# Patient Record
Sex: FEMALE | Race: WHITE | NOT HISPANIC OR LATINO | Employment: OTHER | URBAN - METROPOLITAN AREA
[De-identification: names, ages, dates, MRNs, and addresses within clinical notes are randomized per-mention and may not be internally consistent; named-entity substitution may affect disease eponyms.]

---

## 2018-06-28 ENCOUNTER — APPOINTMENT (EMERGENCY)
Dept: RADIOLOGY | Facility: HOSPITAL | Age: 83
End: 2018-06-28
Payer: MEDICARE

## 2018-06-28 ENCOUNTER — HOSPITAL ENCOUNTER (EMERGENCY)
Facility: HOSPITAL | Age: 83
Discharge: HOME | End: 2018-06-28
Attending: EMERGENCY MEDICINE | Admitting: EMERGENCY MEDICINE
Payer: MEDICARE

## 2018-06-28 VITALS
HEART RATE: 58 BPM | TEMPERATURE: 97.9 F | OXYGEN SATURATION: 97 % | DIASTOLIC BLOOD PRESSURE: 78 MMHG | SYSTOLIC BLOOD PRESSURE: 183 MMHG | WEIGHT: 110 LBS | RESPIRATION RATE: 18 BRPM

## 2018-06-28 DIAGNOSIS — R09.89 CHEST CONGESTION: Primary | ICD-10-CM

## 2018-06-28 DIAGNOSIS — E87.1 HYPONATREMIA: ICD-10-CM

## 2018-06-28 LAB
ALBUMIN SERPL BCP-MCNC: 3.8 G/DL (ref 3.5–5)
ALP SERPL-CCNC: 84 U/L (ref 46–116)
ALT SERPL W P-5'-P-CCNC: 45 U/L (ref 12–78)
ANION GAP SERPL CALCULATED.3IONS-SCNC: 9 MMOL/L (ref 4–13)
AST SERPL W P-5'-P-CCNC: 36 U/L (ref 5–45)
BACTERIA UR QL AUTO: ABNORMAL /HPF
BASOPHILS # BLD AUTO: 0.01 THOUSANDS/ΜL (ref 0–0.1)
BASOPHILS NFR BLD AUTO: 0 % (ref 0–1)
BILIRUB SERPL-MCNC: 0.5 MG/DL (ref 0.2–1)
BILIRUB UR QL STRIP: NEGATIVE
BUN SERPL-MCNC: 15 MG/DL (ref 5–25)
CALCIUM SERPL-MCNC: 9.2 MG/DL (ref 8.3–10.1)
CHLORIDE SERPL-SCNC: 95 MMOL/L (ref 100–108)
CLARITY UR: CLEAR
CO2 SERPL-SCNC: 26 MMOL/L (ref 21–32)
COLOR UR: YELLOW
CREAT SERPL-MCNC: 0.75 MG/DL (ref 0.6–1.3)
EOSINOPHIL # BLD AUTO: 0.18 THOUSAND/ΜL (ref 0–0.61)
EOSINOPHIL NFR BLD AUTO: 3 % (ref 0–6)
ERYTHROCYTE [DISTWIDTH] IN BLOOD BY AUTOMATED COUNT: 13.7 % (ref 11.6–15.1)
GFR SERPL CREATININE-BSD FRML MDRD: 70 ML/MIN/1.73SQ M
GLUCOSE SERPL-MCNC: 96 MG/DL (ref 65–140)
GLUCOSE UR STRIP-MCNC: NEGATIVE MG/DL
HCT VFR BLD AUTO: 41.3 % (ref 34.8–46.1)
HGB BLD-MCNC: 13.8 G/DL (ref 11.5–15.4)
HGB UR QL STRIP.AUTO: NEGATIVE
KETONES UR STRIP-MCNC: NEGATIVE MG/DL
LEUKOCYTE ESTERASE UR QL STRIP: ABNORMAL
LYMPHOCYTES # BLD AUTO: 1.35 THOUSANDS/ΜL (ref 0.6–4.47)
LYMPHOCYTES NFR BLD AUTO: 19 % (ref 14–44)
MAGNESIUM SERPL-MCNC: 2.2 MG/DL (ref 1.6–2.6)
MCH RBC QN AUTO: 32.7 PG (ref 26.8–34.3)
MCHC RBC AUTO-ENTMCNC: 33.4 G/DL (ref 31.4–37.4)
MCV RBC AUTO: 98 FL (ref 82–98)
MONOCYTES # BLD AUTO: 1.18 THOUSAND/ΜL (ref 0.17–1.22)
MONOCYTES NFR BLD AUTO: 17 % (ref 4–12)
NEUTROPHILS # BLD AUTO: 4.41 THOUSANDS/ΜL (ref 1.85–7.62)
NEUTS SEG NFR BLD AUTO: 62 % (ref 43–75)
NITRITE UR QL STRIP: NEGATIVE
NON-SQ EPI CELLS URNS QL MICRO: ABNORMAL /HPF
PH UR STRIP.AUTO: 6 [PH] (ref 4.5–8)
PLATELET # BLD AUTO: 272 THOUSANDS/UL (ref 149–390)
PMV BLD AUTO: 8.7 FL (ref 8.9–12.7)
POTASSIUM SERPL-SCNC: 4 MMOL/L (ref 3.5–5.3)
PROT SERPL-MCNC: 7.6 G/DL (ref 6.4–8.2)
PROT UR STRIP-MCNC: NEGATIVE MG/DL
RBC # BLD AUTO: 4.22 MILLION/UL (ref 3.81–5.12)
RBC #/AREA URNS AUTO: ABNORMAL /HPF
SODIUM SERPL-SCNC: 130 MMOL/L (ref 136–145)
SP GR UR STRIP.AUTO: 1.01 (ref 1–1.03)
TROPONIN I SERPL-MCNC: <0.02 NG/ML
UROBILINOGEN UR QL STRIP.AUTO: 0.2 E.U./DL
WBC # BLD AUTO: 7.13 THOUSAND/UL (ref 4.31–10.16)
WBC #/AREA URNS AUTO: ABNORMAL /HPF

## 2018-06-28 PROCEDURE — 80053 COMPREHEN METABOLIC PANEL: CPT | Performed by: EMERGENCY MEDICINE

## 2018-06-28 PROCEDURE — 73060 X-RAY EXAM OF HUMERUS: CPT

## 2018-06-28 PROCEDURE — 71046 X-RAY EXAM CHEST 2 VIEWS: CPT

## 2018-06-28 PROCEDURE — 36415 COLL VENOUS BLD VENIPUNCTURE: CPT | Performed by: EMERGENCY MEDICINE

## 2018-06-28 PROCEDURE — 96360 HYDRATION IV INFUSION INIT: CPT

## 2018-06-28 PROCEDURE — 93005 ELECTROCARDIOGRAM TRACING: CPT

## 2018-06-28 PROCEDURE — 81001 URINALYSIS AUTO W/SCOPE: CPT

## 2018-06-28 PROCEDURE — 83735 ASSAY OF MAGNESIUM: CPT | Performed by: EMERGENCY MEDICINE

## 2018-06-28 PROCEDURE — 99284 EMERGENCY DEPT VISIT MOD MDM: CPT

## 2018-06-28 PROCEDURE — 84484 ASSAY OF TROPONIN QUANT: CPT | Performed by: EMERGENCY MEDICINE

## 2018-06-28 PROCEDURE — 96361 HYDRATE IV INFUSION ADD-ON: CPT

## 2018-06-28 PROCEDURE — 85025 COMPLETE CBC W/AUTO DIFF WBC: CPT | Performed by: EMERGENCY MEDICINE

## 2018-06-28 RX ORDER — ACETAMINOPHEN 325 MG/1
650 TABLET ORAL EVERY 6 HOURS PRN
COMMUNITY
End: 2019-07-19 | Stop reason: HOSPADM

## 2018-06-28 RX ORDER — GLUCOSAMINE/MSM/CHONDROITIN A 500-83-400
TABLET ORAL DAILY
Status: ON HOLD | COMMUNITY
End: 2022-07-28

## 2018-06-28 RX ORDER — IRBESARTAN 300 MG/1
300 TABLET ORAL
COMMUNITY

## 2018-06-28 RX ORDER — AMIODARONE HYDROCHLORIDE 200 MG/1
200 TABLET ORAL DAILY
COMMUNITY
End: 2020-05-22 | Stop reason: ALTCHOICE

## 2018-06-28 RX ORDER — BIOTIN 1 MG
TABLET ORAL
COMMUNITY

## 2018-06-28 RX ORDER — PRAVASTATIN SODIUM 10 MG
10 TABLET ORAL DAILY
COMMUNITY
End: 2020-04-11 | Stop reason: SDUPTHER

## 2018-06-28 RX ADMIN — SODIUM CHLORIDE 1000 ML: 0.9 INJECTION, SOLUTION INTRAVENOUS at 19:11

## 2018-06-28 NOTE — DISCHARGE INSTRUCTIONS
You may use Mucinex or plain Robitussin as needed for chest congestion as directed on the bottle  Please return to the emergency department for any significantly worsening symptoms  Otherwise follow up with her family physician

## 2018-06-28 NOTE — ED PROVIDER NOTES
History  Chief Complaint   Patient presents with    Cough     pt states when she coughs she is unable to bring up the mucus that is "stuck in her throat" per neice previously pt had less mucus and was dx with pna  denies fevers  also c/o left arm/shoulder pain  History provided by:  Patient and relative   used: No     70-year-old female brought for evaluation of cough and congestion over the past few days, some generalized weakness and also intermittent left arm pain over the last month  No specific injuries  She has been using the arm to push herself up more than usual   Denies any chest pain, shortness of breath  Her sputum is clear to white  She does notes that it is more difficult to clear with a cough  Reports history of pneumonia earlier this year with her only symptom being left-sided shoulder pain  No cardiac disease  On exam she is well appearing overall  Somewhat hypertensive  Heart lung sounds normal  No significant reproducible tenderness of the arm  Plan x-ray of the chest, left humerus to rule out any bony abnormalities, labs, EKG and will re-evaluate  Prior to Admission Medications   Prescriptions Last Dose Informant Patient Reported? Taking?    Cholecalciferol (VITAMIN D3) 1000 units CAPS 6/28/2018 at Unknown time  Yes Yes   Sig: Take by mouth   Coenzyme Q10 (COQ-10) 50 MG CAPS 6/27/2018 at Unknown time  Yes Yes   Sig: Take by mouth daily   Multiple Vitamins-Minerals (CENTRUM SILVER ADULT 50+ PO) 6/28/2018 at Unknown time  Yes Yes   Sig: Take by mouth   Multiple Vitamins-Minerals (ICAPS AREDS 2 PO) 6/28/2018 at Unknown time  Yes Yes   Sig: Take by mouth 2 (two) times a day   Psyllium (METAMUCIL PO) 6/27/2018 at Unknown time  Yes Yes   Sig: Take by mouth   acetaminophen (TYLENOL) 325 mg tablet 6/28/2018 at Unknown time  Yes Yes   Sig: Take 650 mg by mouth every 6 (six) hours as needed for mild pain   amiodarone 200 mg tablet 6/27/2018 at Unknown time  Yes Yes Sig: Take 200 mg by mouth daily   aspirin 81 MG tablet 6/27/2018 at Unknown time  Yes Yes   Sig: Take 81 mg by mouth daily   irbesartan (AVAPRO) 75 mg tablet 6/27/2018 at Unknown time  Yes Yes   Sig: Take 75 mg by mouth daily at bedtime   pravastatin (PRAVACHOL) 10 mg tablet 6/27/2018 at Unknown time  Yes Yes   Sig: Take 10 mg by mouth daily      Facility-Administered Medications: None       Past Medical History:   Diagnosis Date    A-fib ([place] [address] )     Anxiety     Cheilosis     Diverticulosis     Gallstone     Hiatal hernia     Hyperlipidemia     Hypertension     Hyponatremia     Inguinal hernia     Lacunar infarction ([place] [address] )     Macular degeneration     Osteoarthritis     Renal cyst     Syncope     Tuberculosis     Umbilical hernia     Vertigo        History reviewed  No pertinent surgical history  History reviewed  No pertinent family history  I have reviewed and agree with the history as documented  Social History   Substance Use Topics    Smoking status: Never Smoker    Smokeless tobacco: Never Used    Alcohol use No        Review of Systems   Constitutional: Positive for activity change  Negative for appetite change  Respiratory: Positive for cough  Negative for chest tightness and shortness of breath  Cardiovascular: Negative for chest pain  Gastrointestinal: Negative for abdominal pain, nausea and vomiting  Musculoskeletal: Negative for back pain and neck pain  Neurological: Positive for weakness  All other systems reviewed and are negative  Physical Exam  Physical Exam   Constitutional: She is oriented to person, place, and time  She appears well-developed and well-nourished  No distress  HENT:   Head: Normocephalic  Mouth/Throat: Oropharynx is clear and moist    Neck: Normal range of motion  Neck supple  Cardiovascular: Normal rate, regular rhythm, normal heart sounds and intact distal pulses      Pulmonary/Chest: Effort normal and breath sounds normal  She exhibits no tenderness  Abdominal: Soft  She exhibits no distension  There is no tenderness  Musculoskeletal: Normal range of motion  Minimal left upper arm tenderness  No edema  Neurological: She is alert and oriented to person, place, and time  No sensory deficit  She exhibits normal muscle tone  Skin: Skin is warm and dry  Psychiatric: She has a normal mood and affect  Her behavior is normal    Nursing note and vitals reviewed        Vital Signs  ED Triage Vitals [06/28/18 1701]   Temperature Pulse Respirations Blood Pressure SpO2   97 9 °F (36 6 °C) 63 18 (!) 179/77 95 %      Temp Source Heart Rate Source Patient Position - Orthostatic VS BP Location FiO2 (%)   Oral Monitor -- -- --      Pain Score       --           Vitals:    06/28/18 1701 06/28/18 1841 06/28/18 1915   BP: (!) 179/77 (!) 191/79 (!) 183/78   Pulse: 63 58 58       Visual Acuity      ED Medications  Medications   sodium chloride 0 9 % bolus 1,000 mL (1,000 mL Intravenous New Bag 6/28/18 1911)       Diagnostic Studies  Results Reviewed     Procedure Component Value Units Date/Time    Urine Microscopic [[id number]] Collected:  06/28/18 1955    Lab Status:  No result Specimen:  Urine     ED Urine Macroscopic [[id number]]  (Abnormal) Collected:  06/28/18 1955    Lab Status:  Final result Specimen:  Urine Updated:  06/28/18 1949     Color, UA Yellow     Clarity, UA Clear     pH, UA 6 0     Leukocytes, UA Small (A)     Nitrite, UA Negative     Protein, UA Negative mg/dl      Glucose, UA Negative mg/dl      Ketones, UA Negative mg/dl      Urobilinogen, UA 0 2 E U /dl      Bilirubin, UA Negative     Blood, UA Negative     Specific Gravity, UA 1 010    Narrative:       CLINITEK RESULT    Troponin I [[id number]]  (Normal) Collected:  06/28/18 1906    Lab Status:  Final result Specimen:  Blood from Arm, Right Updated:  06/28/18 1931     Troponin I <0 02 ng/mL     Comprehensive metabolic panel [[id number]]  (Abnormal) Collected:  06/28/18 1823    Lab Status:  Final result Specimen:  Blood from Arm, Right Updated:  06/28/18 1851     Sodium 130 (L) mmol/L      Potassium 4 0 mmol/L      Chloride 95 (L) mmol/L      CO2 26 mmol/L      Anion Gap 9 mmol/L      BUN 15 mg/dL      Creatinine 0 75 mg/dL      Glucose 96 mg/dL      Calcium 9 2 mg/dL      AST 36 U/L      ALT 45 U/L      Alkaline Phosphatase 84 U/L      Total Protein 7 6 g/dL      Albumin 3 8 g/dL      Total Bilirubin 0 50 mg/dL      eGFR 70 ml/min/1 73sq m     Narrative:         National Kidney Disease Education Program recommendations are as follows:  GFR calculation is accurate only with a steady state creatinine  Chronic Kidney disease less than 60 ml/min/1 73 sq  meters  Kidney failure less than 15 ml/min/1 73 sq  meters  Magnesium [[id number]]  (Normal) Collected:  06/28/18 1823    Lab Status:  Final result Specimen:  Blood from Arm, Right Updated:  06/28/18 1851     Magnesium 2 2 mg/dL     CBC and differential [[id number]]  (Abnormal) Collected:  06/28/18 1823    Lab Status:  Final result Specimen:  Blood from Arm, Right Updated:  06/28/18 1830     WBC 7 13 Thousand/uL      RBC 4 22 Million/uL      Hemoglobin 13 8 g/dL      Hematocrit 41 3 %      MCV 98 fL      MCH 32 7 pg      MCHC 33 4 g/dL      RDW 13 7 %      MPV 8 7 (L) fL      Platelets 561 Thousands/uL      Neutrophils Relative 62 %      Lymphocytes Relative 19 %      Monocytes Relative 17 (H) %      Eosinophils Relative 3 %      Basophils Relative 0 %      Neutrophils Absolute 4 41 Thousands/µL      Lymphocytes Absolute 1 35 Thousands/µL      Monocytes Absolute 1 18 Thousand/µL      Eosinophils Absolute 0 18 Thousand/µL      Basophils Absolute 0 01 Thousands/µL                  XR humerus LEFT   ED Interpretation by Stevo Dailey MD (06/28 1826)   Normal      Final Result by Colten Joshi MD (06/28 1834)      No acute osseous abnormality              Workstation performed: [id number]         XR chest 2 views   ED Interpretation by Courtney Ramirez MD (06/28 1826)   Normal       Final Result by Nova Rodriguez MD (06/28 1829)      Mild cardiomegaly  Blunting of the left costophrenic sulcus likely on the basis of scarring  Workstation performed: [id number]                    Procedures  ECG 12 Lead Documentation  Date/Time: 6/28/2018 6:50 PM  Performed by: Manoj Delaney  Authorized by: Manoj Delaney     Indications / Diagnosis:  ACS  ECG reviewed by me, the ED Provider: yes    Patient location:  ED  Rate:     ECG rate:  58  Rhythm:     Rhythm: sinus bradycardia    Ectopy:     Ectopy: none    QRS:     QRS axis:  Normal  Conduction:     Conduction: normal    ST segments:     ST segments:  Non-specific  T waves:     T waves: non-specific             Phone Contacts  ED Phone Contact    ED Course                               MDM  Number of Diagnoses or Management Options  Diagnosis management comments: 80-year-old female with left arm pain, chest congestion, cough and some generalized weakness  Workup negative for ACS, bony abnormality  Some mild hyponatremia on lab work which appears to be chronic  Patient had also later mentioned urinary frequency  Urine with small leukocytes  Will follow culture         Amount and/or Complexity of Data Reviewed  Clinical lab tests: ordered and reviewed  Tests in the radiology section of CPT®: ordered and reviewed  Independent visualization of images, tracings, or specimens: yes    Patient Progress  Patient progress: stable    CritCare Time    Disposition  Final diagnoses:   Chest congestion   Hyponatremia     Time reflects when diagnosis was documented in both MDM as applicable and the Disposition within this note     Time User Action Codes Description Comment    6/28/2018  7:56 PM Megan Centeno Add [R09 89] Chest congestion     6/28/2018  7:56 PM Adan PADILLA Add [E87 1] Hyponatremia       ED Disposition     ED Disposition Condition Comment    Discharge  Gladis PENDLETON Genny discharge to home/self care  Condition at discharge: Good        Follow-up Information     Follow up With Specialties Details Why Contact Info Additional Information    Meredith Iglesias MD Family Medicine   [address]  [address] [facility]  If symptoms worsen [address] [facility] [address] [phone]  ED, [address], [place], [place], [address]          Patient's Medications   Discharge Prescriptions    No medications on file     No discharge procedures on file      ED Provider  Electronically Signed by           Francisco Sinclair MD  06/28/18 8765

## 2018-06-29 LAB
ATRIAL RATE: 202 BPM
QRS AXIS: 55 DEGREES
QRSD INTERVAL: 84 MS
QT INTERVAL: 418 MS
QTC INTERVAL: 410 MS
T WAVE AXIS: 19 DEGREES
VENTRICULAR RATE: 58 BPM

## 2018-06-29 PROCEDURE — 93010 ELECTROCARDIOGRAM REPORT: CPT | Performed by: INTERNAL MEDICINE

## 2018-11-30 ENCOUNTER — APPOINTMENT (EMERGENCY)
Dept: RADIOLOGY | Facility: HOSPITAL | Age: 83
End: 2018-11-30
Payer: MEDICARE

## 2018-11-30 ENCOUNTER — HOSPITAL ENCOUNTER (OUTPATIENT)
Facility: HOSPITAL | Age: 83
Setting detail: OBSERVATION
Discharge: HOME HEALTH | End: 2018-12-01
Attending: EMERGENCY MEDICINE | Admitting: INTERNAL MEDICINE
Payer: MEDICARE

## 2018-11-30 DIAGNOSIS — I10 HYPERTENSION: ICD-10-CM

## 2018-11-30 DIAGNOSIS — I38 VALVULAR HEART DISEASE: Chronic | ICD-10-CM

## 2018-11-30 DIAGNOSIS — I48.0 PAF (PAROXYSMAL ATRIAL FIBRILLATION) (HCC): Chronic | ICD-10-CM

## 2018-11-30 DIAGNOSIS — R26.2 AMBULATORY DYSFUNCTION: ICD-10-CM

## 2018-11-30 DIAGNOSIS — R60.0 LOWER EXTREMITY EDEMA: ICD-10-CM

## 2018-11-30 DIAGNOSIS — R07.9 CHEST PAIN: ICD-10-CM

## 2018-11-30 DIAGNOSIS — I16.0 HYPERTENSIVE URGENCY: ICD-10-CM

## 2018-11-30 DIAGNOSIS — E87.1 HYPONATREMIA: Chronic | ICD-10-CM

## 2018-11-30 DIAGNOSIS — R07.9 CHEST PAIN, UNSPECIFIED TYPE: Primary | ICD-10-CM

## 2018-11-30 LAB
ALBUMIN SERPL BCP-MCNC: 4 G/DL (ref 3.5–5)
ALP SERPL-CCNC: 90 U/L (ref 46–116)
ALT SERPL W P-5'-P-CCNC: 34 U/L (ref 12–78)
ANION GAP SERPL CALCULATED.3IONS-SCNC: 9 MMOL/L (ref 4–13)
APTT PPP: 29 SECONDS (ref 26–38)
AST SERPL W P-5'-P-CCNC: 26 U/L (ref 5–45)
ATRIAL RATE: 69 BPM
BASOPHILS # BLD AUTO: 0.03 THOUSANDS/ΜL (ref 0–0.1)
BASOPHILS NFR BLD AUTO: 1 % (ref 0–1)
BILIRUB DIRECT SERPL-MCNC: 0.19 MG/DL (ref 0–0.2)
BILIRUB SERPL-MCNC: 0.6 MG/DL (ref 0.2–1)
BUN SERPL-MCNC: 8 MG/DL (ref 5–25)
CALCIUM SERPL-MCNC: 9.5 MG/DL (ref 8.3–10.1)
CHLORIDE SERPL-SCNC: 96 MMOL/L (ref 100–108)
CO2 SERPL-SCNC: 25 MMOL/L (ref 21–32)
CREAT SERPL-MCNC: 0.62 MG/DL (ref 0.6–1.3)
EOSINOPHIL # BLD AUTO: 0.07 THOUSAND/ΜL (ref 0–0.61)
EOSINOPHIL NFR BLD AUTO: 1 % (ref 0–6)
ERYTHROCYTE [DISTWIDTH] IN BLOOD BY AUTOMATED COUNT: 12.7 % (ref 11.6–15.1)
GFR SERPL CREATININE-BSD FRML MDRD: 79 ML/MIN/1.73SQ M
GLUCOSE SERPL-MCNC: 94 MG/DL (ref 65–140)
HCT VFR BLD AUTO: 40.1 % (ref 34.8–46.1)
HGB BLD-MCNC: 13.7 G/DL (ref 11.5–15.4)
IMM GRANULOCYTES # BLD AUTO: 0.02 THOUSAND/UL (ref 0–0.2)
IMM GRANULOCYTES NFR BLD AUTO: 0 % (ref 0–2)
INR PPP: 1.01 (ref 0.86–1.17)
LYMPHOCYTES # BLD AUTO: 1.1 THOUSANDS/ΜL (ref 0.6–4.47)
LYMPHOCYTES NFR BLD AUTO: 19 % (ref 14–44)
MCH RBC QN AUTO: 33.9 PG (ref 26.8–34.3)
MCHC RBC AUTO-ENTMCNC: 34.2 G/DL (ref 31.4–37.4)
MCV RBC AUTO: 99 FL (ref 82–98)
MONOCYTES # BLD AUTO: 0.79 THOUSAND/ΜL (ref 0.17–1.22)
MONOCYTES NFR BLD AUTO: 14 % (ref 4–12)
NEUTROPHILS # BLD AUTO: 3.77 THOUSANDS/ΜL (ref 1.85–7.62)
NEUTS SEG NFR BLD AUTO: 65 % (ref 43–75)
NRBC BLD AUTO-RTO: 0 /100 WBCS
NT-PROBNP SERPL-MCNC: 148 PG/ML
P AXIS: 96 DEGREES
PLATELET # BLD AUTO: 283 THOUSANDS/UL (ref 149–390)
PLATELET # BLD AUTO: 297 THOUSANDS/UL (ref 149–390)
PMV BLD AUTO: 8.4 FL (ref 8.9–12.7)
PMV BLD AUTO: 8.8 FL (ref 8.9–12.7)
POTASSIUM SERPL-SCNC: 4 MMOL/L (ref 3.5–5.3)
PR INTERVAL: 180 MS
PROT SERPL-MCNC: 7.7 G/DL (ref 6.4–8.2)
PROTHROMBIN TIME: 13 SECONDS (ref 11.8–14.2)
QRS AXIS: 15 DEGREES
QRSD INTERVAL: 92 MS
QT INTERVAL: 398 MS
QTC INTERVAL: 426 MS
RBC # BLD AUTO: 4.04 MILLION/UL (ref 3.81–5.12)
SODIUM SERPL-SCNC: 130 MMOL/L (ref 136–145)
T WAVE AXIS: 44 DEGREES
TROPONIN I SERPL-MCNC: 0.02 NG/ML
TROPONIN I SERPL-MCNC: <0.02 NG/ML
TSH SERPL DL<=0.05 MIU/L-ACNC: 1.36 UIU/ML (ref 0.36–3.74)
VENTRICULAR RATE: 69 BPM
WBC # BLD AUTO: 5.78 THOUSAND/UL (ref 4.31–10.16)

## 2018-11-30 PROCEDURE — 36415 COLL VENOUS BLD VENIPUNCTURE: CPT | Performed by: EMERGENCY MEDICINE

## 2018-11-30 PROCEDURE — 93010 ELECTROCARDIOGRAM REPORT: CPT | Performed by: INTERNAL MEDICINE

## 2018-11-30 PROCEDURE — 85610 PROTHROMBIN TIME: CPT | Performed by: EMERGENCY MEDICINE

## 2018-11-30 PROCEDURE — 80048 BASIC METABOLIC PNL TOTAL CA: CPT | Performed by: EMERGENCY MEDICINE

## 2018-11-30 PROCEDURE — 84484 ASSAY OF TROPONIN QUANT: CPT | Performed by: INTERNAL MEDICINE

## 2018-11-30 PROCEDURE — 84484 ASSAY OF TROPONIN QUANT: CPT | Performed by: EMERGENCY MEDICINE

## 2018-11-30 PROCEDURE — 85025 COMPLETE CBC W/AUTO DIFF WBC: CPT | Performed by: EMERGENCY MEDICINE

## 2018-11-30 PROCEDURE — 71046 X-RAY EXAM CHEST 2 VIEWS: CPT

## 2018-11-30 PROCEDURE — 85730 THROMBOPLASTIN TIME PARTIAL: CPT | Performed by: EMERGENCY MEDICINE

## 2018-11-30 PROCEDURE — 99285 EMERGENCY DEPT VISIT HI MDM: CPT

## 2018-11-30 PROCEDURE — 80076 HEPATIC FUNCTION PANEL: CPT | Performed by: EMERGENCY MEDICINE

## 2018-11-30 PROCEDURE — 84443 ASSAY THYROID STIM HORMONE: CPT | Performed by: INTERNAL MEDICINE

## 2018-11-30 PROCEDURE — 96374 THER/PROPH/DIAG INJ IV PUSH: CPT

## 2018-11-30 PROCEDURE — 99220 PR INITIAL OBSERVATION CARE/DAY 70 MINUTES: CPT | Performed by: INTERNAL MEDICINE

## 2018-11-30 PROCEDURE — 85049 AUTOMATED PLATELET COUNT: CPT | Performed by: INTERNAL MEDICINE

## 2018-11-30 PROCEDURE — 93005 ELECTROCARDIOGRAM TRACING: CPT

## 2018-11-30 PROCEDURE — 83880 ASSAY OF NATRIURETIC PEPTIDE: CPT | Performed by: EMERGENCY MEDICINE

## 2018-11-30 RX ORDER — ASPIRIN 81 MG/1
81 TABLET, CHEWABLE ORAL DAILY
Status: DISCONTINUED | OUTPATIENT
Start: 2018-12-01 | End: 2018-12-01 | Stop reason: HOSPADM

## 2018-11-30 RX ORDER — CLOTRIMAZOLE AND BETAMETHASONE DIPROPIONATE 10; .64 MG/G; MG/G
CREAM TOPICAL DAILY
COMMUNITY
End: 2020-06-11

## 2018-11-30 RX ORDER — ONDANSETRON 2 MG/ML
4 INJECTION INTRAMUSCULAR; INTRAVENOUS EVERY 6 HOURS PRN
Status: DISCONTINUED | OUTPATIENT
Start: 2018-11-30 | End: 2018-12-01 | Stop reason: HOSPADM

## 2018-11-30 RX ORDER — NITROGLYCERIN 0.4 MG/1
0.4 TABLET SUBLINGUAL
Status: DISCONTINUED | OUTPATIENT
Start: 2018-11-30 | End: 2018-12-01 | Stop reason: HOSPADM

## 2018-11-30 RX ORDER — AMIODARONE HYDROCHLORIDE 200 MG/1
200 TABLET ORAL DAILY
Status: DISCONTINUED | OUTPATIENT
Start: 2018-11-30 | End: 2018-12-01 | Stop reason: HOSPADM

## 2018-11-30 RX ORDER — ACETAMINOPHEN 325 MG/1
650 TABLET ORAL EVERY 6 HOURS PRN
Status: DISCONTINUED | OUTPATIENT
Start: 2018-11-30 | End: 2018-12-01 | Stop reason: HOSPADM

## 2018-11-30 RX ORDER — HYDRALAZINE HYDROCHLORIDE 20 MG/ML
10 INJECTION INTRAMUSCULAR; INTRAVENOUS ONCE
Status: COMPLETED | OUTPATIENT
Start: 2018-11-30 | End: 2018-11-30

## 2018-11-30 RX ORDER — HYDRALAZINE HYDROCHLORIDE 20 MG/ML
5 INJECTION INTRAMUSCULAR; INTRAVENOUS EVERY 6 HOURS PRN
Status: DISCONTINUED | OUTPATIENT
Start: 2018-11-30 | End: 2018-12-01 | Stop reason: HOSPADM

## 2018-11-30 RX ORDER — PRAVASTATIN SODIUM 10 MG
10 TABLET ORAL DAILY
Status: DISCONTINUED | OUTPATIENT
Start: 2018-12-01 | End: 2018-11-30

## 2018-11-30 RX ORDER — MELATONIN
1000 DAILY
Status: DISCONTINUED | OUTPATIENT
Start: 2018-12-01 | End: 2018-12-01 | Stop reason: HOSPADM

## 2018-11-30 RX ORDER — CLOTRIMAZOLE AND BETAMETHASONE DIPROPIONATE 10; .64 MG/G; MG/G
CREAM TOPICAL 2 TIMES DAILY
Status: DISCONTINUED | OUTPATIENT
Start: 2018-11-30 | End: 2018-12-01 | Stop reason: HOSPADM

## 2018-11-30 RX ORDER — PRAVASTATIN SODIUM 10 MG
10 TABLET ORAL DAILY
Status: DISCONTINUED | OUTPATIENT
Start: 2018-11-30 | End: 2018-12-01 | Stop reason: HOSPADM

## 2018-11-30 RX ORDER — HYDRALAZINE HYDROCHLORIDE 20 MG/ML
10 INJECTION INTRAMUSCULAR; INTRAVENOUS ONCE
Status: DISCONTINUED | OUTPATIENT
Start: 2018-11-30 | End: 2018-11-30

## 2018-11-30 RX ORDER — AMIODARONE HYDROCHLORIDE 200 MG/1
200 TABLET ORAL DAILY
Status: DISCONTINUED | OUTPATIENT
Start: 2018-12-01 | End: 2018-11-30

## 2018-11-30 RX ORDER — GLUCOSAMINE/MSM/CHONDROITIN A 500-83-400
TABLET ORAL DAILY
Status: DISCONTINUED | OUTPATIENT
Start: 2018-12-01 | End: 2018-11-30

## 2018-11-30 RX ORDER — LOSARTAN POTASSIUM 50 MG/1
50 TABLET ORAL
Status: DISCONTINUED | OUTPATIENT
Start: 2018-11-30 | End: 2018-12-01 | Stop reason: HOSPADM

## 2018-11-30 RX ORDER — ACETAMINOPHEN 325 MG/1
650 TABLET ORAL EVERY 6 HOURS PRN
Status: DISCONTINUED | OUTPATIENT
Start: 2018-11-30 | End: 2018-11-30

## 2018-11-30 RX ADMIN — LOSARTAN POTASSIUM 50 MG: 50 TABLET, FILM COATED ORAL at 21:31

## 2018-11-30 RX ADMIN — PRAVASTATIN SODIUM 10 MG: 10 TABLET ORAL at 21:46

## 2018-11-30 RX ADMIN — HYDRALAZINE HYDROCHLORIDE 10 MG: 20 INJECTION INTRAMUSCULAR; INTRAVENOUS at 17:06

## 2018-11-30 RX ADMIN — CLOTRIMAZOLE AND BETAMETHASONE DIPROPIONATE: 10; .5 CREAM TOPICAL at 21:31

## 2018-11-30 RX ADMIN — AMIODARONE HYDROCHLORIDE 200 MG: 200 TABLET ORAL at 21:46

## 2018-11-30 RX ADMIN — PSYLLIUM HUSK 1 PACKET: 3.4 POWDER ORAL at 21:31

## 2018-11-30 NOTE — ED PROVIDER NOTES
History  Chief Complaint   Patient presents with    Chest Pain     Pt  complains of two episodes of nausea, chest tighntess, and rapid breathing this afternoon while straining to have a bm  (+) Hx  of a-fib  History provided by:  Patient  Chest Pain   Pain location:  Substernal area  Pain quality: aching    Pain radiates to:  Does not radiate  Pain severity:  Moderate  Onset quality:  Gradual  Duration:  1 hour  Timing:  Intermittent  Progression:  Waxing and waning  Chronicity:  New  Context: not breathing    Relieved by:  None tried  Worsened by:  Nothing tried  Ineffective treatments:  None tried  Associated symptoms: no abdominal pain, no cough, no diaphoresis, no dizziness, no fever, no headache, no nausea, no numbness, no palpitations, no shortness of breath and not vomiting        Prior to Admission Medications   Prescriptions Last Dose Informant Patient Reported? Taking?    Cholecalciferol (VITAMIN D3) 1000 units CAPS   Yes Yes   Sig: Take by mouth   Coenzyme Q10 (COQ-10) 50 MG CAPS   Yes Yes   Sig: Take by mouth daily   Multiple Vitamins-Minerals (CENTRUM SILVER ADULT 50+ PO)   Yes Yes   Sig: Take by mouth   Multiple Vitamins-Minerals (ICAPS AREDS 2 PO)   Yes Yes   Sig: Take by mouth 2 (two) times a day   Psyllium (METAMUCIL PO)   Yes Yes   Sig: Take by mouth   acetaminophen (TYLENOL) 325 mg tablet   Yes Yes   Sig: Take 650 mg by mouth every 6 (six) hours as needed for mild pain   amiodarone 200 mg tablet   Yes Yes   Sig: Take 200 mg by mouth daily   aspirin 81 MG tablet   Yes Yes   Sig: Take 81 mg by mouth daily   clotrimazole-betamethasone (LOTRISONE) 1-0 05 % cream   Yes Yes   Sig: Apply topically 2 (two) times a day   irbesartan (AVAPRO) 75 mg tablet   Yes Yes   Sig: Take 75 mg by mouth daily at bedtime   pravastatin (PRAVACHOL) 10 mg tablet   Yes Yes   Sig: Take 10 mg by mouth daily      Facility-Administered Medications: None       Past Medical History:   Diagnosis Date    A-fib ([place] [id number] )     Anxiety     Cheilosis     Diverticulosis     Gallstone     Hiatal hernia     Hyperlipidemia     Hypertension     Hyponatremia     Inguinal hernia     Lacunar infarction     Macular degeneration     Osteoarthritis     Renal cyst     Syncope     Tuberculosis     Umbilical hernia     Vertigo        History reviewed  No pertinent surgical history  History reviewed  No pertinent family history  I have reviewed and agree with the history as documented  Social History   Substance Use Topics    Smoking status: Never Smoker    Smokeless tobacco: Never Used    Alcohol use No        Review of Systems   Constitutional: Negative for activity change, chills, diaphoresis and fever  HENT: Negative for congestion, sinus pressure and sore throat  Eyes: Negative for pain and visual disturbance  Respiratory: Negative for cough, chest tightness, shortness of breath, wheezing and stridor  Cardiovascular: Positive for chest pain  Negative for palpitations  Gastrointestinal: Negative for abdominal distention, abdominal pain, constipation, diarrhea, nausea and vomiting  Genitourinary: Negative for dysuria and frequency  Musculoskeletal: Negative for neck pain and neck stiffness  Skin: Negative for rash  Neurological: Negative for dizziness, speech difficulty, light-headedness, numbness and headaches  Physical Exam  Physical Exam   Constitutional: She is oriented to person, place, and time  She appears well-developed  No distress  HENT:   Head: Normocephalic and atraumatic  Eyes: Pupils are equal, round, and reactive to light  Neck: Normal range of motion  Neck supple  No tracheal deviation present  Cardiovascular: Normal rate, regular rhythm, normal heart sounds and intact distal pulses  No murmur heard  Pulmonary/Chest: Effort normal and breath sounds normal  No stridor  No respiratory distress  Abdominal: Soft  She exhibits no distension  There is no tenderness   There is no rebound and no guarding  Musculoskeletal: Normal range of motion  She exhibits edema  Neurological: She is alert and oriented to person, place, and time  Skin: Skin is warm and dry  She is not diaphoretic  No erythema  No pallor  Psychiatric: She has a normal mood and affect  Vitals reviewed        Vital Signs  ED Triage Vitals [11/30/18 1600]   Temperature Pulse Respirations Blood Pressure SpO2   97 8 °F (36 6 °C) 70 18 (!) 212/91 99 %      Temp Source Heart Rate Source Patient Position - Orthostatic VS BP Location FiO2 (%)   Oral Monitor Sitting Right arm --      Pain Score       No Pain           Vitals:    11/30/18 1745 11/30/18 1800 11/30/18 1824 11/30/18 1913   BP: 158/69 153/70 (!) 171/71 (!) 171/74   Pulse: 66 66 73 70   Patient Position - Orthostatic VS: Lying Lying Sitting Lying       Visual Acuity      ED Medications  Medications   amiodarone tablet 200 mg (not administered)   aspirin chewable tablet 81 mg (not administered)   cholecalciferol (VITAMIN D3) tablet 1,000 Units (not administered)   clotrimazole-betamethasone (LOTRISONE) 1-0 05 % cream (not administered)   multivitamin-minerals (CENTRUM) tablet 1 tablet (not administered)   pravastatin (PRAVACHOL) tablet 10 mg (not administered)   psyllium (METAMUCIL) 1 packet (not administered)   losartan (COZAAR) tablet 50 mg (not administered)   ondansetron (ZOFRAN) injection 4 mg (not administered)   enoxaparin (LOVENOX) subcutaneous injection 40 mg (not administered)   acetaminophen (TYLENOL) tablet 650 mg (not administered)   hydrALAZINE (APRESOLINE) injection 5 mg (not administered)   nitroglycerin (NITROSTAT) SL tablet 0 4 mg (not administered)   hydrALAZINE (APRESOLINE) injection 10 mg (10 mg Intravenous Given 11/30/18 1706)       Diagnostic Studies  Results Reviewed     Procedure Component Value Units Date/Time    TSH, 3rd generation [[id number]]  (Normal) Collected:  11/30/18 1639    Lab Status:  Final result Specimen:  Blood from Arm, Right Updated:  11/30/18 2029     TSH 3RD GENERATON 1 362 uIU/mL     Narrative:         Patients undergoing fluorescein dye angiography may retain small amounts of fluorescein in the body for 48-72 hours post procedure  Samples containing fluorescein can produce falsely depressed TSH values  If the patient had this procedure,a specimen should be resubmitted post fluorescein clearance  The recommended reference ranges for TSH during pregnancy are as follows:  First trimester 0 1 to 2 5 uIU/mL  Second trimester  0 2 to 3 0 uIU/mL  Third trimester 0 3 to 3 0 uIU/m      Hepatic function panel [[id number]]  (Normal) Collected:  11/30/18 1639    Lab Status:  Final result Specimen:  Blood from Arm, Right Updated:  11/30/18 1734     Total Bilirubin 0 60 mg/dL      Bilirubin, Direct 0 19 mg/dL      Alkaline Phosphatase 90 U/L      AST 26 U/L      ALT 34 U/L      Total Protein 7 7 g/dL      Albumin 4 0 g/dL     B-type natriuretic peptide [[id number]]  (Normal) Collected:  11/30/18 1639    Lab Status:  Final result Specimen:  Blood from Arm, Right Updated:  11/30/18 1734     NT-proBNP 148 pg/mL     Troponin I [[id number]]  (Normal) Collected:  11/30/18 1639    Lab Status:  Final result Specimen:  Blood from Arm, Right Updated:  11/30/18 1729     Troponin I 0 02 ng/mL     Basic metabolic panel [[id number]]  (Abnormal) Collected:  11/30/18 1639    Lab Status:  Final result Specimen:  Blood from Arm, Right Updated:  11/30/18 1727     Sodium 130 (L) mmol/L      Potassium 4 0 mmol/L      Chloride 96 (L) mmol/L      CO2 25 mmol/L      ANION GAP 9 mmol/L      BUN 8 mg/dL      Creatinine 0 62 mg/dL      Glucose 94 mg/dL      Calcium 9 5 mg/dL      eGFR 79 ml/min/1 73sq m     Narrative:         National Kidney Disease Education Program recommendations are as follows:  GFR calculation is accurate only with a steady state creatinine  Chronic Kidney disease less than 60 ml/min/1 73 sq  meters  Kidney failure less than 15 ml/min/1 73 sq  meters  Protime-INR [[id number]]  (Normal) Collected:  11/30/18 1639    Lab Status:  Final result Specimen:  Blood from Arm, Right Updated:  11/30/18 1721     Protime 13 0 seconds      INR 1 01    APTT [[id number]]  (Normal) Collected:  11/30/18 1639    Lab Status:  Final result Specimen:  Blood from Arm, Right Updated:  11/30/18 1721     PTT 29 seconds     CBC and differential [[id number]]  (Abnormal) Collected:  11/30/18 1639    Lab Status:  Final result Specimen:  Blood from Arm, Right Updated:  11/30/18 1701     WBC 5 78 Thousand/uL      RBC 4 04 Million/uL      Hemoglobin 13 7 g/dL      Hematocrit 40 1 %      MCV 99 (H) fL      MCH 33 9 pg      MCHC 34 2 g/dL      RDW 12 7 %      MPV 8 8 (L) fL      Platelets 767 Thousands/uL      nRBC 0 /100 WBCs      Neutrophils Relative 65 %      Immat GRANS % 0 %      Lymphocytes Relative 19 %      Monocytes Relative 14 (H) %      Eosinophils Relative 1 %      Basophils Relative 1 %      Neutrophils Absolute 3 77 Thousands/µL      Immature Grans Absolute 0 02 Thousand/uL      Lymphocytes Absolute 1 10 Thousands/µL      Monocytes Absolute 0 79 Thousand/µL      Eosinophils Absolute 0 07 Thousand/µL      Basophils Absolute 0 03 Thousands/µL                  XR chest 2 views   ED Interpretation by Isaac Sierra DO (11/30 1655)   Cardiomegaly, no acute pathology      Final Result by Siomara Mcdonald MD (11/30 1701)      Stable mild cardiomegaly  Stable mild blunting of the costophrenic sulci, possibly related to pleural thickening versus trace pleural effusions              Workstation performed: [id number]                    Procedures  ECG 12 Lead Documentation  Date/Time: 11/30/2018 4:50 PM  Performed by: Marcelo Hall by: Mihaela Tupelo     ECG reviewed by me, the ED Provider: yes    Patient location:  ED  Previous ECG:     Previous ECG:  Compared to current    Comparison ECG info:  6 28 2018  Interpretation:     Interpretation: non-specific    Rate: ECG rate:  69    ECG rate assessment: normal    Rhythm:     Rhythm: sinus rhythm    Ectopy:     Ectopy: none    QRS:     QRS axis:  Normal    QRS intervals:  Normal  Conduction:     Conduction: normal    ST segments:     ST segments:  Non-specific  T waves:     T waves: non-specific             Phone Contacts  ED Phone Contact    ED Course                               MDM  Number of Diagnoses or Management Options  Chest pain, unspecified type: new and requires workup  Hypertensive urgency: new and requires workup  Diagnosis management comments:       Initial ED assessment:  79yo female presents with chest pain  , intermittent over the past hour   Brought in by EMS, received 324mg aspirin pre-hospital    Hypertensive here  Initial DDx includes but is not limited to:   Hypertensive urgency, ACS, angina, esophagitis, pulmonary pathology    Initial ED plan:   Blood work, EKG, admission        Final ED summary/disposition:   After evaluation and workup in the emergency department, blood pressure improved with IV hydralazine  , will admit the hospital for further workup evaluation       Amount and/or Complexity of Data Reviewed  Clinical lab tests: ordered and reviewed  Tests in the radiology section of CPT®: ordered and reviewed  Decide to obtain previous medical records or to obtain history from someone other than the patient: yes  Obtain history from someone other than the patient: yes  Review and summarize past medical records: yes  Discuss the patient with other providers: yes  Independent visualization of images, tracings, or specimens: yes      CritCare Time    Disposition  Final diagnoses:   Chest pain, unspecified type   Hypertensive urgency     Time reflects when diagnosis was documented in both MDM as applicable and the Disposition within this note     Time User Action Codes Description Comment    11/30/2018  5:57 PM Deana Zamudio Add [R07 9] Chest pain, unspecified type     11/30/2018  5:57 PM Jeff Paredes Ruddy PADILLA Add [I16 0] Hypertensive urgency     11/30/2018  8:00 PM Kip, Alpiniano B Modify [R07 9] Chest pain, unspecified type     11/30/2018  8:00 PM Kip, Alpiniano B Modify [I16 0] Hypertensive urgency     11/30/2018  8:00 PM Kip, Alpiniano B Add [R07 9] Chest pain     11/30/2018  8:00 PM Kip, Alpiniano B Modify [R07 9] Chest pain, unspecified type     11/30/2018  8:00 PM Kip, Alpiniano B Modify [I16 0] Hypertensive urgency     11/30/2018  8:00 PM Kip, Alpiniano B Modify [R07 9] Chest pain, unspecified type     11/30/2018  8:00 PM Kip, Alpiniano B Modify [I16 0] Hypertensive urgency     11/30/2018  8:00 PM Kip, Alpiniano B Add [R60 0] Lower extremity edema     11/30/2018  8:00 PM Kip, Alpiniano B Modify [R07 9] Chest pain, unspecified type     11/30/2018  8:00 PM Kip, Alpiniano B Modify [I16 0] Hypertensive urgency     11/30/2018  8:01 PM Kip, Alpiniano B Modify [R07 9] Chest pain, unspecified type     11/30/2018  8:01 PM Kip, Alpiniano B Modify [I16 0] Hypertensive urgency       ED Disposition     ED Disposition Condition Comment    Admit  Case was discussed with Dr Fe Malone and the patient's admission status was agreed to be Admission Status: observation status to the service of Dr Stacy Keenan           Follow-up Information    None         Current Discharge Medication List      CONTINUE these medications which have NOT CHANGED    Details   acetaminophen (TYLENOL) 325 mg tablet Take 650 mg by mouth every 6 (six) hours as needed for mild pain      amiodarone 200 mg tablet Take 200 mg by mouth daily      aspirin 81 MG tablet Take 81 mg by mouth daily      Cholecalciferol (VITAMIN D3) 1000 units CAPS Take by mouth      clotrimazole-betamethasone (LOTRISONE) 1-0 05 % cream Apply topically 2 (two) times a day      Coenzyme Q10 (COQ-10) 50 MG CAPS Take by mouth daily      irbesartan (AVAPRO) 75 mg tablet Take 75 mg by mouth daily at bedtime      Multiple Vitamins-Minerals (CENTRUM SILVER ADULT 50+ PO) Take by mouth      Multiple Vitamins-Minerals (ICAPS AREDS 2 PO) Take by mouth 2 (two) times a day      pravastatin (PRAVACHOL) 10 mg tablet Take 10 mg by mouth daily      Psyllium (METAMUCIL PO) Take by mouth           No discharge procedures on file      ED Provider  Electronically Signed by           Edin Kinsey DO  11/30/18 2029

## 2018-11-30 NOTE — ED NOTES
Dr Renato Andrews awaiting call back from [facility] in regards to admission status, pt  To be transported after speaking to BAYRON Brooks, SADIE  11/30/18 7364

## 2018-12-01 VITALS
TEMPERATURE: 98.1 F | RESPIRATION RATE: 18 BRPM | HEART RATE: 66 BPM | WEIGHT: 144.62 LBS | DIASTOLIC BLOOD PRESSURE: 65 MMHG | SYSTOLIC BLOOD PRESSURE: 137 MMHG | OXYGEN SATURATION: 95 %

## 2018-12-01 LAB
ANION GAP SERPL CALCULATED.3IONS-SCNC: 11 MMOL/L (ref 4–13)
BUN SERPL-MCNC: 9 MG/DL (ref 5–25)
CALCIUM SERPL-MCNC: 9.2 MG/DL (ref 8.3–10.1)
CHLORIDE SERPL-SCNC: 100 MMOL/L (ref 100–108)
CO2 SERPL-SCNC: 23 MMOL/L (ref 21–32)
CREAT SERPL-MCNC: 0.59 MG/DL (ref 0.6–1.3)
GFR SERPL CREATININE-BSD FRML MDRD: 80 ML/MIN/1.73SQ M
GLUCOSE P FAST SERPL-MCNC: 87 MG/DL (ref 65–99)
GLUCOSE SERPL-MCNC: 87 MG/DL (ref 65–140)
MAGNESIUM SERPL-MCNC: 2.1 MG/DL (ref 1.6–2.6)
POTASSIUM SERPL-SCNC: 3.8 MMOL/L (ref 3.5–5.3)
SODIUM SERPL-SCNC: 134 MMOL/L (ref 136–145)
TROPONIN I SERPL-MCNC: 0.02 NG/ML

## 2018-12-01 PROCEDURE — G8979 MOBILITY GOAL STATUS: HCPCS

## 2018-12-01 PROCEDURE — G8978 MOBILITY CURRENT STATUS: HCPCS

## 2018-12-01 PROCEDURE — 99217 PR OBSERVATION CARE DISCHARGE MANAGEMENT: CPT | Performed by: INTERNAL MEDICINE

## 2018-12-01 PROCEDURE — 97163 PT EVAL HIGH COMPLEX 45 MIN: CPT

## 2018-12-01 PROCEDURE — 80048 BASIC METABOLIC PNL TOTAL CA: CPT | Performed by: INTERNAL MEDICINE

## 2018-12-01 PROCEDURE — 99203 OFFICE O/P NEW LOW 30 MIN: CPT | Performed by: INTERNAL MEDICINE

## 2018-12-01 PROCEDURE — 84484 ASSAY OF TROPONIN QUANT: CPT | Performed by: INTERNAL MEDICINE

## 2018-12-01 PROCEDURE — 83735 ASSAY OF MAGNESIUM: CPT | Performed by: INTERNAL MEDICINE

## 2018-12-01 PROCEDURE — 97530 THERAPEUTIC ACTIVITIES: CPT

## 2018-12-01 RX ORDER — CARVEDILOL 6.25 MG/1
6.25 TABLET ORAL 2 TIMES DAILY WITH MEALS
Qty: 60 TABLET | Refills: 0 | Status: SHIPPED | OUTPATIENT
Start: 2018-12-01 | End: 2020-04-30

## 2018-12-01 RX ORDER — CARVEDILOL 6.25 MG/1
6.25 TABLET ORAL 2 TIMES DAILY WITH MEALS
Status: DISCONTINUED | OUTPATIENT
Start: 2018-12-01 | End: 2018-12-01 | Stop reason: HOSPADM

## 2018-12-01 RX ADMIN — VITAMIN D, TAB 1000IU (100/BT) 1000 UNITS: 25 TAB at 08:29

## 2018-12-01 RX ADMIN — Medication 1 TABLET: at 08:28

## 2018-12-01 RX ADMIN — PSYLLIUM HUSK 1 PACKET: 3.4 POWDER ORAL at 08:31

## 2018-12-01 RX ADMIN — CLOTRIMAZOLE AND BETAMETHASONE DIPROPIONATE: 10; .5 CREAM TOPICAL at 08:30

## 2018-12-01 RX ADMIN — ACETAMINOPHEN 650 MG: 325 TABLET, FILM COATED ORAL at 00:58

## 2018-12-01 RX ADMIN — ENOXAPARIN SODIUM 40 MG: 40 INJECTION SUBCUTANEOUS at 08:28

## 2018-12-01 RX ADMIN — HYDRALAZINE HYDROCHLORIDE 5 MG: 20 INJECTION INTRAMUSCULAR; INTRAVENOUS at 08:37

## 2018-12-01 NOTE — SOCIAL WORK
[facility] is able to accept patient for SN/PT/OT  CM left a copy of the non-skilled providers list at the bedside  CM will f/u with patient's niece at the bedside upon arrival      Nursing updated with plan of care

## 2018-12-01 NOTE — ASSESSMENT & PLAN NOTE
· Patient came in with chest pain, likely typical chest pain with pressure/heaviness on the anterior chest area on exertion  · Trend patient's troponins  · Cardiology consult  · Possible stress test, however, will check echocardiogram 1st to rule out significant aortic stenosis (I heard significant murmur at the aortic valve area)  · Aspirin  · Nitroglycerin p r n   · Oxygen p r n  · Pain control p r n  Danita Shah

## 2018-12-01 NOTE — ASSESSMENT & PLAN NOTE
· No other signs of inflammation like tenderness or redness or abnormal warmth to suspect DVT or cellulitis  · According to cardiologist, likely not CHF related as her proBNP was completely normal   · No need for diuretics at this point according to cardiologist   · As per my discussion with advance practitioner for Cardiology, no need for an inpatient echocardiogram   This can may be done as an outpatient  Thus outpatient follow-up with patient's cardiologist   · Patient has this edema for while  This may be due to chronic venous insufficiency  · Will do conservative measures:  Elevation of both legs  Compression stockings/Gregorio stockings  · Outpatient follow-up with primary care physician for re-evaluation

## 2018-12-01 NOTE — UTILIZATION REVIEW
Initial Clinical Review    Admission: Date/Time/Statement:  Placed in Observation status on 11/30 @ 17:58    Orders Placed This Encounter   Procedures    Place in Observation (expected length of stay for this patient is less than two midnights)     Standing Status:   Standing     Number of Occurrences:   1     Order Specific Question:   Admitting Physician     Answer:   Esvin Encarnacion     Order Specific Question:   Level of Care     Answer:   Med Surg [16]         ED: Date/Time/Mode of Arrival:   ED Arrival Information     Expected Arrival Acuity Means of Arrival Escorted By Service Admission Type    - 11/30/2018 15:53 Urgent Ambulance [place] [facility] General Medicine Urgent    Arrival Complaint    CHEST PRESURE          Chief Complaint:   Chief Complaint   Patient presents with    Chest Pain     Pt  complains of two episodes of nausea, chest tighntess, and rapid breathing this afternoon while straining to have a bm  (+) Hx  of a-fib  History of Illness: Jean-Pierre Hughes is a 80 y o  female who presents to the emergency room at [facility] with complaints of chest pains  According to the patient and verified from the patient's niece at bedside, who lives with her, she was in the toilet and just moved her bowels, when she was doing sponge bathing, when all of a sudden, she experienced significant chest pains  She described the pain to be pressure like/heaviness in the middle part of her chest   Patient however denies any shortness of breath or being clammy  However, she told me that due to the severity of the pain and that she felt lightheaded and felt that she was about to pass out, she has to hold  on to  the bathroom sink  Patient's niece attended to her  Patient's pain  resolved  However after some time, patient's chest pain recurred  Thus patient was sent here for further evaluation management    It is important to note, that according to the patient's niece, patient has a similar event last Monday, 5 days ago  At that time, patient had a new medication, Mybertiq comma for urinary retention  Thus they called patient's primary care physician and they were told to discontinue the medication  When asked, patient started having bilateral lower extremity swelling since spring of this year  Patient also lately has been having problems ambulating  Patient has history of falls at home, however, the last 1 occurred last December  Patient denies any other symptoms other the ones mentioned above  Presently, patient is chest pain-free  EXAM -  2+ b/l LE edema - + anxious - Positive for grade 3/6 systolic murmur heard best at the heart apex as well as at the 2nd intercostal space, right parasternal border  ED Vital Signs:   ED Triage Vitals [11/30/18 1600]   Temperature Pulse Respirations Blood Pressure SpO2   97 8 °F (36 6 °C) 70 18 (!) 212/91 99 %      Temp Source Heart Rate Source Patient Position - Orthostatic VS BP Location FiO2 (%)   Oral Monitor Sitting Right arm --      Pain Score       No Pain        Wt Readings from Last 1 Encounters:   12/01/18 65 6 kg (144 lb 10 oz)       Vital Signs (abnormal):    11/30 0701  12/01 0700 12/01 0701  12/01 0808  Most Recent    Temperature (°F) 97 697 8 98 1  98 1 (36 7)    Pulse 6673 66  66    Respirations 18 18  18    Blood Pressure 123/60212/91 195/84  195/84    SpO2 (%) 9699 95  95          Abnormal Labs/Diagnostic Test Results:  11/30 - Na 130 - Cl 96 - trop 0 02 x 3  CBC - normal     12/1 - na 134 - Bun/cr 9/0 59    CXR  - Stable mild cardiomegaly    Stable mild blunting of the costophrenic sulci, possibly related to pleural thickening versus trace pleural effusions  ECHO     ED Treatment:   Medication Administration from 11/30/2018 1553 to 11/30/2018 1849       Date/Time Order Dose Route Action     11/30/2018 1706 hydrALAZINE (APRESOLINE) injection 10 mg 10 mg Intravenous Given       Past Medical/Surgical History: Diagnosis    A-fib ([facility] [address] )    Anxiety    Cheilosis    Diverticulosis    Gallstone    Hiatal hernia    Hyperlipidemia    Hypertension    Hyponatremia    Inguinal hernia    Lacunar infarction    Macular degeneration    Osteoarthritis    Renal cyst    Syncope    Tuberculosis    Umbilical hernia    Vertigo       Admitting Diagnosis: Chest pain [R07 9]  Hypertensive urgency [I16 0]  Chest pain, unspecified type [R07 9]    Age/Sex: 80 y o  female    Assessment/Plan:  79 yo f presented  After an episode of chest pain -  C/p pressure heaviness  On the anterior chest  On exertion - trop neg - Cardiology consult - Possible stress - check echo - - Hx of valvular heart diease ( MV regurg) - check echo -  HTN - adjusting meds - Afib 0 on amiodarone - not on anticoagulation - cont ASA  - Hyponatremia- baseline 130  LE edema -  Monitor - Ambulatory dysfunction -  PT/OT eval     Admission Orders:  Scheduled Meds:   Current Facility-Administered Medications:  acetaminophen 650 mg Oral Q6H PRN 12/1 x 1    amiodarone 200 mg Oral Daily    aspirin 81 mg Oral Daily    cholecalciferol 1,000 Units Oral Daily    clotrimazole-betamethasone  Topical BID    enoxaparin 40 mg Subcutaneous Daily    hydrALAZINE 5 mg Intravenous Q6H PRN    losartan 50 mg Oral HS    multivitamin-minerals 1 tablet Oral Daily    nitroglycerin 0 4 mg Sublingual Q5 Min PRN    ondansetron 4 mg Intravenous Q6H PRN    pravastatin 10 mg Oral Daily    psyllium 1 packet Oral BID      Nursing orders - Telem - VS q 4 - daily weights - I & O q shift - Up with assistance - Diet  2 gm NA       Cardiology Consult  - pending

## 2018-12-01 NOTE — CONSULTS
Consultation - Electrophysiology-Cardiology (EP)   Gómez Troncoso 80 y o  female MRN: [id number]  Unit/Bed#: -01 Encounter: [id number]      Inpatient consult to Cardiology  Consult performed by: Linda Gerardo  Consult ordered by: Early           Assessment/Plan   1  Chest discomfort   * ECG reviewed she has no ischemic changes whatsoever    * troponin's negative x 3    * atypical chest discomfort now resolved    * this was likely not ACS related and given her DNAR/DNI status we will not pursue stress testing at this time, no further w/u needed     2  HTN urgency    * BP on admit was 212/91    * now down to 195/84    * outpatient regimen irbesartan 75mg PO Day, inpatient she has been taking hydralazine PRN in addition to losartan    * will add additional blood pressure medications to medical regimen with PRN hydralazine   * PA Lat CXR does show cardiomegaly likely from uncontrolled BP at home, will f/u echo      3  LE edema   * likely not CHF related as her pro BNP was completely normal    * CXR showing no pleural effusions or vascular congestion    * no need for IV diuretics from CHF standpoint at this time     History of Present Illness   Physician Requesting Consult: Sindy Alvarado MD  Reason for Consult / Principal Problem: chest pain, hypertensive urgency     HPI: Gómez Troncoso is a 80y o  year old female with a history of HTN, HLD, paroxsymal nonvalvular atrial fibrillation no AC on amiodarone who is HOD#0 after presenting to [facility] due to concerns of chest discomfort  Cardiology is being consulted for further evaluation of her chest pain  One day ago while washing up in the bathroom she began to feel slight chest tightness which extremely worried her  Symptoms only lasted a few seconds before resolving but then she felt the slight chest tightness again  She denies any associated symptoms of LH, dizziness, SOB, nausea or diaphoresis   This has never occurred for her in the past  She denies any hx of MI, CAD or prior cardiac procedures  Due to these symptoms her PCP recommended she come to the ED for further evaluation  Once here she was found to have HTN urgency but otherwise no major abnormalities  Currently she is resting comfortably without any concerns or complaints  Outside of yesterdays events she has been concerned with issues with fear of walking and her legs giving out on her but otherwise she has no medical complaints  Review of Systems  ROS as noted above, otherwise 12 point review of systems was performed and is negative  Historical Information   Past Medical History:   Diagnosis Date    A-fib ([facility] [address] )     Anxiety     Cheilosis     Diverticulosis     Gallstone     Hiatal hernia     Hyperlipidemia     Hypertension     Hyponatremia     Inguinal hernia     Lacunar infarction     Macular degeneration     Osteoarthritis     Renal cyst     Syncope     Tuberculosis     Umbilical hernia     Vertigo      History reviewed  No pertinent surgical history    History   Alcohol Use No     History   Drug Use No     History   Smoking Status    Never Smoker   Smokeless Tobacco    Never Used     Family History: non-contributory    Meds/Allergies   Hospital Medications: Current Facility-Administered Medications   Medication Dose Route Frequency    acetaminophen (TYLENOL) tablet 650 mg  650 mg Oral Q6H PRN    amiodarone tablet 200 mg  200 mg Oral Daily    aspirin chewable tablet 81 mg  81 mg Oral Daily    cholecalciferol (VITAMIN D3) tablet 1,000 Units  1,000 Units Oral Daily    clotrimazole-betamethasone (LOTRISONE) 1-0 05 % cream   Topical BID    enoxaparin (LOVENOX) subcutaneous injection 40 mg  40 mg Subcutaneous Daily    hydrALAZINE (APRESOLINE) injection 5 mg  5 mg Intravenous Q6H PRN    losartan (COZAAR) tablet 50 mg  50 mg Oral HS    multivitamin-minerals (CENTRUM) tablet 1 tablet  1 tablet Oral Daily    nitroglycerin (NITROSTAT) SL tablet 0 4 mg  0 4 mg Sublingual Q5 Min PRN    ondansetron (ZOFRAN) injection 4 mg  4 mg Intravenous Q6H PRN    pravastatin (PRAVACHOL) tablet 10 mg  10 mg Oral Daily    psyllium (METAMUCIL) 1 packet  1 packet Oral BID     Home Medications:   Prescriptions Prior to Admission   Medication    acetaminophen (TYLENOL) 325 mg tablet    amiodarone 200 mg tablet    aspirin 81 MG tablet    Cholecalciferol (VITAMIN D3) 1000 units CAPS    clotrimazole-betamethasone (LOTRISONE) 1-0 05 % cream    Coenzyme Q10 (COQ-10) 50 MG CAPS    irbesartan (AVAPRO) 75 mg tablet    Multiple Vitamins-Minerals (CENTRUM SILVER ADULT 50+ PO)    Multiple Vitamins-Minerals (ICAPS AREDS 2 PO)    pravastatin (PRAVACHOL) 10 mg tablet    Psyllium (METAMUCIL PO)       Allergies   Allergen Reactions    Adhesive [Medical Tape]     Codeine     Lomotil [Diphenoxylate]     Quinidex [Quinidine]        Objective   Vitals: Blood pressure (!) 195/84, pulse 66, temperature 98 1 °F (36 7 °C), temperature source Oral, resp  rate 18, weight 65 6 kg (144 lb 10 oz), SpO2 95 %  Orthostatic Blood Pressures      Most Recent Value   Blood Pressure   195/84 filed at 12/01/2018 8517   Patient Position - Orthostatic VS  Lying filed at 12/01/2018 7082            Intake/Output Summary (Last 24 hours) at 12/01/18 0856  Last data filed at 12/01/18 0357   Gross per 24 hour   Intake                0 ml   Output             1595 ml   Net            -1595 ml       Invasive Devices     Peripheral Intravenous Line            Peripheral IV 11/30/18 Right Antecubital less than 1 day                Physical Exam   Constitutional: She is oriented to person, place, and time  She appears well-developed and well-nourished  HENT:   Head: Normocephalic and atraumatic  Eyes: Pupils are equal, round, and reactive to light  EOM are normal    Neck: Normal range of motion  Neck supple  Cardiovascular: Normal rate and regular rhythm      Pulmonary/Chest: Effort normal and breath sounds normal  Abdominal: Soft  Bowel sounds are normal    Musculoskeletal: Normal range of motion  Neurological: She is alert and oriented to person, place, and time  Skin: Skin is warm and dry  Psychiatric: She has a normal mood and affect  Lab Results: I have personally reviewed pertinent lab results  Results from last 7 days  Lab Units 11/30/18  2025 11/30/18  1639   WBC Thousand/uL  --  5 78   HEMOGLOBIN g/dL  --  13 7   HEMATOCRIT %  --  40 1   PLATELETS Thousands/uL 283 297       Results from last 7 days  Lab Units 12/01/18  0512 11/30/18  1639   POTASSIUM mmol/L 3 8 4 0   CHLORIDE mmol/L 100 96*   CO2 mmol/L 23 25   BUN mg/dL 9 8   CREATININE mg/dL 0 59* 0 62   CALCIUM mg/dL 9 2 9 5       Results from last 7 days  Lab Units 11/30/18  1639   INR  1 01   PTT seconds 29       Results from last 7 days  Lab Units 12/01/18  0512   MAGNESIUM mg/dL 2 1       Imaging: I have personally reviewed pertinent reports  ECHO: No results found for this or any previous visit      CATH/STRESS TEST:   None     EKG (12-1-18):

## 2018-12-01 NOTE — ASSESSMENT & PLAN NOTE
· Improved  · History of chronic hyponatremia  · Based on blood exam, baseline is 130  Today, patient's serum sodium was 134  · Monitor  · I am recommending a repeat BMP in 1 week  Patient's primary care physician may facilitate patient having this test   Outpatient follow-up with primary care physician for re-evaluation

## 2018-12-01 NOTE — ASSESSMENT & PLAN NOTE
· No other signs of inflammation like tenderness or redness or abnormal warmth to suspect DVT or other causes of lower extremity swelling, except possible volume overload  · According to the patient's niece, this all started this spring  · Rule out congestive heart failure    · For echocardiogram

## 2018-12-01 NOTE — H&P
H&P- Roberto Cisneros 10/24/1927, 80 y o  female MRN: [id number]    Unit/Bed#: -01 Encounter: [id number]    Primary Care Provider: Inga Kebede MD   Date and time admitted to hospital: 11/30/2018  3:53 PM        * Chest pain   Assessment & Plan    · Patient came in with chest pain, likely typical chest pain with pressure/heaviness on the anterior chest area on exertion  · Trend patient's troponins  · Cardiology consult  · Possible stress test, however, will check echocardiogram 1st to rule out significant aortic stenosis (I heard significant murmur at the aortic valve area)  · Aspirin  · Nitroglycerin p r n   · Oxygen p r n  · Pain control p r n        Valvular heart disease   Assessment & Plan    · Patient has history of mitral valve regurgitation  · For echocardiogram   · I also appreciated significant murmur at the aortic valve area  Ambulatory dysfunction   Assessment & Plan    · Has history of recurrent falls at home  · PT/OT evaluation  Lower extremity edema   Assessment & Plan    · No other signs of inflammation like tenderness or redness or abnormal warmth to suspect DVT or other causes of lower extremity swelling, except possible volume overload  · According to the patient's niece, this all started this spring  · Rule out congestive heart failure  · For echocardiogram      Hyponatremia   Assessment & Plan    · History of chronic hyponatremia  · Based on blood exam, baseline is 130  Presently, patient is at baseline  · Possibility that this may be due to hypervolemia/possible CHF as patient has significant bilateral lower extremity edema  · Monitor  PAF (paroxysmal atrial fibrillation) ([facility])   Assessment & Plan    · Presently on normal sinus rhythm  · Continue patient's amiodarone  · Patient not on anticoagulation at this point  · Continue aspirin  Hypertensive urgency   Assessment & Plan    · Patient takes Avapro at 75 mg once a day at bedtime    This is equivalent to losartan 25 mg per day  · Thus, I will increase the dose of patient's blood pressure medication  Since we do not carry the Avapro here, patient will be on losartan 50 mg at bedtime  However, if patient has significant aortic valve problems/stenosis, likely will change patient's blood pressure medication  · IV hydralazine p r n  Radha Blend · Will adjust treatment accordingly  · Cardiology consult  VTE Prophylaxis: Enoxaparin (Lovenox)  / sequential compression device   Code Status:  DNR DNI  POLST: POLST form is not discussed and not completed at this time  Discussion with family:  I spoke to the patient's niece at bedside  I discussed our findings and plans  I answered questions and concerns  Anticipated Length of Stay:  Patient will be admitted on an Observation basis with an anticipated length of stay of  less than 2 midnights  Justification for Hospital Stay:  Due to the above findings and plans  Total Time for Visit, including Counseling / Coordination of Care: 1 hour  Greater than 50% of this total time spent on direct patient counseling and coordination of care  Chief Complaint:   Chest pain  History of Present Illness:    Desmond King is a 80 y o  female who presents to the emergency room at [facility] with complaints of chest pains  According to the patient and verified from the patient's niece at bedside, who lives with her, she was in the toilet and just moved her bowels, when she was doing spomge bathing, when all of a sudden, she experienced significant chest pains  She described the pain to be pressure like/heaviness in the middle part of her chest   Patient however denies any shortness of breath or being clammy  However, she told me that due to the severity of the pain and that she felt lightheaded and felt that she was about to pass out, she has to hold off on the bathroom sink  Patient's niece attended to her  Patient's pain got resolved    However after some time, patient's chest pain recurred  Thus patient was sent here for further evaluation management  It is important to note, that according to the patient's niece, patient has a similar event last Monday, 5 days ago  At that time, patient had a new medication, Mybertiq comma for urinary retention  Thus they called patient's primary care physician and they were told to discontinue the medication  When asked, patient started having bilateral lower extremity swelling since spring of this year  Patient also lately has been having problems ambulating  Patient has history of falls at home, however, the last 1 occurred last December  Patient denies any other symptoms other the ones mentioned above  Presently, patient is chest pain-free  Review of Systems:    Review of Systems     Ten point review systems done and they were negative except for the ones I mentioned in my history of present illness  Patient denies any fever or chills or any cough or colds  Patient denies any nausea or vomiting or any abdominal pains  Patient denies any headaches  Patient denies any problems urinating or moving her bowels  Past Medical and Surgical History:     Past Medical History:   Diagnosis Date    A-fib ([facility] [address] [facility]     Anxiety     Cheilosis     Diverticulosis     Gallstone     Hiatal hernia     Hyperlipidemia     Hypertension     Hyponatremia     Inguinal hernia     Lacunar infarction     Macular degeneration     Osteoarthritis     Renal cyst     Syncope     Tuberculosis     Umbilical hernia     Vertigo        History reviewed  No pertinent surgical history  Meds/Allergies:    Prior to Admission medications    Medication Sig Start Date End Date Taking?  Authorizing Provider   acetaminophen (TYLENOL) 325 mg tablet Take 650 mg by mouth every 6 (six) hours as needed for mild pain   Yes Historical Provider, MD   amiodarone 200 mg tablet Take 200 mg by mouth daily   Yes Historical Provider, MD aspirin 81 MG tablet Take 81 mg by mouth daily   Yes Historical Provider, MD   Cholecalciferol (VITAMIN D3) 1000 units CAPS Take by mouth   Yes Historical Provider, MD   clotrimazole-betamethasone (LOTRISONE) 1-0 05 % cream Apply topically 2 (two) times a day   Yes Historical Provider, MD   Coenzyme Q10 (COQ-10) 50 MG CAPS Take by mouth daily   Yes Historical Provider, MD   irbesartan (AVAPRO) 75 mg tablet Take 75 mg by mouth daily at bedtime   Yes Historical Provider, MD   Multiple Vitamins-Minerals (CENTRUM SILVER ADULT 50+ PO) Take by mouth   Yes Historical Provider, MD   Multiple Vitamins-Minerals (ICAPS AREDS 2 PO) Take by mouth 2 (two) times a day   Yes Historical Provider, MD   pravastatin (PRAVACHOL) 10 mg tablet Take 10 mg by mouth daily   Yes Historical Provider, MD   Psyllium (METAMUCIL PO) Take by mouth   Yes Historical Provider, MD     Medication list was reviewed    Allergies: Allergies   Allergen Reactions    Adhesive [Medical Tape]     Codeine     Lomotil [Diphenoxylate]     Quinidex [Quinidine]        Social History:     Marital Status:      History   Alcohol Use No     History   Smoking Status    Never Smoker   Smokeless Tobacco    Never Used     History   Drug Use No       Family History:    History reviewed  No pertinent family history  Physical Exam:     Vitals:   Blood Pressure: (!) 171/74 (11/30/18 1913)  Pulse: 70 (11/30/18 1913)  Temperature: 97 8 °F (36 6 °C) (11/30/18 1913)  Temp Source: Oral (11/30/18 1913)  Respirations: 18 (11/30/18 1913)  Weight - Scale: 67 kg (147 lb 11 3 oz) (11/30/18 1600)  SpO2: 97 % (11/30/18 1913)    Physical Exam   Constitutional: She is oriented to person, place, and time  No distress  HENT:   Head: Normocephalic and atraumatic  Mouth/Throat: Oropharynx is clear and moist  No oropharyngeal exudate  Eyes: Conjunctivae are normal  Right eye exhibits no discharge  Left eye exhibits no discharge  No scleral icterus  Neck: No JVD present  No tracheal deviation present  Cardiovascular: Normal rate and regular rhythm  Exam reveals no gallop and no friction rub  Murmur heard  Positive for grade 3/6 systolic murmur heard best at the heart apex as well as at the 2nd intercostal space, right parasternal border  Pulmonary/Chest: Effort normal and breath sounds normal  No stridor  No respiratory distress  She has no wheezes  She has no rales  Abdominal: Soft  Bowel sounds are normal  She exhibits no distension  There is no tenderness  There is no rebound and no guarding  Musculoskeletal: She exhibits edema  She exhibits no tenderness or deformity  Positive for +2 bilateral lower extremity edema   Neurological: She is alert and oriented to person, place, and time  No cranial nerve deficit  Skin: Skin is warm  No rash noted  She is not diaphoretic  No erythema  No pallor  Psychiatric: Her behavior is normal  Thought content normal    Patient is anxious  Vitals reviewed  Additional Data:     Lab Results: I have personally reviewed pertinent reports  Results from last 7 days  Lab Units 11/30/18  1639   WBC Thousand/uL 5 78   HEMOGLOBIN g/dL 13 7   HEMATOCRIT % 40 1   PLATELETS Thousands/uL 297   NEUTROS PCT % 65   LYMPHS PCT % 19   MONOS PCT % 14*   EOS PCT % 1       Results from last 7 days  Lab Units 11/30/18  1639   SODIUM mmol/L 130*   POTASSIUM mmol/L 4 0   CHLORIDE mmol/L 96*   CO2 mmol/L 25   BUN mg/dL 8   CREATININE mg/dL 0 62   ANION GAP mmol/L 9   CALCIUM mg/dL 9 5   ALBUMIN g/dL 4 0   TOTAL BILIRUBIN mg/dL 0 60   ALK PHOS U/L 90   ALT U/L 34   AST U/L 26   GLUCOSE RANDOM mg/dL 94       Results from last 7 days  Lab Units 11/30/18  1639   INR  1 01                   Imaging: I have personally reviewed pertinent reports  XR chest 2 views   ED Interpretation by Dave Bloom DO (11/30 1655)   Cardiomegaly, no acute pathology      Final Result by Carleene Bumpers, MD (11/30 1701)      Stable mild cardiomegaly  Stable mild blunting of the costophrenic sulci, possibly related to pleural thickening versus trace pleural effusions  Workstation performed: [id number]             EKG, Pathology, and Other Studies Reviewed on Admission:   · EKG:  Normal sinus rhythm at 69 per minute  This was read by the cardiologist     Shady Staley / [facility] Records Reviewed: Yes     ** Please Note: This note has been constructed using a voice recognition system   **

## 2018-12-01 NOTE — PHYSICAL THERAPY NOTE
PHYSICAL THERAPY EVALUATION  NAME:  Gómez Troncoso  DATE: 12/01/18     Time In: 9:35 am  Time Out: 10:02 am    AGE:   80 y o  Mrn:   [id number]  ADMIT DX:  Chest pain [R07 9]  Hypertensive urgency [I16 0]  Chest pain, unspecified type [R07 9]    Past Medical History:   Diagnosis Date    A-fib ([address] )     Anxiety     Cheilosis     Diverticulosis     Gallstone     Hiatal hernia     Hyperlipidemia     Hypertension     Hyponatremia     Inguinal hernia     Lacunar infarction     Macular degeneration     Osteoarthritis     Renal cyst     Syncope     Tuberculosis     Umbilical hernia     Vertigo      Length Of Stay: 0  Performed at least 2 patient identifiers during session: Name and Birthday    PHYSICAL THERAPY EVALUATION :    12/01/18 0935   Note Type   Note type Eval/Treat   Pain Assessment   Pain Assessment No/denies pain   Pain Score No Pain   Home Living   Type of Home House   Home Layout Multi-level; Able to live on main level with bedroom/bathroom; Elevator   Bathroom Shower/Tub Walk-in shower   Bathroom Toilet Raised   Bathroom Equipment Toilet raiser;Grab bars in shower;Grab bars around toilet   Bathroom Accessibility Not accessible; Other (Comment)  (unable to lift feet to clear lip into the shower)   Home Equipment Walker; Wheelchair-manual;Other (Comment)  (Lift chair; head elevator for the bed)   Additional Comments FAmily or caregiver is always present   Prior Function   Level of [place] Needs assistance with ADLs and functional mobility; Other (Comment)  (Max of 1 for transfers and bed mobility prior)   Lives With Family; Other (Comment)  (Niece and Niece's son)   Receives Help From Family   ADL Assistance Needs assistance   IADLs Needs assistance   Falls in the last 6 months 0   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; 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 with assist of 1 and SW   Restrictions/Precautions   [place] Bearing Precautions Per Order No   Other Precautions Cardiac/sternal;Chair Alarm; Bed Alarm;Telemetry; Fall Risk;Pain   General   Additional Pertinent History Ruthann Nicole has a history of R hip girdlestone; pelvic fx 1 year ago   Family/Caregiver Present Yes  (Niece)   Cognition   Overall Cognitive Status WFL   Arousal/Participation Alert   Orientation Level Oriented X4   Memory Within functional limits   Following Commands Follows multistep commands with increased time or repetition   Comments Displays anxiety throughout assessment   RUE Assessment   RUE Assessment WFL   LUE Assessment   LUE Assessment WFL   RLE Assessment   RLE Assessment X   RLE Overall PROM   R Hip Flexion 15 degrees in supine   R Hip ABduction 30 degrees in supine   R Knee Flexion movement but unable to fully assess due to limitations of hip in supine   R Ankle Dorsiflexion WFL   Strength RLE   R Hip Flexion 3/5   R Knee Extension 3/5   R Ankle Dorsiflexion 3/5   LLE Assessment   LLE Assessment X   Strength LLE   LLE Overall Strength 4-/5   Coordination   Movements are Fluid and Coordinated 1   Sensation WFL   Bed Mobility   Rolling R 2  Maximal assistance   Additional items Bedrails;Assist x 1; Increased time required;Verbal cues   Rolling L 2  Maximal assistance   Additional items Assist x 1;Bedrails; Increased time required   Supine to Sit Unable to assess   Additional items Comment  (Not assessed due to patient in need of personal hygene)   Transfers   Sit to Stand Unable to assess   Ambulation/Elevation   Gait Assistance Not tested   Endurance Deficit   Endurance Deficit Yes   Endurance Deficit Description Patient c/o no feeling well vitals stable with /42 HR 80 Spo2 96% on RA   Activity Tolerance   Activity Tolerance Patient limited by pain; Patient limited by fatigue   Medical Staff Made Aware Spoke with Veronica Ferreira from case management   Nurse Made Aware Spoke with Ольга Epperson RN re BP readings and mobility status   Assessment   Prognosis Fair   Problem List Decreased strength;Decreased range of motion;Decreased endurance; Impaired balance;Decreased mobility   Barriers to Discharge None; Other (Comment)  (Nepiper present and express she can provide care need with VNA)   Goals   Patient Goals To go home  STG Expiration Date 12/07/18   Treatment Day 1   Plan   Treatment/Interventions Functional transfer training;LE strengthening/ROM; Therapeutic exercise; Endurance training;Patient/family training;Equipment eval/education; Bed mobility;Gait training;Spoke to nursing;Spoke to case management   PT Frequency Other (Comment)  (3-5x/wk)   Recommendation   Recommendation Home with family support;Home PT; Other (Comment)  (Home heatlth assistance )   Equipment Recommended Walker   Barthel Index   Feeding 5   Bathing 0   Grooming Score 0   Dressing Score 0   Bladder Score 0   Bowels Score 10   Toilet Use Score 0   Transfers (Bed/Chair) Score 0   Mobility (Level Surface) Score 0   Stairs Score 0   Barthel Index Score 15       (Please find full objective findings from PT assessment regarding body systems outlined above)  Assessment: Pt is a 80 y o  female seen for PT evaluation s/p admit to [facility] on 11/30/2018 w/ Chest pain and r/o CHF  Oanh Garces presented to the ER reporting chest pain started when she was in the toilet and just moved her bowels, when she was doing spomge bathing, when all of a sudden, she experienced significant chest pains  She described the pain to be pressure like/heaviness in the middle part of her chest   Patient however denied any shortness of breath or being clammy  However, she told me that due to the severity of the pain and that she felt lightheaded and felt that she was about to pass out, she has to hold onto  the bathroom sink  Current work up negative for MI, but demonstrating elevated Blood pressure  Order placed for PT    Prior to admission, pt has a long history of limitation in her right hip with niece relating she had her acetabular component removed when she was very young  She now presents with very limited ROM in the right hip which affects her functional mobility and positioning OOB  Her function prior required A for mobility, ambulated household distances, lived in one floor environment, lived with Niece, used standard walker for mobility and was max assist of 1 for all transfers and bed mobility  Upon evaluation: Pt requires maximum assistance for bed mobility and transfers, mechanical conveyance from nursing standpoint for OOB mobility and unable to assess gait due to not feeling well and need for personal hygene     Pt's clinical presentation is currently unstable/unpredictable given the functional mobility deficits above, especially weakness, decreased ROM, decreased endurance, pain and decreased functional mobility tolerance, coupled with fall risks including hx of falls, impaired balance and limited independent mobility, and combined with medical complications of hypertension , abnormal sodium values and increased RR  Pt to benefit from continued skilled PT tx while in hospital and upon DC to address deficits as defined above and maximize level of functional mobility  From PT/mobility standpoint, recommendation at time of d/c would be Home PT and home with family support pending progress in order to maximize pt's functional independence and consistency w/ mobility in order to facilitate return to PLOF  Recommend trial with walker next 1-2 sessions, ther ex next 1-2 sessions, mechanical conveyance from nursing standpoint for OOB mobility and case management consult  Goals: In 7 days pt will demonstrate: bed mobility (Max A of 1I) for home function OOB, sit<>stand and functional transfers max A of 1 w/ RW for home function, gait training 10ft mod-max A of 1 w/ SW for home distances, improve BLE by 1/2 grade strength to optimize functional mobility, improve balance by 1 grade to decrease fall risk, improve activity tolerance to >25 minutes w/ rest to improve functional endurance for home, pt and family education on PT risk, role, benefits, POC, goals, and recommendations to optimize patient outcomes, patient functional, optimize LOS and promote discharge to least restrictive environment  The following objective measures were performed on IE: Barthel Index 15/100;  Comorbidities affecting pt's physical performance at time of assessment include: HTN and A fib  Personal factors affecting pt at time of IE include: anxiety, advanced age, behavioral pattern, inability to perform ADLs and inability to ambulate household distances  Tosin Nathan, PT, GCS      Physical Therapy Session Note    Time In: 10: 10 am  Time out 10:35 am    S: I just don't feel well  O: Patient seen for first therapy session post evaluation with extensive family and patient education completed re transfer methods and adaptations that may be appropriate due the Gladis's limited R hip ROM  Not able to complete EOB sitting due to need for personal hygiene  Educated Niece on d/c planning process and the plan is for Chaka Tai to return home with increased services to support her increase needs  Rolling max A and LE AROM within small ROM  A/P: Will plan for next session with a PT to assess EOB and standing  Chaka Tai will benefit from Home PT to assist in modifications to environment at home and increase mobility in that functional space      Tosin Nathan, MPT GCS

## 2018-12-01 NOTE — SOCIAL WORK
CM spoke with patient and niece at the bedside  Patient and niece are agreeable to VNA at discharge for SN/PT/OT  Patient's niece would like information about non-skilled home health aides for extra care at home  Patient has a roller walker and a wheelchair at home  Patient's niece will be able to transport home at time of discharge  CM sent referral to [facility] for SN/PT/OT  Waiting for reply

## 2018-12-01 NOTE — ASSESSMENT & PLAN NOTE
· History of chronic hyponatremia  · Based on blood exam, baseline is 130  Presently, patient is at baseline  · Possibility that this may be due to hypervolemia/possible CHF as patient has significant bilateral lower extremity edema  · Monitor

## 2018-12-01 NOTE — ASSESSMENT & PLAN NOTE
· Patient takes Avapro at 75 mg once a day at bedtime  This is equivalent to losartan 25 mg per day  · Thus, I will increase the dose of patient's blood pressure medication  Since we do not carry the Avapro here, patient will be on losartan 50 mg at bedtime  However, if patient has significant aortic valve problems/stenosis, likely will change patient's blood pressure medication  · IV hydralazine p r n  Salina Del Valle · Will adjust treatment accordingly  · Cardiology consult

## 2018-12-01 NOTE — ASSESSMENT & PLAN NOTE
· Patient has history of mitral valve regurgitation  · As per my discussion with the advance practitioner for Cardiology, no need for an inpatient echocardiogram at this point  This may be done as an outpatient    Thus patient needs follow-up with her outpatient cardiologist

## 2018-12-01 NOTE — INCIDENTAL FINDINGS
The following findings require follow up:  Radiographic finding   Finding:  Stable mild blunting of the costophrenic sulci, possibly related to pleural thickening versus trace pleural effusions     Follow up required:  Outpatient follow-up with primary care physician/cardiologist   Follow up should be done within 4 week(s)    Please notify the following clinician to assist with the follow up:   Dr Fina Newby

## 2018-12-01 NOTE — ASSESSMENT & PLAN NOTE
· Presently on normal sinus rhythm  · Continue patient's amiodarone  · Patient not on anticoagulation at this point  Patient has history of recurrent falls  · Continue aspirin    · Outpatient follow-up primary care physician and cardiologist

## 2018-12-01 NOTE — ASSESSMENT & PLAN NOTE
· Resolved  · Patient may go back to her home Avapro taken at night  · As per my discussion with the cardiologist service, be asked me to add carvedilol at 6 25 mg twice a day  · Status post IV hydralazine neel Mittal · Cardiology on board    · Outpatient follow-up with patient's primary care physician/cardiologist

## 2018-12-01 NOTE — ASSESSMENT & PLAN NOTE
· Presently on normal sinus rhythm  · Continue patient's amiodarone  · Patient not on anticoagulation at this point  · Continue aspirin

## 2018-12-01 NOTE — ASSESSMENT & PLAN NOTE
· Patient has history of mitral valve regurgitation  · For echocardiogram   · I also appreciated significant murmur at the aortic valve area

## 2018-12-01 NOTE — DISCHARGE SUMMARY
Discharge- Mikki Body 10/24/1927, 80 y o  female MRN: [id number]    Unit/Bed#: -01 Encounter: [id number]    Primary Care Provider: Alexa Traylor MD   Date and time admitted to hospital: 11/30/2018  3:53 PM        * Chest pain   Assessment & Plan    · Resolved  · Negative troponins  · Cardiology on board: According to the cardiologist, no evidence of active ischemia  Chest pain was atypical   Will not pursue any stress testing at this time and that no further workup needed at this point  Patient just needs to follow up with her cardiologist   · Status post Aspirin  · Nitroglycerin p r n   · Oxygen p r n  · Pain control p r n  Linda Mittal · Outpatient follow-up with primary care physician and cardiologist      Valvular heart disease   Assessment & Plan    · Patient has history of mitral valve regurgitation  · As per my discussion with the advance practitioner for Cardiology, no need for an inpatient echocardiogram at this point  This may be done as an outpatient  Thus patient needs follow-up with her outpatient cardiologist        Ambulatory dysfunction   Assessment & Plan    · Has history of recurrent falls at home  · PT/OT evaluation:  Home PT/OT  Case management was informed  · Outpatient follow-up primary care physician  Lower extremity edema   Assessment & Plan    · No other signs of inflammation like tenderness or redness or abnormal warmth to suspect DVT or cellulitis  · According to cardiologist, likely not CHF related as her proBNP was completely normal   · No need for diuretics at this point according to cardiologist   · As per my discussion with advance practitioner for Cardiology, no need for an inpatient echocardiogram   This can may be done as an outpatient  Thus outpatient follow-up with patient's cardiologist   · Patient has this edema for while  This may be due to chronic venous insufficiency  · Will do conservative measures:  Elevation of both legs    Compression stockings/Gregorio stockings  · Outpatient follow-up with primary care physician for re-evaluation  Hyponatremia   Assessment & Plan    · Improved  · History of chronic hyponatremia  · Based on blood exam, baseline is 130  Today, patient's serum sodium was 134  · Monitor  · I am recommending a repeat BMP in 1 week  Patient's primary care physician may facilitate patient having this test   Outpatient follow-up with primary care physician for re-evaluation  PAF (paroxysmal atrial fibrillation) ([facility])   Assessment & Plan    · Presently on normal sinus rhythm  · Continue patient's amiodarone  · Patient not on anticoagulation at this point  Patient has history of recurrent falls  · Continue aspirin  · Outpatient follow-up primary care physician and cardiologist      Hypertensive urgency   Assessment & Plan    · Resolved  · Patient may go back to her home Avapro taken at night  · As per my discussion with the cardiologist service, be asked me to add carvedilol at 6 25 mg twice a day  · Status post IV hydralazine p r n  Tenzin Cibola · Cardiology on board  · Outpatient follow-up with patient's primary care physician/cardiologist            Discharging Physician / Practitioner: Fina Michael MD  PCP: Fina Villanueva MD  Admission Date:  November 30, 2018  Discharge Date: 12/01/18        Consultations During Hospital Stay:  · Cardiologist     Procedures Performed:     · Please see diagnosis and notes above  Significant Findings / Test Results:     · Please see diagnosis and notes above    Incidental Findings:   ·  Stable mild blunting of the costophrenic sulci, possibly related to pleural thickening versus trace pleural effusions  Outpatient follow-up with primary care physician and cardiologist    Test Results Pending at Discharge (will require follow up): · None  Outpatient Tests Requested:  · None  However, recommending a repeat BMP in 1 week    Patient's primary care physician may facilitate patient having this test     Complications:  None  Reason for Admission:  Chest pain  Hospital Course:     Yanni Gomez is a 80 y o  female patient who originally presented to the hospital on 11/30/2018 due to chest pain  Patient was admitted to rule out acute coronary syndrome  Troponins were negative  There were no significant arrhythmias on patient's telemetry  Patient also was found to have hypertensive urgency  This was managed  Cardiologist told me to add carvedilol to patient's blood pressure regimen  Cardiologist was on board  They cleared the patient for discharge  Outpatient follow-up with patient's primary care physician and cardiologist     For details about patient's diagnosis, workups, management, hospital course and discharge plans, please see above list of diagnoses and related notes  Condition at Discharge: stable     Discharge Day Visit / Exam:     Subjective:    Patient is doing fine and feels a lot better  Patient denies any more pains  Patient denies any shortness of breath  No complaints  Patient is okay with her discharge to home today with home health services      Vitals: Blood Pressure: 137/65 (12/01/18 1347)  Pulse: 66 (12/01/18 0738)  Temperature: 98 1 °F (36 7 °C) (12/01/18 0738)  Temp Source: Oral (12/01/18 0738)  Respirations: 18 (12/01/18 0738)  Weight - Scale: 65 6 kg (144 lb 10 oz) (12/01/18 0550)  SpO2: 95 % (12/01/18 0738)  Exam:   Physical Exam   Constitutional: No distress  HENT:   Head: Normocephalic and atraumatic  Eyes: Right eye exhibits no discharge  Left eye exhibits no discharge  No scleral icterus  Neck: No JVD present  No tracheal deviation present  Cardiovascular: Normal rate and regular rhythm  Exam reveals no gallop and no friction rub  Murmur heard  Pulmonary/Chest: Effort normal and breath sounds normal  No stridor  No respiratory distress  She has no wheezes  She has no rales  Abdominal: Soft   Bowel sounds are normal  She exhibits no distension  There is no tenderness  There is no rebound and no guarding  Musculoskeletal: She exhibits edema  She exhibits no tenderness  Positive for +1 to +2 bilateral lower extremity edema  Neurological: She is alert  No cranial nerve deficit  Skin: Skin is warm  No rash noted  She is not diaphoretic  No erythema  No pallor  Psychiatric: Her behavior is normal  Thought content normal    Slightly anxious earlier today  However on my 2nd visit, patient's mood was normal    Vitals reviewed  Discussion with Family:  I spoke to patient's niece at bedside and discussed with her our findings and plans  She is okay with discharge plans  She is okay with discharge of her aunt back to their home today  She is okay with the visiting services  I answered questions and concerns  Discharge instructions/Information to patient and family:   See after visit summary for information provided to patient and family  Provisions for Follow-Up Care:  See after visit summary for information related to follow-up care and any pertinent home health orders  Disposition:     Home with VNA Services (Reminder: Complete face to face encounter)    For Discharges to [facility]:   · Not Applicable to this Patient - Not Applicable to this Patient    Planned Readmission:  None  Discharge Statement:  I spent 40 minutes discharging the patient  This time was spent on the day of discharge  I had direct contact with the patient on the day of discharge  Greater than 50% of the total time was spent examining patient, answering all patient questions, arranging and discussing plan of care with patient as well as directly providing post-discharge instructions  Additional time then spent on discharge activities  Discharge Medications:  See after visit summary for reconciled discharge medications provided to patient and family        ** Please Note: This note has been constructed using a voice recognition system **

## 2018-12-01 NOTE — ASSESSMENT & PLAN NOTE
· Has history of recurrent falls at home  · PT/OT evaluation:  Home PT/OT  Case management was informed  · Outpatient follow-up primary care physician

## 2018-12-01 NOTE — PLAN OF CARE
Problem: PHYSICAL THERAPY ADULT  Goal: Performs mobility at highest level of function for planned discharge setting  See evaluation for individualized goals  Treatment/Interventions: Functional transfer training, LE strengthening/ROM, Therapeutic exercise, Endurance training, Patient/family training, Equipment eval/education, Bed mobility, Gait training, Spoke to nursing, Spoke to case management  Equipment Recommended: Raquel Borden       See flowsheet documentation for full assessment, interventions and recommendations  Prognosis: Fair  Problem List: Decreased strength, Decreased range of motion, Decreased endurance, Impaired balance, Decreased mobility  Assessment: Pt is a 80 y o  female seen for PT evaluation s/p admit to [facility] on 11/30/2018 w/ Chest pain and r/o CHF  Hussain Grijalva presented to the ER reporting chest pain started when she was in the toilet and just moved her bowels, when she was doing spomge bathing, when all of a sudden, she experienced significant chest pains  She described the pain to be pressure like/heaviness in the middle part of her chest   Patient however denied any shortness of breath or being clammy  However, she told me that due to the severity of the pain and that she felt lightheaded and felt that she was about to pass out, she has to hold onto  the bathroom sink  Current work up negative for MI, but demonstrating elevated Blood pressure  Order placed for PT  Prior to admission, pt has a long history of limitation in her right hip with niece relating she had her acetabular component removed when she was very young  She now presents with very limited ROM in the right hip which affects her functional mobility and positioning OOB  Her function prior required A for mobility, ambulated household distances, lived in one floor environment, lived with Niece, used standard walker for mobility and was max assist of 1 for all transfers and bed mobility   Upon evaluation: Pt requires maximum assistance for bed mobility and transfers, mechanical conveyance from nursing standpoint for OOB mobility and unable to assess gait due to not feeling well and need for personal hygene     Pt's clinical presentation is currently unstable/unpredictable given the functional mobility deficits above, especially weakness, decreased ROM, decreased endurance, pain and decreased functional mobility tolerance, coupled with fall risks including hx of falls, impaired balance and limited independent mobility, and combined with medical complications of hypertension , abnormal sodium values and increased RR  Pt to benefit from continued skilled PT tx while in hospital and upon DC to address deficits as defined above and maximize level of functional mobility  From PT/mobility standpoint, recommendation at time of d/c would be Home PT and home with family support pending progress in order to maximize pt's functional independence and consistency w/ mobility in order to facilitate return to PLOF  Recommend trial with walker next 1-2 sessions, ther ex next 1-2 sessions, mechanical conveyance from nursing standpoint for OOB mobility and case management consult  Barriers to Discharge: None, Other (Comment) (Nepiper present and express she can provide care need with VNA)     Recommendation: Home with family support, Home PT, Other (Comment) (Home heatlth assistance )          See flowsheet documentation for full assessment

## 2018-12-01 NOTE — ASSESSMENT & PLAN NOTE
· Resolved  · Negative troponins  · Cardiology on board: According to the cardiologist, no evidence of active ischemia  Chest pain was atypical   Will not pursue any stress testing at this time and that no further workup needed at this point  Patient just needs to follow up with her cardiologist   · Status post Aspirin  · Nitroglycerin p r n   · Oxygen p r n  · Pain control p r n  Mila Lund   · Outpatient follow-up with primary care physician and cardiologist

## 2019-07-13 ENCOUNTER — HOSPITAL ENCOUNTER (INPATIENT)
Facility: HOSPITAL | Age: 84
LOS: 6 days | Discharge: NURSING, REHAB OR LONG-TERM CARE | DRG: 551 | End: 2019-07-19
Attending: EMERGENCY MEDICINE | Admitting: INTERNAL MEDICINE
Payer: MEDICARE

## 2019-07-13 ENCOUNTER — APPOINTMENT (EMERGENCY)
Dept: CT IMAGING | Facility: HOSPITAL | Age: 84
DRG: 551 | End: 2019-07-13
Payer: MEDICARE

## 2019-07-13 ENCOUNTER — APPOINTMENT (INPATIENT)
Dept: RADIOLOGY | Facility: HOSPITAL | Age: 84
DRG: 551 | End: 2019-07-13
Payer: MEDICARE

## 2019-07-13 DIAGNOSIS — M54.50 ACUTE LOW BACK PAIN: Primary | ICD-10-CM

## 2019-07-13 DIAGNOSIS — S32.030A CLOSED COMPRESSION FRACTURE OF L3 LUMBAR VERTEBRA, INITIAL ENCOUNTER (HCC): ICD-10-CM

## 2019-07-13 DIAGNOSIS — N39.0 UTI (URINARY TRACT INFECTION): ICD-10-CM

## 2019-07-13 DIAGNOSIS — S32.009A LUMBAR VERTEBRAL FRACTURE (HCC): ICD-10-CM

## 2019-07-13 DIAGNOSIS — I16.0 HYPERTENSIVE URGENCY: ICD-10-CM

## 2019-07-13 DIAGNOSIS — E87.1 HYPONATREMIA: Chronic | ICD-10-CM

## 2019-07-13 PROBLEM — R60.0 LOWER EXTREMITY EDEMA: Status: RESOLVED | Noted: 2018-11-30 | Resolved: 2019-07-13

## 2019-07-13 PROBLEM — R07.9 CHEST PAIN: Status: RESOLVED | Noted: 2018-11-30 | Resolved: 2019-07-13

## 2019-07-13 PROBLEM — S99.922A TOE INJURY, LEFT, INITIAL ENCOUNTER: Status: ACTIVE | Noted: 2019-07-13

## 2019-07-13 PROBLEM — I10 ESSENTIAL HYPERTENSION: Status: ACTIVE | Noted: 2019-07-13

## 2019-07-13 LAB
ALBUMIN SERPL BCP-MCNC: 3.7 G/DL (ref 3.5–5)
ALP SERPL-CCNC: 102 U/L (ref 46–116)
ALT SERPL W P-5'-P-CCNC: 20 U/L (ref 12–78)
ANION GAP SERPL CALCULATED.3IONS-SCNC: 9 MMOL/L (ref 4–13)
APTT PPP: 32 SECONDS (ref 23–37)
AST SERPL W P-5'-P-CCNC: 20 U/L (ref 5–45)
BACTERIA UR QL AUTO: ABNORMAL /HPF
BASOPHILS # BLD AUTO: 0.03 THOUSANDS/ΜL (ref 0–0.1)
BASOPHILS NFR BLD AUTO: 0 % (ref 0–1)
BILIRUB SERPL-MCNC: 0.9 MG/DL (ref 0.2–1)
BILIRUB UR QL STRIP: NEGATIVE
BUN SERPL-MCNC: 9 MG/DL (ref 5–25)
CALCIUM SERPL-MCNC: 9.1 MG/DL (ref 8.3–10.1)
CHLORIDE SERPL-SCNC: 99 MMOL/L (ref 100–108)
CLARITY UR: ABNORMAL
CO2 SERPL-SCNC: 27 MMOL/L (ref 21–32)
COLOR UR: YELLOW
CREAT SERPL-MCNC: 0.57 MG/DL (ref 0.6–1.3)
EOSINOPHIL # BLD AUTO: 0.18 THOUSAND/ΜL (ref 0–0.61)
EOSINOPHIL NFR BLD AUTO: 2 % (ref 0–6)
ERYTHROCYTE [DISTWIDTH] IN BLOOD BY AUTOMATED COUNT: 12.5 % (ref 11.6–15.1)
GFR SERPL CREATININE-BSD FRML MDRD: 81 ML/MIN/1.73SQ M
GLUCOSE SERPL-MCNC: 99 MG/DL (ref 65–140)
GLUCOSE UR STRIP-MCNC: NEGATIVE MG/DL
HCT VFR BLD AUTO: 42.4 % (ref 34.8–46.1)
HGB BLD-MCNC: 14.4 G/DL (ref 11.5–15.4)
HGB UR QL STRIP.AUTO: ABNORMAL
IMM GRANULOCYTES # BLD AUTO: 0.03 THOUSAND/UL (ref 0–0.2)
IMM GRANULOCYTES NFR BLD AUTO: 0 % (ref 0–2)
INR PPP: 1.01 (ref 0.84–1.19)
KETONES UR STRIP-MCNC: NEGATIVE MG/DL
LEUKOCYTE ESTERASE UR QL STRIP: ABNORMAL
LYMPHOCYTES # BLD AUTO: 0.97 THOUSANDS/ΜL (ref 0.6–4.47)
LYMPHOCYTES NFR BLD AUTO: 10 % (ref 14–44)
MAGNESIUM SERPL-MCNC: 2 MG/DL (ref 1.6–2.6)
MCH RBC QN AUTO: 34.6 PG (ref 26.8–34.3)
MCHC RBC AUTO-ENTMCNC: 34 G/DL (ref 31.4–37.4)
MCV RBC AUTO: 102 FL (ref 82–98)
MONOCYTES # BLD AUTO: 1.06 THOUSAND/ΜL (ref 0.17–1.22)
MONOCYTES NFR BLD AUTO: 11 % (ref 4–12)
NEUTROPHILS # BLD AUTO: 7.37 THOUSANDS/ΜL (ref 1.85–7.62)
NEUTS SEG NFR BLD AUTO: 77 % (ref 43–75)
NITRITE UR QL STRIP: POSITIVE
NON-SQ EPI CELLS URNS QL MICRO: ABNORMAL /HPF
NRBC BLD AUTO-RTO: 0 /100 WBCS
NT-PROBNP SERPL-MCNC: 371 PG/ML
PH UR STRIP.AUTO: 7 [PH] (ref 4.5–8)
PLATELET # BLD AUTO: 252 THOUSANDS/UL (ref 149–390)
PMV BLD AUTO: 8.8 FL (ref 8.9–12.7)
POTASSIUM SERPL-SCNC: 4 MMOL/L (ref 3.5–5.3)
PROT SERPL-MCNC: 7.6 G/DL (ref 6.4–8.2)
PROT UR STRIP-MCNC: NEGATIVE MG/DL
PROTHROMBIN TIME: 12.7 SECONDS (ref 11.6–14.5)
RBC # BLD AUTO: 4.16 MILLION/UL (ref 3.81–5.12)
RBC #/AREA URNS AUTO: ABNORMAL /HPF
SODIUM SERPL-SCNC: 135 MMOL/L (ref 136–145)
SP GR UR STRIP.AUTO: 1.01 (ref 1–1.03)
TROPONIN I SERPL-MCNC: <0.02 NG/ML
UROBILINOGEN UR QL STRIP.AUTO: 0.2 E.U./DL
WBC # BLD AUTO: 9.64 THOUSAND/UL (ref 4.31–10.16)
WBC #/AREA URNS AUTO: ABNORMAL /HPF

## 2019-07-13 PROCEDURE — 85730 THROMBOPLASTIN TIME PARTIAL: CPT | Performed by: EMERGENCY MEDICINE

## 2019-07-13 PROCEDURE — 36415 COLL VENOUS BLD VENIPUNCTURE: CPT | Performed by: EMERGENCY MEDICINE

## 2019-07-13 PROCEDURE — 96376 TX/PRO/DX INJ SAME DRUG ADON: CPT

## 2019-07-13 PROCEDURE — 99285 EMERGENCY DEPT VISIT HI MDM: CPT

## 2019-07-13 PROCEDURE — 70450 CT HEAD/BRAIN W/O DYE: CPT

## 2019-07-13 PROCEDURE — 85610 PROTHROMBIN TIME: CPT | Performed by: EMERGENCY MEDICINE

## 2019-07-13 PROCEDURE — 84484 ASSAY OF TROPONIN QUANT: CPT | Performed by: EMERGENCY MEDICINE

## 2019-07-13 PROCEDURE — 87077 CULTURE AEROBIC IDENTIFY: CPT

## 2019-07-13 PROCEDURE — 93005 ELECTROCARDIOGRAM TRACING: CPT

## 2019-07-13 PROCEDURE — 83880 ASSAY OF NATRIURETIC PEPTIDE: CPT | Performed by: EMERGENCY MEDICINE

## 2019-07-13 PROCEDURE — 99223 1ST HOSP IP/OBS HIGH 75: CPT | Performed by: INTERNAL MEDICINE

## 2019-07-13 PROCEDURE — 73620 X-RAY EXAM OF FOOT: CPT

## 2019-07-13 PROCEDURE — 80053 COMPREHEN METABOLIC PANEL: CPT | Performed by: EMERGENCY MEDICINE

## 2019-07-13 PROCEDURE — 83735 ASSAY OF MAGNESIUM: CPT | Performed by: EMERGENCY MEDICINE

## 2019-07-13 PROCEDURE — 87186 SC STD MICRODIL/AGAR DIL: CPT

## 2019-07-13 PROCEDURE — 87086 URINE CULTURE/COLONY COUNT: CPT

## 2019-07-13 PROCEDURE — 71260 CT THORAX DX C+: CPT

## 2019-07-13 PROCEDURE — 72125 CT NECK SPINE W/O DYE: CPT

## 2019-07-13 PROCEDURE — 96375 TX/PRO/DX INJ NEW DRUG ADDON: CPT

## 2019-07-13 PROCEDURE — 96365 THER/PROPH/DIAG IV INF INIT: CPT

## 2019-07-13 PROCEDURE — 99284 EMERGENCY DEPT VISIT MOD MDM: CPT | Performed by: EMERGENCY MEDICINE

## 2019-07-13 PROCEDURE — 1123F ACP DISCUSS/DSCN MKR DOCD: CPT | Performed by: INTERNAL MEDICINE

## 2019-07-13 PROCEDURE — 85025 COMPLETE CBC W/AUTO DIFF WBC: CPT | Performed by: EMERGENCY MEDICINE

## 2019-07-13 PROCEDURE — 74177 CT ABD & PELVIS W/CONTRAST: CPT

## 2019-07-13 PROCEDURE — 81001 URINALYSIS AUTO W/SCOPE: CPT

## 2019-07-13 RX ORDER — OXYCODONE HYDROCHLORIDE 5 MG/1
2.5 TABLET ORAL EVERY 6 HOURS PRN
Status: DISCONTINUED | OUTPATIENT
Start: 2019-07-13 | End: 2019-07-19 | Stop reason: HOSPADM

## 2019-07-13 RX ORDER — ACETAMINOPHEN 325 MG/1
650 TABLET ORAL EVERY 6 HOURS PRN
Status: DISCONTINUED | OUTPATIENT
Start: 2019-07-13 | End: 2019-07-13

## 2019-07-13 RX ORDER — LIDOCAINE 50 MG/G
1 PATCH TOPICAL DAILY
Status: DISCONTINUED | OUTPATIENT
Start: 2019-07-14 | End: 2019-07-19 | Stop reason: HOSPADM

## 2019-07-13 RX ORDER — CEPHALEXIN 500 MG/1
500 CAPSULE ORAL EVERY 6 HOURS SCHEDULED
Qty: 28 CAPSULE | Refills: 0 | Status: ON HOLD | OUTPATIENT
Start: 2019-07-13 | End: 2019-07-13 | Stop reason: CLARIF

## 2019-07-13 RX ORDER — SODIUM CHLORIDE 9 MG/ML
50 INJECTION, SOLUTION INTRAVENOUS CONTINUOUS
Status: DISCONTINUED | OUTPATIENT
Start: 2019-07-13 | End: 2019-07-14

## 2019-07-13 RX ORDER — OXYCODONE HYDROCHLORIDE 5 MG/1
5 TABLET ORAL EVERY 6 HOURS PRN
Status: DISCONTINUED | OUTPATIENT
Start: 2019-07-13 | End: 2019-07-15

## 2019-07-13 RX ORDER — ASPIRIN 81 MG/1
81 TABLET, CHEWABLE ORAL DAILY
Status: DISCONTINUED | OUTPATIENT
Start: 2019-07-13 | End: 2019-07-19 | Stop reason: HOSPADM

## 2019-07-13 RX ORDER — LIDOCAINE 50 MG/G
1 PATCH TOPICAL ONCE
Status: COMPLETED | OUTPATIENT
Start: 2019-07-13 | End: 2019-07-14

## 2019-07-13 RX ORDER — MUSCLE RUB CREAM 100; 150 MG/G; MG/G
CREAM TOPICAL 4 TIMES DAILY PRN
Status: DISCONTINUED | OUTPATIENT
Start: 2019-07-13 | End: 2019-07-19 | Stop reason: HOSPADM

## 2019-07-13 RX ORDER — LIDOCAINE 50 MG/G
1 PATCH TOPICAL ONCE
Status: DISCONTINUED | OUTPATIENT
Start: 2019-07-13 | End: 2019-07-13

## 2019-07-13 RX ORDER — TIZANIDINE 2 MG/1
2 TABLET ORAL ONCE
Status: COMPLETED | OUTPATIENT
Start: 2019-07-13 | End: 2019-07-13

## 2019-07-13 RX ORDER — DEXAMETHASONE SODIUM PHOSPHATE 4 MG/ML
4 INJECTION, SOLUTION INTRA-ARTICULAR; INTRALESIONAL; INTRAMUSCULAR; INTRAVENOUS; SOFT TISSUE EVERY 8 HOURS SCHEDULED
Status: COMPLETED | OUTPATIENT
Start: 2019-07-13 | End: 2019-07-14

## 2019-07-13 RX ORDER — AMIODARONE HYDROCHLORIDE 200 MG/1
100 TABLET ORAL DAILY
Status: DISCONTINUED | OUTPATIENT
Start: 2019-07-13 | End: 2019-07-13

## 2019-07-13 RX ORDER — LIDOCAINE 50 MG/G
1 PATCH TOPICAL DAILY
Qty: 30 PATCH | Refills: 0 | Status: ON HOLD | OUTPATIENT
Start: 2019-07-13 | End: 2019-07-13 | Stop reason: CLARIF

## 2019-07-13 RX ORDER — MELATONIN
1000 DAILY
Status: DISCONTINUED | OUTPATIENT
Start: 2019-07-13 | End: 2019-07-19 | Stop reason: HOSPADM

## 2019-07-13 RX ORDER — ACETAMINOPHEN 325 MG/1
975 TABLET ORAL EVERY 8 HOURS SCHEDULED
Status: DISCONTINUED | OUTPATIENT
Start: 2019-07-13 | End: 2019-07-19 | Stop reason: HOSPADM

## 2019-07-13 RX ORDER — PRAVASTATIN SODIUM 10 MG
10 TABLET ORAL
Status: DISCONTINUED | OUTPATIENT
Start: 2019-07-13 | End: 2019-07-19 | Stop reason: HOSPADM

## 2019-07-13 RX ORDER — TIZANIDINE HYDROCHLORIDE 2 MG/1
2 CAPSULE, GELATIN COATED ORAL 3 TIMES DAILY PRN
Qty: 21 CAPSULE | Refills: 0 | Status: ON HOLD | OUTPATIENT
Start: 2019-07-13 | End: 2019-07-13 | Stop reason: CLARIF

## 2019-07-13 RX ORDER — ONDANSETRON 2 MG/ML
4 INJECTION INTRAMUSCULAR; INTRAVENOUS EVERY 6 HOURS PRN
Status: DISCONTINUED | OUTPATIENT
Start: 2019-07-13 | End: 2019-07-19 | Stop reason: HOSPADM

## 2019-07-13 RX ORDER — ASPIRIN 81 MG/1
81 TABLET, CHEWABLE ORAL DAILY
Status: DISCONTINUED | OUTPATIENT
Start: 2019-07-13 | End: 2019-07-13

## 2019-07-13 RX ORDER — AMIODARONE HYDROCHLORIDE 200 MG/1
100 TABLET ORAL DAILY
Status: DISCONTINUED | OUTPATIENT
Start: 2019-07-13 | End: 2019-07-19 | Stop reason: HOSPADM

## 2019-07-13 RX ORDER — CARVEDILOL 6.25 MG/1
6.25 TABLET ORAL DAILY
Status: DISCONTINUED | OUTPATIENT
Start: 2019-07-13 | End: 2019-07-19 | Stop reason: HOSPADM

## 2019-07-13 RX ORDER — SULFAMETHOXAZOLE AND TRIMETHOPRIM 800; 160 MG/1; MG/1
1 TABLET ORAL EVERY 12 HOURS SCHEDULED
Status: DISCONTINUED | OUTPATIENT
Start: 2019-07-13 | End: 2019-07-15

## 2019-07-13 RX ORDER — CLOTRIMAZOLE AND BETAMETHASONE DIPROPIONATE 10; .64 MG/G; MG/G
CREAM TOPICAL 2 TIMES DAILY
Status: DISCONTINUED | OUTPATIENT
Start: 2019-07-13 | End: 2019-07-19 | Stop reason: HOSPADM

## 2019-07-13 RX ORDER — MORPHINE SULFATE 4 MG/ML
2 INJECTION, SOLUTION INTRAMUSCULAR; INTRAVENOUS ONCE
Status: DISCONTINUED | OUTPATIENT
Start: 2019-07-13 | End: 2019-07-15

## 2019-07-13 RX ORDER — HYDRALAZINE HYDROCHLORIDE 20 MG/ML
10 INJECTION INTRAMUSCULAR; INTRAVENOUS ONCE
Status: COMPLETED | OUTPATIENT
Start: 2019-07-13 | End: 2019-07-13

## 2019-07-13 RX ORDER — MORPHINE SULFATE 4 MG/ML
2 INJECTION, SOLUTION INTRAMUSCULAR; INTRAVENOUS ONCE
Status: COMPLETED | OUTPATIENT
Start: 2019-07-13 | End: 2019-07-13

## 2019-07-13 RX ORDER — DIAZEPAM 5 MG/ML
2.5 INJECTION, SOLUTION INTRAMUSCULAR; INTRAVENOUS ONCE
Status: COMPLETED | OUTPATIENT
Start: 2019-07-13 | End: 2019-07-13

## 2019-07-13 RX ORDER — LOSARTAN POTASSIUM 50 MG/1
50 TABLET ORAL 2 TIMES DAILY
Status: DISCONTINUED | OUTPATIENT
Start: 2019-07-13 | End: 2019-07-15

## 2019-07-13 RX ORDER — POLYETHYLENE GLYCOL 3350 17 G/17G
17 POWDER, FOR SOLUTION ORAL DAILY PRN
Status: DISCONTINUED | OUTPATIENT
Start: 2019-07-13 | End: 2019-07-15

## 2019-07-13 RX ADMIN — SODIUM CHLORIDE 50 ML/HR: 0.9 INJECTION, SOLUTION INTRAVENOUS at 18:17

## 2019-07-13 RX ADMIN — MORPHINE SULFATE 2 MG: 4 INJECTION INTRAVENOUS at 10:05

## 2019-07-13 RX ADMIN — LIDOCAINE 1 PATCH: 50 PATCH CUTANEOUS at 12:23

## 2019-07-13 RX ADMIN — DEXAMETHASONE SODIUM PHOSPHATE 4 MG: 4 INJECTION, SOLUTION INTRAMUSCULAR; INTRAVENOUS at 18:24

## 2019-07-13 RX ADMIN — LOSARTAN POTASSIUM 50 MG: 50 TABLET ORAL at 21:01

## 2019-07-13 RX ADMIN — MENTHOL, METHYL SALICYLATE: 10; 15 CREAM TOPICAL at 18:17

## 2019-07-13 RX ADMIN — TIZANIDINE 2 MG: 2 TABLET ORAL at 12:10

## 2019-07-13 RX ADMIN — HYDRALAZINE HYDROCHLORIDE 10 MG: 20 INJECTION INTRAMUSCULAR; INTRAVENOUS at 11:40

## 2019-07-13 RX ADMIN — LIDOCAINE 1 PATCH: 50 PATCH TOPICAL at 12:12

## 2019-07-13 RX ADMIN — Medication 2.5 MG: at 12:40

## 2019-07-13 RX ADMIN — OXYCODONE HYDROCHLORIDE 2.5 MG: 5 TABLET ORAL at 21:57

## 2019-07-13 RX ADMIN — SULFAMETHOXAZOLE AND TRIMETHOPRIM 1 TABLET: 800; 160 TABLET ORAL at 21:01

## 2019-07-13 RX ADMIN — PRAVASTATIN SODIUM 10 MG: 10 TABLET ORAL at 21:01

## 2019-07-13 RX ADMIN — CEFTRIAXONE 1000 MG: 2 INJECTION, POWDER, FOR SOLUTION INTRAMUSCULAR; INTRAVENOUS at 11:42

## 2019-07-13 RX ADMIN — ACETAMINOPHEN 975 MG: 325 TABLET, FILM COATED ORAL at 18:13

## 2019-07-13 RX ADMIN — ASPIRIN 81 MG 81 MG: 81 TABLET ORAL at 21:01

## 2019-07-13 RX ADMIN — IOHEXOL 100 ML: 350 INJECTION, SOLUTION INTRAVENOUS at 10:46

## 2019-07-13 RX ADMIN — AMIODARONE HYDROCHLORIDE 100 MG: 200 TABLET ORAL at 21:01

## 2019-07-13 RX ADMIN — MORPHINE SULFATE 2 MG: 4 INJECTION INTRAVENOUS at 10:38

## 2019-07-13 NOTE — H&P
H&P- Chandni Pack 10/24/1927, 80 y o  female MRN: [id number]    Unit/Bed#: -Ml Encounter: [id number]    Primary Care Provider: Romi Sapp MD   Date and time admitted to hospital: 7/13/2019  9:22 AM      Lumbar pain  Assessment & Plan  Currently patient's pain in controlled with Lidocaine patch and morphine  Plan - MRI lumbar spine   Pain control with Tylenol 975mg 8hrly, Oxycodone 2 5mg PRN (moderate pain), Oxycodone 5mg PRN (severe pain), and Lidoderm patch daily   IV Dexamethasone 4 mg 8 hrly   Monitor pain levels       Urinary tract infection  Assessment & Plan  Patient does not complain of dysuria, frequency, flank pain or hematuria  Her urine microscopy shows 10-20 WBC, and innumerable bacteria  She does not have fever or chills  Examination shows no renal angle tenderness  Plan -  IV Normal saline 50ml/hr   TMP--160 mg every 12 hrs   Good oral intake of fluids   Monitor temperature and other vitals   Follow up urine cultures    Toe injury, left, initial encounter  Assessment & Plan  On examination patient had bruising of the Left second foot, and had pain with movement  She does not recall of any trauma to the toe  Plan - Xray Left foot   Pain control    Essential hypertension  Assessment & Plan  Patient is on home antihypertensives  On admission to the ED, her BP was elevated, but with adequate pain control, it normalized  Plan - Continue home antihypertensives   Monitor vitals    Ambulatory dysfunction  Assessment & Plan  Patient can walk with an assistive device  However, she is at increased risk for falls  She needs assistance getting up from bed    Plan - Ambulate with assistance   Physical Therapy evaluation and treatment    PAF (paroxysmal atrial fibrillation) ([facility] [address] )  Assessment & Plan  She has not experienced any episodes of chest pain or palpitations in the recent past    Plan - Continue home medication (amioderone)          VTE Prophylaxis: Enoxaparin (Lovenox)  / reason for no mechanical VTE prophylaxis Does not meet requirement   Code Status: Level 3 DNAR  POLST: POLST form is not discussed and not completed at this time  Discussion with family:patient and neice    Anticipated Length of Stay:  Patient will be admitted on an Inpatient basis with an anticipated length of stay of  2 midnights  Justification for Hospital Stay: evaluation and management of back pain and UTI    Total Time for Visit, including Counseling / Coordination of Care: 45 minutes  Greater than 50% of this total time spent on direct patient counseling and coordination of care  Chief Complaint:   Back pain    History of Present Illness: History was taken from the patient and her niece (caretaker)    Jaclyn Nunes is a 80 y o  female who presents with lumbar back pain which started 3 days back  She has a history of diffuse arthritis (since age 3 years), diverticulitis, and overactive negative  Back pain was shooting in nature, was present initially only with standing and movement  But since yesterday, it has become persistent  It did not radiate to lower limbs, and on presentation to the ED was of 7/10 intensity  Tylenol taken at home did not relieve her symptoms, and her usual heating pad application made the pain worse  She does not recall recent trauma to the back, or falls in the past few weeks  (had a fall in May, but she did not sustain injuries)  She does not have dysuria, hematuria, or frequency  There's no history of fever or chills  Her bowel habits are normal with no constipation or diarrhea  ED - in the ED, she was given morphine and lidocaine patch and her pain subsequently improved  However, she still experienced pain with movement  Her BP was elevated on admission to the ED, but it normalized following adequate pain control  Urinary microscopy shows WBC 10-20, and innumerable bacteria  Cultures have been sent   CT scan of the chest, abdomen, and pelvis  Shows now acute compression collapse of the vertebra  Possible cystic pelvic/adnexal lesion, or a posteriorly directed diverticulum from urinary bladder(less likely) was visualized  Review of Systems:    Review of Systems   Constitutional: Negative for appetite change, chills, diaphoresis, fatigue, fever and unexpected weight change  HENT: Negative  Eyes: Negative  Respiratory: Negative for cough, chest tightness and shortness of breath  Cardiovascular: Negative for chest pain, palpitations and leg swelling  Gastrointestinal: Negative for abdominal distention, abdominal pain, anal bleeding, blood in stool, constipation, diarrhea, nausea and rectal pain  Endocrine: Negative  Genitourinary: Negative for difficulty urinating, dysuria, flank pain, frequency, hematuria and pelvic pain  Musculoskeletal: Positive for arthralgias, back pain and joint swelling  Skin: Negative for pallor  Allergic/Immunologic: Negative  Neurological: Negative  Hematological: Does not bruise/bleed easily  Psychiatric/Behavioral: Negative for behavioral problems, confusion and decreased concentration (mild forgetfulness due to age related dementia)  The patient is nervous/anxious  All other systems reviewed and are negative  Past Medical and Surgical History:     Past Medical History:   Diagnosis Date    A-fib ([facility] [address] )     Anxiety     Cheilosis     Diverticulosis     Gallstone     Hiatal hernia     Hyperlipidemia     Hypertension     Hyponatremia     Inguinal hernia     Lacunar infarction (HCC)     Macular degeneration     Osteoarthritis     Renal cyst     Syncope     Tuberculosis     Umbilical hernia     Vertigo        Past Surgical History:   Procedure Laterality Date    HIP SURGERY      At  young age       Meds/Allergies:    Prior to Admission medications    Medication Sig Start Date End Date Taking?  Authorizing Provider   acetaminophen (TYLENOL) 325 mg tablet Take 650 mg by mouth every 6 (six) hours as needed for mild pain    Historical Provider, MD   amiodarone 200 mg tablet Take 100 mg by mouth daily     Historical Provider, MD   aspirin 81 MG tablet Take 81 mg by mouth daily    Historical Provider, MD   carvedilol (COREG) 6 25 mg tablet Take 1 tablet (6 25 mg total) by mouth 2 (two) times a day with meals  Patient taking differently: Take 6 25 mg by mouth daily  12/1/18   Nadeen Woodward MD   Cholecalciferol (VITAMIN D3) 1000 units CAPS Take by mouth    Historical Provider, MD   clotrimazole-betamethasone (LOTRISONE) 1-0 05 % cream Apply topically 2 (two) times a day    Historical Provider, MD   Coenzyme Q10 (COQ-10) 50 MG CAPS Take by mouth daily    Historical Provider, MD   irbesartan (AVAPRO) 300 mg tablet Take 150 mg by mouth 2 (two) times a day     Historical Provider, MD   Multiple Vitamins-Minerals (ICAPS AREDS 2 PO) Take by mouth 2 (two) times a day    Historical Provider, MD   pravastatin (PRAVACHOL) 10 mg tablet Take 10 mg by mouth daily    Historical Provider, MD   Psyllium (METAMUCIL PO) Take by mouth    Historical Provider, MD   cephalexin (KEFLEX) 500 mg capsule Take 1 capsule (500 mg total) by mouth every 6 (six) hours for 7 days 7/13/19 7/13/19  Ibrahima Vazquez,    lidocaine (LIDODERM) 5 % Apply 1 patch topically daily Remove & Discard patch within 12 hours or as directed by MD 7/13/19 7/13/19  Shonna Vazquez DO   Multiple Vitamins-Minerals (CENTRUM SILVER ADULT 50+ PO) Take by mouth  7/13/19  Historical Provider, MD   TiZANidine (ZANAFLEX) 2 MG capsule Take 1 capsule (2 mg total) by mouth 3 (three) times a day as needed for muscle spasms for up to 7 days 7/13/19 7/13/19  Leelee Tran, DO     I have reviewed home medications with patient family member  Allergies: Allergies   Allergen Reactions    Codeine     Lomotil [Diphenoxylate]     Quinidex [Quinidine]     Adhesive [Medical Tape] Rash       Social History:     Marital Status:     Patient Pre-hospital Living Situation: Home, with niece as her care giver  Patient Pre-hospital Level of Mobility: need assistance  Patient Pre-hospital Diet Restrictions: none  Substance Use History:   Social History     Substance and Sexual Activity   Alcohol Use No     Social History     Tobacco Use   Smoking Status Never Smoker   Smokeless Tobacco Never Used     Social History     Substance and Sexual Activity   Drug Use No       Family History:    Family History   Problem Relation Age of Onset    Leukemia Sister     Diabetes Sister     Hypertension Sister     Coronary artery disease Sister     Aortic aneurysm Sister        Physical Exam:     Vitals:   Blood Pressure: 136/60 (07/13/19 1410)  Pulse: 61 (07/13/19 1410)  Temperature: 97 7 °F (36 5 °C) (07/13/19 1410)  Temp Source: Oral (07/13/19 0931)  Respirations: 20 (07/13/19 1410)  Height: 5' 3" (160 cm) (07/13/19 1500)  Weight - Scale: 56 kg (123 lb 7 3 oz) (07/13/19 1500)  SpO2: 95 % (07/13/19 1500)    Physical Exam   Constitutional: No distress (slightly anxious)  HENT:   Head: Normocephalic and atraumatic  Mouth/Throat: Oropharyngeal exudate: oral cavity appears dry  Eyes: Pupils are equal, round, and reactive to light  Right eye exhibits no discharge  Left eye exhibits no discharge  Neck: Normal range of motion  Cardiovascular: Normal rate, regular rhythm and intact distal pulses  Murmur heard  Pulmonary/Chest: Effort normal and breath sounds normal  No respiratory distress  She has no wheezes  Tenderness: mild chest soreness present  no deformities  Abdominal: Soft  Bowel sounds are normal  Distention: slight distension due to full bladder  There is no tenderness (no renal angle tenderness)  There is no guarding  Midline surgical incision present   Musculoskeletal: She exhibits tenderness (bruising of the left 2nd toe present  mildly tender)  Deformity: multiple small and large joint deformities due to osteoarthritis  Spine shows no deformities, no tenderness to palpation    SLRT was positive on the R side   Skin: Skin is warm  Capillary refill takes less than 2 seconds  No rash noted  She is not diaphoretic  No erythema  Vitals reviewed  Additional Data:     Lab Results: I have personally reviewed pertinent reports  Results from last 7 days   Lab Units 07/13/19  0944   WBC Thousand/uL 9 64   HEMOGLOBIN g/dL 14 4   HEMATOCRIT % 42 4   PLATELETS Thousands/uL 252   NEUTROS PCT % 77*   LYMPHS PCT % 10*   MONOS PCT % 11   EOS PCT % 2     Results from last 7 days   Lab Units 07/13/19  0944   SODIUM mmol/L 135*   POTASSIUM mmol/L 4 0   CHLORIDE mmol/L 99*   CO2 mmol/L 27   BUN mg/dL 9   CREATININE mg/dL 0 57*   ANION GAP mmol/L 9   CALCIUM mg/dL 9 1   ALBUMIN g/dL 3 7   TOTAL BILIRUBIN mg/dL 0 90   ALK PHOS U/L 102   ALT U/L 20   AST U/L 20   GLUCOSE RANDOM mg/dL 99     Results from last 7 days   Lab Units 07/13/19  0944   INR  1 01                   Imaging: I have personally reviewed pertinent reports  CT chest abdomen pelvis w contrast   Final Result by Cheri Liu MD (07/13 0504)      No solid visceral injury seen  Distended urinary bladder with mild prominence of the both renal pelvicalyceal system and ureters, Evaluate clinically for emptying of the bladder      A 7 3 x 5 9 x 1 8 cm rounded fluid-filled structure cystic area posterior to the urinary bladder may represent a cystic pelvic/adnexal lesion, less likely may represent a posteriorly directed diverticulum from urinary bladder  Consider nonemergent    ultrasound of the pelvis  for definite characterization   Bilateral scarring      No acute compression collapse of the vertebra   Cholelithiasis   The study was marked in EPIC for significant notification               Workstation performed: [id number]         CT head without contrast   Final Result by Cheri Liu MD (07/13 3211)      No acute intracranial hemorrhage seen   No extra-axial collection seen   No mass effect or midline shift seen Workstation performed: [id number]         CT cervical spine without contrast   Final Result by Olivia Ang MD (07/13 1120)      No acute compression collapse of the vertebra                   Workstation performed: [id number]         MRI inpatient order    (Results Pending)   XR foot 2 vw left    (Results Pending)       EKG, Pathology, and Other Studies Reviewed on Admission:   · EKG: none    Allscripts / Epic Records Reviewed: Yes     ** Please Note: This note has been constructed using a voice recognition system   **

## 2019-07-13 NOTE — ED PROVIDER NOTES
History  Chief Complaint   Patient presents with    Back Pain     woke up at 033 w/ back pain; took tylenol  Pain persists; came to ED for Eval     27-year-old female who comes in for evaluation of back pain  Patient states that back pain started on Tuesday and it is her her entire back and is constant  Patient states that she tried Tylenol at home with minimal relief  Patient denies any injury or previous episodes of back pain  Patient states the pain woke her up at 3 o'clock in the morning and has gotten progressively worse since then  Patient denies any headache blurred or double vision chest pain shortness of breath nausea vomiting abdominal pain diarrhea or UTI symptoms  0948-patient now admits to having multiple falls where she fell into her kitchen she said 1 time she fell into her refrigerator and 1 time she fell into her [place] it is unclear if the patient struck her head or not  History provided by:  Patient and EMS personnel   used: No    Back Pain   Location:  Generalized  Quality:  Shooting and stabbing  Radiates to:  Does not radiate  Pain severity:  Severe  Pain is:  Same all the time  Onset quality:  Gradual  Duration:  4 days  Timing:  Constant  Progression:  Worsening  Chronicity:  New  Context: not emotional stress, not MVA and not recent injury    Ineffective treatments:  Lying down, being still and OTC medications  Associated symptoms: no abdominal pain, no abdominal swelling, no chest pain, no dysuria, no fever, no headaches, no leg pain, no numbness, no perianal numbness and no tingling    Risk factors: no hx of cancer, not obese and no recent surgery        Prior to Admission Medications   Prescriptions Last Dose Informant Patient Reported? Taking?    Cholecalciferol (VITAMIN D3) 1000 units CAPS   Yes No   Sig: Take by mouth   Coenzyme Q10 (COQ-10) 50 MG CAPS   Yes No   Sig: Take by mouth daily   Multiple Vitamins-Minerals (CENTRUM SILVER ADULT 50+ PO)   Yes No Sig: Take by mouth   Multiple Vitamins-Minerals (ICAPS AREDS 2 PO)   Yes No   Sig: Take by mouth 2 (two) times a day   Psyllium (METAMUCIL PO)   Yes No   Sig: Take by mouth   acetaminophen (TYLENOL) 325 mg tablet   Yes No   Sig: Take 650 mg by mouth every 6 (six) hours as needed for mild pain   amiodarone 200 mg tablet   Yes No   Sig: Take 200 mg by mouth daily   aspirin 81 MG tablet   Yes No   Sig: Take 81 mg by mouth daily   carvedilol (COREG) 6 25 mg tablet   No No   Sig: Take 1 tablet (6 25 mg total) by mouth 2 (two) times a day with meals   clotrimazole-betamethasone (LOTRISONE) 1-0 05 % cream   Yes No   Sig: Apply topically 2 (two) times a day   irbesartan (AVAPRO) 75 mg tablet   Yes No   Sig: Take 300 mg by mouth 2 (two) times a day    pravastatin (PRAVACHOL) 10 mg tablet   Yes No   Sig: Take 10 mg by mouth daily      Facility-Administered Medications: None       Past Medical History:   Diagnosis Date    [facility]-fib ([facility] )     Anxiety     Cheilosis     Diverticulosis     Gallstone     Hiatal hernia     Hyperlipidemia     Hypertension     Hyponatremia     Inguinal hernia     Lacunar infarction (HCC)     Macular degeneration     Osteoarthritis     Renal cyst     Syncope     Tuberculosis     Umbilical hernia     Vertigo        History reviewed  No pertinent surgical history  History reviewed  No pertinent family history  I have reviewed and agree with the history as documented  Social History     Tobacco Use    Smoking status: Never Smoker    Smokeless tobacco: Never Used   Substance Use Topics    Alcohol use: No    Drug use: No        Review of Systems   Constitutional: Negative for fatigue and fever  HENT: Negative for congestion and ear pain  Eyes: Negative for discharge and redness  Respiratory: Negative for apnea, cough, shortness of breath and wheezing  Cardiovascular: Negative for chest pain  Gastrointestinal: Negative for abdominal pain and diarrhea     Endocrine: Negative for cold intolerance and polydipsia  Genitourinary: Negative for difficulty urinating, dysuria and hematuria  Musculoskeletal: Positive for back pain  Negative for arthralgias  Skin: Negative for color change and rash  Allergic/Immunologic: Negative for environmental allergies and immunocompromised state  Neurological: Negative for tingling, numbness and headaches  Hematological: Negative for adenopathy  Does not bruise/bleed easily  Psychiatric/Behavioral: Negative for agitation and behavioral problems  Physical Exam  Physical Exam   Constitutional: She is oriented to person, place, and time  Vital signs are normal  She appears well-developed and well-nourished  Non-toxic appearance  HENT:   Head: Normocephalic and atraumatic  Right Ear: Tympanic membrane and external ear normal    Left Ear: Tympanic membrane and external ear normal    Nose: Nose normal  No rhinorrhea, sinus tenderness or nasal deformity  Mouth/Throat: Uvula is midline and oropharynx is clear and moist  Normal dentition  Eyes: Pupils are equal, round, and reactive to light  Conjunctivae, EOM and lids are normal  Right eye exhibits no discharge  Left eye exhibits no discharge  Neck: Trachea normal and normal range of motion  Neck supple  No JVD present  Carotid bruit is not present  Cardiovascular: Normal rate, regular rhythm, intact distal pulses and normal pulses  No extrasystoles are present  PMI is not displaced  Pulmonary/Chest: Effort normal and breath sounds normal  No accessory muscle usage  No respiratory distress  She has no wheezes  She has no rhonchi  She has no rales  Abdominal: Soft  Normal appearance and bowel sounds are normal  She exhibits no mass  There is no tenderness  There is no rigidity, no rebound and no guarding  Musculoskeletal:        Right shoulder: She exhibits normal range of motion, no bony tenderness, no swelling and no deformity          Cervical back: Normal  She exhibits normal range of motion, no tenderness, no bony tenderness and no deformity  Lymphadenopathy:     She has no cervical adenopathy  She has no axillary adenopathy  Neurological: She is alert and oriented to person, place, and time  She has normal strength and normal reflexes  No cranial nerve deficit or sensory deficit  GCS eye subscore is 4  GCS verbal subscore is 5  GCS motor subscore is 6  Skin: Skin is warm and dry  No rash noted  Psychiatric: She has a normal mood and affect  Her speech is normal and behavior is normal    Nursing note and vitals reviewed        Vital Signs  ED Triage Vitals   Temperature Pulse Respirations Blood Pressure SpO2   07/13/19 0926 07/13/19 0926 07/13/19 0926 07/13/19 0931 07/13/19 0926   97 7 °F (36 5 °C) 63 16 (!) 210/90 96 %      Temp Source Heart Rate Source Patient Position - Orthostatic VS BP Location FiO2 (%)   07/13/19 0926 07/13/19 0926 07/13/19 0926 07/13/19 0926 --   Oral Monitor Lying Right arm       Pain Score       07/13/19 0931       Worst Possible Pain           Vitals:    07/13/19 1000 07/13/19 1030 07/13/19 1136 07/13/19 1231   BP: (!) 203/85 (!) 203/85 (!) 199/82 (!) 175/77   Pulse: 64 58 63 78   Patient Position - Orthostatic VS:   Lying          Visual Acuity      ED Medications  Medications   lidocaine (LIDODERM) 5 % patch 1 patch (1 patch Topical Medication Applied 7/13/19 1223)   morphine (PF) 4 mg/mL injection 2 mg (has no administration in time range)   morphine (PF) 4 mg/mL injection 2 mg (2 mg Intravenous Given 7/13/19 1005)   morphine (PF) 4 mg/mL injection 2 mg (2 mg Intravenous Given 7/13/19 1038)   iohexol (OMNIPAQUE) 350 MG/ML injection (MULTI-DOSE) 100 mL (100 mL Intravenous Given 7/13/19 1046)   ceftriaxone (ROCEPHIN) 1 g/50 mL in dextrose IVPB (0 mg Intravenous Stopped 7/13/19 1210)   hydrALAZINE (APRESOLINE) injection 10 mg (10 mg Intravenous Given 7/13/19 1140)   tiZANidine (ZANAFLEX) tablet 2 mg (2 mg Oral Given 7/13/19 1210) diazepam (VALIUM) injection 2 5 mg (2 5 mg Intravenous Given 7/13/19 1240)       Diagnostic Studies  Results Reviewed     Procedure Component Value Units Date/Time    Urine Microscopic [[id number]]  (Abnormal) Collected:  07/13/19 1028    Lab Status:  Final result Specimen:  Urine, Clean Catch Updated:  07/13/19 1122     RBC, UA None Seen /hpf      WBC, UA 10-20 /hpf      Epithelial Cells Occasional /hpf      Bacteria, UA Innumerable /hpf     Urine culture [[id number]] Collected:  07/13/19 1028    Lab Status:   In process Specimen:  Urine, Clean Catch Updated:  07/13/19 1122    Magnesium [[id number]]  (Normal) Collected:  07/13/19 0944    Lab Status:  Final result Specimen:  Blood from Arm, Right Updated:  07/13/19 1022     Magnesium 2 0 mg/dL     B-type natriuretic peptide [[id number]]  (Normal) Collected:  07/13/19 0944    Lab Status:  Final result Specimen:  Blood from Arm, Right Updated:  07/13/19 1022     NT-proBNP 371 pg/mL     ED Urine Macroscopic [[id number]]  (Abnormal) Collected:  07/13/19 1028    Lab Status:  Final result Specimen:  Urine Updated:  07/13/19 1019     Color, UA Yellow     Clarity, UA Cloudy     pH, UA 7 0     Leukocytes, UA Small     Nitrite, UA Positive     Protein, UA Negative mg/dl      Glucose, UA Negative mg/dl      Ketones, UA Negative mg/dl      Urobilinogen, UA 0 2 E U /dl      Bilirubin, UA Negative     Blood, UA Trace     Specific [place], UA 1 015    Narrative:       CLINITEK RESULT    Troponin I [[id number]]  (Normal) Collected:  07/13/19 0944    Lab Status:  Final result Specimen:  Blood from Arm, Right Updated:  07/13/19 1015     Troponin I <0 02 ng/mL     Comprehensive metabolic panel [[id number]]  (Abnormal) Collected:  07/13/19 0944    Lab Status:  Final result Specimen:  Blood from Arm, Right Updated:  07/13/19 1012     Sodium 135 mmol/L      Potassium 4 0 mmol/L      Chloride 99 mmol/L      CO2 27 mmol/L      ANION GAP 9 mmol/L      BUN 9 mg/dL      Creatinine 0 57 mg/dL Glucose 99 mg/dL      Calcium 9 1 mg/dL      AST 20 U/L      ALT 20 U/L      Alkaline Phosphatase 102 U/L      Total Protein 7 6 g/dL      Albumin 3 7 g/dL      Total Bilirubin 0 90 mg/dL      eGFR 81 ml/min/1 73sq m     Narrative:       [place] guidelines for Chronic Kidney Disease (CKD):     Stage 1 with normal or high GFR (GFR > 90 mL/min/1 73 square meters)    Stage 2 Mild CKD (GFR = 60-89 mL/min/1 73 square meters)    Stage 3A Moderate CKD (GFR = 45-59 mL/min/1 73 square meters)    Stage 3B Moderate CKD (GFR = 30-44 mL/min/1 73 square meters)    Stage 4 Severe CKD (GFR = 15-29 mL/min/1 73 square meters)    Stage 5 End Stage CKD (GFR <15 mL/min/1 73 square meters)  Note: GFR calculation is accurate only with a steady state creatinine    Protime-INR [[id number]]  (Normal) Collected:  07/13/19 0944    Lab Status:  Final result Specimen:  Blood from Arm, Right Updated:  07/13/19 1007     Protime 12 7 seconds      INR 1 01    APTT [[id number]]  (Normal) Collected:  07/13/19 0944    Lab Status:  Final result Specimen:  Blood from Arm, Right Updated:  07/13/19 1007     PTT 32 seconds     CBC and differential [[id number]]  (Abnormal) Collected:  07/13/19 0944    Lab Status:  Final result Specimen:  Blood from Arm, Right Updated:  07/13/19 0954     WBC 9 64 Thousand/uL      RBC 4 16 Million/uL      Hemoglobin 14 4 g/dL      Hematocrit 42 4 %       fL      MCH 34 6 pg      MCHC 34 0 g/dL      RDW 12 5 %      MPV 8 8 fL      Platelets 682 Thousands/uL      nRBC 0 /100 WBCs      Neutrophils Relative 77 %      Immat GRANS % 0 %      Lymphocytes Relative 10 %      Monocytes Relative 11 %      Eosinophils Relative 2 %      Basophils Relative 0 %      Neutrophils Absolute 7 37 Thousands/µL      Immature Grans Absolute 0 03 Thousand/uL      Lymphocytes Absolute 0 97 Thousands/µL      Monocytes Absolute 1 06 Thousand/µL      Eosinophils Absolute 0 18 Thousand/µL      Basophils Absolute 0 03 Thousands/µL                  CT chest abdomen pelvis w contrast   Final Result by Nury Sousa MD (07/13 1138)      No solid visceral injury seen  Distended urinary bladder with mild prominence of the both renal pelvicalyceal system and ureters, Evaluate clinically for emptying of the bladder      A 7 3 x 5 9 x 1 8 cm rounded fluid-filled structure cystic area posterior to the urinary bladder may represent a cystic pelvic/adnexal lesion, less likely may represent a posteriorly directed diverticulum from urinary bladder  Consider nonemergent    ultrasound of the pelvis  for definite characterization   Bilateral scarring      No acute compression collapse of the vertebra   Cholelithiasis   The study was marked in EPIC for significant notification  Workstation performed: [id number]         CT head without contrast   Final Result by Nury Sousa MD (07/13 1115)      No acute intracranial hemorrhage seen   No extra-axial collection seen   No mass effect or midline shift seen                  Workstation performed: [id number]         CT cervical spine without contrast   Final Result by Nury Sousa MD (07/13 1120)      No acute compression collapse of the vertebra                   Workstation performed: [id number]                    Procedures  ECG 12 Lead Documentation Only  Date/Time: 7/13/2019 12:48 PM  Performed by: Janie Ibanez DO  Authorized by: Janie Ibanez DO     Patient location:  ED  Rate:     ECG rate:  66  Rhythm:     Rhythm: sinus rhythm             ED Course  ED Course as of Jul 13 1250   Sat Jul 13, 2019   1228 We attempted to discharge patient but she complained that her back pain was too painful and she was unable to ambulate so was decided that we should bring patient in for observation                                    MDM  Number of Diagnoses or Management Options  Acute low back pain: new and requires workup  Hypertensive urgency: new and requires workup  UTI (urinary tract infection): new and requires workup     Amount and/or Complexity of Data Reviewed  Clinical lab tests: ordered and reviewed  Tests in the radiology section of CPT®: ordered and reviewed  Tests in the medicine section of CPT®: ordered and reviewed  Independent visualization of images, tracings, or specimens: yes    Patient Progress  Patient progress: stable      Disposition  Final diagnoses:   Acute low back pain   UTI (urinary tract infection)   Hypertensive urgency     Time reflects when diagnosis was documented in both MDM as applicable and the Disposition within this note     Time User Action Codes Description Comment    7/13/2019 11:46 AM Aston Baeza K Add [M54 5] Acute low back pain     7/13/2019 11:46 AM Taylor Phuongalberto ARRIAGA Add [N39 0] UTI (urinary tract infection)     7/13/2019 12:29 PM Oli Lass Add [I16 0] Hypertensive urgency       ED Disposition     ED Disposition Condition Date/Time Comment    Admit Stable Sat Jul 13, 2019 12:45 PM Case was discussed with   and the patient's admission status was agreed to be Admission Status: inpatient status to the service of Dr Macie Moyer            Follow-up Information    None         Patient's Medications   Discharge Prescriptions    CEPHALEXIN (KEFLEX) 500 MG CAPSULE    Take 1 capsule (500 mg total) by mouth every 6 (six) hours for 7 days       Start Date: 7/13/2019 End Date: 7/20/2019       Order Dose: 500 mg       Quantity: 28 capsule    Refills: 0    LIDOCAINE (LIDODERM) 5 %    Apply 1 patch topically daily Remove & Discard patch within 12 hours or as directed by MD       Start Date: 7/13/2019 End Date: --       Order Dose: 1 patch       Quantity: 30 patch    Refills: 0    TIZANIDINE (ZANAFLEX) 2 MG CAPSULE    Take 1 capsule (2 mg total) by mouth 3 (three) times a day as needed for muscle spasms for up to 7 days       Start Date: 7/13/2019 End Date: 7/20/2019       Order Dose: 2 mg       Quantity: 21 capsule    Refills: 0 No discharge procedures on file      ED Provider  Electronically Signed by           Selena Sampson,   07/13/19 206 [facility],   07/13/19 8101

## 2019-07-13 NOTE — ASSESSMENT & PLAN NOTE
She has not experienced any episodes of chest pain or palpitations in the recent past    Plan - Continue home medication (amioderone)

## 2019-07-13 NOTE — ASSESSMENT & PLAN NOTE
On examination patient had bruising of the Left second foot, and had pain with movement   She does not recall of any trauma to the toe  Plan - Xray Left foot   Pain control

## 2019-07-13 NOTE — ASSESSMENT & PLAN NOTE
Patient can walk with an assistive device  However, she is at increased risk for falls  She needs assistance getting up from bed    Plan - Ambulate with assistance   Physical Therapy evaluation and treatment

## 2019-07-13 NOTE — ASSESSMENT & PLAN NOTE
Patient does not complain of dysuria, frequency, flank pain or hematuria  Her urine microscopy shows 10-20 WBC, and innumerable bacteria  She does not have fever or chills   Examination shows no renal angle tenderness  Plan -  IV Normal saline 50ml/hr   TMP--160 mg every 12 hrs   Good oral intake of fluids   Monitor temperature and other vitals   Follow up urine cultures

## 2019-07-13 NOTE — ASSESSMENT & PLAN NOTE
Patient is on home antihypertensives  On admission to the ED, her BP was elevated, but with adequate pain control, it normalized    Plan - Continue home antihypertensives   Monitor vitals

## 2019-07-13 NOTE — ASSESSMENT & PLAN NOTE
Currently patient's pain in controlled with Lidocaine patch and morphine     Plan - MRI lumbar spine   Pain control with Tylenol 975mg 8hrly, Oxycodone 2 5mg PRN (moderate pain), Oxycodone 5mg PRN (severe pain), and Lidoderm patch daily   IV Dexamethasone 4 mg 8 hrly   Monitor pain levels

## 2019-07-13 NOTE — QUICK NOTE
The patient was seen and examined today with Dr Geetha Goodwin  Please see her admission note for details  The plan was reviewed and orders were placed with my direct observation  My attestation to the history and physical documentation will follow within the next 24 hours as resident note not yet completed      Romayne Rink,

## 2019-07-14 ENCOUNTER — APPOINTMENT (INPATIENT)
Dept: MRI IMAGING | Facility: HOSPITAL | Age: 84
DRG: 551 | End: 2019-07-14
Payer: MEDICARE

## 2019-07-14 PROBLEM — M19.90 ARTHRITIS: Status: ACTIVE | Noted: 2019-07-14

## 2019-07-14 PROBLEM — K59.09 CHRONIC CONSTIPATION: Status: ACTIVE | Noted: 2019-07-14

## 2019-07-14 PROCEDURE — 72148 MRI LUMBAR SPINE W/O DYE: CPT

## 2019-07-14 PROCEDURE — 99232 SBSQ HOSP IP/OBS MODERATE 35: CPT | Performed by: INTERNAL MEDICINE

## 2019-07-14 RX ORDER — HYDRALAZINE HYDROCHLORIDE 20 MG/ML
10 INJECTION INTRAMUSCULAR; INTRAVENOUS EVERY 6 HOURS PRN
Status: DISCONTINUED | OUTPATIENT
Start: 2019-07-14 | End: 2019-07-19 | Stop reason: HOSPADM

## 2019-07-14 RX ORDER — FLUTICASONE PROPIONATE 50 MCG
1 SPRAY, SUSPENSION (ML) NASAL
Status: DISCONTINUED | OUTPATIENT
Start: 2019-07-14 | End: 2019-07-19 | Stop reason: HOSPADM

## 2019-07-14 RX ORDER — SENNOSIDES 8.6 MG
2 TABLET ORAL 2 TIMES DAILY PRN
Status: DISCONTINUED | OUTPATIENT
Start: 2019-07-14 | End: 2019-07-15

## 2019-07-14 RX ADMIN — SENNOSIDES 17.2 MG: 8.6 TABLET, FILM COATED ORAL at 16:35

## 2019-07-14 RX ADMIN — AMIODARONE HYDROCHLORIDE 100 MG: 200 TABLET ORAL at 21:14

## 2019-07-14 RX ADMIN — FLUTICASONE PROPIONATE 1 SPRAY: 50 SPRAY, METERED NASAL at 21:13

## 2019-07-14 RX ADMIN — PRAVASTATIN SODIUM 10 MG: 10 TABLET ORAL at 21:14

## 2019-07-14 RX ADMIN — Medication 1 TABLET: at 08:53

## 2019-07-14 RX ADMIN — DEXAMETHASONE SODIUM PHOSPHATE 4 MG: 4 INJECTION, SOLUTION INTRAMUSCULAR; INTRAVENOUS at 01:08

## 2019-07-14 RX ADMIN — LOSARTAN POTASSIUM 50 MG: 50 TABLET ORAL at 08:53

## 2019-07-14 RX ADMIN — LOSARTAN POTASSIUM 50 MG: 50 TABLET ORAL at 21:14

## 2019-07-14 RX ADMIN — ACETAMINOPHEN 975 MG: 325 TABLET, FILM COATED ORAL at 21:14

## 2019-07-14 RX ADMIN — POLYETHYLENE GLYCOL 3350 17 G: 17 POWDER, FOR SOLUTION ORAL at 09:01

## 2019-07-14 RX ADMIN — SULFAMETHOXAZOLE AND TRIMETHOPRIM 1 TABLET: 800; 160 TABLET ORAL at 08:54

## 2019-07-14 RX ADMIN — ASPIRIN 81 MG 81 MG: 81 TABLET ORAL at 21:17

## 2019-07-14 RX ADMIN — MENTHOL, METHYL SALICYLATE: 10; 15 CREAM TOPICAL at 08:59

## 2019-07-14 RX ADMIN — OXYCODONE HYDROCHLORIDE 5 MG: 5 TABLET ORAL at 21:14

## 2019-07-14 RX ADMIN — CLOTRIMAZOLE AND BETAMETHASONE DIPROPIONATE: 10; .64 CREAM TOPICAL at 08:54

## 2019-07-14 RX ADMIN — Medication 30 MG: at 16:39

## 2019-07-14 RX ADMIN — MENTHOL, METHYL SALICYLATE 1 APPLICATION: 10; 15 CREAM TOPICAL at 18:06

## 2019-07-14 RX ADMIN — OXYCODONE HYDROCHLORIDE 2.5 MG: 5 TABLET ORAL at 13:21

## 2019-07-14 RX ADMIN — CARVEDILOL 6.25 MG: 6.25 TABLET, FILM COATED ORAL at 08:53

## 2019-07-14 RX ADMIN — HYDRALAZINE HYDROCHLORIDE 10 MG: 20 INJECTION INTRAMUSCULAR; INTRAVENOUS at 22:34

## 2019-07-14 RX ADMIN — DEXAMETHASONE SODIUM PHOSPHATE 4 MG: 4 INJECTION, SOLUTION INTRAMUSCULAR; INTRAVENOUS at 06:41

## 2019-07-14 RX ADMIN — DEXAMETHASONE SODIUM PHOSPHATE 4 MG: 4 INJECTION, SOLUTION INTRAMUSCULAR; INTRAVENOUS at 14:48

## 2019-07-14 RX ADMIN — SULFAMETHOXAZOLE AND TRIMETHOPRIM 1 TABLET: 800; 160 TABLET ORAL at 21:14

## 2019-07-14 RX ADMIN — ACETAMINOPHEN 975 MG: 325 TABLET, FILM COATED ORAL at 14:47

## 2019-07-14 RX ADMIN — LIDOCAINE 1 PATCH: 50 PATCH TOPICAL at 11:51

## 2019-07-14 RX ADMIN — VITAMIN D, TAB 1000IU (100/BT) 1000 UNITS: 25 TAB at 08:53

## 2019-07-14 RX ADMIN — ACETAMINOPHEN 975 MG: 325 TABLET, FILM COATED ORAL at 05:51

## 2019-07-14 RX ADMIN — ENOXAPARIN SODIUM 40 MG: 40 INJECTION SUBCUTANEOUS at 08:53

## 2019-07-14 NOTE — ASSESSMENT & PLAN NOTE
· Per family, she has had arthritis since the age of 3 and actually had hospitalizations even as a child for arthritis  Somewhere in her teenage years she did have a surgery in her right hip which left her with a very stiff right hip and subsequently increased arthritis in the knees due to compensation

## 2019-07-14 NOTE — ASSESSMENT & PLAN NOTE
· Patient does not complain of dysuria, frequency, flank pain or hematuria  Her urine microscopy shows 10-20 WBC, and innumerable bacteria  She does not have fever or chills  Examination shows no renal angle tenderness  · Antibiotic - currently bactrim  Define whether true need for antibiotic or not  · Cultures - follow up urine culture  · Monitor temperatures and serial exams

## 2019-07-14 NOTE — ASSESSMENT & PLAN NOTE
· Follow up results of xray of the toe  · If fracture or other abnormality outside of bruising, could consider podiatry evaluation

## 2019-07-14 NOTE — ASSESSMENT & PLAN NOTE
· Continue current medications  · As blood pressures running high, we will add PRN IV hydralazine and will consider an increase in losartan vs  Having family bring home dose of irbesartan in to hospital   · Control pain (see above)  · Monitor blood pressures

## 2019-07-14 NOTE — ASSESSMENT & PLAN NOTE
· Usually just on Metamucil  · Added miralax on admission  · Will add PRN senna as well  · Monitor bowel movements

## 2019-07-14 NOTE — PLAN OF CARE
Problem: Potential for Falls  Goal: Patient will remain free of falls  Description  INTERVENTIONS:  - Assess patient frequently for physical needs  -  Identify cognitive and physical deficits and behaviors that affect risk of falls    -  [place] fall precautions as indicated by assessment   - Educate patient/family on patient safety including physical limitations  - Instruct patient to call for assistance with activity based on assessment  - Modify environment to reduce risk of injury  - Consider OT/PT consult to assist with strengthening/mobility  Outcome: Progressing     Problem: GENITOURINARY - ADULT  Goal: Maintains or returns to baseline urinary function  Description  INTERVENTIONS:  - Assess urinary function  - Encourage oral fluids to ensure adequate hydration  - Administer IV fluids as ordered to ensure adequate hydration  - Administer ordered medications as needed  - Offer frequent toileting  - Follow urinary retention protocol if ordered  Outcome: Progressing  Goal: Absence of urinary retention  Description  INTERVENTIONS:  - Assess patients ability to void and empty bladder  - Monitor I/O  - Bladder scan as needed  - Discuss with physician/AP medications to alleviate retention as needed  - Discuss catheterization for long term situations as appropriate  Outcome: Progressing     Problem: METABOLIC, FLUID AND ELECTROLYTES - ADULT  Goal: Electrolytes maintained within normal limits  Description  INTERVENTIONS:  - Monitor labs and assess patient for signs and symptoms of electrolyte imbalances  - Administer electrolyte replacement as ordered  - Monitor response to electrolyte replacements, including repeat lab results as appropriate  - Instruct patient on fluid and nutrition as appropriate  Outcome: Progressing     Problem: Prexisting or High Potential for Compromised Skin Integrity  Goal: Skin integrity is maintained or improved  Description  INTERVENTIONS:  - Identify patients at risk for skin breakdown  - Assess and monitor skin integrity  - Assess and monitor nutrition and hydration status  - Monitor labs (i e  albumin)  - Assess for incontinence   - Turn and reposition patient  - Assist with mobility/ambulation  - Relieve pressure over bony prominences  - Avoid friction and shearing  - Provide appropriate hygiene as needed including keeping skin clean and dry  - Evaluate need for skin moisturizer/barrier cream  - Collaborate with interdisciplinary team (i e  Nutrition, Rehabilitation, etc )   - Patient/family teaching  Outcome: Progressing

## 2019-07-14 NOTE — ASSESSMENT & PLAN NOTE
· Evaluation -   · MRI Lumbar Spine  · Dependent on results, could consider neurosurgery or orthopedic consultation or determine if any localized treatments can be given  · Physical therapy assessment  · Treatment -   · Steroid - Dexamethasone 4 mg IV q8h x 4 doses being given  · Baseline Pain control -   · Tylenol 975 mg q8h scheduled  · Lidoderm daily  · PRN Pain Control -  · Moderate pain - Oxycodone 2 5 mg q6h PRN  · Severe Pain - Oxycodone 5 mg q6h PRN  · Could consider topical NSAID  · Muscle Relaxer - [place] cream   Avoid systemic muscle relaxer if possible in geriatric population  · Monitor Pain levels

## 2019-07-14 NOTE — PROGRESS NOTES
Progress Note - Katelyn Roche 10/24/1927, 80 y o  female MRN: [id number]    Unit/Bed#: -01 Encounter: [id number]    Primary Care Provider: Ale Mejia MD   Date and time admitted to hospital: 7/13/2019  9:22 AM        * Lumbar pain  Assessment & Plan  · Evaluation -   · MRI Lumbar Spine  · Dependent on results, could consider neurosurgery or orthopedic consultation or determine if any localized treatments can be given  · Physical therapy assessment  · Treatment -   · Steroid - Dexamethasone 4 mg IV q8h x 4 doses being given  · Baseline Pain control -   · Tylenol 975 mg q8h scheduled  · Lidoderm daily  · PRN Pain Control -  · Moderate pain - Oxycodone 2 5 mg q6h PRN  · Severe Pain - Oxycodone 5 mg q6h PRN  · Could consider topical NSAID  · Muscle Relaxer - Bengay cream   Avoid systemic muscle relaxer if possible in geriatric population  · Monitor Pain levels  Ambulatory dysfunction  Assessment & Plan  · Fall precautions / bed alarm  · PT evaluation  Toe injury, left, initial encounter  Assessment & Plan  · Follow up results of xray of the toe  · If fracture or other abnormality outside of bruising, could consider podiatry evaluation  Urinary tract infection  Assessment & Plan  · Patient does not complain of dysuria, frequency, flank pain or hematuria  Her urine microscopy shows 10-20 WBC, and innumerable bacteria  She does not have fever or chills  Examination shows no renal angle tenderness  · Antibiotic - currently bactrim  Define whether true need for antibiotic or not  · Cultures - follow up urine culture  · Monitor temperatures and serial exams  PAF (paroxysmal atrial fibrillation) ([facility])  Assessment & Plan  · Rate / Rhythm control -   · Continue on amiodarone and carvedilol  · Anticoagulation -   · She is on aspirin but no other anticoagulants  She is a fall risk  Essential hypertension  Assessment & Plan  · Continue current medications    · As blood pressures running high, we will add PRN IV hydralazine and will consider an increase in losartan vs  Having family bring home dose of irbesartan in to hospital   · Control pain (see above)  · Monitor blood pressures  Chronic constipation  Assessment & Plan  · Usually just on Metamucil  · Added miralax on admission  · Will add PRN senna as well  · Monitor bowel movements  VTE Pharmacologic Prophylaxis:   Pharmacologic: Enoxaparin (Lovenox)  Mechanical VTE Prophylaxis in Place: No    Patient Centered Rounds: I have performed bedside rounds with nursing staff today  Discussions with Specialists or Other Care Team Provider: None    Education and Discussions with Family / Patient: niece updated at bedside  Time Spent for Care: 30 minutes  More than 50% of total time spent on counseling and coordination of care as described above  Current Length of Stay: 1 day(s)    Current Patient Status: Inpatient   Certification Statement: The patient will continue to require additional inpatient hospital stay due to evaluation and treatment of the lumbar back pain  Discharge Plan / Estimated Discharge Date: Next 24 - 48 hours? Code Status: Level 3 - DNAR and DNI      Subjective: There was some concern for possible urinary retention with bladder scan showing increased urine in the bladder last night but then the patient was able to finally void  The patient has not had a bowel movement since Thursday and her abdomen seems more distended than normal   The patient has no nausea and is eating okay  The patient slept well last night with the pain medication on board  She does endorse that her pain is improved today  She does want to find out [facility] do I have this pain      Objective:     Vitals:   Temp (24hrs), Av 9 °F (36 6 °C), Min:97 7 °F (36 5 °C), Max:98 °F (36 7 °C)    Temp:  [97 7 °F (36 5 °C)-98 °F (36 7 °C)] 98 °F (36 7 °C)  HR:  [55-78] 67  Resp:  [18-20] 18  BP: (124-210)/(59-90) 190/82  SpO2:  [94 %-97 %] 96 %  Body mass index is 21 87 kg/m²  Input and Output Summary (last 24 hours): Intake/Output Summary (Last 24 hours) at 7/14/2019 0936  Last data filed at 7/13/2019 1210  Gross per 24 hour   Intake 50 ml   Output    Net 50 ml       Physical Exam:     Physical Exam   Constitutional: No distress  HENT:   Mouth/Throat: Oropharynx is clear and moist  No oropharyngeal exudate  Eyes: Pupils are equal, round, and reactive to light  Cardiovascular: Normal rate and regular rhythm  No murmur heard  Pulmonary/Chest: Effort normal and breath sounds normal  She has no wheezes  She has no rales  Abdominal: Soft  She exhibits distension (with hyper-resonance on percussion  )  There is no tenderness  Slightly hypoactive, but present, bowel sounds  Musculoskeletal: She exhibits no edema or tenderness  No tenderness on palpation directly over the spine  The right leg straight leg raising test is improved to about 30 degrees and now limited by range of motion limitations / stiffening at the right hip  Neurological: She is alert  No cranial nerve deficit or sensory deficit  Generally oriented but repetitive with some questions  Motor strength 5/5 to all   Skin: Skin is warm and dry  Vitals reviewed  Additional Data:     Labs:    Results from last 7 days   Lab Units 07/13/19  0944   WBC Thousand/uL 9 64   HEMOGLOBIN g/dL 14 4   HEMATOCRIT % 42 4   PLATELETS Thousands/uL 252   NEUTROS PCT % 77*   LYMPHS PCT % 10*   MONOS PCT % 11   EOS PCT % 2     Results from last 7 days   Lab Units 07/13/19  0944   POTASSIUM mmol/L 4 0   CHLORIDE mmol/L 99*   CO2 mmol/L 27   BUN mg/dL 9   CREATININE mg/dL 0 57*   CALCIUM mg/dL 9 1   ALK PHOS U/L 102   ALT U/L 20   AST U/L 20     Results from last 7 days   Lab Units 07/13/19  0944   INR  1 01       * I Have Reviewed All Lab Data Listed Above  * Additional Pertinent Lab Tests Reviewed:  All Labs Within Last 24 Hours Reviewed    Imaging:    Imaging Reports Reviewed Today Include: No new imaging reports available yet  Imaging Personally Reviewed by Myself Includes:  None    Recent Cultures (last 7 days):     Results from last 7 days   Lab Units 07/13/19  1028   URINE CULTURE  >100,000 cfu/ml Gram Negative Raf Enteric Like*       Last 24 Hours Medication List:     Current Facility-Administered Medications:  acetaminophen 975 mg Oral Q8H [address] Brian Salcedo MD    amiodarone 100 mg Oral Daily Brian Salcedo MD    aspirin 81 mg Oral Daily Brian Salcedo MD    carvedilol 6 25 mg Oral Daily Brian Salcedo MD    cholecalciferol 1,000 Units Oral Daily Brian Salcedo MD    clotrimazole-betamethasone  Topical BID Brian Salcedo MD    co-enzyme Q-10 30 mg Oral Daily Brian Salcedo MD    dexamethasone 4 mg Intravenous Q8H Natalie Denson MD    enoxaparin 40 mg Subcutaneous Daily Brian Salcedo MD    lidocaine 1 patch Topical Daily Brian Salcedo MD    losartan 50 mg Oral BID Brian Salcedo MD    menthol-methyl salicylate  Apply externally 4x Daily PRN Brian Salcedo MD    morphine injection 2 mg Intravenous Once Janet Vazquez DO    multivitamin-minerals 1 tablet Oral Daily Brian Salcedo MD    ondansetron 4 mg Intravenous Q6H PRN Brian Salcedo MD    oxyCODONE 2 5 mg Oral Q6H PRN Brian Salcedo MD    oxyCODONE 5 mg Oral Q6H PRN Brian Salcedo MD    polyethylene glycol 17 g Oral Daily PRN Brian Salcedo MD    pravastatin 10 mg Oral Daily With Richard Cabrera MD    Psyllium 3 capsule Oral Q12H [address] Ruchi Moore PA-C    sodium chloride 50 mL/hr Intravenous Continuous Brian Salcedo MD Last Rate: 50 mL/hr (07/13/19 1817)   sulfamethoxazole-trimethoprim 1 tablet Oral Q12H Natalie Denson MD         Today, Patient Was Seen By: Leyda Valente DO    ** Please Note: This note has been constructed using a voice recognition system   **

## 2019-07-14 NOTE — ASSESSMENT & PLAN NOTE
· Rate / Rhythm control -   · Continue on amiodarone and carvedilol  · Anticoagulation -   · She is on aspirin but no other anticoagulants  She is a fall risk

## 2019-07-15 PROBLEM — R82.71 ASYMPTOMATIC BACTERIURIA: Status: ACTIVE | Noted: 2019-07-13

## 2019-07-15 PROBLEM — S32.030A CLOSED COMPRESSION FRACTURE OF L3 LUMBAR VERTEBRA, INITIAL ENCOUNTER (HCC): Status: ACTIVE | Noted: 2019-07-13

## 2019-07-15 LAB
ANION GAP SERPL CALCULATED.3IONS-SCNC: 10 MMOL/L (ref 4–13)
ANION GAP SERPL CALCULATED.3IONS-SCNC: 10 MMOL/L (ref 4–13)
ANION GAP SERPL CALCULATED.3IONS-SCNC: 11 MMOL/L (ref 4–13)
BACTERIA UR CULT: ABNORMAL
BUN SERPL-MCNC: 13 MG/DL (ref 5–25)
BUN SERPL-MCNC: 14 MG/DL (ref 5–25)
BUN SERPL-MCNC: 14 MG/DL (ref 5–25)
CALCIUM SERPL-MCNC: 8.3 MG/DL (ref 8.3–10.1)
CALCIUM SERPL-MCNC: 8.7 MG/DL (ref 8.3–10.1)
CALCIUM SERPL-MCNC: 8.9 MG/DL (ref 8.3–10.1)
CHLORIDE SERPL-SCNC: 93 MMOL/L (ref 100–108)
CHLORIDE SERPL-SCNC: 95 MMOL/L (ref 100–108)
CHLORIDE SERPL-SCNC: 97 MMOL/L (ref 100–108)
CO2 SERPL-SCNC: 20 MMOL/L (ref 21–32)
CO2 SERPL-SCNC: 21 MMOL/L (ref 21–32)
CO2 SERPL-SCNC: 22 MMOL/L (ref 21–32)
CREAT SERPL-MCNC: 0.62 MG/DL (ref 0.6–1.3)
CREAT SERPL-MCNC: 0.63 MG/DL (ref 0.6–1.3)
CREAT SERPL-MCNC: 0.66 MG/DL (ref 0.6–1.3)
GFR SERPL CREATININE-BSD FRML MDRD: 77 ML/MIN/1.73SQ M
GFR SERPL CREATININE-BSD FRML MDRD: 79 ML/MIN/1.73SQ M
GFR SERPL CREATININE-BSD FRML MDRD: 79 ML/MIN/1.73SQ M
GLUCOSE SERPL-MCNC: 108 MG/DL (ref 65–140)
GLUCOSE SERPL-MCNC: 116 MG/DL (ref 65–140)
GLUCOSE SERPL-MCNC: 127 MG/DL (ref 65–140)
POTASSIUM SERPL-SCNC: 3.7 MMOL/L (ref 3.5–5.3)
POTASSIUM SERPL-SCNC: 4.1 MMOL/L (ref 3.5–5.3)
POTASSIUM SERPL-SCNC: 4.1 MMOL/L (ref 3.5–5.3)
SODIUM SERPL-SCNC: 123 MMOL/L (ref 136–145)
SODIUM SERPL-SCNC: 127 MMOL/L (ref 136–145)
SODIUM SERPL-SCNC: 129 MMOL/L (ref 136–145)

## 2019-07-15 PROCEDURE — 97110 THERAPEUTIC EXERCISES: CPT

## 2019-07-15 PROCEDURE — G8979 MOBILITY GOAL STATUS: HCPCS

## 2019-07-15 PROCEDURE — 97163 PT EVAL HIGH COMPLEX 45 MIN: CPT

## 2019-07-15 PROCEDURE — 99232 SBSQ HOSP IP/OBS MODERATE 35: CPT | Performed by: INTERNAL MEDICINE

## 2019-07-15 PROCEDURE — 80048 BASIC METABOLIC PNL TOTAL CA: CPT | Performed by: INTERNAL MEDICINE

## 2019-07-15 PROCEDURE — 99222 1ST HOSP IP/OBS MODERATE 55: CPT | Performed by: PHYSICIAN ASSISTANT

## 2019-07-15 PROCEDURE — G8978 MOBILITY CURRENT STATUS: HCPCS

## 2019-07-15 PROCEDURE — 97760 ORTHOTIC MGMT&TRAING 1ST ENC: CPT

## 2019-07-15 RX ORDER — BISACODYL 10 MG
10 SUPPOSITORY, RECTAL RECTAL ONCE
Status: COMPLETED | OUTPATIENT
Start: 2019-07-15 | End: 2019-07-15

## 2019-07-15 RX ORDER — SODIUM CHLORIDE 9 MG/ML
100 INJECTION, SOLUTION INTRAVENOUS CONTINUOUS
Status: DISCONTINUED | OUTPATIENT
Start: 2019-07-15 | End: 2019-07-15

## 2019-07-15 RX ORDER — SENNOSIDES 8.6 MG
2 TABLET ORAL 2 TIMES DAILY
Status: DISCONTINUED | OUTPATIENT
Start: 2019-07-15 | End: 2019-07-19 | Stop reason: HOSPADM

## 2019-07-15 RX ORDER — IRBESARTAN 150 MG/1
150 TABLET ORAL 2 TIMES DAILY
Status: DISCONTINUED | OUTPATIENT
Start: 2019-07-15 | End: 2019-07-16

## 2019-07-15 RX ORDER — POLYETHYLENE GLYCOL 3350 17 G/17G
17 POWDER, FOR SOLUTION ORAL DAILY
Status: DISCONTINUED | OUTPATIENT
Start: 2019-07-16 | End: 2019-07-19 | Stop reason: HOSPADM

## 2019-07-15 RX ADMIN — SENNOSIDES 17.2 MG: 8.6 TABLET, FILM COATED ORAL at 17:34

## 2019-07-15 RX ADMIN — PRAVASTATIN SODIUM 10 MG: 10 TABLET ORAL at 21:21

## 2019-07-15 RX ADMIN — ACETAMINOPHEN 975 MG: 325 TABLET, FILM COATED ORAL at 21:20

## 2019-07-15 RX ADMIN — SENNOSIDES 17.2 MG: 8.6 TABLET, FILM COATED ORAL at 09:44

## 2019-07-15 RX ADMIN — Medication 1 TABLET: at 08:11

## 2019-07-15 RX ADMIN — BISACODYL 10 MG: 10 SUPPOSITORY RECTAL at 20:39

## 2019-07-15 RX ADMIN — ENOXAPARIN SODIUM 40 MG: 40 INJECTION SUBCUTANEOUS at 08:12

## 2019-07-15 RX ADMIN — HYDRALAZINE HYDROCHLORIDE 10 MG: 20 INJECTION INTRAMUSCULAR; INTRAVENOUS at 23:40

## 2019-07-15 RX ADMIN — SULFAMETHOXAZOLE AND TRIMETHOPRIM 1 TABLET: 800; 160 TABLET ORAL at 08:11

## 2019-07-15 RX ADMIN — ACETAMINOPHEN 975 MG: 325 TABLET, FILM COATED ORAL at 13:55

## 2019-07-15 RX ADMIN — HYDRALAZINE HYDROCHLORIDE 10 MG: 20 INJECTION INTRAMUSCULAR; INTRAVENOUS at 08:09

## 2019-07-15 RX ADMIN — VITAMIN D, TAB 1000IU (100/BT) 1000 UNITS: 25 TAB at 08:12

## 2019-07-15 RX ADMIN — IRBESARTAN 150 MG: 150 TABLET ORAL at 17:34

## 2019-07-15 RX ADMIN — SODIUM CHLORIDE 100 ML/HR: 0.9 INJECTION, SOLUTION INTRAVENOUS at 09:29

## 2019-07-15 RX ADMIN — IRBESARTAN 150 MG: 150 TABLET ORAL at 13:55

## 2019-07-15 RX ADMIN — POLYETHYLENE GLYCOL 3350 17 G: 17 POWDER, FOR SOLUTION ORAL at 09:44

## 2019-07-15 RX ADMIN — CLOTRIMAZOLE AND BETAMETHASONE DIPROPIONATE: 10; .64 CREAM TOPICAL at 08:12

## 2019-07-15 RX ADMIN — LOSARTAN POTASSIUM 50 MG: 50 TABLET ORAL at 08:11

## 2019-07-15 RX ADMIN — ACETAMINOPHEN 975 MG: 325 TABLET, FILM COATED ORAL at 07:39

## 2019-07-15 RX ADMIN — Medication 30 MG: at 17:34

## 2019-07-15 RX ADMIN — CARVEDILOL 6.25 MG: 6.25 TABLET, FILM COATED ORAL at 08:11

## 2019-07-15 RX ADMIN — AMIODARONE HYDROCHLORIDE 100 MG: 200 TABLET ORAL at 21:20

## 2019-07-15 RX ADMIN — OXYCODONE HYDROCHLORIDE 2.5 MG: 5 TABLET ORAL at 09:44

## 2019-07-15 RX ADMIN — LIDOCAINE 1 PATCH: 50 PATCH TOPICAL at 13:56

## 2019-07-15 RX ADMIN — ASPIRIN 81 MG 81 MG: 81 TABLET ORAL at 21:21

## 2019-07-15 RX ADMIN — CLOTRIMAZOLE AND BETAMETHASONE DIPROPIONATE: 10; .64 CREAM TOPICAL at 17:34

## 2019-07-15 NOTE — PLAN OF CARE
Problem: Potential for Falls  Goal: Patient will remain free of falls  Description  INTERVENTIONS:  - Assess patient frequently for physical needs  -  Identify cognitive and physical deficits and behaviors that affect risk of falls    -  Jarales fall precautions as indicated by assessment   - Educate patient/family on patient safety including physical limitations  - Instruct patient to call for assistance with activity based on assessment  - Modify environment to reduce risk of injury  - Consider OT/PT consult to assist with strengthening/mobility  Outcome: Progressing     Problem: GENITOURINARY - ADULT  Goal: Maintains or returns to baseline urinary function  Description  INTERVENTIONS:  - Assess urinary function  - Encourage oral fluids to ensure adequate hydration  - Administer IV fluids as ordered to ensure adequate hydration  - Administer ordered medications as needed  - Offer frequent toileting  - Follow urinary retention protocol if ordered  Outcome: Progressing  Goal: Absence of urinary retention  Description  INTERVENTIONS:  - Assess patients ability to void and empty bladder  - Monitor I/O  - Bladder scan as needed  - Discuss with physician/AP medications to alleviate retention as needed  - Discuss catheterization for long term situations as appropriate  Outcome: Progressing     Problem: METABOLIC, FLUID AND ELECTROLYTES - ADULT  Goal: Electrolytes maintained within normal limits  Description  INTERVENTIONS:  - Monitor labs and assess patient for signs and symptoms of electrolyte imbalances  - Administer electrolyte replacement as ordered  - Monitor response to electrolyte replacements, including repeat lab results as appropriate  - Instruct patient on fluid and nutrition as appropriate  Outcome: Progressing     Problem: Prexisting or High Potential for Compromised Skin Integrity  Goal: Skin integrity is maintained or improved  Description  INTERVENTIONS:  - Identify patients at risk for skin breakdown  - Assess and monitor skin integrity  - Assess and monitor nutrition and hydration status  - Monitor labs (i e  albumin)  - Assess for incontinence   - Turn and reposition patient  - Assist with mobility/ambulation  - Relieve pressure over bony prominences  - Avoid friction and shearing  - Provide appropriate hygiene as needed including keeping skin clean and dry  - Evaluate need for skin moisturizer/barrier cream  - Collaborate with interdisciplinary team (i e  Nutrition, Rehabilitation, etc )   - Patient/family teaching  Outcome: Progressing     Problem: PAIN - ADULT  Goal: Verbalizes/displays adequate comfort level or baseline comfort level  Description  Interventions:  - Encourage patient to monitor pain and request assistance  - Assess pain using appropriate pain scale  - Administer analgesics based on type and severity of pain and evaluate response  - Implement non-pharmacological measures as appropriate and evaluate response  - Consider cultural and social influences on pain and pain management  - Notify physician/advanced practitioner if interventions unsuccessful or patient reports new pain  Outcome: Progressing     Problem: DISCHARGE PLANNING  Goal: Discharge to home or other facility with appropriate resources  Description  INTERVENTIONS:  - Identify barriers to discharge w/patient and caregiver  - Arrange for needed discharge resources and transportation as appropriate  - Identify discharge learning needs (meds, wound care, etc )  - Arrange for interpretive services to assist at discharge as needed  - Refer to Case Management Department for coordinating discharge planning if the patient needs post-hospital services based on physician/advanced practitioner order or complex needs related to functional status, cognitive ability, or social support system  Outcome: Progressing     Problem: Knowledge Deficit  Goal: Patient/family/caregiver demonstrates understanding of disease process, treatment plan, medications, and discharge instructions  Description  Complete learning assessment and assess knowledge base    Interventions:  - Provide teaching at level of understanding  - Provide teaching via preferred learning methods  Outcome: Progressing     Problem: RESPIRATORY - ADULT  Goal: Achieves optimal ventilation and oxygenation  Description  INTERVENTIONS:  - Assess for changes in respiratory status  - Assess for changes in mentation and behavior  - Position to facilitate oxygenation and minimize respiratory effort  - Oxygen administration by appropriate delivery method based on oxygen saturation (per order) or ABGs  - Initiate smoking cessation education as indicated  - Encourage broncho-pulmonary hygiene including cough, deep breathe, Incentive Spirometry  - Assess the need for suctioning and aspirate as needed  - Assess and instruct to report SOB or any respiratory difficulty  - Respiratory Therapy support as indicated  Outcome: Progressing     Problem: MUSCULOSKELETAL - ADULT  Goal: Maintain or return mobility to safest level of function  Description  INTERVENTIONS:  - Assess patient's ability to carry out ADLs; assess patient's baseline for ADL function and identify physical deficits which impact ability to perform ADLs (bathing, care of mouth/teeth, toileting, grooming, dressing, etc )  - Assess/evaluate cause of self-care deficits   - Assess range of motion  - Assess patient's mobility; develop plan if impaired  - Assess patient's need for assistive devices and provide as appropriate  - Encourage maximum independence but intervene and supervise when necessary  - Involve family in performance of ADLs  - Assess for home care needs following discharge   - Request OT consult to assist with ADL evaluation and planning for discharge  - Provide patient education as appropriate  Outcome: Progressing

## 2019-07-15 NOTE — CONSULTS
Consultation - Neurosurgery   Benjamin Mijares 80 y o  female MRN: [id number]  Unit/Bed#: -01 Encounter: [id number]    Images reviewed at morning rounds on 07/15/2019  at 8:25 a m  Patient was seen and examined on 07/15/2019 at 8:35 a m  Inpatient consult to Neurosurgery  Consult performed by: Alex Santo PA-C  Consult ordered by: Brent Ware DO          Assessment/Plan               Assessment:  1  Linear fracture of inferior L3 vertebral body without displacement  2  Low back pain  3  History of Afib  4  Arthritis  5  Hypertension  6  Mild cognitive deficit      Plan:   · Exam:  Patient is alert, communicates well, anxious and in mild to moderate pain  EOMI bliaterally, Moves all extremities, strength is 5/5 bilaterally, sensation to light touch is normal throughout  DTR 2+ bilaterally  No clonus and Babinski negative bilaterally  Moderate tenderness at L3 region  · Imagings :  Reviewed by me and attending as follows-  · MRI of lumbar spine demonstrate a linear fracture through the in fear aspect of the body of the L3 vertebral   No loss of vertebral body head or retropulsion  No ligamentous injury  · Pain control:  Per primary team-we recommend outpatient Pain Management  · DVT ppx:   · SCDs bilateral legs  · Okay continue Lovenox  · Activity:  As tolerated  · PT/OT evaluation & treatment  · Brace:  Wear LSO brace when upright and at 45 degree head of bed  · Medical Mx:  Per primary team  · Neurocheck:  Routine  · Patient complains of localized lower back pain, increase intensity with movement  Neuro exam nonfocal except tenderness at L3 region on palpation  Lumbar MRI demonstrate closed linear fracture of inferior L3 vertebral without displacement or ligamentous injury  No neurosurgical procedure is indicated at this juncture  Patient can continue with conservative management including outpatient mild pain management, physical therapy and LSO brace    Baseline upright lumbar spine x-rays in brace ordered to evaluate the stability of the fracture  Follow-up in 2 weeks with another upright lumbar spine x-rays in brace  Call for concern or question  Will sign off  History of Present Illness     HPI: Loan Toney is a 80 y o  female with PMHx of low-grade intermittent chronic back pain started feeling severe lower back pain on Thursday  Patient denies history of recent fall, but she gave history of weakness in the leg because of arthritis so that when she sits down she throws her back to the chair  Patient denies history of numbness, weakness, paresthesia and extremities  Denies bowel or bladder issues, except constipation that she developed after admission  Denies any saddle anesthesia  She has history of gait instability uses walker  Patient denies history of fever, chills, rigors, cough or chest pain  She denies history of diabetes mellitus but has history of hypertension, AFib on baby aspirin  Denies history of stroke, seizure, or bleeding disorder  Patient denies history of smoking cigarettes or drinking alcohol  MRI of lumbar spine demonstrates a linear fracture through the inferior aspect of the body of the L3 vertebra without associated the loss of vertebral height  There is no retropulsion of posterior vertebral margin  Review of Systems   Constitutional: Negative for activity change, chills and fever  HENT: Negative for trouble swallowing and voice change  Respiratory: Negative for apnea, cough and shortness of breath  Cardiovascular: Negative for chest pain and leg swelling  Gastrointestinal: Positive for constipation  Negative for nausea and vomiting  Genitourinary: Negative for difficulty urinating  Musculoskeletal: Positive for arthralgias, back pain and gait problem  Negative for neck stiffness     Neurological: Negative for dizziness, tremors, seizures, syncope, facial asymmetry, speech difficulty, weakness, light-headedness, numbness and headaches  Hematological: Does not bruise/bleed easily  Psychiatric/Behavioral: Positive for confusion  The patient is nervous/anxious          Historical Information   Past Medical History:   Diagnosis Date    A-fib ([address] )     Anxiety     Cheilosis     Diverticulosis     Gallstone     Hiatal hernia     Hyperlipidemia     Hypertension     Hyponatremia     Inguinal hernia     Lacunar infarction (HCC)     Macular degeneration     Osteoarthritis     Renal cyst     Syncope     Tuberculosis     Umbilical hernia     Vertigo      Past Surgical History:   Procedure Laterality Date    HIP SURGERY      At  young age     Social History     Substance and Sexual Activity   Alcohol Use No     Social History     Substance and Sexual Activity   Drug Use No     Social History     Tobacco Use   Smoking Status Never Smoker   Smokeless Tobacco Never Used     Family History   Problem Relation Age of Onset    Leukemia Sister     Diabetes Sister     Hypertension Sister     Coronary artery disease Sister     Aortic aneurysm Sister        Meds/Allergies   all current active meds have been reviewed, current meds:   Current Facility-Administered Medications   Medication Dose Route Frequency    acetaminophen (TYLENOL) tablet 975 mg  975 mg Oral Q8H [facility]    amiodarone tablet 100 mg  100 mg Oral Daily    aspirin chewable tablet 81 mg  81 mg Oral Daily    carvedilol (COREG) tablet 6 25 mg  6 25 mg Oral Daily    cholecalciferol (VITAMIN D3) tablet 1,000 Units  1,000 Units Oral Daily    clotrimazole-betamethasone (LOTRISONE) 1-0 05 % cream   Topical BID    co-enzyme Q-10 capsule 30 mg  30 mg Oral Daily    enoxaparin (LOVENOX) subcutaneous injection 40 mg  40 mg Subcutaneous Daily    fluticasone (FLONASE) 50 mcg/act nasal spray 1 spray  1 spray Each Nare HS PRN    hydrALAZINE (APRESOLINE) injection 10 mg  10 mg Intravenous Q6H PRN    lidocaine (LIDODERM) 5 % patch 1 patch  1 patch Topical Daily    losartan (COZAAR) tablet 50 mg  50 mg Oral BID    menthol-methyl salicylate (BENGAY) 20-64 % cream   Apply externally 4x Daily PRN    morphine (PF) 4 mg/mL injection 2 mg  2 mg Intravenous Once    multivitamin-minerals (CENTRUM) tablet 1 tablet  1 tablet Oral Daily    ondansetron (ZOFRAN) injection 4 mg  4 mg Intravenous Q6H PRN    oxyCODONE (ROXICODONE) IR tablet 2 5 mg  2 5 mg Oral Q6H PRN    polyethylene glycol (MIRALAX) packet 17 g  17 g Oral Daily PRN    pravastatin (PRAVACHOL) tablet 10 mg  10 mg Oral Daily With Dinner    Psyllium CAPS 3 capsule  3 capsule Oral Q12H [facility]    senna (SENOKOT) tablet 17 2 mg  2 tablet Oral BID PRN    sodium chloride 0 9 % infusion  100 mL/hr Intravenous Continuous    sulfamethoxazole-trimethoprim (BACTRIM DS) 800-160 mg per tablet 1 tablet  1 tablet Oral Q12H [facility]    and PTA meds:   Prior to Admission Medications   Prescriptions Last Dose Informant Patient Reported? Taking?    Cholecalciferol (VITAMIN D3) 1000 units CAPS   Yes No   Sig: Take by mouth   Coenzyme Q10 (COQ-10) 50 MG CAPS   Yes No   Sig: Take by mouth daily   Multiple Vitamins-Minerals (ICAPS AREDS 2 PO)   Yes No   Sig: Take by mouth 2 (two) times a day   Psyllium (METAMUCIL PO)   Yes No   Sig: Take by mouth   acetaminophen (TYLENOL) 325 mg tablet   Yes No   Sig: Take 650 mg by mouth every 6 (six) hours as needed for mild pain   amiodarone 200 mg tablet   Yes No   Sig: Take 100 mg by mouth daily    aspirin 81 MG tablet   Yes No   Sig: Take 81 mg by mouth daily   carvedilol (COREG) 6 25 mg tablet   No No   Sig: Take 1 tablet (6 25 mg total) by mouth 2 (two) times a day with meals   Patient taking differently: Take 6 25 mg by mouth daily    clotrimazole-betamethasone (LOTRISONE) 1-0 05 % cream   Yes No   Sig: Apply topically 2 (two) times a day   irbesartan (AVAPRO) 300 mg tablet   Yes No   Sig: Take 150 mg by mouth 2 (two) times a day    pravastatin (PRAVACHOL) 10 mg tablet   Yes No   Sig: Take 10 mg by mouth daily Facility-Administered Medications: None     Allergies   Allergen Reactions    Codeine     Lomotil [Diphenoxylate]     Quinidex [Quinidine]     Adhesive [Medical Tape] Rash    Penicillins Rash       Objective   I/O       07/13 0701 - 07/14 0700 07/14 0701 - 07/15 0700 07/15 0701 - 07/16 0700    P  O   1080     IV Piggyback 50      Total Intake(mL/kg) 50 (0 9) 1080 (19 4)     Urine (mL/kg/hr)  2200 (1 6)     Total Output  2200     Net +50 -1120                  Physical Exam   Constitutional: She appears well-developed and well-nourished  Eyes: EOM are normal    Neck: Normal range of motion  Cardiovascular: Normal rate  Pulmonary/Chest: Effort normal    Musculoskeletal:   Tenderness at L3 region on palpation without swelling or hematoma   Neurological: She is alert  She has normal strength and normal reflexes  No cranial nerve deficit or sensory deficit  GCS eye subscore is 4  GCS verbal subscore is 5  GCS motor subscore is 6  Reflex Scores:       Tricep reflexes are 2+ on the right side and 2+ on the left side  Bicep reflexes are 2+ on the right side and 2+ on the left side  Brachioradialis reflexes are 2+ on the right side and 2+ on the left side  Patellar reflexes are 2+ on the right side and 2+ on the left side  Achilles reflexes are 2+ on the right side and 2+ on the left side  Skin: Skin is warm  Psychiatric: Her speech is normal      Neurologic Exam     Mental Status   Disoriented to person  Disoriented to place  Speech: speech is normal   Level of consciousness: alert    Cranial Nerves     CN II   Visual fields full to confrontation  CN III, IV, VI   Extraocular motions are normal    Right pupil: Shape: regular  Left pupil: Shape: regular     Nystagmus: none     CN V   Right facial sensation deficit: none  Left facial sensation deficit: none    CN VII   Right facial weakness: none  Left facial weakness: none    CN XI   CN XI normal      CN XII   CN XII normal  Motor Exam   Muscle bulk: normal  Overall muscle tone: normal  Right arm tone: normal  Left arm tone: normal  Right arm pronator drift: absent  Left arm pronator drift: absent  Right leg tone: normal  Left leg tone: normal    Strength   Strength 5/5 throughout  Sensory Exam   Light touch normal      Gait, Coordination, and Reflexes     Reflexes   Right brachioradialis: 2+  Left brachioradialis: 2+  Right biceps: 2+  Left biceps: 2+  Right triceps: 2+  Left triceps: 2+  Right patellar: 2+  Left patellar: 2+  Right achilles: 2+  Left achilles: 2+  Right : 2+  Left : 2+  Right plantar: normal  Left plantar: normal  Right Retana: absent  Left Retana: absent  Right ankle clonus: absent  Left ankle clonus: absent      Vitals:Blood pressure (!) 197/88, pulse 70, temperature 97 6 °F (36 4 °C), temperature source Oral, resp  rate 18, height 5' 3" (1 6 m), weight 56 kg (123 lb 7 3 oz), SpO2 96 %  ,Body mass index is 21 87 kg/m²       Lab Results:   Results from last 7 days   Lab Units 07/13/19  0944   WBC Thousand/uL 9 64   HEMOGLOBIN g/dL 14 4   HEMATOCRIT % 42 4   PLATELETS Thousands/uL 252   NEUTROS PCT % 77*   MONOS PCT % 11     Results from last 7 days   Lab Units 07/15/19  0503 07/13/19  0944   POTASSIUM mmol/L 4 1 4 0   CHLORIDE mmol/L 97* 99*   CO2 mmol/L 21 27   BUN mg/dL 13 9   CREATININE mg/dL 0 62 0 57*   CALCIUM mg/dL 8 9 9 1   ALK PHOS U/L  --  102   ALT U/L  --  20   AST U/L  --  20     Results from last 7 days   Lab Units 07/13/19  0944   MAGNESIUM mg/dL 2 0         Results from last 7 days   Lab Units 07/13/19  0944   INR  1 01   PTT seconds 32     No results found for: TROPONINT  ABG:No results found for: PHART, VPJ2KWM, PO2ART, YNY3WMG, Q8LCLMFT, BEART, SOURCE    Imaging Studies: I have personally reviewed pertinent reports   , I have personally reviewed pertinent films in PACS and I have personally reviewed pertinent films in PACS with a Radiologist     EKG, Pathology, and Other Studies: I have personally reviewed pertinent reports   , I have personally reviewed pertinent films in PACS and I have personally reviewed pertinent films in PACS with a Radiologist     VTE Prophylaxis: Sequential compression device (Venodyne)     Code Status: Level 3 - DNAR and DNI  Advance Directive and Living Will:      Power of :    POLST:      Counseling / Coordination of Care  I spent 20 minutes with the patient

## 2019-07-15 NOTE — ASSESSMENT & PLAN NOTE
· Patients blood pressure has been ranging in the SBP-180-190, DBP - 70-80  · PRN IV hydralazine has been added  · Discussed with the niece  Will be replacing Losartan with her home dose of Irbesartan  · Control pain (see above)  · Monitor blood pressures

## 2019-07-15 NOTE — PLAN OF CARE
Problem: Potential for Falls  Goal: Patient will remain free of falls  Description  INTERVENTIONS:  - Assess patient frequently for physical needs  -  Identify cognitive and physical deficits and behaviors that affect risk of falls    -  [place] fall precautions as indicated by assessment   - Educate patient/family on patient safety including physical limitations  - Instruct patient to call for assistance with activity based on assessment  - Modify environment to reduce risk of injury  - Consider OT/PT consult to assist with strengthening/mobility  Outcome: Progressing     Problem: GENITOURINARY - ADULT  Goal: Maintains or returns to baseline urinary function  Description  INTERVENTIONS:  - Assess urinary function  - Encourage oral fluids to ensure adequate hydration  - Administer IV fluids as ordered to ensure adequate hydration  - Administer ordered medications as needed  - Offer frequent toileting  - Follow urinary retention protocol if ordered  Outcome: Progressing  Goal: Absence of urinary retention  Description  INTERVENTIONS:  - Assess patients ability to void and empty bladder  - Monitor I/O  - Bladder scan as needed  - Discuss with physician/AP medications to alleviate retention as needed  - Discuss catheterization for long term situations as appropriate  Outcome: Progressing     Problem: METABOLIC, FLUID AND ELECTROLYTES - ADULT  Goal: Electrolytes maintained within normal limits  Description  INTERVENTIONS:  - Monitor labs and assess patient for signs and symptoms of electrolyte imbalances  - Administer electrolyte replacement as ordered  - Monitor response to electrolyte replacements, including repeat lab results as appropriate  - Instruct patient on fluid and nutrition as appropriate  Outcome: Progressing     Problem: Prexisting or High Potential for Compromised Skin Integrity  Goal: Skin integrity is maintained or improved  Description  INTERVENTIONS:  - Identify patients at risk for skin breakdown  - Assess and monitor skin integrity  - Assess and monitor nutrition and hydration status  - Monitor labs (i e  albumin)  - Assess for incontinence   - Turn and reposition patient  - Assist with mobility/ambulation  - Relieve pressure over bony prominences  - Avoid friction and shearing  - Provide appropriate hygiene as needed including keeping skin clean and dry  - Evaluate need for skin moisturizer/barrier cream  - Collaborate with interdisciplinary team (i e  Nutrition, Rehabilitation, etc )   - Patient/family teaching  Outcome: Progressing     Problem: PAIN - ADULT  Goal: Verbalizes/displays adequate comfort level or baseline comfort level  Description  Interventions:  - Encourage patient to monitor pain and request assistance  - Assess pain using appropriate pain scale  - Administer analgesics based on type and severity of pain and evaluate response  - Implement non-pharmacological measures as appropriate and evaluate response  - Consider cultural and social influences on pain and pain management  - Notify physician/advanced practitioner if interventions unsuccessful or patient reports new pain  Outcome: Progressing     Problem: DISCHARGE PLANNING  Goal: Discharge to home or other facility with appropriate resources  Description  INTERVENTIONS:  - Identify barriers to discharge w/patient and caregiver  - Arrange for needed discharge resources and transportation as appropriate  - Identify discharge learning needs (meds, wound care, etc )  - Arrange for interpretive services to assist at discharge as needed  - Refer to Case Management Department for coordinating discharge planning if the patient needs post-hospital services based on physician/advanced practitioner order or complex needs related to functional status, cognitive ability, or social support system  Outcome: Progressing     Problem: Knowledge Deficit  Goal: Patient/family/caregiver demonstrates understanding of disease process, treatment plan, medications, and discharge instructions  Description  Complete learning assessment and assess knowledge base    Interventions:  - Provide teaching at level of understanding  - Provide teaching via preferred learning methods  Outcome: Progressing     Problem: RESPIRATORY - ADULT  Goal: Achieves optimal ventilation and oxygenation  Description  INTERVENTIONS:  - Assess for changes in respiratory status  - Assess for changes in mentation and behavior  - Position to facilitate oxygenation and minimize respiratory effort  - Oxygen administration by appropriate delivery method based on oxygen saturation (per order) or ABGs  - Initiate smoking cessation education as indicated  - Encourage broncho-pulmonary hygiene including cough, deep breathe, Incentive Spirometry  - Assess the need for suctioning and aspirate as needed  - Assess and instruct to report SOB or any respiratory difficulty  - Respiratory Therapy support as indicated  Outcome: Progressing     Problem: MUSCULOSKELETAL - ADULT  Goal: Maintain or return mobility to safest level of function  Description  INTERVENTIONS:  - Assess patient's ability to carry out ADLs; assess patient's baseline for ADL function and identify physical deficits which impact ability to perform ADLs (bathing, care of mouth/teeth, toileting, grooming, dressing, etc )  - Assess/evaluate cause of self-care deficits   - Assess range of motion  - Assess patient's mobility; develop plan if impaired  - Assess patient's need for assistive devices and provide as appropriate  - Encourage maximum independence but intervene and supervise when necessary  - Involve family in performance of ADLs  - Assess for home care needs following discharge   - Request OT consult to assist with ADL evaluation and planning for discharge  - Provide patient education as appropriate  Outcome: Progressing

## 2019-07-15 NOTE — ASSESSMENT & PLAN NOTE
Patient has not had a bowel movement yet  She is currently on MiraLax, and senna p r n    Plan - Senna 2 tabs, 2 times a day    MiraLax daily

## 2019-07-15 NOTE — PHYSICAL THERAPY NOTE
PHYSICAL THERAPY EVALUATION NOTE    Patient Name: Jamshid Jane  IIFHA'Q Date: 7/15/2019  AGE:   80 y o  Mrn:   [id number]  ADMIT DX:  Back pain [M54 9]  UTI (urinary tract infection) [N39 0]  Acute low back pain [M54 5]  Hypertensive urgency [I16 0]    Past Medical History:   Diagnosis Date    A-fib ([facility] [place] [facility] )     Anxiety     Cheilosis     Diverticulosis     Gallstone     Hiatal hernia     Hyperlipidemia     Hypertension     Hyponatremia     Inguinal hernia     Lacunar infarction ([facility] [place] [facility] )     Macular degeneration     Osteoarthritis     Renal cyst     Syncope     Tuberculosis     Umbilical hernia     Vertigo      Length Of Stay: 2  PHYSICAL THERAPY EVALUATION :    07/15/19 1138   Pain Assessment   Pain Assessment 0-10   Pain Score 8   Pain Location Back   Home Living   Type of Home House   Home Layout Two level;Bed/bath upstairs; Other (Comment)  (stairglide to 2nd floor - must use 2 steps to stairglide )   Additional Comments lives w/ niece and family  niece is primary caregiver to pt  mobilizes in room w/ roller walker  uses wheelchair in community  has lift chair  needs assist w/ transfers and ADLs/IADLs  no falls in last 6 months  Prior Function   Comments pt seen supine in bed w/ niece present at bedside  pt agreed to participate in PT eval  c/o back pain  history was provided by niece at bedside  pt wants to go home  Restrictions/Precautions   Braces or Orthoses LSO;Other (Comment)  (when upright or head of bed 45*)   Other Precautions Chair Alarm; Bed Alarm;Multiple lines; Fall Risk;Pain   General   Additional Pertinent History 7/15/19 at 7:55, blood pressure was 197/88  Family/Caregiver Present Yes   Cognition   Arousal/Participation Cooperative   Orientation Level Oriented to person; Other (Comment)  (pt was identified w/ full name, birth date)   Following Commands Follows one step commands with increased time or repetition   Comments supine blood pressure 192/79 and 68 BPM  blood pressure was manually checked by Temo Oneill NSG - 182/78  RUE Assessment   RUE Assessment X  (3+/5, shoulder 2+/5)   LUE Assessment   LUE Assessment WFL  (3+/5, shoulder 3/5)   RLE Assessment   RLE Assessment X  (3-/5, hip 2/5)   LLE Assessment   LLE Assessment X  (3-/5, hip 2+/5)   Coordination   Movements are Fluid and Coordinated 0   Coordination and Movement Description impaired coordination UEs   Light Touch   RLE Light Touch Grossly intact   LLE Light Touch Grossly intact   Bed Mobility   Rolling R 2  Maximal assistance   Additional items Assist x 1;Bedrails; Increased time required;Verbal cues;LE management  (donning LSO)   Rolling L 2  Maximal assistance   Additional items Assist x 1;Bedrails; Increased time required;Verbal cues;LE management  (donning LSO)   Supine to Sit 1  Dependent   Additional items Assist x 1   Sit to Supine 2  Maximal assistance   Additional items Assist x 2; Increased time required;Verbal cues;LE management   Additional Comments pt sat edge of bed approximately 3 minutes w/ modx1  additional sitting not possible due to pain and fatigue  pt needs dependent means for out of bed mobilization  Transfers   Sit to Stand Unable to assess   Balance   Static Sitting Poor   Static Standing Zero   Activity Tolerance   Activity Tolerance Patient limited by fatigue;Patient limited by pain   Nurse Made Aware spoke to [facility]   Assessment   Prognosis Fair   Problem List Decreased strength;Decreased range of motion;Decreased endurance; Impaired balance;Decreased mobility; Decreased coordination;Decreased safety awareness;Orthopedic restrictions;Pain   Assessment Pt presents with lumbar back pain which started 3 days back  Dx: L3 compression fx, left 2nd toe bruising, essential HTN, UTI, and ambulatory dysfunction   order placed for PT eval and tx, w/ activity order of up w/ A and LSO (wear when upright and head of bed at 45*)  pt presents w/ comorbidities of a-fib, hyperlipidemia, HTN, lacunar infarction, OA, macular degeneration, syncope, vertigo and hip surgery and personal factors of advanced age, living in 2 story house, mobilizing w/ assistive device, anxiety, inability to perform IADLs and inability to perform ADLs  pt presents w/ weakness, decreased ROM, decreased endurance, impaired balance, impaired coordination, decreased safety awareness, orthopedic restrictions and fall risk  these impairments are evident in findings from physical examination (weakness, decreased ROM and impaired coordination), mobility assessment (need for max to total assist w/ bed mobility, inability to mobilize out of bed, need for input for mobility technique/safety), and Barthel Index: 20/100  pt needed input for task focus and mobility technique/safety  pt is at risk for falls due to physical and safety awareness deficits  pt's clinical presentation is unstable/unpredictable (evident in poor blood pressure control, need for assist w/ bed mobility when usually ambulatory, pain impacting overall mobility status and need for input for task focus and mobility technique/safety)  pt needs inpatient PT tx to improve mobility deficits and progress mobility training as appropriate  discharge recommendation is for inpatient rehab to reduce fall risk and maximize level of functional independence  Pt would benefit from OT consult to address safety awareness  Pt needs dependent means for out of bed mobilization     Goals   Patient Goals go home   STG Expiration Date 07/25/19   Short Term Goal #1 pt will: Don/doff LSO w/ modx1 to decrease caregiver burden, Increase bilateral LE strength 1/2 grade to facilitate independent mobility, Perform rolling and repositioning in bed w/ minx1 to decrease fall risk factors, Perform supine <---> sitting transition w/ minx1 to improve independence, Increase static sitting balance 1 grade to decrease risk for falls, Tolerate 20 minutes sitting edge of bed w/ supervision to faciliate upright tolerance, Improve Barthel Index score to 45 or greater to facilitate independence, and Complete mobility assessment to expedite safe return home w/ family support  Plan   Treatment/Interventions LE strengthening/ROM; Therapeutic exercise; Endurance training;Cognitive reorientation;Patient/family training;Equipment eval/education; Bed mobility  (PT to see next session to complete mobility assessment)   PT Frequency 5x/wk; Other (Comment)  (and 1x on weekend)   Recommendation   Recommendation Short-term skilled PT;OT consult   Equipment Recommended Other (Comment)  (pt needs dependent means for out of bed mobilization )   Additional Comments pt was dependent for donning/doffing LSO and adjusting quick draw straps   Barthel Index   Feeding 5   Bathing 0   Grooming Score 0   Dressing Score 0   Bladder Score 5   Bowels Score 5   Toilet Use Score 0   Transfers (Bed/Chair) Score 5   Mobility (Level Surface) Score 0   Stairs Score 0   Barthel Index Score 20     Skilled PT recommended while in hospital and upon DC to progress pt toward treatment goals       Daniele Lynn, PT

## 2019-07-15 NOTE — ASSESSMENT & PLAN NOTE
Patients pain is currently controlled with oxycodone 2 5mg (for moderate pain), 5 mg (for severe pain)  However, steven noted that the patient was slightly confused when she woke up this morning  On examination, she did not have vertebral tenderness  She was alert and oriented to time and place, but not person  Her confusion state gradually settled and when I saw her an hour later, she was in her baseline mental status  She had her 1st oxycodone 5mg dose last night, and the confusion was most likely caused by this  Lumbar MRI demonstrate closed linear fracture of inferior L3 vertebral without displacement or ligamentous injury       Patient can continue with conservative management including outpatient mild pain management, physical therapy and LSO brace  Baseline upright lumbar spine x-rays in brace ordered to evaluate the stability of the fracture  Follow-up in 2 weeks with another upright lumbar spine x-rays in brace  Call for concern or question  Will sign off  · Evaluation -  Neurosurgery was consulted- no surgical interventions at this time  · Physical therapy assessment  · Treatment -   · Discontinue oxycodone 5 mg  · Continue with conservative management including outpatient mild pain management, physical therapy and LSO brace  · Baseline Pain control -   · Tylenol 975 mg q8h scheduled  · Lidoderm daily  · PRN Pain Control -  · Oxycodone 2 5 mg q6h PRN  · Could consider topical NSAID  · Muscle Relaxer - Bengay cream   Avoid systemic muscle relaxer if possible in geriatric population  · Monitor Pain levels

## 2019-07-15 NOTE — ASSESSMENT & PLAN NOTE
Patient does not complain of dysuria, frequency, flank pain or hematuria  Her urine microscopy shows 10-20 WBC, and innumerable bacteria  She does not have fever or chills  Examination shows no renal angle tenderness  Urine culture reports 100 000 hpf E coli growth  As the patient is asymptomatic the antibiotics will be discontinued  Plan -  Discontinue TMP-SMX   Monitor temperatures and serial exams

## 2019-07-15 NOTE — ASSESSMENT & PLAN NOTE
Patient was noted to have Na level of 129  (135 on admission)  She has a history of hyponatremia in the past  She has good urine out put  On examination, she was mildly confused, but with time, she regained her baseline mental status  Her confusion was likely due to the oxycodone as it was transient    Plan - BMP at 2 pm today    Monitor BMP daily    IV normal saline 100ml/hr

## 2019-07-15 NOTE — PROGRESS NOTES
Progress Note - Kraig Cabrera 10/24/1927, 80 y o  female MRN: [id number]    Unit/Bed#: -01 Encounter: [id number]    Primary Care Provider: Melanie Diaz MD   Date and time admitted to hospital: 7/13/2019  9:22 AM        * Closed compression fracture of L3 lumbar vertebra, initial encounter [facility])  Assessment & Plan  Patients pain is currently controlled with oxycodone 2 5mg (for moderate pain), 5 mg (for severe pain)  However, steven noted that the patient was slightly confused when she woke up this morning  On examination, she did not have vertebral tenderness  She was alert and oriented to time and place, but not person  Her confusion state gradually settled and when I saw her an hour later, she was in her baseline mental status  She had her 1st oxycodone 5mg dose last night, and the confusion was most likely caused by this  Lumbar MRI demonstrate closed linear fracture of inferior L3 vertebral without displacement or ligamentous injury       Patient can continue with conservative management including outpatient mild pain management, physical therapy and LSO brace  Baseline upright lumbar spine x-rays in brace ordered to evaluate the stability of the fracture  Follow-up in 2 weeks with another upright lumbar spine x-rays in brace  Call for concern or question  Will sign off  · Evaluation -  Neurosurgery was consulted- no surgical interventions at this time  · Physical therapy assessment  · Treatment -   · Discontinue oxycodone 5 mg  · Continue with conservative management including outpatient mild pain management, physical therapy and LSO brace  · Baseline Pain control -   · Tylenol 975 mg q8h scheduled  · Lidoderm daily  · PRN Pain Control -  · Oxycodone 2 5 mg q6h PRN  · Could consider topical NSAID  · Muscle Relaxer - Bengay cream   Avoid systemic muscle relaxer if possible in geriatric population  · Monitor Pain levels      Asymptomatic bacteriuria  Assessment & Plan  Patient does not complain of dysuria, frequency, flank pain or hematuria  Her urine microscopy shows 10-20 WBC, and innumerable bacteria  She does not have fever or chills  Examination shows no renal angle tenderness  Urine culture reports 100 000 hpf E coli growth  As the patient is asymptomatic the antibiotics will be discontinued  Plan -  Discontinue TMP-SMX   Monitor temperatures and serial exams  Hyponatremia  Assessment & Plan  Patient was noted to have Na level of 129  (135 on admission)  She has a history of hyponatremia in the past  She has good urine out put  On examination, she was mildly confused, but with time, she regained her baseline mental status  Her confusion was likely due to the oxycodone as it was transient  Plan - BMP at 2 pm today    Monitor BMP daily    IV normal saline 100ml/hr    Toe injury, left, initial encounter  Assessment & Plan  X ray of the left foot shows no fracture or dislocation of the 2nd toe  Patient does not complain of pain      Essential hypertension  Assessment & Plan  · Patients blood pressure has been ranging in the SBP-180-190, DBP - 70-80  · PRN IV hydralazine has been added  · Discussed with the niece  Will be replacing Losartan with her home dose of Irbesartan  · Control pain (see above)  · Monitor blood pressures  Arthritis  Assessment & Plan  · Per family, she has had arthritis since the age of 3 and actually had hospitalizations even as a child for arthritis  Somewhere in her teenage years she did have a surgery in her right hip which left her with a very stiff right hip and subsequently increased arthritis in the knees due to compensation  Chronic constipation  Assessment & Plan  Patient has not had a bowel movement yet  She is currently on MiraLax, and senna p r n  Plan - Senna 2 tabs, 2 times a day    MiraLax daily      Ambulatory dysfunction  Assessment & Plan  · Fall precautions / bed alarm  · PT evaluation      PAF (paroxysmal atrial fibrillation) [facility])  Assessment & Plan  · Rate / Rhythm control -   · Continue on amiodarone and carvedilol  · Anticoagulation -   · She is on aspirin but no other anticoagulants  She is a fall risk  VTE Pharmacologic Prophylaxis:   Pharmacologic: Enoxaparin (Lovenox)  Mechanical VTE Prophylaxis in Place: No    Patient Centered Rounds: I have performed bedside rounds with nursing staff today  Discussions with Specialists or Other Care Team Provider: none    Education and Discussions with Family / Patient:  Patient and the niece    Time Spent for Care: 20 minutes  More than 50% of total time spent on counseling and coordination of care as described above  Current Length of Stay: 2 day(s)    Current Patient Status: Inpatient   Certification Statement: The patient will continue to require additional inpatient hospital stay due to management of back pain    Discharge Plan: 2-3 days    Code Status: Level 3 - DNAR and DNI      Subjective:   Patient's pain has improved with oxycodone and lidocaine patch  The niece has noted that the patient seemed confused since this morning, but she is gradually returning to her baseline mental status  She has not had a bowel movement, but she has a good appetite  Objective:     Vitals:   Temp (24hrs), Av °F (36 7 °C), Min:97 6 °F (36 4 °C), Max:98 6 °F (37 °C)    Temp:  [97 6 °F (36 4 °C)-98 6 °F (37 °C)] 97 6 °F (36 4 °C)  HR:  [62-70] 70  Resp:  [18] 18  BP: (159-197)/(68-88) 182/78  SpO2:  [96 %] 96 %  Body mass index is 21 87 kg/m²  Input and Output Summary (last 24 hours): Intake/Output Summary (Last 24 hours) at 7/15/2019 1332  Last data filed at 7/15/2019 0937  Gross per 24 hour   Intake 1080 ml   Output 2600 ml   Net -1520 ml       Physical Exam:     Physical Exam   Constitutional: No distress  HENT:   Head: Normocephalic and atraumatic  Eyes: Pupils are equal, round, and reactive to light     Cardiovascular: Normal rate, regular rhythm and normal heart sounds  Murmur: soft ejection systolic murmur was heard at the aortic area  Pulmonary/Chest: Effort normal and breath sounds normal    Abdominal: Bowel sounds are normal  Distention: Mild distension  Tenderness: no renal angle tenderness  Musculoskeletal: Deformity: multiple joint bony enlargement noted  ROM limited in right hip  Lumbar back: She exhibits tenderness  She exhibits no bony tenderness, no deformity and no pain  Skin: Capillary refill takes less than 2 seconds  She is not diaphoretic  Vitals reviewed  Additional Data:     Labs:    Results from last 7 days   Lab Units 07/13/19  0944   WBC Thousand/uL 9 64   HEMOGLOBIN g/dL 14 4   HEMATOCRIT % 42 4   PLATELETS Thousands/uL 252   NEUTROS PCT % 77*   LYMPHS PCT % 10*   MONOS PCT % 11   EOS PCT % 2     Results from last 7 days   Lab Units 07/15/19  0503 07/13/19  0944   SODIUM mmol/L 129* 135*   POTASSIUM mmol/L 4 1 4 0   CHLORIDE mmol/L 97* 99*   CO2 mmol/L 21 27   BUN mg/dL 13 9   CREATININE mg/dL 0 62 0 57*   ANION GAP mmol/L 11 9   CALCIUM mg/dL 8 9 9 1   ALBUMIN g/dL  --  3 7   TOTAL BILIRUBIN mg/dL  --  0 90   ALK PHOS U/L  --  102   ALT U/L  --  20   AST U/L  --  20   GLUCOSE RANDOM mg/dL 127 99     Results from last 7 days   Lab Units 07/13/19  0944   INR  1 01                       * I Have Reviewed All Lab Data Listed Above  * Additional Pertinent Lab Tests Reviewed:  [facility]    Imaging:    Imaging Reports Reviewed Today Include: MRI lumbar spine  Imaging Personally Reviewed by Myself Includes:  None    Recent Cultures (last 7 days):     Results from last 7 days   Lab Units 07/13/19  1028   URINE CULTURE  >100,000 cfu/ml Escherichia coli*       Last 24 Hours Medication List:     Current Facility-Administered Medications:  acetaminophen 975 mg Oral Q8H [address] Julia Sanders MD    amiodarone 100 mg Oral Daily Julia Sanders MD    aspirin 81 mg Oral Daily Julia Sanders MD carvedilol 6 25 mg Oral Daily Gerardo Shipman MD    cholecalciferol 1,000 Units Oral Daily Gerardo Shipman MD    clotrimazole-betamethasone  Topical BID Gerardo Shipman MD    co-enzyme Q-10 30 mg Oral Daily Gerardo Shipman MD    enoxaparin 40 mg Subcutaneous Daily Gerardo Shipman MD    fluticasone 1 spray Each Nare HS PRN Sarah Machado, DO    hydrALAZINE 10 mg Intravenous Q6H PRN Sarah Machado, DO    irbesartan 150 mg Oral BID Sarah Machado, DO    lidocaine 1 patch Topical Daily Gerardo Shipman MD    menthol-methyl salicylate  Apply externally 4x Daily PRN Gerardo Shipman MD    multivitamin-minerals 1 tablet Oral Daily Gerardo Shipman MD    ondansetron 4 mg Intravenous Q6H PRN Gerardo Shipman MD    oxyCODONE 2 5 mg Oral Q6H PRN Gerardo Shipman MD    [START ON 7/16/2019] polyethylene glycol 17 g Oral Daily Sarah Machado, DO    pravastatin 10 mg Oral Daily With Pippa Goodwin MD    Psyllium 3 capsule Oral Q12H [address] Ruchi Moore PA-C    senna 2 tablet Oral BID Sarah Machado, DO    sodium chloride 100 mL/hr Intravenous Continuous Sarah Machado, DO Last Rate: 100 mL/hr (07/15/19 0929)        Today, Patient Was Seen By: Gerardo Shipman MD    ** Please Note: Dictation voice to text software may have been used in the creation of this document   **

## 2019-07-15 NOTE — ASSESSMENT & PLAN NOTE
X ray of the left foot shows no fracture or dislocation of the 2nd toe   Patient does not complain of pain

## 2019-07-15 NOTE — PLAN OF CARE
Problem: PHYSICAL THERAPY ADULT  Goal: Performs mobility at highest level of function for planned discharge setting  See evaluation for individualized goals  Description  Treatment/Interventions: LE strengthening/ROM, Therapeutic exercise, Endurance training, Cognitive reorientation, Patient/family training, Equipment eval/education, Bed mobility(PT to see next session to complete mobility assessment)  Equipment Recommended: Other (Comment)(pt needs dependent means for out of bed mobilization )       See flowsheet documentation for full assessment, interventions and recommendations  7/15/2019 1428 by Meena Jauregui PT  Outcome: Progressing  Note:   Prognosis: Fair  Problem List: Decreased strength, Decreased range of motion, Decreased endurance, Impaired balance, Decreased mobility, Decreased coordination, Decreased safety awareness, Orthopedic restrictions, Pain  Assessment: Pt was provided w/ LSO per orders  Pt had no additional reports w/ brace being donned  Pt needed total assist for donning and doffing LSO  Pt was initiated w/ participation in exercise program to address physical and mobility deficits noted during eval  Pt required occasional input to maintain technique and complete all reps  Handout was provided to expedite understanding of technique  Rest breaks were needed due to fatigue and pain  Further inpatient PT intervention is necessary to decrease fall risk and maximize functional independence  Recommendation: Short-term skilled PT, OT consult          See flowsheet documentation for full assessment  7/15/2019 1422 by Meena Jauregui, PT  Outcome: Progressing  Note:   Prognosis: Fair  Problem List: Decreased strength, Decreased range of motion, Decreased endurance, Impaired balance, Decreased mobility, Decreased coordination, Decreased safety awareness, Orthopedic restrictions, Pain  Assessment: Pt presents with lumbar back pain which started 3 days back   Dx: L3 compression fx, left 2nd toe bruising, essential HTN, UTI, and ambulatory dysfunction  order placed for PT eval and tx, w/ activity order of up w/ A and LSO (wear when upright and head of bed at 45*)  pt presents w/ comorbidities of a-fib, hyperlipidemia, HTN, lacunar infarction, OA, macular degeneration, syncope, vertigo and hip surgery and personal factors of advanced age, living in 2 story house, mobilizing w/ assistive device, anxiety, inability to perform IADLs and inability to perform ADLs  pt presents w/ weakness, decreased ROM, decreased endurance, impaired balance, impaired coordination, decreased safety awareness, orthopedic restrictions and fall risk  these impairments are evident in findings from physical examination (weakness, decreased ROM and impaired coordination), mobility assessment (need for max to total assist w/ bed mobility, inability to mobilize out of bed, need for input for mobility technique/safety), and Barthel Index: 20/100  pt needed input for task focus and mobility technique/safety  pt is at risk for falls due to physical and safety awareness deficits  pt's clinical presentation is unstable/unpredictable (evident in poor blood pressure control, need for assist w/ bed mobility when usually ambulatory, pain impacting overall mobility status and need for input for task focus and mobility technique/safety)  pt needs inpatient PT tx to improve mobility deficits and progress mobility training as appropriate  discharge recommendation is for inpatient rehab to reduce fall risk and maximize level of functional independence  Pt would benefit from OT consult to address safety awareness  Pt needs dependent means for out of bed mobilization  Recommendation: Short-term skilled PT, OT consult          See flowsheet documentation for full assessment

## 2019-07-15 NOTE — PROGRESS NOTES
I have seen and examined Valentine Royal personally and have reviewed the medical record independently  I have reviewed the case with the resident physician including all assessments and the plan of care for each  I agree with the resident physician and offer the following addendum to the below statements by the resident physician:     The patient had some increased confusion early morning this morning starting around 4:00 a m     The patient was given oxycodone at the 5 mg dose for the 1st time last night  The patient has been eating and drinking okay and she did receive steroids up until yesterday  Her sodium did drop down to 129 today  On exam, heart is with a regular rate and rhythm  Lungs are overall clear to auscultation bilaterally without any wheezing, rhonchi, or rales  Abdomen is soft, nondistended, nontender to palpation  The patient is slightly more sleepy and when not directing attention her weight does sometimes close her eyes but then will open them spontaneously on her own without prompting  The patient is currently back to baseline which is somewhat of an anxious personality but generally oriented  The patient does however, repeat herself at times  There is maybe some mild memory impairment which seems to be baseline  However, the patient is not agitated confused or severely disoriented currently as had been reported earlier  Motor strength is similar to prior exams and the range of motion in the right lower extremity is also still limited like previous but unchanged  I did not palpate the back currently as patient was complaining of some increased pain in the back as she did not receive any additional pain medications since last night and is just now receiving the oxycodone at the 2 5 mg dose  In terms of assessment and plan:  · L3 lumbar compression fracture with lumbar pain present on admission -   · Evaluation -   ?  Neurosurgery evaluating patient for TLSO brace for mobility / pain control  Will get xray imaging with brace after fitted  ? Physical therapy assessment today  Spoke to PT  · Treatment -   ? Steroid - Dexamethasone 4 mg IV q8h x 4 doses given through 1400 on 07/14/2019  Consider low dose prednisone if pain worsens at all again  ? Baseline Pain control -   § Tylenol 975 mg q8h scheduled  § Lidoderm daily  ? PRN Pain Control -  § Moderate / Severe pain - Oxycodone 2 5 mg q6h PRN  Patient became confused after first dose of 5 mg so will avoid higher doses like this for now  § Could consider topical NSAID  § Muscle Relaxer - Bengay cream   Avoid systemic muscle relaxer if possible in geriatric population  ? Monitor Pain levels  · Ambulatory dysfunction - physical therapy and case management will be seeing the patient and talking with her niece today  Will determine rehab needs and work on discharge planning with anticipation of a potential for discharge within the next 24-48 hours  · Acute encephalopathy / transient delirium - this is most likely related to medication effect from the 5 mg oxycodone dose  Subsequently, we have stopped the 5 mg of oxycodone  Patient was tolerating the 2 5 mg previously and will continue with this dosing  Other pain medication regimen will be kept intact  The patient's sodium did also drop and there is a possibility that the patient might have some mild dehydration so we will give her some IV fluids  Alternatively the steroid may have contributed to the hyponatremia as well  Currently, the patient is not on any steroid any longer but there is consideration for starting a low-dose of prednisone (20 mg daily) if the pain starts to increase today or tomorrow  The patient has no symptoms of urinary infection and, given a more plausible reason for the back pain currently, we are going to stop antibiotics  It is felt, instead, that this represents asymptomatic bacteriuria instead of true urinary tract infection    · Hyponatremia - the worsening is either due to dehydration or steroid effect  The steroids are currently on hold as the initial intention was to stay give 4 doses  The patient is on IV fluids currently and will recheck the BMP today at 2:00 p m  And again tomorrow morning  If the sodium improves we may consider lowering the IV fluid rate  If it looks like we might be over correcting then we will hold IV fluids  · Asymptomatic bacteriuria - antibiotics were discontinued  Urine culture shows E coli  At this point, monitor for any onset of urinary symptoms off of antibiotics  · Toe injury/bruising without fracture - supportive care  No further interventions required  · Chronic constipation - change senna and MiraLax both to scheduled dosing regimens  Continue on Metamucil  Monitor bowel movements  · Essential hypertension - IV hydralazine was given earlier and did bring the blood pressure down  However, the patient has been having higher blood pressures in which she would normally have at home  Some of this could be pain related and other component could be steroid related  Nonetheless, the patient's niece has her usual irbesartan medication here and we will prescribe this instead of the formulary losartan that we have been giving and see if this improves the patient's blood pressure before adding or changing anything else  Monitor blood pressures  For detailed history, assessment, and plan of care, please review the statements below by Saleem Short, PGY-1      Grady Moyer DO

## 2019-07-15 NOTE — PLAN OF CARE
Problem: PHYSICAL THERAPY ADULT  Goal: Performs mobility at highest level of function for planned discharge setting  See evaluation for individualized goals  Description  Treatment/Interventions: LE strengthening/ROM, Therapeutic exercise, Endurance training, Cognitive reorientation, Patient/family training, Equipment eval/education, Bed mobility(PT to see next session to complete mobility assessment)  Equipment Recommended: Other (Comment)(pt needs dependent means for out of bed mobilization )       See flowsheet documentation for full assessment, interventions and recommendations  Outcome: Progressing  Note:   Prognosis: Fair  Problem List: Decreased strength, Decreased range of motion, Decreased endurance, Impaired balance, Decreased mobility, Decreased coordination, Decreased safety awareness, Orthopedic restrictions, Pain  Assessment: Pt presents with lumbar back pain which started 3 days back  Dx: L3 compression fx, left 2nd toe bruising, essential HTN, UTI, and ambulatory dysfunction  order placed for PT eval and tx, w/ activity order of up w/ A and LSO (wear when upright and head of bed at 45*)  pt presents w/ comorbidities of a-fib, hyperlipidemia, HTN, lacunar infarction, OA, macular degeneration, syncope, vertigo and hip surgery and personal factors of advanced age, living in 2 story house, mobilizing w/ assistive device, anxiety, inability to perform IADLs and inability to perform ADLs  pt presents w/ weakness, decreased ROM, decreased endurance, impaired balance, impaired coordination, decreased safety awareness, orthopedic restrictions and fall risk  these impairments are evident in findings from physical examination (weakness, decreased ROM and impaired coordination), mobility assessment (need for max to total assist w/ bed mobility, inability to mobilize out of bed, need for input for mobility technique/safety), and Barthel Index: 20/100   pt needed input for task focus and mobility technique/safety  pt is at risk for falls due to physical and safety awareness deficits  pt's clinical presentation is unstable/unpredictable (evident in poor blood pressure control, need for assist w/ bed mobility when usually ambulatory, pain impacting overall mobility status and need for input for task focus and mobility technique/safety)  pt needs inpatient PT tx to improve mobility deficits and progress mobility training as appropriate  discharge recommendation is for inpatient rehab to reduce fall risk and maximize level of functional independence  Pt would benefit from OT consult to address safety awareness  Pt needs dependent means for out of bed mobilization  Recommendation: Short-term skilled PT, OT consult          See flowsheet documentation for full assessment

## 2019-07-15 NOTE — UTILIZATION REVIEW
Initial Clinical Review    Admission: Date/Time/Statement: 7/13/19 @ 1246     Orders Placed This Encounter   Procedures    Inpatient Admission     Standing Status:   Standing     Number of Occurrences:   1     Order Specific Question:   Admitting Physician     Answer:   David Alvarado [[id number]]     Order Specific Question:   Level of Care     Answer:   Med Surg [[id number]]     Order Specific Question:   Estimated length of stay     Answer:   More than 2 Midnights     Order Specific Question:   Certification     Answer:   I certify that inpatient services are medically necessary for this patient for a duration of greater than two midnights  See H&P and MD Progress Notes for additional information about the patient's course of treatment  ED Arrival Information     Expected Arrival Acuity Means of Arrival Escorted By Service Admission Type    - 7/13/2019 09:22 [facility] [address] [facility] Urgent    Arrival Complaint    -        Chief Complaint   Patient presents with    Back Pain     woke up at 033 w/ back pain; took tylenol  Pain persists; came to ED for Eval     Assessment/Plan: 81 yo female presents to ED from home via EMS with c/o back pain that started on Tuesday  And has gotten progressively worse  Admits to multiple falls @ home  Symptoms did not improve much with meds in ED and additionally  there was concernfor possible urinary tract infection and will follow up urine cultures for this  Lumbar pain  Assessment & Plan  Currently patient's pain in controlled with Lidocaine patch and morphine  Plan -            MRI lumbar spine           Pain control with Tylenol 975mg 8hrly, Oxycodone 2 5mg PRN (moderate pain), Oxycodone 5mg PRN (severe pain), and Lidoderm patch daily           IV Dexamethasone 4 mg 8 hrly           Monitor pain levels                Urinary tract infection  Assessment & Plan  Patient does not complain of dysuria, frequency, flank pain or hematuria   Her urine microscopy shows 10-20 WBC, and innumerable bacteria  She does not have fever or chills  Examination shows no renal angle tenderness  Plan -            IV Normal saline 50ml/hr           TMP--160 mg every 12 hrs           Good oral intake of fluids           Monitor temperature and other vitals           Follow up urine cultures     Toe injury, left, initial encounter  Assessment & Plan  On examination patient had bruising of the Left second foot, and had pain with movement  She does not recall of any trauma to the toe  Plan -            Xray Left foot           Pain control     Essential hypertension  Assessment & Plan  Patient is on home antihypertensives  On admission to the ED, her BP was elevated, but with adequate pain control, it normalized  Plan -            Continue home antihypertensives           Monitor vitals     Ambulatory dysfunction  Assessment & Plan  Patient can walk with an assistive device  However, she is at increased risk for falls  She needs assistance getting up from bed  Plan -            Ambulate with assistance           Physical Therapy evaluation and treatment     PAF (paroxysmal atrial fibrillation) ([facility] [address] )  Assessment & Plan  She has not experienced any episodes of chest pain or palpitations in the recent past    Plan -            Continue home medication (amioderone)      Anticipated Length of Stay:  Patient will be admitted on an Inpatient basis with an anticipated length of stay of  2 midnights  Justification for Hospital Stay: evaluation and management of back pain and UTI    Per NeuroSurgery 7/15: Linear fracture of inferior L3 vertebral body without displacement  ? Pain control:  Per primary team-we recommend outpatient Pain Management  ? DVT ppx:  SCDs bilateral legs  Okay continue Lovenox  ? Activity:  As tolerated   PT/OT evaluation & treatment  ?  Brace:  Wear LSO brace when upright and at 45 degree head of        ED Triage Vitals   Temperature Pulse Respirations Blood Pressure SpO2   07/13/19 0926 07/13/19 0926 07/13/19 0215 07/13/19 0931 07/13/19 0926   97 7 °F (36 5 °C) 63 (P) 20 (!) 210/90 96 %      Temp Source Heart Rate Source Patient Position - Orthostatic VS BP Location FiO2 (%)   07/13/19 0926 07/13/19 0926 07/13/19 0926 07/13/19 0926 --   Oral Monitor Lying Right arm       Pain Score       07/13/19 0931       Worst Possible Pain        Wt Readings from Last 1 Encounters:   07/13/19 56 kg (123 lb 7 3 oz)     Additional Vital Signs:   07/14/19 1537  98 6 °F (37 °C)  64  18  182/80Abnormal     96 %  None (Room air)  Sitting   07/14/19 0738  98 °F (36 7 °C)  67  18  190/82Abnormal     96 %  None (Room air)     07/13/19 1410  97 7 °F (36 5 °C)  61  20  136/60        Lying   07/13/19 1340    55  18  131/60    94 %  None (Room air)  Lying   07/13/19 1301    57  19  124/59    96 %  None (Room air)  Lying   07/13/19 1231    78    175/77           07/13/19 1030    58  19  203/85    97 %  None (Room air)     07/13/19 1000    64  18  203/85    95 %  None (Room air)         Pertinent Labs/Diagnostic Test Results:   Results from last 7 days   Lab Units 07/13/19  0944   WBC Thousand/uL 9 64   HEMOGLOBIN g/dL 14 4   HEMATOCRIT % 42 4   PLATELETS Thousands/uL 252   NEUTROS ABS Thousands/µL 7 37     Results from last 7 days   Lab Units 07/15/19  0503 07/13/19  0944   SODIUM mmol/L 129* 135*   POTASSIUM mmol/L 4 1 4 0   CHLORIDE mmol/L 97* 99*   CO2 mmol/L 21 27   ANION GAP mmol/L 11 9   BUN mg/dL 13 9   CREATININE mg/dL 0 62 0 57*   EGFR ml/min/1 73sq m 79 81   CALCIUM mg/dL 8 9 9 1   MAGNESIUM mg/dL  --  2 0     Results from last 7 days   Lab Units 07/13/19  0944   AST U/L 20   ALT U/L 20   ALK PHOS U/L 102   TOTAL PROTEIN g/dL 7 6   ALBUMIN g/dL 3 7   TOTAL BILIRUBIN mg/dL 0 90     Results from last 7 days   Lab Units 07/15/19  0503 07/13/19  0944   GLUCOSE RANDOM mg/dL 127 99     Results from last 7 days   Lab Units 07/13/19  0944   TROPONIN I ng/mL <0 02 Results from last 7 days   Lab Units 07/13/19  0944   PROTIME seconds 12 7   INR  1 01   PTT seconds 32     Results from last 7 days   Lab Units 07/13/19  0944   NT-PRO BNP pg/mL 371     Results from last 7 days   Lab Units 07/13/19  1028   CLARITY UA  Cloudy   COLOR UA  Yellow   SPEC GRAV UA  1 015   PH UA  7 0   GLUCOSE UA mg/dl Negative   KETONES UA mg/dl Negative   BLOOD UA  Trace*   PROTEIN UA mg/dl Negative   NITRITE UA  Positive*   BILIRUBIN UA  Negative   UROBILINOGEN UA E U /dl 0 2   LEUKOCYTES UA  Small*   WBC UA /hpf 10-20*   RBC UA /hpf None Seen   BACTERIA UA /hpf Innumerable*   EPITHELIAL CELLS WET PREP /hpf Occasional     Results from last 7 days   Lab Units 07/13/19  1028   URINE CULTURE  >100,000 cfu/ml Escherichia coli*     7/13 CT C/A/P: No solid visceral injury seen  Distended urinary bladder with mild prominence of the both renal pelvicalyceal system and ureters, Evaluate clinically for emptying of the bladder  A 7 3 x 5 9 x 1 8 cm rounded fluid-filled structure cystic area posterior to the urinary bladder may represent a cystic pelvic/adnexal lesion, less likely may represent a posteriorly directed diverticulum from urinary bladder   Consider nonemergent ultrasound of the pelvis  for definite characterization Bilateral scarring  No acute compression collapse of the vertebra  Cholelithiasis    7/13 CT Head: No acute intracranial hemorrhage seen  No extra-axial collection seen  No mass effect or midline shift seen  7/13 CT C Spine: No acute compression collapse of the vertebra  7/13 Left Foot Xray: Somewhat limited examination demonstrating no convincing evidence of fracture or dislocation  7/14 MRI Lumbar Spine: There is a linear fracture through the inferior aspect of the body of the L3 vertebra without associated the loss of vertebral height  There is no retropulsion of posterior vertebral margin  Intact posterior elements    No acute ligamentous injury with intact anterior Dr  ligament, posterior longitudinal ligament, ligamentum flavum are and interspinous ligament  Again noted is a T2 hyperintense lesion in the pelvis measuring 7 3 x 5 9 x 7 8 cm  As seen on the previous CT   For further evaluation with the pelvic sonogram suggested on nonemergent basis       ED Treatment:   Medication Administration from 07/13/2019 0922 to 07/13/2019 1418       Date/Time Order Dose Route Action     07/13/2019 1005 morphine (PF) 4 mg/mL injection 2 mg 2 mg Intravenous Given     07/13/2019 1038 morphine (PF) 4 mg/mL injection 2 mg 2 mg Intravenous Given     07/13/2019 1046 iohexol (OMNIPAQUE) 350 MG/ML injection (MULTI-DOSE) 100 mL 100 mL Intravenous Given     07/13/2019 1142 ceftriaxone (ROCEPHIN) 1 g/50 mL in dextrose IVPB 1,000 mg Intravenous New Bag     07/13/2019 1140 hydrALAZINE (APRESOLINE) injection 10 mg 10 mg Intravenous Given     07/13/2019 1223 lidocaine (LIDODERM) 5 % patch 1 patch 1 patch Topical Medication Applied     07/13/2019 1210 tiZANidine (ZANAFLEX) tablet 2 mg 2 mg Oral Given     07/13/2019 1212 lidocaine (LIDODERM) 5 % patch 1 patch 1 patch Topical Medication Applied     07/13/2019 1240 diazepam (VALIUM) injection 2 5 mg 2 5 mg Intravenous Given        Past Medical History:   Diagnosis Date    A-fib ([facility] )     Anxiety     Cheilosis     Diverticulosis     Gallstone     Hiatal hernia     Hyperlipidemia     Hypertension     Hyponatremia     Inguinal hernia     Lacunar infarction ([facility] )     Macular degeneration     Osteoarthritis     Renal cyst     Syncope     Tuberculosis     Umbilical hernia     Vertigo      Present on Admission:   PAF (paroxysmal atrial fibrillation) (HCC)   Ambulatory dysfunction      Admitting Diagnosis: Back pain [M54 9]  UTI (urinary tract infection) [N39 0]  Acute low back pain [M54 5]  Hypertensive urgency [I16 0]  Age/Sex: 80 y o  female     Admission Orders:  Current Facility-Administered Medications:  acetaminophen 975 mg Oral [facility] amiodarone 100 mg Oral Daily    aspirin 81 mg Oral Daily    carvedilol 6 25 mg Oral Daily    cholecalciferol 1,000 Units Oral Daily    clotrimazole-betamethasone  Topical BID    co-enzyme Q-10 30 mg Oral Daily    enoxaparin 40 mg Subcutaneous Daily    fluticasone 1 spray Each Nare HS PRN X 1 714   hydrALAZINE 10 mg Intravenous Q6H PRN X 1 714   lidocaine 1 patch Topical Daily    menthol-methyl salicylate  Apply externally 4x Daily PRN    multivitamin-minerals 1 tablet Oral Daily    ondansetron 4 mg Intravenous Q6H PRN    oxyCODONE 2 5 mg Oral Q6H PRN X 1 7/13 & x 1 7/14   [START ON 7/16/2019] polyethylene glycol 17 g Oral Daily    pravastatin 10 mg Oral Daily With Dinner    Psyllium 3 capsule Oral Q12H [address]    senna 2 tablet Oral BID    sodium chloride 100 mL/hr Intravenous Continuous  started 7/15     Decardron 4 mg IV q8h  Oxycodone 5 mg po x 1 7/14    IP CONSULT TO NEUROSURGERY  7/15: Sallie alexander    [facility] Utilization Review Department  Phone: [phone]; Fax [phone]  [email or web address]  ATTENTION: Please call with any questions or concerns to [phone]  and carefully listen to the prompts so that you are directed to the right person  Send all requests for admission clinical reviews, approved or denied determinations and any other requests to fax [phone]   All voicemails are confidential

## 2019-07-15 NOTE — PHYSICAL THERAPY NOTE
PHYSICAL THERAPY TREATMENT NOTE     Patient Name: Jamshid Jane  PFSGQ'[id number] Date: 7/15/2019     07/15/19 1210   Pain Assessment   Pain Assessment 0-10   Pain Score 8   Pain Location Back   Restrictions/Precautions   Braces or Orthoses LSO;Other (Comment)  (when upright or head of bed 45*)   Other Precautions Chair Alarm; Bed Alarm;Multiple lines; Fall Risk;Pain;Spinal precautions   General   Chart Reviewed Yes   Family/Caregiver Present Yes   Cognition   Arousal/Participation Cooperative   Attention Attends with cues to redirect   Orientation Level Oriented to person; Other (Comment)  (pt was identified w/ full name, birth date)   Following Commands Follows one step commands with increased time or repetition   Subjective   Subjective pt agreed to participate in PT intervention  pt and niece were provided education regarding purpose of brace, indications for use, and donning/doffing technique  pt had poor retention of information but niece had good understanding (asking appropriate questions)  education was completed via verbal instruction, demonstration, and teachback  pt continued to need total assist w/ brace management at end of session  Activity Tolerance   Activity Tolerance Patient limited by fatigue;Patient limited by pain   Nurse Made Aware spoke to [facility]   Equipment Use   Comments ankle pumps 20  glut sets and glut sets 10 each  Assessment   Prognosis Fair   Problem List Decreased strength;Decreased range of motion;Decreased endurance; Impaired balance;Decreased mobility; Decreased coordination;Decreased safety awareness;Orthopedic restrictions;Pain   Assessment Pt was provided w/ LSO per orders  Pt had no additional reports w/ brace being donned  Pt needed total assist for donning and doffing LSO   Pt was initiated w/ participation in exercise program to address physical and mobility deficits noted during eval  Pt required occasional input to maintain technique and complete all reps  Handout was provided to expedite understanding of technique  Rest breaks were needed due to fatigue and pain  Further inpatient PT intervention is necessary to decrease fall risk and maximize functional independence  Goals   Patient Goals go home   STG Expiration Date 07/25/19   Short Term Goal #1 pt will: Don/doff LSO w/ modx1 to decrease caregiver burden, Increase bilateral LE strength 1/2 grade to facilitate independent mobility, Perform rolling and repositioning in bed w/ minx1 to decrease fall risk factors, Perform supine <---> sitting transition w/ minx1 to improve independence, Increase static sitting balance 1 grade to decrease risk for falls, Tolerate 20 minutes sitting edge of bed w/ supervision to faciliate upright tolerance, Improve Barthel Index score to 45 or greater to facilitate independence, and Complete mobility assessment to expedite safe return home w/ family support  Treatment Day 1   Plan   Treatment/Interventions LE strengthening/ROM; Therapeutic exercise; Endurance training;Cognitive reorientation;Patient/family training;Equipment eval/education; Bed mobility  (PT to see next session to complete mobility assessment)   Progress Progressing toward goals   PT Frequency 5x/wk; Other (Comment)  (and 1x on weekend)   Recommendation   Recommendation Short-term skilled PT;OT consult   Equipment Recommended Other (Comment)  (pt needs dependent means for out of bed mobilization )     Skilled inpatient PT recommended while in hospital to progress pt toward treatment goals      Mary Grace Black, PT

## 2019-07-16 ENCOUNTER — TELEPHONE (OUTPATIENT)
Dept: NEUROSURGERY | Facility: CLINIC | Age: 84
End: 2019-07-16

## 2019-07-16 LAB
ANION GAP SERPL CALCULATED.3IONS-SCNC: 11 MMOL/L (ref 4–13)
ANION GAP SERPL CALCULATED.3IONS-SCNC: 9 MMOL/L (ref 4–13)
ATRIAL RATE: 71 BPM
BUN SERPL-MCNC: 13 MG/DL (ref 5–25)
BUN SERPL-MCNC: 14 MG/DL (ref 5–25)
CALCIUM SERPL-MCNC: 7.9 MG/DL (ref 8.3–10.1)
CALCIUM SERPL-MCNC: 8.2 MG/DL (ref 8.3–10.1)
CHLORIDE SERPL-SCNC: 96 MMOL/L (ref 100–108)
CHLORIDE SERPL-SCNC: 97 MMOL/L (ref 100–108)
CO2 SERPL-SCNC: 19 MMOL/L (ref 21–32)
CO2 SERPL-SCNC: 21 MMOL/L (ref 21–32)
CORTIS SERPL-MCNC: 9.8 UG/DL
CREAT SERPL-MCNC: 0.65 MG/DL (ref 0.6–1.3)
CREAT SERPL-MCNC: 0.87 MG/DL (ref 0.6–1.3)
GFR SERPL CREATININE-BSD FRML MDRD: 58 ML/MIN/1.73SQ M
GFR SERPL CREATININE-BSD FRML MDRD: 78 ML/MIN/1.73SQ M
GLUCOSE SERPL-MCNC: 120 MG/DL (ref 65–140)
GLUCOSE SERPL-MCNC: 85 MG/DL (ref 65–140)
OSMOLALITY UR/SERPL-RTO: 260 MMOL/KG (ref 282–298)
OSMOLALITY UR: 233 MMOL/KG
P AXIS: 78 DEGREES
POTASSIUM SERPL-SCNC: 3.8 MMOL/L (ref 3.5–5.3)
POTASSIUM SERPL-SCNC: 3.9 MMOL/L (ref 3.5–5.3)
PR INTERVAL: 240 MS
QRS AXIS: 34 DEGREES
QRSD INTERVAL: 82 MS
QT INTERVAL: 384 MS
QTC INTERVAL: 403 MS
SODIUM 24H UR-SCNC: 43 MOL/L
SODIUM SERPL-SCNC: 126 MMOL/L (ref 136–145)
SODIUM SERPL-SCNC: 127 MMOL/L (ref 136–145)
T WAVE AXIS: 31 DEGREES
TSH SERPL DL<=0.05 MIU/L-ACNC: 1.83 UIU/ML (ref 0.36–3.74)
URATE SERPL-MCNC: 1.1 MG/DL (ref 2–6.8)
VENTRICULAR RATE: 66 BPM

## 2019-07-16 PROCEDURE — 99232 SBSQ HOSP IP/OBS MODERATE 35: CPT | Performed by: INTERNAL MEDICINE

## 2019-07-16 PROCEDURE — 84300 ASSAY OF URINE SODIUM: CPT | Performed by: NURSE PRACTITIONER

## 2019-07-16 PROCEDURE — 84443 ASSAY THYROID STIM HORMONE: CPT | Performed by: NURSE PRACTITIONER

## 2019-07-16 PROCEDURE — 82533 TOTAL CORTISOL: CPT | Performed by: NURSE PRACTITIONER

## 2019-07-16 PROCEDURE — 97110 THERAPEUTIC EXERCISES: CPT

## 2019-07-16 PROCEDURE — 83930 ASSAY OF BLOOD OSMOLALITY: CPT | Performed by: NURSE PRACTITIONER

## 2019-07-16 PROCEDURE — 83935 ASSAY OF URINE OSMOLALITY: CPT | Performed by: NURSE PRACTITIONER

## 2019-07-16 PROCEDURE — 97530 THERAPEUTIC ACTIVITIES: CPT

## 2019-07-16 PROCEDURE — 93010 ELECTROCARDIOGRAM REPORT: CPT | Performed by: INTERNAL MEDICINE

## 2019-07-16 PROCEDURE — 80048 BASIC METABOLIC PNL TOTAL CA: CPT | Performed by: INTERNAL MEDICINE

## 2019-07-16 PROCEDURE — 84550 ASSAY OF BLOOD/URIC ACID: CPT | Performed by: NURSE PRACTITIONER

## 2019-07-16 RX ORDER — GABAPENTIN 100 MG/1
100 CAPSULE ORAL
Status: DISCONTINUED | OUTPATIENT
Start: 2019-07-16 | End: 2019-07-19 | Stop reason: HOSPADM

## 2019-07-16 RX ORDER — ECHINACEA PURPUREA EXTRACT 125 MG
1 TABLET ORAL AS NEEDED
Status: DISCONTINUED | OUTPATIENT
Start: 2019-07-16 | End: 2019-07-19 | Stop reason: HOSPADM

## 2019-07-16 RX ORDER — IRBESARTAN 300 MG/1
150 TABLET ORAL 2 TIMES DAILY
Status: DISCONTINUED | OUTPATIENT
Start: 2019-07-16 | End: 2019-07-19 | Stop reason: HOSPADM

## 2019-07-16 RX ADMIN — VITAMIN D, TAB 1000IU (100/BT) 1000 UNITS: 25 TAB at 09:40

## 2019-07-16 RX ADMIN — SENNOSIDES 17.2 MG: 8.6 TABLET, FILM COATED ORAL at 17:56

## 2019-07-16 RX ADMIN — ACETAMINOPHEN 975 MG: 325 TABLET, FILM COATED ORAL at 13:26

## 2019-07-16 RX ADMIN — GABAPENTIN 100 MG: 100 CAPSULE ORAL at 21:47

## 2019-07-16 RX ADMIN — SENNOSIDES 17.2 MG: 8.6 TABLET, FILM COATED ORAL at 09:40

## 2019-07-16 RX ADMIN — ENOXAPARIN SODIUM 40 MG: 40 INJECTION SUBCUTANEOUS at 09:40

## 2019-07-16 RX ADMIN — CARVEDILOL 6.25 MG: 6.25 TABLET, FILM COATED ORAL at 09:40

## 2019-07-16 RX ADMIN — IRBESARTAN 150 MG: 150 TABLET ORAL at 09:40

## 2019-07-16 RX ADMIN — POLYETHYLENE GLYCOL 3350 17 G: 17 POWDER, FOR SOLUTION ORAL at 09:40

## 2019-07-16 RX ADMIN — AMIODARONE HYDROCHLORIDE 100 MG: 200 TABLET ORAL at 20:38

## 2019-07-16 RX ADMIN — MENTHOL, METHYL SALICYLATE: 10; 15 CREAM TOPICAL at 11:54

## 2019-07-16 RX ADMIN — Medication 1 TABLET: at 09:40

## 2019-07-16 RX ADMIN — LIDOCAINE 1 PATCH: 50 PATCH TOPICAL at 20:38

## 2019-07-16 RX ADMIN — IRBESARTAN 150 MG: 300 TABLET, FILM COATED ORAL at 17:56

## 2019-07-16 RX ADMIN — ASPIRIN 81 MG 81 MG: 81 TABLET ORAL at 20:38

## 2019-07-16 RX ADMIN — PRAVASTATIN SODIUM 10 MG: 10 TABLET ORAL at 20:38

## 2019-07-16 RX ADMIN — CLOTRIMAZOLE AND BETAMETHASONE DIPROPIONATE: 10; .64 CREAM TOPICAL at 09:40

## 2019-07-16 RX ADMIN — Medication 30 MG: at 17:56

## 2019-07-16 RX ADMIN — ACETAMINOPHEN 975 MG: 325 TABLET, FILM COATED ORAL at 21:47

## 2019-07-16 NOTE — PLAN OF CARE
Problem: Potential for Falls  Goal: Patient will remain free of falls  Description  INTERVENTIONS:  - Assess patient frequently for physical needs  -  Identify cognitive and physical deficits and behaviors that affect risk of falls    -  [place] fall precautions as indicated by assessment   - Educate patient/family on patient safety including physical limitations  - Instruct patient to call for assistance with activity based on assessment  - Modify environment to reduce risk of injury  - Consider OT/PT consult to assist with strengthening/mobility  Outcome: Progressing     Problem: GENITOURINARY - ADULT  Goal: Maintains or returns to baseline urinary function  Description  INTERVENTIONS:  - Assess urinary function  - Encourage oral fluids to ensure adequate hydration  - Administer IV fluids as ordered to ensure adequate hydration  - Administer ordered medications as needed  - Offer frequent toileting  - Follow urinary retention protocol if ordered  Outcome: Progressing  Goal: Absence of urinary retention  Description  INTERVENTIONS:  - Assess patients ability to void and empty bladder  - Monitor I/O  - Bladder scan as needed  - Discuss with physician/AP medications to alleviate retention as needed  - Discuss catheterization for long term situations as appropriate  Outcome: Progressing     Problem: METABOLIC, FLUID AND ELECTROLYTES - ADULT  Goal: Electrolytes maintained within normal limits  Description  INTERVENTIONS:  - Monitor labs and assess patient for signs and symptoms of electrolyte imbalances  - Administer electrolyte replacement as ordered  - Monitor response to electrolyte replacements, including repeat lab results as appropriate  - Instruct patient on fluid and nutrition as appropriate  Outcome: Progressing     Problem: Prexisting or High Potential for Compromised Skin Integrity  Goal: Skin integrity is maintained or improved  Description  INTERVENTIONS:  - Identify patients at risk for skin breakdown  - Assess and monitor skin integrity  - Assess and monitor nutrition and hydration status  - Monitor labs (i e  albumin)  - Assess for incontinence   - Turn and reposition patient  - Assist with mobility/ambulation  - Relieve pressure over bony prominences  - Avoid friction and shearing  - Provide appropriate hygiene as needed including keeping skin clean and dry  - Evaluate need for skin moisturizer/barrier cream  - Collaborate with interdisciplinary team (i e  Nutrition, Rehabilitation, etc )   - Patient/family teaching  Outcome: Progressing     Problem: DISCHARGE PLANNING  Goal: Discharge to home or other facility with appropriate resources  Description  INTERVENTIONS:  - Identify barriers to discharge w/patient and caregiver  - Arrange for needed discharge resources and transportation as appropriate  - Identify discharge learning needs (meds, wound care, etc )  - Arrange for interpretive services to assist at discharge as needed  - Refer to Case Management Department for coordinating discharge planning if the patient needs post-hospital services based on physician/advanced practitioner order or complex needs related to functional status, cognitive ability, or social support system  Outcome: Progressing     Problem: Knowledge Deficit  Goal: Patient/family/caregiver demonstrates understanding of disease process, treatment plan, medications, and discharge instructions  Description  Complete learning assessment and assess knowledge base    Interventions:  - Provide teaching at level of understanding  - Provide teaching via preferred learning methods  Outcome: Progressing     Problem: RESPIRATORY - ADULT  Goal: Achieves optimal ventilation and oxygenation  Description  INTERVENTIONS:  - Assess for changes in respiratory status  - Assess for changes in mentation and behavior  - Position to facilitate oxygenation and minimize respiratory effort  - Oxygen administration by appropriate delivery method based on oxygen saturation (per order) or ABGs  - Initiate smoking cessation education as indicated  - Encourage broncho-pulmonary hygiene including cough, deep breathe, Incentive Spirometry  - Assess the need for suctioning and aspirate as needed  - Assess and instruct to report SOB or any respiratory difficulty  - Respiratory Therapy support as indicated  Outcome: Progressing     Problem: MUSCULOSKELETAL - ADULT  Goal: Maintain or return mobility to safest level of function  Description  INTERVENTIONS:  - Assess patient's ability to carry out ADLs; assess patient's baseline for ADL function and identify physical deficits which impact ability to perform ADLs (bathing, care of mouth/teeth, toileting, grooming, dressing, etc )  - Assess/evaluate cause of self-care deficits   - Assess range of motion  - Assess patient's mobility; develop plan if impaired  - Assess patient's need for assistive devices and provide as appropriate  - Encourage maximum independence but intervene and supervise when necessary  - Involve family in performance of ADLs  - Assess for home care needs following discharge   - Request OT consult to assist with ADL evaluation and planning for discharge  - Provide patient education as appropriate  Outcome: Progressing

## 2019-07-16 NOTE — PROGRESS NOTES
Patient's evening lab values-   Na - 127   K - 3 8   Urine osmolality - 223   Urine sodium - pending   Serum osmolality - 260   Uric acid - 1 1   TSH - 1 828  Patient is in her baseline mental status  As the Na level is trending up since morning (123, 126, 127), continue the same management with pain control and fluid restriction

## 2019-07-16 NOTE — ASSESSMENT & PLAN NOTE
Yesterday, her labs showed sodium level of 129  She was started on 0 9 normal saline drip  Her  subsequent  samples were 127 and 123 consecutively  (Na-135 on admission) today's morning sodium level is 126  She has a history of hyponatremia in the past  She has good urine out put  Her niece states that she has been drinking increased amount of water with her oral pills  Currently she is not on any medication that could cause hyponatremia  She was given 4 doses of IV dexamethasone since admission  She is currently in her baseline mental status  Not appear dehydrated  Her confusion yesterday morning was likely due to the oxycodone as it was transient  Hyponatremia workup has been sent  Discussed with Dr Alexander Mclean on further management  Hyponatremia is likely due to her ongoing pain  Plan - BMP at 3 pm today    Monitor BMP daily    Fluid restricted diet    Monitor patients mental status    Follow up hyponatremia workup - Serum osmolality, cortisol, TSH and uric acid, urine sodium, urine osmolality    Nephrology consult to be considered if there is no improvement

## 2019-07-16 NOTE — PLAN OF CARE
Problem: PHYSICAL THERAPY ADULT  Goal: Performs mobility at highest level of function for planned discharge setting  See evaluation for individualized goals  Description  Treatment/Interventions: LE strengthening/ROM, Therapeutic exercise, Endurance training, Cognitive reorientation, Patient/family training, Equipment eval/education, Bed mobility(PT to see next session to complete mobility assessment)  Equipment Recommended: Other (Comment)(pt needs dependent means for out of bed mobilization )       See flowsheet documentation for full assessment, interventions and recommendations  Outcome: Progressing  Note:   Prognosis: Fair  Problem List: Decreased strength, Decreased range of motion, Decreased endurance, Impaired balance, Decreased mobility, Decreased coordination, Decreased cognition, Decreased safety awareness, Impaired tone, Decreased skin integrity, Orthopedic restrictions, Pain  Assessment: Pt  is a 79 yo F who presents with Closed compression fracture of L3 vertebrae  Pt  was identified with full name and birthdate,Pt  agreeable to PT Session requesting OOB mobility to recliner  Pt  performed rolling with MaxAx1 to aid in donning of LSO brace  Pt  required increased time LE management and use of bedrails with VCS for sequencing, and body mechanics  With Brace donned in supine, Pt  rolled onto R side and performed sidelying to sit with MaxAx1 and VCs for sequencing, and body mechanics  Pt  performed sit<>stand transfers with ModAx2 to stand and ModAx1 to sit with VCs for hand placement and sequencing, and to reach back to control descent  Lexy Lazo utilized to perform bed to chair transfer, and patient positioned to comfort in recliner  Pt  Continues to demonstrate activity limitations as outlined above and will benefit from continued skilled therapy to increase functional independence and aid patient in return to PLOF  Discharge recommendation is Rehab           Recommendation: Short-term skilled PT PT - OK to Discharge: Yes    See flowsheet documentation for full assessment

## 2019-07-16 NOTE — ASSESSMENT & PLAN NOTE
She had a bowel movement this morning and she feels a lot better    Plan - Senna 2 tabs, 2 times a day    MiraLax daily

## 2019-07-16 NOTE — PHYSICAL THERAPY NOTE
PHYSICAL THERAPY NOTE    Patient Name: Leeann Crook  [id number] Date: 7/16/2019 07/16/19 1540   Pain Assessment   Pain Assessment 0-10   Pain Score Worst Possible Pain  (with movement)   Restrictions/Precautions   Braces or Orthoses LSO   Other Precautions Chair Alarm; Bed Alarm;Multiple lines;Pain;Spinal precautions   General   Chart Reviewed Yes   Additional Pertinent History Pt  is a 79 yo F who presents with Closed compression fracture of L3 vertebrae  Family/Caregiver Present Yes   Cognition   Overall Cognitive Status WFL   Arousal/Participation Cooperative   Attention Attends with cues to redirect   Orientation Level Oriented to person;Disoriented to place; Disoriented to time;Disoriented to situation   Memory Decreased recall of recent events;Decreased short term memory;Decreased long term memory   Following Commands Follows one step commands with increased time or repetition   Comments Pt  was identified with full name and birthdate   Subjective   Subjective Pt  agreeable to PT Session requesting OOB mobility to recliner   Bed Mobility   Rolling R 2  Maximal assistance   Additional items Assist x 1; Increased time required;LE management;Verbal cues; Bedrails  (for sequencing, and leg placement)   Rolling L 2  Maximal assistance   Additional items Bedrails; Increased time required;Verbal cues;LE management  (for sequencing, and hand placement)   Supine to Sit 2  Maximal assistance   Additional items Assist x 1; Increased time required;Verbal cues;LE management;HOB elevated; Bedrails  (sidelying>sitting)   Sit to Supine Unable to assess   Additional Comments Pt  performed rolling with MaxAx1 to aid in donning of LSO brace  Pt  required increased time LE management and use of bedrails with VCS for sequencing, and body mechanics   With Brace donned in supine, Pt  rolled onto R side and performed sidelying to sit with MaxAx1 and VCs for sequencing, and body mechanics   Transfers   Sit to Stand 3  Moderate assistance   Additional items Assist x 2; Increased time required;Verbal cues  (for hand placement and sequencing)   Stand to Sit 3  Moderate assistance   Additional items Assist x 1; Impulsive;Verbal cues  (to reach back to control descent)   Additional Comments Pt  performed sit<>stand transfers with ModAx2 to stand and ModAx1 to sit with VCs for hand placement and sequencing, and to reach back to control descent  Derral Guard utilized to perform bed to chair transfer, and patient positioned to comfort in recliner  Balance   Static Sitting Poor   Dynamic Sitting Poor   Static Standing Zero  (Ax2)   Endurance Deficit   Endurance Deficit Yes   Endurance Deficit Description Postural and standing tolerance degradation noted with fatigue   Activity Tolerance   Activity Tolerance Patient tolerated treatment well   Nurse Made Aware Spoke to Breanna Tsang   Exercises   Ankle Pumps 25 reps;Bilateral;AROM; Sitting   Assessment   Prognosis Fair   Problem List Decreased strength;Decreased range of motion;Decreased endurance; Impaired balance;Decreased mobility; Decreased coordination;Decreased cognition;Decreased safety awareness; Impaired tone;Decreased skin integrity;Orthopedic restrictions;Pain   Assessment Pt  is a 81 yo F who presents with Closed compression fracture of L3 vertebrae  Pt  was identified with full name and birthdate,Pt  agreeable to PT Session requesting OOB mobility to recliner  Pt  performed rolling with MaxAx1 to aid in donning of LSO brace  Pt  required increased time LE management and use of bedrails with VCS for sequencing, and body mechanics  With Brace donned in supine, Pt  rolled onto R side and performed sidelying to sit with MaxAx1 and VCs for sequencing, and body mechanics  Pt  performed sit<>stand transfers with ModAx2 to stand and ModAx1 to sit with VCs for hand placement and sequencing, and to reach back to control descent  Hector Gardner utilized to perform bed to chair transfer, and patient positioned to comfort in recliner  Pt  Continues to demonstrate activity limitations as outlined above and will benefit from continued skilled therapy to increase functional independence and aid patient in return to PLOF  Discharge recommendation is Rehab  Goals   Patient Goals TO GO HOME   STG Expiration Date 07/25/19   Treatment Day 2   Plan   Treatment/Interventions Functional transfer training;LE strengthening/ROM; Therapeutic exercise; Endurance training;Cognitive reorientation;Patient/family training;Bed mobility; Equipment eval/education;Spoke to nursing;Family   Progress Progressing toward goals   PT Frequency 5x/wk  (and 1x/weekend)   Recommendation   Recommendation Short-term skilled PT   Equipment Recommended   (quickmove and Ax2 for OOB mobility)   PT - OK to Discharge Yes   Additional Comments to rehab when medically appropriate     Ronnell Gomez, PT, DPT 7/16/2019

## 2019-07-16 NOTE — PROGRESS NOTES
Progress Note - Simone Souza 10/24/1927, 80 y o  female MRN: [id number]    Unit/Bed#: -01 Encounter: [id number]    Primary Care Provider: Diana Noonan MD   Date and time admitted to hospital: 7/13/2019  9:22 AM        * Closed compression fracture of L3 lumbar vertebra, initial encounter [facility])  Assessment & Plan  Patients pain is currently controlled with oxycodone 2 5mg  (5mg was discontinued due to patient's confused state yesterday morning)  This morning patient is her baseline mental status  Lumbar MRI demonstrate closed linear fracture of inferior L3 vertebral without displacement or ligamentous injury  On examination, she did not have vertebral tenderness  · Evaluation - Neurosurgery was consulted- no surgical interventions at this time  Physical therapy assessment  · Treatment - Continue with conservative management including outpatient mild pain management, physical therapy and LSO brace  To be followed up in 2 weeks  with another upright lumbar spine x-rays in brace  Baseline Pain control -   · Tylenol 975 mg q8h scheduled  · Lidoderm daily  · Gabapentin 100mg at bedtime      PRN Pain Control -  · Oxycodone 2 5 mg q6h PRN  · Could consider topical NSAID  · Muscle Relaxer - [place] cream   Avoid systemic muscle relaxer if possible in geriatric population  Monitor Pain levels  Hyponatremia  Assessment & Plan   Yesterday, her labs showed sodium level of 129  She was started on 0 9 normal saline drip  Her  subsequent  samples were 127 and 123 consecutively  (Na-135 on admission) today's morning sodium level is 126  She has a history of hyponatremia in the past  She has good urine out put  Her niece states that she has been drinking increased amount of water with her oral pills  Currently she is not on any medication that could cause hyponatremia  She was given 4 doses of IV dexamethasone since admission  She is currently in her baseline mental status  Not appear dehydrated  Her confusion yesterday morning was likely due to the oxycodone as it was transient  Hyponatremia workup has been sent  Discussed with Dr Kuldip Vega on further management  Hyponatremia is likely due to her ongoing pain  Plan - BMP at 3 pm today    Monitor BMP daily    Fluid restricted diet    Monitor patients mental status    Follow up hyponatremia workup - Serum osmolality, cortisol, TSH and uric acid, urine sodium, urine osmolality    Nephrology consult to be considered if there is no improvement  Asymptomatic bacteriuria  Assessment & Plan   Her urine microscopy showed 10-20 WBC, and innumerable bacteria on admission  Patient does not have fever or chills  She does not complain of dysuria, frequency, flank pain or hematuria  Examination shows no renal angle tenderness  Urine culture reports 100 000 hpf E coli growth  As the patient is asymptomatic we discontinued her antibiotics  Plan -     Monitor temperatures and serial exams  Toe injury, left, initial encounter  Assessment & Plan  X ray of the left foot shows no fracture or dislocation of the 2nd toe  Patient does not complain of pain      Essential hypertension  Assessment & Plan  · Patients blood pressure has been ranging in the SBP-180-190, DBP - 70-80  · PRN IV hydralazine has been added  · Losartan was replaced with her home dose of Irbesartan  · Her BP is 133/63    Plan - Control pain (see above)  Monitor blood pressures  Arthritis  Assessment & Plan  · Per family, she has had arthritis since the age of 3 and actually had hospitalizations even as a child for arthritis  Somewhere in her teenage years she did have a surgery in her right hip which left her with a very stiff right hip and subsequently increased arthritis in the knees due to compensation  Chronic constipation  Assessment & Plan  She had a bowel movement this morning and she feels a lot better    Plan - Senna 2 tabs, 2 times a day    MiraLax daily      Ambulatory dysfunction  Assessment & Plan  · Fall precautions / bed alarm  · PT evaluation  PAF (paroxysmal atrial fibrillation) ([facility])  Assessment & Plan  · Rate / Rhythm control -   · Continue on amiodarone and carvedilol  · Anticoagulation -   · She is on aspirin but no other anticoagulants  She is a fall risk  VTE Pharmacologic Prophylaxis:   Pharmacologic: Enoxaparin (Lovenox)  Mechanical VTE Prophylaxis in Place: No    Patient Centered Rounds: I have performed bedside rounds with nursing staff today  Discussions with Specialists or Other Care Team Provider:None    Education and Discussions with Family / Patient: Patient and neice    Time Spent for Care: 20 minutes  More than 50% of total time spent on counseling and coordination of care as described above  Current Length of Stay: 3 day(s)    Current Patient Status: Inpatient   Certification Statement: The patient will continue to require additional inpatient hospital stay due to evaluation and treatment of hyponatremia    Discharge Plan: to be decided    Code Status: Level 3 - DNAR and DNI      Subjective:   Patient feels a lot better than she did the past few days  She experienced back pain last night and early morning  She had a bowel movement this morning and feels better  She has no other complaints  Objective:     Vitals:   Temp (24hrs), Av °F (36 7 °C), Min:97 6 °F (36 4 °C), Max:98 7 °F (37 1 °C)    Temp:  [97 6 °F (36 4 °C)-98 7 °F (37 1 °C)] 98 7 °F (37 1 °C)  HR:  [61-64] 61  Resp:  [16-18] 18  BP: (133-210)/(63-80) 133/63  SpO2:  [96 %-98 %] 98 %  Body mass index is 21 87 kg/m²  Input and Output Summary (last 24 hours): Intake/Output Summary (Last 24 hours) at 2019 1242  Last data filed at 2019 0901  Gross per 24 hour   Intake    Output 1825 ml   Net -1825 ml       Physical Exam:     Physical Exam   Constitutional: No distress  HENT:   Head: Normocephalic and atraumatic     Eyes: Pupils are equal, round, and reactive to light  Oral cavity is moist, has a denture  Cardiovascular: Normal rate, regular rhythm and normal heart sounds  Murmur: soft ejection systolic murmur was heard at the aortic area  Pulmonary/Chest: Effort normal and breath sounds normal    Abdominal: Bowel sounds are normal  No distension  Tenderness: no renal angle tenderness  Musculoskeletal: Deformity: multiple joint bony enlargement noted  ROM limited in right hip  Lumbar back: She exhibits tenderness  She exhibits no bony tenderness, There is a step down deformity in thel lumbar spine  Skin: Capillary refill takes less than 2 seconds  She is not diaphoretic  Vitals reviewed  Additional Data:     Labs:    Results from last 7 days   Lab Units 07/13/19  0944   WBC Thousand/uL 9 64   HEMOGLOBIN g/dL 14 4   HEMATOCRIT % 42 4   PLATELETS Thousands/uL 252   NEUTROS PCT % 77*   LYMPHS PCT % 10*   MONOS PCT % 11   EOS PCT % 2     Results from last 7 days   Lab Units 07/16/19  0647  07/13/19  0944   SODIUM mmol/L 126*   < > 135*   POTASSIUM mmol/L 3 9   < > 4 0   CHLORIDE mmol/L 96*   < > 99*   CO2 mmol/L 19*   < > 27   BUN mg/dL 13   < > 9   CREATININE mg/dL 0 65   < > 0 57*   ANION GAP mmol/L 11   < > 9   CALCIUM mg/dL 8 2*   < > 9 1   ALBUMIN g/dL  --   --  3 7   TOTAL BILIRUBIN mg/dL  --   --  0 90   ALK PHOS U/L  --   --  102   ALT U/L  --   --  20   AST U/L  --   --  20   GLUCOSE RANDOM mg/dL 85   < > 99    < > = values in this interval not displayed  Results from last 7 days   Lab Units 07/13/19  0944   INR  1 01                       * I Have Reviewed All Lab Data Listed Above  * Additional Pertinent Lab Tests Reviewed:  All Labs Within Last 24 Hours Reviewed    Imaging:    Imaging Reports Reviewed Today Include: none  Imaging Personally Reviewed by Myself Includes: none    Recent Cultures (last 7 days):     Results from last 7 days   Lab Units 07/13/19  1028   URINE CULTURE  >100,000 cfu/ml Escherichia coli* Last 24 Hours Medication List:     Current Facility-Administered Medications:  acetaminophen 975 mg Oral Q8H [facility] Ken Metz MD   amiodarone 100 mg Oral Daily eKn Metz MD   aspirin 81 mg Oral Daily Ken Metz MD   carvedilol 6 25 mg Oral Daily Ken Metz MD   cholecalciferol 1,000 Units Oral Daily Ken Metz MD   clotrimazole-betamethasone  Topical BID Ken Metz MD   co-enzyme Q-10 30 mg Oral Daily Ken Metz MD   enoxaparin 40 mg Subcutaneous Daily Ken Metz MD   fluticasone 1 spray Each Nare HS PRN Joby Sanabria DO   gabapentin 100 mg Oral HS Ken Metz MD   hydrALAZINE 10 mg Intravenous Q6H PRN Joby Sanabria DO   irbesartan 150 mg Oral BID Carlosmohini Yeager MD   lidocaine 1 patch Topical Daily Ken Metz MD   menthol-methyl salicylate  Apply externally 4x Daily PRN Ken Metz MD   multivitamin-minerals 1 tablet Oral Daily Ken Metz MD   ondansetron 4 mg Intravenous Q6H PRN Ken Metz MD   oxyCODONE 2 5 mg Oral Q6H PRN Ken Metz MD   polyethylene glycol 17 g Oral Daily Joby Sanabria DO   pravastatin 10 mg Oral Daily With Vinicio Nielson MD   Psyllium 3 capsule Oral Q12H [facility] Ruchi Moore PA-C   senna 2 tablet Oral BID Joby Sanabria DO        Today, Patient Was Seen By: Ken Metz MD    ** Please Note: Dictation voice to text software may have been used in the creation of this document   **

## 2019-07-16 NOTE — PLAN OF CARE
Problem: Potential for Falls  Goal: Patient will remain free of falls  Description  INTERVENTIONS:  - Assess patient frequently for physical needs  -  Identify cognitive and physical deficits and behaviors that affect risk of falls    -  [place] fall precautions as indicated by assessment   - Educate patient/family on patient safety including physical limitations  - Instruct patient to call for assistance with activity based on assessment  - Modify environment to reduce risk of injury  - Consider OT/PT consult to assist with strengthening/mobility  Outcome: Progressing     Problem: GENITOURINARY - ADULT  Goal: Maintains or returns to baseline urinary function  Description  INTERVENTIONS:  - Assess urinary function  - Encourage oral fluids to ensure adequate hydration  - Administer IV fluids as ordered to ensure adequate hydration  - Administer ordered medications as needed  - Offer frequent toileting  - Follow urinary retention protocol if ordered  Outcome: Progressing  Goal: Absence of urinary retention  Description  INTERVENTIONS:  - Assess patients ability to void and empty bladder  - Monitor I/O  - Bladder scan as needed  - Discuss with physician/AP medications to alleviate retention as needed  - Discuss catheterization for long term situations as appropriate  Outcome: Progressing     Problem: METABOLIC, FLUID AND ELECTROLYTES - ADULT  Goal: Electrolytes maintained within normal limits  Description  INTERVENTIONS:  - Monitor labs and assess patient for signs and symptoms of electrolyte imbalances  - Administer electrolyte replacement as ordered  - Monitor response to electrolyte replacements, including repeat lab results as appropriate  - Instruct patient on fluid and nutrition as appropriate  Outcome: Progressing     Problem: Prexisting or High Potential for Compromised Skin Integrity  Goal: Skin integrity is maintained or improved  Description  INTERVENTIONS:  - Identify patients at risk for skin breakdown  - Assess and monitor skin integrity  - Assess and monitor nutrition and hydration status  - Monitor labs (i e  albumin)  - Assess for incontinence   - Turn and reposition patient  - Assist with mobility/ambulation  - Relieve pressure over bony prominences  - Avoid friction and shearing  - Provide appropriate hygiene as needed including keeping skin clean and dry  - Evaluate need for skin moisturizer/barrier cream  - Collaborate with interdisciplinary team (i e  Nutrition, Rehabilitation, etc )   - Patient/family teaching  Outcome: Progressing     Problem: PAIN - ADULT  Goal: Verbalizes/displays adequate comfort level or baseline comfort level  Description  Interventions:  - Encourage patient to monitor pain and request assistance  - Assess pain using appropriate pain scale  - Administer analgesics based on type and severity of pain and evaluate response  - Implement non-pharmacological measures as appropriate and evaluate response  - Consider cultural and social influences on pain and pain management  - Notify physician/advanced practitioner if interventions unsuccessful or patient reports new pain  Outcome: Progressing     Problem: DISCHARGE PLANNING  Goal: Discharge to home or other facility with appropriate resources  Description  INTERVENTIONS:  - Identify barriers to discharge w/patient and caregiver  - Arrange for needed discharge resources and transportation as appropriate  - Identify discharge learning needs (meds, wound care, etc )  - Arrange for interpretive services to assist at discharge as needed  - Refer to Case Management Department for coordinating discharge planning if the patient needs post-hospital services based on physician/advanced practitioner order or complex needs related to functional status, cognitive ability, or social support system  Outcome: Progressing     Problem: Knowledge Deficit  Goal: Patient/family/caregiver demonstrates understanding of disease process, treatment plan, medications, and discharge instructions  Description  Complete learning assessment and assess knowledge base    Interventions:  - Provide teaching at level of understanding  - Provide teaching via preferred learning methods  Outcome: Progressing     Problem: RESPIRATORY - ADULT  Goal: Achieves optimal ventilation and oxygenation  Description  INTERVENTIONS:  - Assess for changes in respiratory status  - Assess for changes in mentation and behavior  - Position to facilitate oxygenation and minimize respiratory effort  - Oxygen administration by appropriate delivery method based on oxygen saturation (per order) or ABGs  - Initiate smoking cessation education as indicated  - Encourage broncho-pulmonary hygiene including cough, deep breathe, Incentive Spirometry  - Assess the need for suctioning and aspirate as needed  - Assess and instruct to report SOB or any respiratory difficulty  - Respiratory Therapy support as indicated  Outcome: Progressing     Problem: MUSCULOSKELETAL - ADULT  Goal: Maintain or return mobility to safest level of function  Description  INTERVENTIONS:  - Assess patient's ability to carry out ADLs; assess patient's baseline for ADL function and identify physical deficits which impact ability to perform ADLs (bathing, care of mouth/teeth, toileting, grooming, dressing, etc )  - Assess/evaluate cause of self-care deficits   - Assess range of motion  - Assess patient's mobility; develop plan if impaired  - Assess patient's need for assistive devices and provide as appropriate  - Encourage maximum independence but intervene and supervise when necessary  - Involve family in performance of ADLs  - Assess for home care needs following discharge   - Request OT consult to assist with ADL evaluation and planning for discharge  - Provide patient education as appropriate  Outcome: Progressing

## 2019-07-16 NOTE — QUICK NOTE
Was called by floor for decreasing sodium level, down to 123  Suspected secondary to steroids or dehydration  D/c'd fluids  Ordered hyponatremia workup including serum and urine osmolality, cortisol, TSH and uric acid  Seizure precautions initiated  No seizure activity noted  Daughter is staying room with patient  Prednisone scheduled for this morning  Awaiting morning BMP results  If not improving will consult nephrology   Signed off to morning team

## 2019-07-16 NOTE — ASSESSMENT & PLAN NOTE
Her urine microscopy showed 10-20 WBC, and innumerable bacteria on admission  Patient does not have fever or chills  She does not complain of dysuria, frequency, flank pain or hematuria  Examination shows no renal angle tenderness  Urine culture reports 100 000 hpf E coli growth  As the patient is asymptomatic we discontinued her antibiotics  Plan -     Monitor temperatures and serial exams

## 2019-07-16 NOTE — ASSESSMENT & PLAN NOTE
Patients pain is currently controlled with oxycodone 2 5mg  (5mg was discontinued due to patient's confused state yesterday morning)  This morning patient is her baseline mental status  Lumbar MRI demonstrate closed linear fracture of inferior L3 vertebral without displacement or ligamentous injury  On examination, she did not have vertebral tenderness  · Evaluation - Neurosurgery was consulted- no surgical interventions at this time  Physical therapy assessment  · Treatment - Continue with conservative management including outpatient mild pain management, physical therapy and LSO brace  To be followed up in 2 weeks  with another upright lumbar spine x-rays in brace  Baseline Pain control -   · Tylenol 975 mg q8h scheduled  · Lidoderm daily  · Gabapentin 100mg at bedtime      PRN Pain Control -  · Oxycodone 2 5 mg q6h PRN  · Could consider topical NSAID  · Muscle Relaxer - Bengay cream   Avoid systemic muscle relaxer if possible in geriatric population  Monitor Pain levels

## 2019-07-16 NOTE — PHYSICIAN ADVISOR
Current patient class: Inpatient  The patient is currently on Hospital Day: 3 at [facility]      The patient was admitted to the hospital at [phone] on 7/13/19 for the following diagnosis:  Back pain [M54 9]  UTI (urinary tract infection) [N39 0]  Acute low back pain [M54 5]  Hypertensive urgency [I16 0]       There is documentation in the medical record of an expected length of stay of at least 2 midnights  The patient is therefore expected to satisfy the 2 midnight benchmark and given the 2 midnight presumption is appropriate for INPATIENT ADMISSION  Given this expectation of a satisfying stay, CMS instructs us that the patient is most often appropriate for inpatient admission under part A provided medical necessity is documented in the chart  After review of the relevant documentation, labs, vital signs and test results, the patient is appropriate for INPATIENT ADMISSION  Admission to the hospital as an inpatient is a complex decision making process which requires the practitioner to consider the patients presenting complaint, history and physical examination and all relevant testing  With this in mind, in this case, the patient was deemed appropriate for INPATIENT ADMISSION  After review of the documentation and testing available at the time of the admission I concur with this clinical determination of medical necessity  Rationale is as follows: The patient is a 80 yrs old Female who presented to the ED at 7/13/2019  9:22 AM with a chief complaint of Back Pain (woke up at 033 w/ back pain; took tylenol  Pain persists; came to ED for Eval)  Pt presented with back pain and had several falling episodes at home  Neurological symptoms were not evident but MRI of back showed compression fracture of L3 vertebral body  Pt with significantly elevated blood pressure on admission  Neurosurgical consultation obtained suggesting nonoperative management    Pain control attempted with narcotics but confusion noted after dosing so amount was lowered  Physical therapy consult obtained and given situation, extreme age, pain, fall risk, etc short term rehab is suggested  As an additional concern is dropping sodium since admission which is being addressed  Given this situation and need for additional treatment, brace fitting and probable rehab placement for safety Inpatient status is appropriate      The patients vitals on arrival were ED Triage Vitals   Temperature Pulse Respirations Blood Pressure SpO2   07/13/19 0926 07/13/19 0926 07/13/19 0215 07/13/19 0931 07/13/19 0926   97 7 °F (36 5 °C) 63 (P) 20 (!) 210/90 96 %      Temp Source Heart Rate Source Patient Position - Orthostatic VS BP Location FiO2 (%)   07/13/19 0926 07/13/19 0926 07/13/19 0926 07/13/19 0926 --   Oral Monitor Lying Right arm       Pain Score       07/13/19 0931       Worst Possible Pain           Past Medical History:   Diagnosis Date    [facility]-fib [facility])     Anxiety     Cheilosis     Diverticulosis     Gallstone     Hiatal hernia     Hyperlipidemia     Hypertension     Hyponatremia     Inguinal hernia     Lacunar infarction ([facility] [address] )     Macular degeneration     Osteoarthritis     Renal cyst     Syncope     Tuberculosis     Umbilical hernia     Vertigo      Past Surgical History:   Procedure Laterality Date    HIP SURGERY      At  young age           Consults have been placed to:   IP CONSULT TO NEUROSURGERY    Vitals:    07/15/19 0755 07/15/19 1144 07/15/19 1145 07/15/19 1500   BP: (!) 197/88 (!) 192/79 (!) 182/78 138/69   BP Location: Left arm Left arm Left arm Right arm   Pulse: 70   63   Resp: 18   16   Temp: 97 6 °F (36 4 °C)   97 8 °F (36 6 °C)   TempSrc: Oral   Oral   SpO2: 96%   96%   Weight:       Height:           Most recent labs:    Recent Labs     07/13/19  0944  07/15/19  1416   WBC 9 64  --   --    HGB 14 4  --   --    HCT 42 4  --   --      --   --    K 4 0   < > 4 1   CALCIUM 9 1   < > 8 7 BUN 9   < > 14   CREATININE 0 57*   < > 0 63   INR 1 01  --   --    TROPONINI <0 02  --   --    AST 20  --   --    ALT 20  --   --    ALKPHOS 102  --   --     < > = values in this interval not displayed         Scheduled Meds:  Current Facility-Administered Medications:  acetaminophen 975 mg Oral Q8H [address] Jessica Tyler MD    amiodarone 100 mg Oral Daily Jessica Tyler MD    aspirin 81 mg Oral Daily Jessica Tyler MD    bisacodyl 10 mg Rectal Once Fernie Garduno, DO    carvedilol 6 25 mg Oral Daily Jessica Tyler MD    cholecalciferol 1,000 Units Oral Daily Jessica Tyler MD    clotrimazole-betamethasone  Topical BID Jessica Tyler MD    co-enzyme Q-10 30 mg Oral Daily Jessica Tyler MD    enoxaparin 40 mg Subcutaneous Daily Jessica Tyler MD    fluticasone 1 spray Each Nare HS PRN Devorah Patch, DO    hydrALAZINE 10 mg Intravenous Q6H PRN Devorah Patch, DO    irbesartan 150 mg Oral BID Devorah Patch, DO    lidocaine 1 patch Topical Daily Jessica Tyler MD    menthol-methyl salicylate  Apply externally 4x Daily PRN Jessica Tyler MD    multivitamin-minerals 1 tablet Oral Daily Jessica Tyler MD    ondansetron 4 mg Intravenous Q6H PRN Jessica Tyler MD    oxyCODONE 2 5 mg Oral Q6H PRN Jessica Tyler MD    [START ON 7/16/2019] polyethylene glycol 17 g Oral Daily Devorah Patch, DO    pravastatin 10 mg Oral Daily With Lesley Sánchez MD    Psyllium 3 capsule Oral Q12H [address] Ruchi Moore PA-C    senna 2 tablet Oral BID Devorah Patch, DO    sodium chloride 100 mL/hr Intravenous Continuous Devorah Patch, DO Last Rate: 100 mL/hr (07/15/19 0929)     Continuous Infusions:  sodium chloride 100 mL/hr Last Rate: 100 mL/hr (07/15/19 0929)     PRN Meds: fluticasone    hydrALAZINE    menthol-methyl salicylate    ondansetron    oxyCODONE    Surgical procedures (if appropriate):

## 2019-07-16 NOTE — ASSESSMENT & PLAN NOTE
· Patients blood pressure has been ranging in the SBP-180-190, DBP - 70-80  · PRN IV hydralazine has been added  · Losartan was replaced with her home dose of Irbesartan  · Her BP is 133/63    Plan - Control pain (see above)  Monitor blood pressures

## 2019-07-16 NOTE — TELEPHONE ENCOUNTER
TODAYS DATE: 7/16/2019  EMAIL FROM PA: Porfirio Buchanan  DATE OF EMAIL: 7/15/2019    INSTRUCTIONS:   PLAN CONSERVATIVE TREATMENT WITH PAIN MANAGEMENT AND PHYSICAL THERAPY AND BRACE  BASELINE UPRIGHT C-SPINE X-RAY IN BRACE ORDERED-PENDING  STATUS: PATIENT STILL IN HOSPITAL    FOLLOW UP WITH: Jero Hull  DATE/TIME/LOCATION:   8/1/2019 / 10:00 AM / Demetrius Emerson: XRAY L-SPINE    STATUS: 7/17/2019  PATIENT STILL IN HOSPITAL    STATUS: 7/18/2019  PATIENT STILL IN HOSPITAL    STATUS: 7/19/2019  PATIENT STILL IN HOSPTIAL    STATUS: 7/22/2019  PATIENT DISCHARGED TO [facility]  #[phone] ([place])  CALLED AND SPOKE TO PATRICIA CONFIRMED DATE/TIME/LOCATION OF VISIT AND THAT THE L-SPINE XRAY NEEDED TO BE DONE PRIOR TO THE OFFICE VISIT

## 2019-07-17 LAB
ANION GAP SERPL CALCULATED.3IONS-SCNC: 10 MMOL/L (ref 4–13)
ANION GAP SERPL CALCULATED.3IONS-SCNC: 8 MMOL/L (ref 4–13)
BUN SERPL-MCNC: 14 MG/DL (ref 5–25)
BUN SERPL-MCNC: 15 MG/DL (ref 5–25)
CALCIUM SERPL-MCNC: 8 MG/DL (ref 8.3–10.1)
CALCIUM SERPL-MCNC: 8.5 MG/DL (ref 8.3–10.1)
CHLORIDE SERPL-SCNC: 96 MMOL/L (ref 100–108)
CHLORIDE SERPL-SCNC: 99 MMOL/L (ref 100–108)
CO2 SERPL-SCNC: 21 MMOL/L (ref 21–32)
CO2 SERPL-SCNC: 23 MMOL/L (ref 21–32)
CREAT SERPL-MCNC: 0.72 MG/DL (ref 0.6–1.3)
CREAT SERPL-MCNC: 0.76 MG/DL (ref 0.6–1.3)
GFR SERPL CREATININE-BSD FRML MDRD: 69 ML/MIN/1.73SQ M
GFR SERPL CREATININE-BSD FRML MDRD: 73 ML/MIN/1.73SQ M
GLUCOSE SERPL-MCNC: 135 MG/DL (ref 65–140)
GLUCOSE SERPL-MCNC: 85 MG/DL (ref 65–140)
POTASSIUM SERPL-SCNC: 4 MMOL/L (ref 3.5–5.3)
POTASSIUM SERPL-SCNC: 4 MMOL/L (ref 3.5–5.3)
SODIUM SERPL-SCNC: 127 MMOL/L (ref 136–145)
SODIUM SERPL-SCNC: 130 MMOL/L (ref 136–145)

## 2019-07-17 PROCEDURE — 99232 SBSQ HOSP IP/OBS MODERATE 35: CPT | Performed by: INTERNAL MEDICINE

## 2019-07-17 PROCEDURE — 80048 BASIC METABOLIC PNL TOTAL CA: CPT | Performed by: INTERNAL MEDICINE

## 2019-07-17 RX ORDER — SODIUM CHLORIDE 1000 MG
1 TABLET, SOLUBLE MISCELLANEOUS
Status: DISCONTINUED | OUTPATIENT
Start: 2019-07-17 | End: 2019-07-18

## 2019-07-17 RX ADMIN — VITAMIN D, TAB 1000IU (100/BT) 1000 UNITS: 25 TAB at 09:01

## 2019-07-17 RX ADMIN — Medication 1 TABLET: at 09:01

## 2019-07-17 RX ADMIN — GABAPENTIN 100 MG: 100 CAPSULE ORAL at 21:36

## 2019-07-17 RX ADMIN — IRBESARTAN 150 MG: 300 TABLET, FILM COATED ORAL at 17:41

## 2019-07-17 RX ADMIN — IRBESARTAN 150 MG: 300 TABLET, FILM COATED ORAL at 09:04

## 2019-07-17 RX ADMIN — CLOTRIMAZOLE AND BETAMETHASONE DIPROPIONATE 1 APPLICATION: 10; .64 CREAM TOPICAL at 09:04

## 2019-07-17 RX ADMIN — LIDOCAINE 1 PATCH: 50 PATCH TOPICAL at 21:37

## 2019-07-17 RX ADMIN — PRAVASTATIN SODIUM 10 MG: 10 TABLET ORAL at 21:36

## 2019-07-17 RX ADMIN — ENOXAPARIN SODIUM 40 MG: 40 INJECTION SUBCUTANEOUS at 09:01

## 2019-07-17 RX ADMIN — MENTHOL, METHYL SALICYLATE 1 APPLICATION: 10; 15 CREAM TOPICAL at 21:37

## 2019-07-17 RX ADMIN — OXYCODONE HYDROCHLORIDE 2.5 MG: 5 TABLET ORAL at 17:34

## 2019-07-17 RX ADMIN — ACETAMINOPHEN 975 MG: 325 TABLET, FILM COATED ORAL at 21:36

## 2019-07-17 RX ADMIN — SODIUM CHLORIDE TAB 1 GM 1 G: 1 TAB at 17:36

## 2019-07-17 RX ADMIN — ACETAMINOPHEN 975 MG: 325 TABLET, FILM COATED ORAL at 15:29

## 2019-07-17 RX ADMIN — POLYETHYLENE GLYCOL 3350 17 G: 17 POWDER, FOR SOLUTION ORAL at 09:01

## 2019-07-17 RX ADMIN — AMIODARONE HYDROCHLORIDE 100 MG: 200 TABLET ORAL at 21:36

## 2019-07-17 RX ADMIN — ACETAMINOPHEN 975 MG: 325 TABLET, FILM COATED ORAL at 05:47

## 2019-07-17 RX ADMIN — CARVEDILOL 6.25 MG: 6.25 TABLET, FILM COATED ORAL at 09:01

## 2019-07-17 RX ADMIN — ASPIRIN 81 MG 81 MG: 81 TABLET ORAL at 21:36

## 2019-07-17 RX ADMIN — FLUTICASONE PROPIONATE 1 SPRAY: 50 SPRAY, METERED NASAL at 21:37

## 2019-07-17 RX ADMIN — SENNOSIDES 17.2 MG: 8.6 TABLET, FILM COATED ORAL at 17:36

## 2019-07-17 RX ADMIN — SENNOSIDES 17.2 MG: 8.6 TABLET, FILM COATED ORAL at 09:01

## 2019-07-17 RX ADMIN — SODIUM CHLORIDE TAB 1 GM 1 G: 1 TAB at 12:09

## 2019-07-17 RX ADMIN — Medication 30 MG: at 17:36

## 2019-07-17 NOTE — ASSESSMENT & PLAN NOTE
· PRN IV hydralazine has been added  · Losartan was replaced with her home dose of Irbesartan  · Her BP is 133/63    Plan - Control pain (see above)  Monitor blood pressures

## 2019-07-17 NOTE — TREATMENT PLAN
I saw the patient this morning  She appears a bit anxious and confused  She is in bed, not in  pain distress  Slight improvement with her lower back pain, 5/10  Denies any radiculopathy  Her sodium was low, but coming up after treatment  Baseline upright lumbar spine x-rays in brace is pending because patient was unable to stand up  We  Will review of the image after x-rays done

## 2019-07-17 NOTE — ASSESSMENT & PLAN NOTE
Patients pain is currently controlled with oxycodone 2 5mg  (5mg was discontinued due to patient's confused state following administration)  This morning patient is in her baseline mental status  Lumbar MRI demonstrated closed linear fracture of inferior L3 vertebral without displacement or ligamentous injury  · Evaluation - Neurosurgery was consulted- no surgical interventions at this time  Physical therapy assessment-continued skilled therapy to increase functional independence and aid patient in return to PLOF  Discharge recommendation is Rehab  · Treatment - Continue with conservative management including outpatient mild pain management, physical therapy and LSO brace  To be followed up in 2 weeks  with another upright lumbar spine x-rays in brace  Baseline Pain control -   · Tylenol 975 mg q8h scheduled  · Lidoderm daily  · Gabapentin 100mg at bedtime      PRN Pain Control -  · Oxycodone 2 5 mg q6h PRN  · Could consider topical NSAID  · Muscle Relaxer - Bengay cream   Avoid systemic muscle relaxer if possible in geriatric population  Monitor Pain levels

## 2019-07-17 NOTE — PROGRESS NOTES
Progress Note - Rody Delgadillo 10/24/1927, 80 y o  female MRN: [id number]    Unit/Bed#: -01 Encounter: [id number]    Primary Care Provider: Claudell Alf, MD   Date and time admitted to hospital: 7/13/2019  9:22 AM        * Closed compression fracture of L3 lumbar vertebra, initial encounter [facility])  Assessment & Plan  Patients pain is currently controlled with oxycodone 2 5mg  (5mg was discontinued due to patient's confused state following administration)  This morning patient is in her baseline mental status  Lumbar MRI demonstrated closed linear fracture of inferior L3 vertebral without displacement or ligamentous injury  · Evaluation - Neurosurgery was consulted- no surgical interventions at this time  Physical therapy assessment-continued skilled therapy to increase functional independence and aid patient in return to PLOF  Discharge recommendation is Rehab  · Treatment - Continue with conservative management including outpatient mild pain management, physical therapy and LSO brace  To be followed up in 2 weeks  with another upright lumbar spine x-rays in brace  Baseline Pain control -   · Tylenol 975 mg q8h scheduled  · Lidoderm daily  · Gabapentin 100mg at bedtime      PRN Pain Control -  · Oxycodone 2 5 mg q6h PRN  · Could consider topical NSAID  · Muscle Relaxer - Bengay cream   Avoid systemic muscle relaxer if possible in geriatric population  Monitor Pain levels  Hyponatremia  Assessment & Plan   On 07/15 her labs showed sodium level of 129  She was started on 0 9 normal saline drip  Her  subsequent  samples were 127 and 123 consecutively  (Na-135 on admission)  She is currently on a fluid restriction  And her Na is stable at 127     Hyponatremia workup -     Na - 127               K - 3 8               Urine osmolality - 223               Urine sodium - 43               Serum osmolality - 260               Uric acid - 1 1               TSH - 1 828  She has a history of hyponatremia in the past  She has good urine out put  Currently she is not on any medication that could cause hyponatremia  She is currently in her baseline mental status  Does not appear dehydrated  She is on fluid restriction (1500ml/day)  Hyponatremia workup  Plan - Nacl tablets 1 g tds    BMP at 7 pm today (Adjust Nacl tablet dose accordingly)    Monitor BMP daily    Fluid restricted diet    Monitor patients mental status    Nephrology consult to be considered if there is no improvement  Asymptomatic bacteriuria  Assessment & Plan   Her urine microscopy showed 10-20 WBC, and innumerable bacteria on admission  Patient does not have fever or chills  She does not complain of dysuria, frequency, flank pain or hematuria  Examination shows no renal angle tenderness  Urine culture reports 100 000 hpf E coli growth  As the patient is asymptomatic we discontinued her antibiotics  Plan -     Monitor temperatures and serial exams  Toe injury, left, initial encounter  Assessment & Plan  X ray of the left foot shows no fracture or dislocation of the 2nd toe  Patient does not complain of pain      Essential hypertension  Assessment & Plan    · PRN IV hydralazine has been added  · Losartan was replaced with her home dose of Irbesartan  · Her BP is 133/63    Plan - Control pain (see above)  Monitor blood pressures  Arthritis  Assessment & Plan  · Per family, she has had arthritis since the age of 3 and actually had hospitalizations even as a child for arthritis  Somewhere in her teenage years she did have a surgery in her right hip which left her with a very stiff right hip and subsequently increased arthritis in the knees due to compensation  Chronic constipation  Assessment & Plan  She had a bowel movement yesterday and she feels a lot better  Plan - Senna 2 tabs, 2 times a day    MiraLax daily      Ambulatory dysfunction  Assessment & Plan  · Fall precautions / bed alarm    · PT evaluation  PAF (paroxysmal atrial fibrillation) ([facility])  Assessment & Plan  · Rate / Rhythm control -   · Continue on amiodarone and carvedilol  · Anticoagulation -   · She is on aspirin but no other anticoagulants  She is a fall risk  VTE Pharmacologic Prophylaxis:   Pharmacologic: Enoxaparin (Lovenox)  Mechanical VTE Prophylaxis in Place: Yes    Patient Centered Rounds: I have performed bedside rounds with nursing staff today  Discussions with Specialists or Other Care Team Provider:none    Education and Discussions with Family / Patient: Patient and neice    Time Spent for Care: 20 minutes  More than 50% of total time spent on counseling and coordination of care as described above  Current Length of Stay: 4 day(s)    Current Patient Status: Inpatient   Certification Statement: The patient will continue to require additional inpatient hospital stay due to evaluation and management of hyponatremia    Discharge Plan: to be determined    Code Status: Level 3 - DNAR and DNI      Subjective:   Patient feels generally well, apart from the back pain when the pain medication effect weans off  She does not have fever, chills, rigors, flank pain  Her appetite is good, but her niece states that her food intake is very little  Objective:     Vitals:   Temp (24hrs), Av 8 °F (36 6 °C), Min:97 5 °F (36 4 °C), Max:98 °F (36 7 °C)    Temp:  [97 5 °F (36 4 °C)-98 °F (36 7 °C)] 97 5 °F (36 4 °C)  HR:  [58-64] 58  Resp:  [18] 18  BP: (135-166)/(63-70) 166/70  SpO2:  [95 %-98 %] 97 %  Body mass index is 21 87 kg/m²  Input and Output Summary (last 24 hours): Intake/Output Summary (Last 24 hours) at 2019 1151  Last data filed at 2019 0929  Gross per 24 hour   Intake 600 ml   Output    Net 600 ml       Physical Exam:     Physical Exam   Constitutional: No distress  HENT:   Head: Normocephalic and atraumatic  Eyes: Pupils are equal, round, and reactive to light     Oral cavity is moist, has a denture  Cardiovascular: Normal rate, regular rhythm and normal heart sounds  Murmur: soft ejection systolic murmur was heard at the aortic area  Pulmonary/Chest: Effort normal and breath sounds normal    Abdominal: Bowel sounds are normal  No distension  Tenderness: no renal angle tenderness    Musculoskeletal: Deformity: multiple joint bony enlargement noted  ROM limited in right hip     Lumbar back:   She exhibits no bony tenderness, There is a step down deformity in thel lumbar spine  Skin: Capillary refill takes less than 2 seconds  She is not diaphoretic    Vitals reviewed  Additional Data:     Labs:    Results from last 7 days   Lab Units 07/13/19  0944   WBC Thousand/uL 9 64   HEMOGLOBIN g/dL 14 4   HEMATOCRIT % 42 4   PLATELETS Thousands/uL 252   NEUTROS PCT % 77*   LYMPHS PCT % 10*   MONOS PCT % 11   EOS PCT % 2     Results from last 7 days   Lab Units 07/17/19  0553  07/13/19  0944   SODIUM mmol/L 127*   < > 135*   POTASSIUM mmol/L 4 0   < > 4 0   CHLORIDE mmol/L 96*   < > 99*   CO2 mmol/L 23   < > 27   BUN mg/dL 14   < > 9   CREATININE mg/dL 0 76   < > 0 57*   ANION GAP mmol/L 8   < > 9   CALCIUM mg/dL 8 5   < > 9 1   ALBUMIN g/dL  --   --  3 7   TOTAL BILIRUBIN mg/dL  --   --  0 90   ALK PHOS U/L  --   --  102   ALT U/L  --   --  20   AST U/L  --   --  20   GLUCOSE RANDOM mg/dL 85   < > 99    < > = values in this interval not displayed  Results from last 7 days   Lab Units 07/13/19  0944   INR  1 01                       * I Have Reviewed All Lab Data Listed Above  * Additional Pertinent Lab Tests Reviewed:  Hal 66 Admission Reviewed    Imaging:    Imaging Reports Reviewed Today Include: none  Imaging Personally Reviewed by Myself Includes: none    Recent Cultures (last 7 days):     Results from last 7 days   Lab Units 07/13/19  1028   URINE CULTURE  >100,000 cfu/ml Escherichia coli*       Last 24 Hours Medication List:     Current Facility-Administered Medications:  acetaminophen 975 mg Oral Q8H [address] Carmelo Rider MD   amiodarone 100 mg Oral Daily Carmelo Rider MD   aspirin 81 mg Oral Daily Carmelo Rider MD   carvedilol 6 25 mg Oral Daily Carmelo Rider MD   cholecalciferol 1,000 Units Oral Daily Carmelo Rider MD   clotrimazole-betamethasone  Topical BID Carmelo Rider MD   co-enzyme Q-10 30 mg Oral Daily Carmelo Rider MD   enoxaparin 40 mg Subcutaneous Daily Carmelo Rider MD   fluticasone 1 spray Each Nare HS PRN Carmelo Rider MD   gabapentin 100 mg Oral HS Carmelo Rider MD   hydrALAZINE 10 mg Intravenous Q6H PRN Dawna Osorio,    irbesartan 150 mg Oral BID Bel Kennedy MD   lidocaine 1 patch Topical Daily Carmelo Rider MD   menthol-methyl salicylate  Apply externally 4x Daily PRN Carmelo Rider MD   multivitamin-minerals 1 tablet Oral Daily Carmelo Rider MD   ondansetron 4 mg Intravenous Q6H PRN Carmelo Rider MD   oxyCODONE 2 5 mg Oral Q6H PRN Carmelo Rider MD   polyethylene glycol 17 g Oral Daily Dawna Osorio,    pravastatin 10 mg Oral Daily With Fernando Terrell MD   Psyllium 3 capsule Oral Q12H [address] Ruchi Moore PA-C   senna 2 tablet Oral BID Dawna Osorio,    sodium chloride 1 spray Each Nare PRN Carmelo Rider MD   sodium chloride 1 g Oral TID With Meals Carmelo Rider MD        Today, Patient Was Seen By: Carmelo Rider MD    ** Please Note: Dictation voice to text software may have been used in the creation of this document   **

## 2019-07-17 NOTE — PLAN OF CARE
Problem: Potential for Falls  Goal: Patient will remain free of falls  Description  INTERVENTIONS:  - Assess patient frequently for physical needs  -  Identify cognitive and physical deficits and behaviors that affect risk of falls    -  [place] fall precautions as indicated by assessment   - Educate patient/family on patient safety including physical limitations  - Instruct patient to call for assistance with activity based on assessment  - Modify environment to reduce risk of injury  - Consider OT/PT consult to assist with strengthening/mobility  Outcome: Progressing     Problem: GENITOURINARY - ADULT  Goal: Maintains or returns to baseline urinary function  Description  INTERVENTIONS:  - Assess urinary function  - Encourage oral fluids to ensure adequate hydration  - Administer IV fluids as ordered to ensure adequate hydration  - Administer ordered medications as needed  - Offer frequent toileting  - Follow urinary retention protocol if ordered  Outcome: Progressing  Goal: Absence of urinary retention  Description  INTERVENTIONS:  - Assess patients ability to void and empty bladder  - Monitor I/O  - Bladder scan as needed  - Discuss with physician/AP medications to alleviate retention as needed  - Discuss catheterization for long term situations as appropriate  Outcome: Progressing     Problem: METABOLIC, FLUID AND ELECTROLYTES - ADULT  Goal: Electrolytes maintained within normal limits  Description  INTERVENTIONS:  - Monitor labs and assess patient for signs and symptoms of electrolyte imbalances  - Administer electrolyte replacement as ordered  - Monitor response to electrolyte replacements, including repeat lab results as appropriate  - Instruct patient on fluid and nutrition as appropriate  Outcome: Progressing     Problem: Prexisting or High Potential for Compromised Skin Integrity  Goal: Skin integrity is maintained or improved  Description  INTERVENTIONS:  - Identify patients at risk for skin breakdown  - Assess and monitor skin integrity  - Assess and monitor nutrition and hydration status  - Monitor labs (i e  albumin)  - Assess for incontinence   - Turn and reposition patient  - Assist with mobility/ambulation  - Relieve pressure over bony prominences  - Avoid friction and shearing  - Provide appropriate hygiene as needed including keeping skin clean and dry  - Evaluate need for skin moisturizer/barrier cream  - Collaborate with interdisciplinary team (i e  Nutrition, Rehabilitation, etc )   - Patient/family teaching  Outcome: Progressing     Problem: PAIN - ADULT  Goal: Verbalizes/displays adequate comfort level or baseline comfort level  Description  Interventions:  - Encourage patient to monitor pain and request assistance  - Assess pain using appropriate pain scale  - Administer analgesics based on type and severity of pain and evaluate response  - Implement non-pharmacological measures as appropriate and evaluate response  - Consider cultural and social influences on pain and pain management  - Notify physician/advanced practitioner if interventions unsuccessful or patient reports new pain  Outcome: Progressing     Problem: DISCHARGE PLANNING  Goal: Discharge to home or other facility with appropriate resources  Description  INTERVENTIONS:  - Identify barriers to discharge w/patient and caregiver  - Arrange for needed discharge resources and transportation as appropriate  - Identify discharge learning needs (meds, wound care, etc )  - Arrange for interpretive services to assist at discharge as needed  - Refer to Case Management Department for coordinating discharge planning if the patient needs post-hospital services based on physician/advanced practitioner order or complex needs related to functional status, cognitive ability, or social support system  Outcome: Progressing     Problem: Knowledge Deficit  Goal: Patient/family/caregiver demonstrates understanding of disease process, treatment plan, medications, and discharge instructions  Description  Complete learning assessment and assess knowledge base    Interventions:  - Provide teaching at level of understanding  - Provide teaching via preferred learning methods  Outcome: Progressing     Problem: RESPIRATORY - ADULT  Goal: Achieves optimal ventilation and oxygenation  Description  INTERVENTIONS:  - Assess for changes in respiratory status  - Assess for changes in mentation and behavior  - Position to facilitate oxygenation and minimize respiratory effort  - Oxygen administration by appropriate delivery method based on oxygen saturation (per order) or ABGs  - Initiate smoking cessation education as indicated  - Encourage broncho-pulmonary hygiene including cough, deep breathe, Incentive Spirometry  - Assess the need for suctioning and aspirate as needed  - Assess and instruct to report SOB or any respiratory difficulty  - Respiratory Therapy support as indicated  Outcome: Progressing     Problem: MUSCULOSKELETAL - ADULT  Goal: Maintain or return mobility to safest level of function  Description  INTERVENTIONS:  - Assess patient's ability to carry out ADLs; assess patient's baseline for ADL function and identify physical deficits which impact ability to perform ADLs (bathing, care of mouth/teeth, toileting, grooming, dressing, etc )  - Assess/evaluate cause of self-care deficits   - Assess range of motion  - Assess patient's mobility; develop plan if impaired  - Assess patient's need for assistive devices and provide as appropriate  - Encourage maximum independence but intervene and supervise when necessary  - Involve family in performance of ADLs  - Assess for home care needs following discharge   - Request OT consult to assist with ADL evaluation and planning for discharge  - Provide patient education as appropriate  Outcome: Progressing

## 2019-07-17 NOTE — ASSESSMENT & PLAN NOTE
She had a bowel movement yesterday and she feels a lot better    Plan - Senna 2 tabs, 2 times a day    MiraLax daily

## 2019-07-17 NOTE — ASSESSMENT & PLAN NOTE
On 07/15 her labs showed sodium level of 129  She was started on 0 9 normal saline drip  Her  subsequent  samples were 127 and 123 consecutively  (Na-135 on admission)  She is currently on a fluid restriction  And her Na is stable at 127  Hyponatremia workup -     Na - 127               K - 3 8               Urine osmolality - 223               Urine sodium - 43               Serum osmolality - 260               Uric acid - 1 1               TSH - 1 828  She has a history of hyponatremia in the past  She has good urine out put  Currently she is not on any medication that could cause hyponatremia  She is currently in her baseline mental status  Does not appear dehydrated  She is on fluid restriction (1500ml/day)  Hyponatremia workup  Plan - Nacl tablets 1 g tds    BMP at 7 pm today (Adjust Nacl tablet dose accordingly)    Monitor BMP daily    Fluid restricted diet    Monitor patients mental status    Nephrology consult to be considered if there is no improvement

## 2019-07-18 ENCOUNTER — APPOINTMENT (INPATIENT)
Dept: RADIOLOGY | Facility: HOSPITAL | Age: 84
DRG: 551 | End: 2019-07-18
Payer: MEDICARE

## 2019-07-18 LAB
ANION GAP SERPL CALCULATED.3IONS-SCNC: 10 MMOL/L (ref 4–13)
BUN SERPL-MCNC: 12 MG/DL (ref 5–25)
CALCIUM SERPL-MCNC: 8.4 MG/DL (ref 8.3–10.1)
CHLORIDE SERPL-SCNC: 102 MMOL/L (ref 100–108)
CO2 SERPL-SCNC: 22 MMOL/L (ref 21–32)
CREAT SERPL-MCNC: 0.62 MG/DL (ref 0.6–1.3)
GFR SERPL CREATININE-BSD FRML MDRD: 79 ML/MIN/1.73SQ M
GLUCOSE SERPL-MCNC: 94 MG/DL (ref 65–140)
POTASSIUM SERPL-SCNC: 4.3 MMOL/L (ref 3.5–5.3)
SODIUM SERPL-SCNC: 134 MMOL/L (ref 136–145)

## 2019-07-18 PROCEDURE — 97110 THERAPEUTIC EXERCISES: CPT | Performed by: PHYSICAL THERAPIST

## 2019-07-18 PROCEDURE — 80048 BASIC METABOLIC PNL TOTAL CA: CPT | Performed by: INTERNAL MEDICINE

## 2019-07-18 PROCEDURE — 72100 X-RAY EXAM L-S SPINE 2/3 VWS: CPT

## 2019-07-18 PROCEDURE — 99232 SBSQ HOSP IP/OBS MODERATE 35: CPT | Performed by: INTERNAL MEDICINE

## 2019-07-18 PROCEDURE — 97535 SELF CARE MNGMENT TRAINING: CPT | Performed by: PHYSICAL THERAPIST

## 2019-07-18 PROCEDURE — 97112 NEUROMUSCULAR REEDUCATION: CPT | Performed by: PHYSICAL THERAPIST

## 2019-07-18 RX ORDER — SODIUM CHLORIDE 1000 MG
1 TABLET, SOLUBLE MISCELLANEOUS 2 TIMES DAILY WITH MEALS
Status: DISCONTINUED | OUTPATIENT
Start: 2019-07-18 | End: 2019-07-19

## 2019-07-18 RX ADMIN — VITAMIN D, TAB 1000IU (100/BT) 1000 UNITS: 25 TAB at 10:00

## 2019-07-18 RX ADMIN — SENNOSIDES 17.2 MG: 8.6 TABLET, FILM COATED ORAL at 09:59

## 2019-07-18 RX ADMIN — Medication 1 TABLET: at 09:59

## 2019-07-18 RX ADMIN — ACETAMINOPHEN 975 MG: 325 TABLET, FILM COATED ORAL at 21:45

## 2019-07-18 RX ADMIN — MENTHOL, METHYL SALICYLATE: 10; 15 CREAM TOPICAL at 17:33

## 2019-07-18 RX ADMIN — IRBESARTAN 150 MG: 300 TABLET, FILM COATED ORAL at 17:34

## 2019-07-18 RX ADMIN — ASPIRIN 81 MG 81 MG: 81 TABLET ORAL at 21:45

## 2019-07-18 RX ADMIN — ACETAMINOPHEN 975 MG: 325 TABLET, FILM COATED ORAL at 06:39

## 2019-07-18 RX ADMIN — PRAVASTATIN SODIUM 10 MG: 10 TABLET ORAL at 21:45

## 2019-07-18 RX ADMIN — SODIUM CHLORIDE TAB 1 GM 1 G: 1 TAB at 10:00

## 2019-07-18 RX ADMIN — LIDOCAINE 1 PATCH: 50 PATCH TOPICAL at 21:46

## 2019-07-18 RX ADMIN — ACETAMINOPHEN 975 MG: 325 TABLET, FILM COATED ORAL at 13:09

## 2019-07-18 RX ADMIN — SODIUM CHLORIDE TAB 1 GM 1 G: 1 TAB at 17:33

## 2019-07-18 RX ADMIN — AMIODARONE HYDROCHLORIDE 100 MG: 200 TABLET ORAL at 21:45

## 2019-07-18 RX ADMIN — SENNOSIDES 17.2 MG: 8.6 TABLET, FILM COATED ORAL at 17:37

## 2019-07-18 RX ADMIN — HYDRALAZINE HYDROCHLORIDE 10 MG: 20 INJECTION INTRAMUSCULAR; INTRAVENOUS at 21:54

## 2019-07-18 RX ADMIN — IRBESARTAN 150 MG: 300 TABLET, FILM COATED ORAL at 10:01

## 2019-07-18 RX ADMIN — OXYCODONE HYDROCHLORIDE 2.5 MG: 5 TABLET ORAL at 21:48

## 2019-07-18 RX ADMIN — OXYCODONE HYDROCHLORIDE 2.5 MG: 5 TABLET ORAL at 14:50

## 2019-07-18 RX ADMIN — POLYETHYLENE GLYCOL 3350 17 G: 17 POWDER, FOR SOLUTION ORAL at 09:59

## 2019-07-18 RX ADMIN — CARVEDILOL 6.25 MG: 6.25 TABLET, FILM COATED ORAL at 10:00

## 2019-07-18 RX ADMIN — ENOXAPARIN SODIUM 40 MG: 40 INJECTION SUBCUTANEOUS at 10:00

## 2019-07-18 RX ADMIN — GABAPENTIN 100 MG: 100 CAPSULE ORAL at 21:45

## 2019-07-18 RX ADMIN — MENTHOL, METHYL SALICYLATE: 10; 15 CREAM TOPICAL at 10:03

## 2019-07-18 RX ADMIN — Medication 30 MG: at 17:35

## 2019-07-18 RX ADMIN — CLOTRIMAZOLE AND BETAMETHASONE DIPROPIONATE: 10; .64 CREAM TOPICAL at 10:04

## 2019-07-18 NOTE — PROGRESS NOTES
Progress Note - Simone Souza 10/24/1927, 80 y o  female MRN: [id number]    Unit/Bed#: -01 Encounter: [id number]    Primary Care Provider: Diana Noonan MD   Date and time admitted to hospital: 7/13/2019  9:22 AM        * Closed compression fracture of L3 lumbar vertebra, initial encounter [facility])  Assessment & Plan  Patients pain is currently controlled with oxycodone 2 5mg  (5mg was discontinued due to patient's confused state following administration)  Lumbar MRI demonstrated closed linear fracture of inferior L3 vertebral without displacement or ligamentous injury  · Evaluation - Neurosurgery was consulted- no surgical interventions at this time  Physical therapy assessment-continued skilled therapy to increase functional independence and aid patient in return to PLOF  Discharge recommendation is Rehab    Do Xray lumbar spine (with brace)  · Treatment - Continue with conservative management including outpatient mild pain management, physical therapy and LSO brace  To be followed up in 2 weeks  with another upright lumbar spine x-rays in brace  Baseline Pain control -   · Tylenol 975 mg q8h scheduled  · Lidoderm daily  · Gabapentin 100mg at bedtime      PRN Pain Control -  · Oxycodone 2 5 mg q6h PRN  · Muscle Relaxer - Bengay cream   Avoid systemic muscle relaxer if possible in geriatric population  Monitor Pain levels  Hyponatremia  Assessment & Plan   On 07/15 her labs showed sodium level of 129  She was started on 0 9 normal saline drip  Her  subsequent  samples were 127 and 123 consecutively  (Na-135 on admission)  She is currently on a fluid restriction and NaCl tablets 3 times a day    Her morning Na level is 134   Hyponatremia workup -               Urine osmolality - 223               Urine sodium - 43               Serum osmolality - 260               Uric acid - 1 1               TSH - 1 828  She has a history of hyponatremia in the past   Plan - Continue Nacl tablets 1 g tds    BMP daily    Fluid restricted diet    Monitor patients mental status          Asymptomatic bacteriuria  Assessment & Plan   Her urine microscopy showed 10-20 WBC, and innumerable bacteria on admission  Patient does not have fever or chills  She does not complain of dysuria, frequency, flank pain or hematuria  Examination shows no renal angle tenderness  Urine culture reports 100 000 hpf E coli growth  As the patient is asymptomatic we discontinued her antibiotics  Plan -     Monitor temperatures and serial exams  Toe injury, left, initial encounter  Assessment & Plan  X ray of the left foot shows no fracture or dislocation of the 2nd toe  Patient does not complain of pain      Essential hypertension  Assessment & Plan    · PRN IV hydralazine has been added (she did not require IV hydralazine since we replaced Losartan with Irbesartan )  · Losartan was replaced with her home dose of Irbesartan  · Her BP is 164/56    Plan - Control pain (see above)  Monitor blood pressures  Arthritis  Assessment & Plan  · Per family, she has had arthritis since the age of 3 and actually had hospitalizations even as a child for arthritis  Somewhere in her teenage years she did have a surgery in her right hip which left her with a very stiff right hip and subsequently increased arthritis in the knees due to compensation  Chronic constipation  Assessment & Plan  Currently she has normal bowel movements  Plan - Continue Senna 2 tabs, 2 times a day and MiraLax daily      Ambulatory dysfunction  Assessment & Plan  · Fall precautions / bed alarm  · PT evaluation  PAF (paroxysmal atrial fibrillation) ([facility])  Assessment & Plan  · Rate / Rhythm control -   · Continue on amiodarone and carvedilol  · Anticoagulation -   · She is on aspirin but no other anticoagulants  She is a fall risk        VTE Pharmacologic Prophylaxis:   Pharmacologic: Enoxaparin (Lovenox)  Mechanical VTE Prophylaxis in Place: Yes    Patient Centered Rounds: I have performed bedside rounds with nursing staff today  Discussions with Specialists or Other Care Team Provider: none    Education and Discussions with Family / Patient: patient and neice    Time Spent for Care: 15 minutes  More than 50% of total time spent on counseling and coordination of care as described above  Current Length of Stay: 5 day(s)    Current Patient Status: Inpatient   Certification Statement: The patient will continue to require additional inpatient hospital stay due to evaluation and management of above condition    Discharge Plan: 24 hours    Code Status: Level 3 - DNAR and DNI      Subjective:   Patient is feeling well  No new complaints  She has back pain when changing positions and is still unable to sit up  No fever, chills, or rigors  Has a good urine output, normal bowel habits and a good appetite  No abdominal pain, or dysuria  She does not complain of chest pain or palpitations  Objective:     Vitals:   Temp (24hrs), Av 9 °F (36 6 °C), Min:97 5 °F (36 4 °C), Max:98 2 °F (36 8 °C)    Temp:  [97 5 °F (36 4 °C)-98 2 °F (36 8 °C)] 98 2 °F (36 8 °C)  HR:  [58-62] 60  Resp:  [18] 18  BP: (164-168)/(56-70) 164/56  SpO2:  [96 %-97 %] 97 %  Body mass index is 21 87 kg/m²  Input and Output Summary (last 24 hours): Intake/Output Summary (Last 24 hours) at 2019 0728  Last data filed at 2019 0500  Gross per 24 hour   Intake 600 ml   Output 1425 ml   Net -825 ml       Physical Exam:     Physical Exam   Constitutional: No distress  HENT:   Head: Normocephalic and atraumatic  Eyes: Pupils are equal, round, and reactive to light    Oral cavity is moist, has a denture  Cardiovascular: Normal rate, regular rhythm and normal heart sounds  Murmur: soft ejection systolic murmur was heard at the aortic area    Pulmonary/Chest: Effort normal and breath sounds normal    Abdominal: Bowel sounds are normal  No distension  Tenderness: no renal angle tenderness    Musculoskeletal: Deformity: multiple joint bony enlargement noted  ROM limited in right hip     Lumbar back:   She exhibits no bony tenderness, There is a step down deformity in thel lumbar spine     Skin: Capillary refill takes less than 2 seconds  She is not diaphoretic    Vitals reviewed  Additional Data:     Labs:    Results from last 7 days   Lab Units 07/13/19  0944   WBC Thousand/uL 9 64   HEMOGLOBIN g/dL 14 4   HEMATOCRIT % 42 4   PLATELETS Thousands/uL 252   NEUTROS PCT % 77*   LYMPHS PCT % 10*   MONOS PCT % 11   EOS PCT % 2     Results from last 7 days   Lab Units 07/18/19  0635  07/13/19  0944   SODIUM mmol/L 134*   < > 135*   POTASSIUM mmol/L 4 3   < > 4 0   CHLORIDE mmol/L 102   < > 99*   CO2 mmol/L 22   < > 27   BUN mg/dL 12   < > 9   CREATININE mg/dL 0 62   < > 0 57*   ANION GAP mmol/L 10   < > 9   CALCIUM mg/dL 8 4   < > 9 1   ALBUMIN g/dL  --   --  3 7   TOTAL BILIRUBIN mg/dL  --   --  0 90   ALK PHOS U/L  --   --  102   ALT U/L  --   --  20   AST U/L  --   --  20   GLUCOSE RANDOM mg/dL 94   < > 99    < > = values in this interval not displayed  Results from last 7 days   Lab Units 07/13/19  0944   INR  1 01                       * I Have Reviewed All Lab Data Listed Above  * Additional Pertinent Lab Tests Reviewed:  [facility]    Imaging:    Imaging Reports Reviewed Today Include:  None  Imaging Personally Reviewed by Myself Includes:  None    Recent Cultures (last 7 days):     Results from last 7 days   Lab Units 07/13/19  1028   URINE CULTURE  >100,000 cfu/ml Escherichia coli*       Last 24 Hours Medication List:     Current Facility-Administered Medications:  acetaminophen 975 mg Oral Q8H Rupert Spann MD   amiodarone 100 mg Oral Daily Tej Jasso MD   aspirin 81 mg Oral Daily Tej Jasso MD   carvedilol 6 25 mg Oral Daily Tej Jasso MD   cholecalciferol 1,000 Units Oral Daily Eluwkat Lian Bell MD   clotrimazole-betamethasone  Topical BID Palomo Moraes MD   co-enzyme Q-10 30 mg Oral Daily Palomo Moraes MD   enoxaparin 40 mg Subcutaneous Daily Palomo Moraes MD   fluticasone 1 spray Each Nare HS PRN Palomo Moraes, MD   gabapentin 100 mg Oral HS Palomo Moraes, MD   hydrALAZINE 10 mg Intravenous Q6H PRN Brent Ware, DO   irbesartan 150 mg Oral BID Dalia White MD   lidocaine 1 patch Topical Daily Palomo Oconnorole, MD   menthol-methyl salicylate  Apply externally 4x Daily PRN Palomo Moraes, MD   multivitamin-minerals 1 tablet Oral Daily Palomo Oconnorole, MD   ondansetron 4 mg Intravenous Q6H PRN Palomo Oconnorole, MD   oxyCODONE 2 5 mg Oral Q6H PRN Palomo Moraes, MD   polyethylene glycol 17 g Oral Daily Brent Ware, DO   pravastatin 10 mg Oral Daily With Adalid Brown MD   Psyllium 3 capsule Oral Q12H [address] Ruchi Moore PA-C   senna 2 tablet Oral BID Brent Ware, DO   sodium chloride 1 spray Each Nare PRN Palomo Moraes MD   sodium chloride 1 g Oral TID With Meals Palomo Moraes MD        Today, Patient Was Seen By: Palomo Moraes MD    ** Please Note: Dictation voice to text software may have been used in the creation of this document   **

## 2019-07-18 NOTE — PLAN OF CARE
Not progressing    Problem: PHYSICAL THERAPY ADULT  Goal: Performs mobility at highest level of function for planned discharge setting  See evaluation for individualized goals  Description  Treatment/Interventions: LE strengthening/ROM, Therapeutic exercise, Endurance training, Cognitive reorientation, Patient/family training, Equipment eval/education, Bed mobility(PT to see next session to complete mobility assessment)  Equipment Recommended: Other (Comment)(pt needs dependent means for out of bed mobilization )       See flowsheet documentation for full assessment, interventions and recommendations  Note:   Prognosis: Fair  Problem List: Decreased strength, Decreased range of motion, Decreased endurance, Impaired balance, Decreased mobility, Decreased safety awareness, Orthopedic restrictions, Pain  Assessment: pt conitnued to be dependent for any mobility beyond rolling  pt is only medicated with tylenol  pt only able to tolerate HOB elevated to 30*  pt did practice rolling side to side, attmepted to get to sitting but had severe pain   pt performed ther ex ble in painfree rom  pt has limited hip flexoin rom on right due to prior hip fx,and cannot achieve 90* for sitting  pt will need continued PT  recommend better pain control  pt needs rehab  spoke with family member regarding mobility, ther ex HEP, positioning for skin breakdown prevention and spine precautions  voiced understanding and questions answered  Recommendation: Post acute IP rehab, Short-term skilled PT     PT - OK to Discharge: Yes(rehab)    See flowsheet documentation for full assessment

## 2019-07-18 NOTE — ASSESSMENT & PLAN NOTE
On 07/15 her labs showed sodium level of 129  She was started on 0 9 normal saline drip  Her  subsequent  samples were 127 and 123 consecutively  (Na-135 on admission)  She is currently on a fluid restriction and NaCl tablets 3 times a day    Her morning Na level is 134   Hyponatremia workup -               Urine osmolality - 223               Urine sodium - 43               Serum osmolality - 260               Uric acid - 1 1               TSH - 1 828  She has a history of hyponatremia in the past   Plan - Continue Nacl tablets 1 g tds    BMP daily    Fluid restricted diet    Monitor patients mental status

## 2019-07-18 NOTE — ASSESSMENT & PLAN NOTE
· PRN IV hydralazine has been added (she did not require IV hydralazine since we replaced Losartan with Irbesartan )  · Losartan was replaced with her home dose of Irbesartan  · Her BP is 164/56    Plan - Control pain (see above)  Monitor blood pressures

## 2019-07-18 NOTE — PHYSICAL THERAPY NOTE
Physical Therapy Progress Note     07/18/19 4768   Pain Assessment   Pain Assessment 0-10   Pain Score Worst Possible Pain   Pain Type Acute pain  (with movement, minimal at rest)   Pain Location Back   Pain Orientation Lower   Hospital Pain Intervention(s) Repositioned; Ambulation/increased activity; Emotional support  (increased pain with movement)   Response to Interventions unable to complete mobility   Restrictions/Precautions   Braces or Orthoses LSO   Other Precautions Bed Alarm;Multiple lines; Fall Risk;Pain;Spinal precautions   General   Chart Reviewed Yes   Family/Caregiver Present Yes   Cognition   Overall Cognitive Status WFL   Orientation Level Oriented to person;Oriented to place;Oriented to situation   Subjective   Subjective pt reports comfort with positioning after attempt to mobilize  pt tony had tylenol for meds  pt was incontinent of urine, bed wet and needed change  Bed Mobility   Rolling R 3  Moderate assistance   Additional items Assist x 1; Increased time required;Verbal cues;LE management  (instructions for log rolling and hand placement for A)   Rolling L 3  Moderate assistance   Additional items Assist x 1; Increased time required;Verbal cues;LE management   Supine to Sit Unable to assess  (attempted, unable to rise from sidelying due to pain)   Transfers   Sit to Stand Unable to assess   Endurance Deficit   Endurance Deficit Yes   Endurance Deficit Description severe back pain   Activity Tolerance   Activity Tolerance Patient limited by pain   Nurse Made Aware yes   Exercises   Quad Sets Supine;20 reps;AROM; Bilateral   Glute Sets Supine;20 reps;AROM; Bilateral  (also hip rotation supine 20 reps ble arom)   Knee AROM Short Arc Quad Supine;20 reps;AROM; Bilateral   Ankle Pumps Supine;25 reps;AROM; Bilateral   Neuro re-ed rolling technique, supine to sitting ( unable to complete)   Assessment   Prognosis Fair   Problem List Decreased strength;Decreased range of motion;Decreased endurance; Impaired balance;Decreased mobility; Decreased safety awareness;Orthopedic restrictions;Pain   Assessment pt conitnued to be dependent for any mobility beyond rolling  pt is only medicated with tylenol  pt only able to tolerate HOB elevated to 30*  pt did practice rolling side to side, attmepted to get to sitting but had severe pain   pt performed ther ex ble in painfree rom  pt has limited hip flexoin rom on right due to prior hip fx,and cannot achieve 90* for sitting  pt will need continued PT  recommend better pain control  pt needs rehab  spoke with family member regarding mobility, ther ex HEP, positioning for skin breakdown prevention and spine precautions  voiced understanding and questions answered  Goals   Patient Goals less pain when moving   STG Expiration Date 07/25/19   Treatment Day 3   Plan   Treatment/Interventions Functional transfer training;LE strengthening/ROM; Therapeutic exercise; Endurance training;Patient/family training;Equipment eval/education; Bed mobility;Gait training; Compensatory technique education;Spoke to nursing;Spoke to case management   Progress Slow progress, decreased activity tolerance   PT Frequency 5x/wk; Weekend  (1x weekend)   Recommendation   Recommendation Post acute IP rehab;Short-term skilled PT   PT - OK to Discharge Yes  (rehab)     Razia Single, PT

## 2019-07-18 NOTE — SOCIAL WORK
LOS 5   Not a bundle; Not a readmission  Pt lives with her niece/POA and niece's son in her [facility] with the bed and bath upstairs  They care for pt 24/7  They have a lift chair, lift to 2nd floor, RW, WC commode, shower chair and raised toilet seat  Pt needs assistance with ADL's and transfers  Pt uses [facility] on [address] for medications and is able to afford them with Rx coverage  Pt has not been to rehab in the past however has had [facility] and [facility] before  Her Niece is her POA and she has a LW  No hx of MH/D&A  Her income is SSI  A post acute care recommendation was made by your care team for STR  Discussed Freedom of Choice with both patient and caregiver  List of facilities given to both patient and caregiver via in person  both patient and caregiver aware the list is custom filtered for them by zip code location and that [facility] post acute providers are designated  Ernesto is aware pt may need rehab however she is requesting a private room and a facility where she would be able to stay overnight with pt  Cm explained it would be difficult to have a private room however cm would discuss with facilities the ability to stay overnight  She requested referrals be made to [facility], [facility] and [facility]  Cm made referrals  Cm will follow to assist with needs

## 2019-07-18 NOTE — ASSESSMENT & PLAN NOTE
Patients pain is currently controlled with oxycodone 2 5mg  (5mg was discontinued due to patient's confused state following administration)  Lumbar MRI demonstrated closed linear fracture of inferior L3 vertebral without displacement or ligamentous injury  · Evaluation - Neurosurgery was consulted- no surgical interventions at this time  Physical therapy assessment-continued skilled therapy to increase functional independence and aid patient in return to PLOF  Discharge recommendation is Rehab    Do Xray lumbar spine (with brace)  · Treatment - Continue with conservative management including outpatient mild pain management, physical therapy and LSO brace  To be followed up in 2 weeks  with another upright lumbar spine x-rays in brace  Baseline Pain control -   · Tylenol 975 mg q8h scheduled  · Lidoderm daily  · Gabapentin 100mg at bedtime      PRN Pain Control -  · Oxycodone 2 5 mg q6h PRN  · Muscle Relaxer - Bengay cream   Avoid systemic muscle relaxer if possible in geriatric population  Monitor Pain levels

## 2019-07-18 NOTE — ASSESSMENT & PLAN NOTE
Lumbar MRI demonstrated closed linear fracture of inferior L3 vertebral without displacement or ligamentous injury  Patient's pain was controlled with oxycodone 2 5mg  (patient did not tolerate oxycodone 5 mg)  · Evaluation - Physical therapy assessment-continued skilled therapy to increase functional independence and aid patient in return to PLOF  Discharge recommendation is Rehab     Xray lumbar spine (with brace) was taken - Stable appearance of inferior L3 endplate fracture   No retropulsion or significant loss of height     Neurosurgery was consulted- no surgical interventions were suggested at this time  · Plan - Conservative management including outpatient mild pain management, physical therapy and LSO brace  Neurosurgery follow up in 2 weeks  with another upright lumbar spine x-rays in brace  Baseline Pain control -   · Tylenol 975 mg q8h  · Lidoderm patch daily  · Gabapentin 100mg at bedtime      PRN Pain Control -  · Oxycodone 2 5 mg q6h PRN  · Muscle Relaxer - Bengay cream   Avoid systemic muscle relaxer if possible in geriatric population  Monitor Pain levels

## 2019-07-18 NOTE — PLAN OF CARE
Problem: Potential for Falls  Goal: Patient will remain free of falls  Description  INTERVENTIONS:  - Assess patient frequently for physical needs  -  Identify cognitive and physical deficits and behaviors that affect risk of falls    -  [place] fall precautions as indicated by assessment   - Educate patient/family on patient safety including physical limitations  - Instruct patient to call for assistance with activity based on assessment  - Modify environment to reduce risk of injury  - Consider OT/PT consult to assist with strengthening/mobility  Outcome: Progressing     Problem: GENITOURINARY - ADULT  Goal: Maintains or returns to baseline urinary function  Description  INTERVENTIONS:  - Assess urinary function  - Encourage oral fluids to ensure adequate hydration  - Administer IV fluids as ordered to ensure adequate hydration  - Administer ordered medications as needed  - Offer frequent toileting  - Follow urinary retention protocol if ordered  Outcome: Progressing  Goal: Absence of urinary retention  Description  INTERVENTIONS:  - Assess patients ability to void and empty bladder  - Monitor I/O  - Bladder scan as needed  - Discuss with physician/AP medications to alleviate retention as needed  - Discuss catheterization for long term situations as appropriate  Outcome: Progressing     Problem: METABOLIC, FLUID AND ELECTROLYTES - ADULT  Goal: Electrolytes maintained within normal limits  Description  INTERVENTIONS:  - Monitor labs and assess patient for signs and symptoms of electrolyte imbalances  - Administer electrolyte replacement as ordered  - Monitor response to electrolyte replacements, including repeat lab results as appropriate  - Instruct patient on fluid and nutrition as appropriate  Outcome: Progressing     Problem: Prexisting or High Potential for Compromised Skin Integrity  Goal: Skin integrity is maintained or improved  Description  INTERVENTIONS:  - Identify patients at risk for skin breakdown  - Assess and monitor skin integrity  - Assess and monitor nutrition and hydration status  - Monitor labs (i e  albumin)  - Assess for incontinence   - Turn and reposition patient  - Assist with mobility/ambulation  - Relieve pressure over bony prominences  - Avoid friction and shearing  - Provide appropriate hygiene as needed including keeping skin clean and dry  - Evaluate need for skin moisturizer/barrier cream  - Collaborate with interdisciplinary team (i e  Nutrition, Rehabilitation, etc )   - Patient/family teaching  Outcome: Progressing     Problem: PAIN - ADULT  Goal: Verbalizes/displays adequate comfort level or baseline comfort level  Description  Interventions:  - Encourage patient to monitor pain and request assistance  - Assess pain using appropriate pain scale  - Administer analgesics based on type and severity of pain and evaluate response  - Implement non-pharmacological measures as appropriate and evaluate response  - Consider cultural and social influences on pain and pain management  - Notify physician/advanced practitioner if interventions unsuccessful or patient reports new pain  Outcome: Progressing     Problem: DISCHARGE PLANNING  Goal: Discharge to home or other facility with appropriate resources  Description  INTERVENTIONS:  - Identify barriers to discharge w/patient and caregiver  - Arrange for needed discharge resources and transportation as appropriate  - Identify discharge learning needs (meds, wound care, etc )  - Arrange for interpretive services to assist at discharge as needed  - Refer to Case Management Department for coordinating discharge planning if the patient needs post-hospital services based on physician/advanced practitioner order or complex needs related to functional status, cognitive ability, or social support system  Outcome: Progressing     Problem: Knowledge Deficit  Goal: Patient/family/caregiver demonstrates understanding of disease process, treatment plan, medications, and discharge instructions  Description  Complete learning assessment and assess knowledge base    Interventions:  - Provide teaching at level of understanding  - Provide teaching via preferred learning methods  Outcome: Progressing     Problem: RESPIRATORY - ADULT  Goal: Achieves optimal ventilation and oxygenation  Description  INTERVENTIONS:  - Assess for changes in respiratory status  - Assess for changes in mentation and behavior  - Position to facilitate oxygenation and minimize respiratory effort  - Oxygen administration by appropriate delivery method based on oxygen saturation (per order) or ABGs  - Initiate smoking cessation education as indicated  - Encourage broncho-pulmonary hygiene including cough, deep breathe, Incentive Spirometry  - Assess the need for suctioning and aspirate as needed  - Assess and instruct to report SOB or any respiratory difficulty  - Respiratory Therapy support as indicated  Outcome: Progressing     Problem: MUSCULOSKELETAL - ADULT  Goal: Maintain or return mobility to safest level of function  Description  INTERVENTIONS:  - Assess patient's ability to carry out ADLs; assess patient's baseline for ADL function and identify physical deficits which impact ability to perform ADLs (bathing, care of mouth/teeth, toileting, grooming, dressing, etc )  - Assess/evaluate cause of self-care deficits   - Assess range of motion  - Assess patient's mobility; develop plan if impaired  - Assess patient's need for assistive devices and provide as appropriate  - Encourage maximum independence but intervene and supervise when necessary  - Involve family in performance of ADLs  - Assess for home care needs following discharge   - Request OT consult to assist with ADL evaluation and planning for discharge  - Provide patient education as appropriate  Outcome: Progressing

## 2019-07-18 NOTE — SOCIAL WORK
[facility] is not able to accept pt  [facility] is able to accept pt  VM stated they do not have a private room  Cm will follow up with Ernesto as pt is ready for DC tomorrow

## 2019-07-18 NOTE — PROGRESS NOTES
Patient sleeping comfortably in bed with niece at bedside  Call bell within reach  Will continue to monitor

## 2019-07-18 NOTE — ASSESSMENT & PLAN NOTE
Patients past medical history mentions that she has had hyponatremia in the past  On 07/15 her labs showed sodium level of 129  Her  subsequent  samples were 127 and 123 consecutively  (Na-135 on admission)  She was started on fluid restriction and NaCl tablets 3 times a day  (Medication list was reviewed for potential drugs causing hyponatremia, and found none) She was not dehydrated, had good urine output  Hyponatremia workup -               Urine osmolality - 223               Urine sodium - 43               Serum osmolality - 260               Uric acid - 1 1               TSH - 1 828  On discharge, patients Na level was - 133 , and was no longer on fluid restriction    Plan - Continue Nacl tablets 1 g three times a day    Fluid restriction to 1800 ml/day    Follow up BMP in 3 days    Monitor patients mental status

## 2019-07-18 NOTE — ASSESSMENT & PLAN NOTE
Currently she has normal bowel movements    Plan - Continue Senna 2 tabs, 2 times a day and MiraLax daily

## 2019-07-19 ENCOUNTER — TELEPHONE (OUTPATIENT)
Dept: OTHER | Facility: OTHER | Age: 84
End: 2019-07-19

## 2019-07-19 VITALS
OXYGEN SATURATION: 97 % | HEART RATE: 62 BPM | TEMPERATURE: 98.3 F | BODY MASS INDEX: 21.88 KG/M2 | RESPIRATION RATE: 16 BRPM | DIASTOLIC BLOOD PRESSURE: 64 MMHG | SYSTOLIC BLOOD PRESSURE: 160 MMHG | WEIGHT: 123.46 LBS | HEIGHT: 63 IN

## 2019-07-19 PROBLEM — E87.1 HYPONATREMIA: Chronic | Status: RESOLVED | Noted: 2018-11-30 | Resolved: 2019-07-19

## 2019-07-19 LAB
ANION GAP SERPL CALCULATED.3IONS-SCNC: 7 MMOL/L (ref 4–13)
BUN SERPL-MCNC: 9 MG/DL (ref 5–25)
CALCIUM SERPL-MCNC: 8 MG/DL (ref 8.3–10.1)
CHLORIDE SERPL-SCNC: 103 MMOL/L (ref 100–108)
CO2 SERPL-SCNC: 23 MMOL/L (ref 21–32)
CREAT SERPL-MCNC: 0.52 MG/DL (ref 0.6–1.3)
GFR SERPL CREATININE-BSD FRML MDRD: 84 ML/MIN/1.73SQ M
GLUCOSE SERPL-MCNC: 85 MG/DL (ref 65–140)
POTASSIUM SERPL-SCNC: 3.8 MMOL/L (ref 3.5–5.3)
SODIUM SERPL-SCNC: 133 MMOL/L (ref 136–145)

## 2019-07-19 PROCEDURE — 80048 BASIC METABOLIC PNL TOTAL CA: CPT | Performed by: INTERNAL MEDICINE

## 2019-07-19 PROCEDURE — 99239 HOSP IP/OBS DSCHRG MGMT >30: CPT | Performed by: INTERNAL MEDICINE

## 2019-07-19 RX ORDER — OXYCODONE HYDROCHLORIDE 5 MG/1
2.5 TABLET ORAL EVERY 6 HOURS PRN
Qty: 30 TABLET | Refills: 0
Start: 2019-07-19 | End: 2019-11-20 | Stop reason: ALTCHOICE

## 2019-07-19 RX ORDER — SENNOSIDES 8.6 MG
2 TABLET ORAL 2 TIMES DAILY
Refills: 0
Start: 2019-07-19 | End: 2019-11-20 | Stop reason: ALTCHOICE

## 2019-07-19 RX ORDER — POLYETHYLENE GLYCOL 3350 17 G/17G
17 POWDER, FOR SOLUTION ORAL DAILY
Qty: 14 EACH | Refills: 0 | OUTPATIENT
Start: 2019-07-20

## 2019-07-19 RX ORDER — LIDOCAINE 50 MG/G
1 PATCH TOPICAL DAILY
Qty: 30 PATCH | Refills: 0
Start: 2019-07-19 | End: 2019-11-20 | Stop reason: ALTCHOICE

## 2019-07-19 RX ORDER — MUSCLE RUB CREAM 100; 150 MG/G; MG/G
CREAM TOPICAL 4 TIMES DAILY PRN
Qty: 1 TUBE | Refills: 0 | OUTPATIENT
Start: 2019-07-19

## 2019-07-19 RX ORDER — SODIUM CHLORIDE 1000 MG
1 TABLET, SOLUBLE MISCELLANEOUS
Qty: 30 TABLET | Refills: 0 | OUTPATIENT
Start: 2019-07-19

## 2019-07-19 RX ORDER — GABAPENTIN 100 MG/1
100 CAPSULE ORAL
Refills: 0
Start: 2019-07-19 | End: 2019-11-20 | Stop reason: ALTCHOICE

## 2019-07-19 RX ORDER — OXYCODONE HYDROCHLORIDE 5 MG/1
2.5 TABLET ORAL EVERY 6 HOURS PRN
Qty: 30 TABLET | Refills: 0
Start: 2019-07-19 | End: 2019-07-19

## 2019-07-19 RX ORDER — MUSCLE RUB CREAM 100; 150 MG/G; MG/G
CREAM TOPICAL 4 TIMES DAILY PRN
Refills: 0
Start: 2019-07-19 | End: 2019-11-20 | Stop reason: ALTCHOICE

## 2019-07-19 RX ORDER — ACETAMINOPHEN 325 MG/1
975 TABLET ORAL EVERY 8 HOURS SCHEDULED
Refills: 0
Start: 2019-07-19 | End: 2019-11-20 | Stop reason: ALTCHOICE

## 2019-07-19 RX ORDER — POLYETHYLENE GLYCOL 3350 17 G/17G
17 POWDER, FOR SOLUTION ORAL DAILY
Refills: 0 | Status: ON HOLD
Start: 2019-07-20 | End: 2021-02-13 | Stop reason: SDUPTHER

## 2019-07-19 RX ORDER — OXYCODONE HYDROCHLORIDE 5 MG/1
2.5 TABLET ORAL EVERY 6 HOURS PRN
Qty: 30 TABLET | Refills: 0 | OUTPATIENT
Start: 2019-07-19 | End: 2019-07-29

## 2019-07-19 RX ORDER — SODIUM CHLORIDE 1000 MG
1 TABLET, SOLUBLE MISCELLANEOUS
Status: DISCONTINUED | OUTPATIENT
Start: 2019-07-19 | End: 2019-07-19 | Stop reason: HOSPADM

## 2019-07-19 RX ORDER — SENNOSIDES 8.6 MG
2 TABLET ORAL 2 TIMES DAILY
Qty: 120 EACH | Refills: 0 | OUTPATIENT
Start: 2019-07-19

## 2019-07-19 RX ORDER — GABAPENTIN 100 MG/1
100 CAPSULE ORAL
Qty: 30 CAPSULE | Refills: 0 | OUTPATIENT
Start: 2019-07-19

## 2019-07-19 RX ORDER — SODIUM CHLORIDE 1000 MG
1 TABLET, SOLUBLE MISCELLANEOUS
Refills: 0
Start: 2019-07-19 | End: 2020-06-12 | Stop reason: SDUPTHER

## 2019-07-19 RX ORDER — LIDOCAINE 50 MG/G
1 PATCH TOPICAL DAILY
Qty: 30 PATCH | Refills: 0
Start: 2019-07-19 | End: 2019-07-19

## 2019-07-19 RX ORDER — LIDOCAINE 50 MG/G
1 PATCH TOPICAL DAILY
Qty: 30 PATCH | Refills: 0 | OUTPATIENT
Start: 2019-07-19

## 2019-07-19 RX ORDER — ACETAMINOPHEN 325 MG/1
975 TABLET ORAL EVERY 8 HOURS SCHEDULED
Qty: 30 TABLET | Refills: 0 | OUTPATIENT
Start: 2019-07-19

## 2019-07-19 RX ADMIN — IRBESARTAN 150 MG: 300 TABLET, FILM COATED ORAL at 09:50

## 2019-07-19 RX ADMIN — ACETAMINOPHEN 975 MG: 325 TABLET, FILM COATED ORAL at 05:21

## 2019-07-19 RX ADMIN — SENNOSIDES 17.2 MG: 8.6 TABLET, FILM COATED ORAL at 09:47

## 2019-07-19 RX ADMIN — CARVEDILOL 6.25 MG: 6.25 TABLET, FILM COATED ORAL at 09:47

## 2019-07-19 RX ADMIN — CLOTRIMAZOLE AND BETAMETHASONE DIPROPIONATE: 10; .64 CREAM TOPICAL at 09:52

## 2019-07-19 RX ADMIN — ENOXAPARIN SODIUM 40 MG: 40 INJECTION SUBCUTANEOUS at 09:47

## 2019-07-19 RX ADMIN — VITAMIN D, TAB 1000IU (100/BT) 1000 UNITS: 25 TAB at 09:47

## 2019-07-19 RX ADMIN — ACETAMINOPHEN 975 MG: 325 TABLET, FILM COATED ORAL at 13:11

## 2019-07-19 RX ADMIN — POLYETHYLENE GLYCOL 3350 17 G: 17 POWDER, FOR SOLUTION ORAL at 09:47

## 2019-07-19 RX ADMIN — Medication 1 TABLET: at 09:47

## 2019-07-19 RX ADMIN — SODIUM CHLORIDE TAB 1 GM 1 G: 1 TAB at 12:24

## 2019-07-19 RX ADMIN — OXYCODONE HYDROCHLORIDE 2.5 MG: 5 TABLET ORAL at 13:11

## 2019-07-19 RX ADMIN — SODIUM CHLORIDE TAB 1 GM 1 G: 1 TAB at 09:50

## 2019-07-19 NOTE — PLAN OF CARE
Problem: Potential for Falls  Goal: Patient will remain free of falls  Description  INTERVENTIONS:  - Assess patient frequently for physical needs  -  Identify cognitive and physical deficits and behaviors that affect risk of falls    -  [place] fall precautions as indicated by assessment   - Educate patient/family on patient safety including physical limitations  - Instruct patient to call for assistance with activity based on assessment  - Modify environment to reduce risk of injury  - Consider OT/PT consult to assist with strengthening/mobility  Outcome: Progressing     Problem: GENITOURINARY - ADULT  Goal: Maintains or returns to baseline urinary function  Description  INTERVENTIONS:  - Assess urinary function  - Encourage oral fluids to ensure adequate hydration  - Administer IV fluids as ordered to ensure adequate hydration  - Administer ordered medications as needed  - Offer frequent toileting  - Follow urinary retention protocol if ordered  Outcome: Progressing  Goal: Absence of urinary retention  Description  INTERVENTIONS:  - Assess patients ability to void and empty bladder  - Monitor I/O  - Bladder scan as needed  - Discuss with physician/AP medications to alleviate retention as needed  - Discuss catheterization for long term situations as appropriate  Outcome: Progressing     Problem: METABOLIC, FLUID AND ELECTROLYTES - ADULT  Goal: Electrolytes maintained within normal limits  Description  INTERVENTIONS:  - Monitor labs and assess patient for signs and symptoms of electrolyte imbalances  - Administer electrolyte replacement as ordered  - Monitor response to electrolyte replacements, including repeat lab results as appropriate  - Instruct patient on fluid and nutrition as appropriate  Outcome: Progressing     Problem: Prexisting or High Potential for Compromised Skin Integrity  Goal: Skin integrity is maintained or improved  Description  INTERVENTIONS:  - Identify patients at risk for skin breakdown  - Assess and monitor skin integrity  - Assess and monitor nutrition and hydration status  - Monitor labs (i e  albumin)  - Assess for incontinence   - Turn and reposition patient  - Assist with mobility/ambulation  - Relieve pressure over bony prominences  - Avoid friction and shearing  - Provide appropriate hygiene as needed including keeping skin clean and dry  - Evaluate need for skin moisturizer/barrier cream  - Collaborate with interdisciplinary team (i e  Nutrition, Rehabilitation, etc )   - Patient/family teaching  Outcome: Progressing     Problem: PAIN - ADULT  Goal: Verbalizes/displays adequate comfort level or baseline comfort level  Description  Interventions:  - Encourage patient to monitor pain and request assistance  - Assess pain using appropriate pain scale  - Administer analgesics based on type and severity of pain and evaluate response  - Implement non-pharmacological measures as appropriate and evaluate response  - Consider cultural and social influences on pain and pain management  - Notify physician/advanced practitioner if interventions unsuccessful or patient reports new pain  Outcome: Progressing     Problem: DISCHARGE PLANNING  Goal: Discharge to home or other facility with appropriate resources  Description  INTERVENTIONS:  - Identify barriers to discharge w/patient and caregiver  - Arrange for needed discharge resources and transportation as appropriate  - Identify discharge learning needs (meds, wound care, etc )  - Arrange for interpretive services to assist at discharge as needed  - Refer to Case Management Department for coordinating discharge planning if the patient needs post-hospital services based on physician/advanced practitioner order or complex needs related to functional status, cognitive ability, or social support system  Outcome: Progressing     Problem: Knowledge Deficit  Goal: Patient/family/caregiver demonstrates understanding of disease process, treatment plan, medications, and discharge instructions  Description  Complete learning assessment and assess knowledge base    Interventions:  - Provide teaching at level of understanding  - Provide teaching via preferred learning methods  Outcome: Progressing     Problem: RESPIRATORY - ADULT  Goal: Achieves optimal ventilation and oxygenation  Description  INTERVENTIONS:  - Assess for changes in respiratory status  - Assess for changes in mentation and behavior  - Position to facilitate oxygenation and minimize respiratory effort  - Oxygen administration by appropriate delivery method based on oxygen saturation (per order) or ABGs  - Initiate smoking cessation education as indicated  - Encourage broncho-pulmonary hygiene including cough, deep breathe, Incentive Spirometry  - Assess the need for suctioning and aspirate as needed  - Assess and instruct to report SOB or any respiratory difficulty  - Respiratory Therapy support as indicated  Outcome: Progressing     Problem: MUSCULOSKELETAL - ADULT  Goal: Maintain or return mobility to safest level of function  Description  INTERVENTIONS:  - Assess patient's ability to carry out ADLs; assess patient's baseline for ADL function and identify physical deficits which impact ability to perform ADLs (bathing, care of mouth/teeth, toileting, grooming, dressing, etc )  - Assess/evaluate cause of self-care deficits   - Assess range of motion  - Assess patient's mobility; develop plan if impaired  - Assess patient's need for assistive devices and provide as appropriate  - Encourage maximum independence but intervene and supervise when necessary  - Involve family in performance of ADLs  - Assess for home care needs following discharge   - Request OT consult to assist with ADL evaluation and planning for discharge  - Provide patient education as appropriate  Outcome: Progressing

## 2019-07-19 NOTE — DISCHARGE INSTRUCTIONS
Dear Jojo Yeager,     It was our pleasure to care for you here at [facility]   It is our hope that we were always able to meet and exceed the expected standards for your care during your stay  You were hospitalized due to a fracture in your lumbar vertebra   You were cared for on the 3rd floor under the service of Italia Casillas MD with the [facility] who covers for your primary care physician (PCP), Emma James MD, while you were hospitalized  If you have any questions or concerns related to this hospitalization, you may contact us at [phone]  For follow up, we recommend that you follow up with your primary care physician  Please review this entire discharge summary as additional general instructions may be provided later as well  However, at this time we provide for you here, the most important instructions / recommendations at discharge:     · Continue your home medication, and the prescribed pain medication  · Take NaCl (salt) tablets 3 times a day with meals  · Do a Basic Metabolic Panel (blood test) in 3 days - will be done at your Rehabilitation center  · Restrict oral fluid intake to 1800 ml/day  Neurosurgery discharge instructions following spine fracture:    LSO brace to be worn when out of bed of head of bed greater than 45 degrees  May place brace on while sitting on edge of bed  May be removed for showering   No bending, twisting or heavy lifting  No strenuous activities  No Driving   Follow up with the neurosurgery group in 2 weeks  2-3 days prior to your appointment complete upright x-rays at any [address] [place] [address] facility  The script for the x-ray is electronic      Sincerely,     Italia Casillas MD  **Please notify MD immediately if you have increased back or leg pain  New numbness, tingling and/or weakness in your legs  **      Hyponatremia   WHAT YOU NEED TO KNOW:   What is hyponatremia?   Hyponatremia occurs when the amount of sodium (salt) in your blood is lower than normal  Sodium is an electrolyte (mineral) that helps your muscles, heart, and digestive system work properly  It helps control blood pressure and fluid balance  What causes hyponatremia? Hyponatremia happens when too much sodium leaves your body, or when more water than sodium stays in your blood  Any of the following conditions can lead to hyponatremia:  · A diet that is low in sodium    · Drinking too much water or receiving too much fluid through an IV    · Intense and prolonged exercise that causes excessive sweating    · Medical conditions, such as [place] disease, kidney disease, congestive heart failure, liver cirrhosis, or cancer    · Medicines, such as diuretics, antidepressants, pain medicines, or illegal drugs such as ecstasy    · Dehydration  What are the signs and symptoms of hyponatremia? You may have no signs or symptoms  Symptoms may start to appear when the amount of sodium in your blood drops too low or too fast  You may have any of the following:  · Abdominal cramps, nausea, or vomiting    · Headache, confusion, hallucinations, or trouble staying awake    · Muscle weakness or cramps     · Seizures or coma  How is hyponatremia diagnosed? Your healthcare provider will ask you about the medicines you take  He will do a physical exam to look for signs of swelling caused by fluid retention (extra water in your body)  · Blood tests  will be done to check the level of sodium in your blood  They may also be done to find the cause of your hyponatremia  · Urine sodium  is a test that checks the level of sodium in your urine  A sample of your urine is collected and is sent to a lab for tests  How is hyponatremia treated? Treatment depends on the cause of your hyponatremia and how severe it is  Healthcare providers may limit the amount of liquids you drink if you are retaining water   A salt solution may be given through an IV to increase the amount of sodium in your blood  Medicines may also be given to help get rid of extra fluid in your body  You may urinate more often while taking these medicines  When should I contact my healthcare provider? · You have muscle cramps or twitching  · You feel very weak or tired  · You have nausea or are vomiting  · You have questions or concerns about your condition or care  When should I seek immediate care or call 911? · You have a seizure  · You have an irregular heartbeat  · You have trouble breathing  · You cannot move your arms and legs  · You are confused or cannot think clearly  CARE AGREEMENT:   You have the right to help plan your care  Learn about your health condition and how it may be treated  Discuss treatment options with your caregivers to decide what care you want to receive  You always have the right to refuse treatment  The above information is an  only  It is not intended as medical advice for individual conditions or treatments  Talk to your doctor, nurse or pharmacist before following any medical regimen to see if it is safe and effective for you  © 2017 [employer] Information is for End User's use only and may not be sold, redistributed or otherwise used for commercial purposes  All illustrations and images included in CareNotes® are the copyrighted property of A D A M [employer]  or Raul Rajan

## 2019-07-19 NOTE — ASSESSMENT & PLAN NOTE
Her urine microscopy showed 10-20 WBC, and innumerable bacteria on admission  Patient did not have fever or chills  She did not complain of dysuria, frequency, flank pain or hematuria  Examination showed no renal angle tenderness  Urine culture reports 100 000 hpf E coli growth  As the patient is asymptomatic we discontinued her antibiotics

## 2019-07-19 NOTE — ASSESSMENT & PLAN NOTE
She did not complain chest pain, palpitations, or shortness of breath during her stay in the hospital  She is on aspirin but no other anticoagulants  She is a fall risk  · Rate / Rhythm control -   · Continue on amiodarone and carvedilol    · Anticoagulation -   ·  Continue aspirin 81 mg daily

## 2019-07-19 NOTE — TRANSPORTATION MEDICAL NECESSITY
Section I - General Information    Name of Patient: Simone Souza                 : 10/24/1927    Medicare #: [id number]  Transport Date: 19 (PCS is valid for round trips on this date and for all repetitive trips in the 60-day range as noted below )  Origin: [address]: [facility]  Is the pt's stay covered under Medicare Part A (PPS/DRG)   [x]     Closest appropriate facility? If no, why is transport to more distant facility required? Yes  If hospice pt, is this transport related to pt's terminal illness? NA       Section II - Medical Necessity Questionnaire  Ambulance transportation is medically necessary only if other means of transport are contraindicated or would be potentially harmful to the patient  To meet this requirement, the patient must either be "bed confined" or suffer from a condition such that transport by means other than ambulance is contraindicated by the patient's condition  The following questions must be answered by the medical professional signing below for this form to be valid:    1)  Describe the MEDICAL CONDITION (physical and/or mental) of this patient AT [address] [place] [address] that requires the patient to be transported in an ambulance and why transport by other means is contraindicated by the patient's condition: MOderate to severe pain on movement  Bed bound    2) Is the patient "bed confined" as defined below? Yes  To be "be confined" the patient must satisfy all three of the following conditions: (1) unable to get up from bed without Assistance; AND (2) unable to ambulate; AND (3) unable to sit in a chair or wheelchair  3) Can this patient safely be transported by car or wheelchair van (i e , seated during transport without a medical attendant or monitoring)?    No    4) In addition to completing questions 1-3 above, please check any of the following conditions that apply*:   *Note: supporting documentation for any boxes checked must be maintained in the patient's medical records  If hosp-hosp transfer, describe services needed at 2nd facility not available at 1st facility? Non-Healed Fractures  Moderate/severe pain on movement   Unable to tolerate seated position for time needed to transport   Other(specify) bed bound      Section III - Signature of Physician or Healthcare Professional  I certify that the above information is true and correct based on my evaluation of this patient, and represent that the patient requires transport by ambulance and that other forms of transport are contraindicated  I understand that this information will be used by the Centers for Medicare and Medicaid Services (CMS) to support the determination of medical necessity for ambulance services, and I represent that I have personal knowledge of the patient's condition at time of transport  []  If this box is checked, I also certify that the patient is physically or mentally incapable of signing the ambulance service's claim and that the institution with which I am affiliated has furnished care, services, or assistance to the patient  My signature below is made on behalf of the patient pursuant to 42 CFR §424 36(b)(4)  In accordance with 42 CFR §424 37, the specific reason(s) that the patient is physically or mentally incapable of signing the claim form is as follows: NA       Signature of Physician* or Healthcare Professional______________________________________________________________  Signature Date 07/19/19 @ 1400 (For scheduled repetitive transports, this form is not valid for transports performed more than 60 days after this date)    Printed Name & Credentials of Physician or Healthcare Professional (MD, DO, RN, etc )______PHILIP Henriquez___  *Form must be signed by patient's attending physician for scheduled, repetitive transports   For non-repetitive, unscheduled ambulance transports, if unable to obtain the signature of the attending physician, any of the following may sign (choose appropriate option below)  [] Physician Assistant []  Clinical Nurse Specialist []  Registered Nurse  []  Nurse Practitioner  [x] Discharge Planner

## 2019-07-19 NOTE — SOCIAL WORK
Niece has decided she prefers to have pt go to [facility] at DC  Pt is stable for DC to rehab  Transportation has been arranged through [place] [facility] with [facility]  As pt is bed bound, has non healed fractures and has moderate to severe pain   time is 1445  Pt, Niece Andi Natarajan), nursing ([facility]  [facility]), [facility] resident Yesika Jenkins) and [facility] are aware of DC arrangements  IMM has been reviewed with pt and Pt's niece  LMN has been completed, faxed to [facility] and given to nurse  No further cm interventions needed at this time

## 2019-07-19 NOTE — ASSESSMENT & PLAN NOTE
X ray of the left foot showed no fracture or dislocation of the 2nd toe  Patient does not complain of pain

## 2019-07-19 NOTE — PLAN OF CARE
Problem: Potential for Falls  Goal: Patient will remain free of falls  Description  INTERVENTIONS:  - Assess patient frequently for physical needs  -  Identify cognitive and physical deficits and behaviors that affect risk of falls    -  Rinard fall precautions as indicated by assessment   - Educate patient/family on patient safety including physical limitations  - Instruct patient to call for assistance with activity based on assessment  - Modify environment to reduce risk of injury  - Consider OT/PT consult to assist with strengthening/mobility  Outcome: Completed     Problem: GENITOURINARY - ADULT  Goal: Maintains or returns to baseline urinary function  Description  INTERVENTIONS:  - Assess urinary function  - Encourage oral fluids to ensure adequate hydration  - Administer IV fluids as ordered to ensure adequate hydration  - Administer ordered medications as needed  - Offer frequent toileting  - Follow urinary retention protocol if ordered  Outcome: Completed  Goal: Absence of urinary retention  Description  INTERVENTIONS:  - Assess patients ability to void and empty bladder  - Monitor I/O  - Bladder scan as needed  - Discuss with physician/AP medications to alleviate retention as needed  - Discuss catheterization for long term situations as appropriate  Outcome: Completed     Problem: METABOLIC, FLUID AND ELECTROLYTES - ADULT  Goal: Electrolytes maintained within normal limits  Description  INTERVENTIONS:  - Monitor labs and assess patient for signs and symptoms of electrolyte imbalances  - Administer electrolyte replacement as ordered  - Monitor response to electrolyte replacements, including repeat lab results as appropriate  - Instruct patient on fluid and nutrition as appropriate  Outcome: Completed     Problem: Prexisting or High Potential for Compromised Skin Integrity  Goal: Skin integrity is maintained or improved  Description  INTERVENTIONS:  - Identify patients at risk for skin breakdown  - Assess and monitor skin integrity  - Assess and monitor nutrition and hydration status  - Monitor labs (i e  albumin)  - Assess for incontinence   - Turn and reposition patient  - Assist with mobility/ambulation  - Relieve pressure over bony prominences  - Avoid friction and shearing  - Provide appropriate hygiene as needed including keeping skin clean and dry  - Evaluate need for skin moisturizer/barrier cream  - Collaborate with interdisciplinary team (i e  Nutrition, Rehabilitation, etc )   - Patient/family teaching  Outcome: Completed     Problem: PAIN - ADULT  Goal: Verbalizes/displays adequate comfort level or baseline comfort level  Description  Interventions:  - Encourage patient to monitor pain and request assistance  - Assess pain using appropriate pain scale  - Administer analgesics based on type and severity of pain and evaluate response  - Implement non-pharmacological measures as appropriate and evaluate response  - Consider cultural and social influences on pain and pain management  - Notify physician/advanced practitioner if interventions unsuccessful or patient reports new pain  Outcome: Completed     Problem: DISCHARGE PLANNING  Goal: Discharge to home or other facility with appropriate resources  Description  INTERVENTIONS:  - Identify barriers to discharge w/patient and caregiver  - Arrange for needed discharge resources and transportation as appropriate  - Identify discharge learning needs (meds, wound care, etc )  - Arrange for interpretive services to assist at discharge as needed  - Refer to Case Management Department for coordinating discharge planning if the patient needs post-hospital services based on physician/advanced practitioner order or complex needs related to functional status, cognitive ability, or social support system  Outcome: Completed     Problem: Knowledge Deficit  Goal: Patient/family/caregiver demonstrates understanding of disease process, treatment plan, medications, and discharge instructions  Description  Complete learning assessment and assess knowledge base    Interventions:  - Provide teaching at level of understanding  - Provide teaching via preferred learning methods  Outcome: Completed     Problem: RESPIRATORY - ADULT  Goal: Achieves optimal ventilation and oxygenation  Description  INTERVENTIONS:  - Assess for changes in respiratory status  - Assess for changes in mentation and behavior  - Position to facilitate oxygenation and minimize respiratory effort  - Oxygen administration by appropriate delivery method based on oxygen saturation (per order) or ABGs  - Initiate smoking cessation education as indicated  - Encourage broncho-pulmonary hygiene including cough, deep breathe, Incentive Spirometry  - Assess the need for suctioning and aspirate as needed  - Assess and instruct to report SOB or any respiratory difficulty  - Respiratory Therapy support as indicated  Outcome: Completed     Problem: MUSCULOSKELETAL - ADULT  Goal: Maintain or return mobility to safest level of function  Description  INTERVENTIONS:  - Assess patient's ability to carry out ADLs; assess patient's baseline for ADL function and identify physical deficits which impact ability to perform ADLs (bathing, care of mouth/teeth, toileting, grooming, dressing, etc )  - Assess/evaluate cause of self-care deficits   - Assess range of motion  - Assess patient's mobility; develop plan if impaired  - Assess patient's need for assistive devices and provide as appropriate  - Encourage maximum independence but intervene and supervise when necessary  - Involve family in performance of ADLs  - Assess for home care needs following discharge   - Request OT consult to assist with ADL evaluation and planning for discharge  - Provide patient education as appropriate  Outcome: Completed

## 2019-07-19 NOTE — ASSESSMENT & PLAN NOTE
During her hospital stay,PRN IV hydralazine was added to control hypertension  However, she did not require IV hydralazine since we replaced Losartan with her home medication Irbesartan  Plan - Control pain (see above)     Continue home antihypertensives

## 2019-07-19 NOTE — ASSESSMENT & PLAN NOTE
Per family, she has had arthritis since the age of 3 and actually had hospitalizations even as a child for arthritis  Somewhere in her teenage years she did have a surgery in her right hip which left her with a very stiff right hip and subsequently increased arthritis in the knees due to compensation    Plan - Continue PT

## 2019-07-19 NOTE — DISCHARGE SUMMARY
Discharge- Tyler Dickens 10/24/1927, 80 y o  female MRN: [id number]    Unit/Bed#: -01 Encounter: [id number]    Primary Care Provider: Emily Araujo MD   Date and time admitted to hospital: 7/13/2019  9:22 AM        * Closed compression fracture of L3 lumbar vertebra, initial encounter [facility])  Assessment & Plan     Lumbar MRI demonstrated closed linear fracture of inferior L3 vertebral without displacement or ligamentous injury  Patient's pain was controlled with oxycodone 2 5mg  (patient did not tolerate oxycodone 5 mg)  · Evaluation - Physical therapy assessment-continued skilled therapy to increase functional independence and aid patient in return to PLOF  Discharge recommendation is Rehab     Xray lumbar spine (with brace) was taken - Stable appearance of inferior L3 endplate fracture   No retropulsion or significant loss of height     Neurosurgery was consulted- no surgical interventions were suggested at this time  · Plan - Conservative management including outpatient mild pain management, physical therapy and LSO brace  Neurosurgery follow up in 2 weeks  with another upright lumbar spine x-rays in brace  Baseline Pain control -   · Tylenol 975 mg q8h  · Lidoderm patch daily  · Gabapentin 100mg at bedtime      PRN Pain Control -  · Oxycodone 2 5 mg q6h PRN  · Muscle Relaxer - Bengay cream   Avoid systemic muscle relaxer if possible in geriatric population  Monitor Pain levels  Hyponatremia  Assessment & Plan   Patients past medical history mentions that she has had hyponatremia in the past  On 07/15 her labs showed sodium level of 129  Her  subsequent  samples were 127 and 123 consecutively  (Na-135 on admission)  She was started on fluid restriction and NaCl tablets 3 times a day  (Medication list was reviewed for potential drugs causing hyponatremia, and found none) She was not dehydrated, had good urine output    Hyponatremia workup -               Urine osmolality - 223               Urine sodium - 43               Serum osmolality - 260               Uric acid - 1 1               TSH - 1 828  On discharge, patients Na level was - 133 , and was no longer on fluid restriction  Plan - Continue Nacl tablets 1 g three times a day    Fluid restriction to 1800 ml/day    Follow up BMP in 3 days    Monitor patients mental status          Asymptomatic bacteriuria  Assessment & Plan   Her urine microscopy showed 10-20 WBC, and innumerable bacteria on admission  Patient did not have fever or chills  She did not complain of dysuria, frequency, flank pain or hematuria  Examination showed no renal angle tenderness  Urine culture reports 100 000 hpf E coli growth  As the patient is asymptomatic we discontinued her antibiotics  Toe injury, left, initial encounter  Assessment & Plan  X ray of the left foot showed no fracture or dislocation of the 2nd toe  Patient does not complain of pain  Essential hypertension  Assessment & Plan    During her hospital stay,PRN IV hydralazine was added to control hypertension  However, she did not require IV hydralazine since we replaced Losartan with her home medication Irbesartan  Plan - Control pain (see above)  Continue home antihypertensives    Arthritis  Assessment & Plan  Per family, she has had arthritis since the age of 3 and actually had hospitalizations even as a child for arthritis  Somewhere in her teenage years she did have a surgery in her right hip which left her with a very stiff right hip and subsequently increased arthritis in the knees due to compensation  Plan - Continue PT    Chronic constipation  Assessment & Plan  Currently she has normal bowel movements  Plan - Continue Senna 2 tabs, 2 times a day and MiraLax daily      Ambulatory dysfunction  Assessment & Plan  · Fall precautions / bed alarm  · PT evaluation and treatment in the outpatient setting      PAF (paroxysmal atrial fibrillation) ([facility])  Assessment & Plan  She did not complain chest pain, palpitations, or shortness of breath during her stay in the hospital  She is on aspirin but no other anticoagulants  She is a fall risk  · Rate / Rhythm control -   · Continue on amiodarone and carvedilol  · Anticoagulation -   ·  Continue aspirin 81 mg daily    Discharging Physician / Practitioner: Ken Mezt MD  PCP: Garrett Montanez MD  Admission Date:   Admission Orders (From admission, onward)    Ordered        07/13/19 1246  Inpatient Admission  Once             Discharge Date: 07/19/19    Resolved Problems  Date Reviewed: 7/14/2019    None          Consultations During Hospital Stay:  · Neurosurgery, Physical therapy    Procedures Performed:   · None     Significant Findings / Test Results:     BMP - Patients Serum Na levels started to decrease since the 2nd day of her admission  The lowest recorded was 123 and trended up to a Na level of 134 on discharge  Serum K - 4 3, chloride - 102, BUN - 12, Creatinine - 0 62, eGFR - 79  Serum osmolality - 260  Random cortisol - 9 8  TSH - 1 828  Urine osmolality - 233  Serum uric acid - 1 1  CT cervical spine without contrast - No acute compression collapse of the vertebra  CT head without contrast - no acute intracranial hemorrhage seen, No extra-axial collection seen, No mass effect or midline shift seen  CT chest, abdomen, and pelvis with contrast -  Distended urinary bladder with mild prominence of the both renal pelvicalyceal system and ureters, Evaluate clinically for emptying of the bladder  A 7 3 x 5 9 x 1 8 cm rounded fluid-filled structure cystic area posterior to the urinary bladder may represent a cystic pelvic/adnexal lesion, less likely may represent a posteriorly directed diverticulum from urinary bladder   Consider nonemergent   No acute compression collapse of the vertebra  Cholelithiasis  MRI lumbar spine without contrast - There is a linear fracture through the inferior aspect of the body of the L3 vertebra without associated the loss of vertebral height  There is no retropulsion of posterior vertebral margin  Intact posterior elements  No acute ligamentous injury   X ray left foot - Somewhat limited examination demonstrating no convincing evidence of fracture or dislocation  X ray lumbar spine (with brace) - Stable appearance of inferior L3 endplate fracture   No retropulsion or significant loss of height  Incidental Findings:   CT chest, abdomen, and pelvis with contrast -  Distended urinary bladder with mild prominence of the both renal pelvicalyceal system and ureters, Evaluate clinically for emptying of the bladder  A 7 3 x 5 9 x 1 8 cm rounded fluid-filled structure cystic area posterior to the urinary bladder may represent a cystic pelvic/adnexal lesion, less likely may represent a posteriorly directed diverticulum from urinary bladder   Consider nonemergent    Test Results Pending at Discharge (will require follow up):   · none     Outpatient Tests Requested:  · none    Complications:  none    Reason for Admission: Lumbar vertebral fracture    Hospital Course:     Asya Recinos is a 80 y o  female patient who originally presented to the hospital on 7/13/2019 due to lumbar back pain which started 3 days before her admission  She has a history of diffuse arthritis (since age 3 years), diverticulitis, and overactive negative  Her back pain was shooting in nature, was present initially only with standing and movement, and later became persistent  It did not radiate to lower limbs, and on presentation to the ED, was of 7/10 intensity  Tylenol taken at home did not relieve her symptoms, and her usual heating pad application made the pain worse  She did not recall recent trauma to the back, or falls in the past few weeks  (had a fall in May, but she did not sustain injuries)  She did not have dysuria, hematuria, or frequency  There was no history of fever or chills   Her bowel habits were normal with no constipation or diarrhea  ED course- in the ED, she was given morphine and lidocaine patch and her pain subsequently improved, present only with movement  Her BP was elevated on admission to the ED, but it normalized following adequate pain control  Urinary microscopy showed WBC 10-20, and innumerable bacteria  Cultures have been sent  CT scan of the chest, abdomen, and pelvis  Shows now acute compression collapse of the vertebra  Possible cystic pelvic/adnexal lesion, or a posteriorly directed diverticulum from urinary bladder(less likely) was visualized  Please see above list of diagnoses and related plan for additional information  Condition at Discharge: stable     Discharge Day Visit / Exam:     Subjective:  On discharge, patient had no major complaints  She still has pain on movement  She denies fever, chills, rigors, dysuria  She has a good appetite with normal bowel habits  Vitals: Blood Pressure: 160/64 (07/19/19 0808)  Pulse: 62 (07/19/19 0700)  Temperature: 98 3 °F (36 8 °C) (07/19/19 0700)  Temp Source: Oral (07/19/19 0700)  Respirations: 16 (07/19/19 0700)  Height: 5' 3" (160 cm) (07/13/19 1500)  Weight - Scale: 56 kg (123 lb 7 3 oz) (07/13/19 1500)  SpO2: 97 % (07/19/19 0700)  Exam:   Review of Systems   Constitutional: Negative for appetite change, chills, diaphoresis, fatigue, fever and unexpected weight change  HENT: Negative  Eyes: Negative  Respiratory: Negative for cough, chest tightness and shortness of breath  Cardiovascular: Negative for chest pain, palpitations and leg swelling  Gastrointestinal: Negative for abdominal distention, abdominal pain, anal bleeding, blood in stool, constipation, diarrhea, nausea and rectal pain  Endocrine: Negative  Genitourinary: Negative for difficulty urinating, dysuria, flank pain, frequency, hematuria and pelvic pain  Musculoskeletal: Positive for arthralgias, back pain and joint swelling  Skin: Negative for pallor  Allergic/Immunologic: Negative  Neurological: Negative  Hematological: Does not bruise/bleed easily  Psychiatric/Behavioral: Negative for behavioral problems, confusion and decreased concentration (mild forgetfulness due to age related dementia)  The patient is nervous/anxious  All other systems reviewed and are negative      Discussion with Family: neice    Discharge instructions/Information to patient and family:   See after visit summary for information provided to patient and family  Provisions for Follow-Up Care:  See after visit summary for information related to follow-up care and any pertinent home health orders  Disposition:     Acute Rehab at [facility]      Planned Readmission: none     Discharge Statement:  I spent 28 minutes discharging the patient  This time was spent on the day of discharge  I had direct contact with the patient on the day of discharge  Greater than 50% of the total time was spent examining patient, answering all patient questions, arranging and discussing plan of care with patient as well as directly providing post-discharge instructions  Additional time then spent on discharge activities  Discharge Medications:  See after visit summary for reconciled discharge medications provided to patient and family        ** Please Note: This note has been constructed using a voice recognition system **

## 2019-07-22 ENCOUNTER — TRANSCRIBE ORDERS (OUTPATIENT)
Dept: NEUROSURGERY | Facility: CLINIC | Age: 84
End: 2019-07-22

## 2019-07-22 ENCOUNTER — NURSING HOME VISIT (OUTPATIENT)
Dept: GERIATRICS | Facility: OTHER | Age: 84
End: 2019-07-22
Payer: MEDICARE

## 2019-07-22 DIAGNOSIS — I48.0 PAF (PAROXYSMAL ATRIAL FIBRILLATION) (HCC): Chronic | ICD-10-CM

## 2019-07-22 DIAGNOSIS — M19.90 ARTHRITIS: ICD-10-CM

## 2019-07-22 DIAGNOSIS — S32.030A CLOSED COMPRESSION FRACTURE OF L3 LUMBAR VERTEBRA, INITIAL ENCOUNTER (HCC): Primary | ICD-10-CM

## 2019-07-22 DIAGNOSIS — I10 ESSENTIAL HYPERTENSION: ICD-10-CM

## 2019-07-22 DIAGNOSIS — K59.09 CHRONIC CONSTIPATION: ICD-10-CM

## 2019-07-22 DIAGNOSIS — R82.71 ASYMPTOMATIC BACTERIURIA: ICD-10-CM

## 2019-07-22 DIAGNOSIS — R26.2 AMBULATORY DYSFUNCTION: ICD-10-CM

## 2019-07-22 DIAGNOSIS — E87.1 HYPONATREMIA: Chronic | ICD-10-CM

## 2019-07-22 PROCEDURE — 99306 1ST NF CARE HIGH MDM 50: CPT | Performed by: FAMILY MEDICINE

## 2019-07-22 NOTE — ASSESSMENT & PLAN NOTE
· Per records, patient has had a longstanding history of arthritis requiring multiple hospitalizations in the past   · Currently pain is well controlled    · Continue PT

## 2019-07-22 NOTE — ASSESSMENT & PLAN NOTE
· Per records, history of prior episodes of hyponatremia  · On hospital admission her sodium level was borderline low at 135  During hospitalization, she was noted to have hyponatremia with a sodium as low as 123  · Felt to be multifactorial in origin in the setting of steroid use, poor oral intake, dehydration  · After appropriate workup, patient was started on a fluid restriction to 1800 cc per day as well as sodium tablets 3 times a day  · Will check a BMP to monitor sodium levels  · Continue fluid restrictions and sodium tablets as ordered

## 2019-07-22 NOTE — ASSESSMENT & PLAN NOTE
· Initial UA in the ED was positive for 10-20 WBC in numerable bacteria  · Eventually her urine culture was positive for greater than 100,000 CFU per mL of E coli  However, patient remained completely asymptomatic throughout hospitalization and decision was made to discontinue her antibiotic therapy

## 2019-07-22 NOTE — PROGRESS NOTES
[address]  [address] [facility]  [phone]    [place]  HISTORY & PHYSICAL        NAME: Eliot Scanlon  AGE: 80 y o  SEX: female  : 10/24/1927  DATE OF ENCOUNTER: 19  POS: 31 (SNF)  PCP: Sravantih Whitaker MD    Chief Complaint     Seen and examined today for admission to short term rehabilitation unit at [address] [place] [address]  History of Present Illness     79 yo F with PMH of diffuse arthritis, diverticulitis, and overactive bladder  She was admitted to [facility] 19-19 due to worsening lumbar back pain x 3 days  Patient presented to the ED complaining of shooting pain on her lumbar region that was worse with standing and movement and eventually became constant  Was previously taking Tylenol without relief and her regular heating pad was making it worse  No hx of recent trauma or falls reported  Patient to the ED, she was found to be hypertensive  Her pain was initially treated with morphine and lidocaine in the ED with improvement of her blood pressure and subsequent normalization of HTN  Initial UA was positive for 10-20 WBC and innumerable bacteria  Urine culture eventually grew greater than 100,000 CFU per mL E coli  However, her antibiotics were discontinued in the inpatient setting given that patient was completely asymptomatic and afebrile  Patient underwent an MRI of the lumbar spine that demonstrated a linear fracture through the inferior aspect of the body of L3  No loss of vertebral body head or retropulsion  No ligamentous injury  She was evaluated by Neurosurgery who recommended conservative therapy with pain management, physical therapy and LSO brace  Recommendation was made to follow up as outpatient in 2 weeks with a repeat upright lumbar spine x-ray in brace  During hospitalization patient had a transient episode of delirium that resolved spontaneously a couple of hours later    It was attributed to steroid use, higher dose of oxycodone (5 mg), dehydration and hyponatremia  Her hyponatremia was attributed to dehydration versus steroid effect, steroids were held and patient was given IV fluids with improvement of her sodium levels  Of note, on hospital admission patient was noted to have a toe injury/bruising on the left 2nd toe  X-rays were performed, no evidence of fracture found  She also has an underlying history of chronic constipation, treated in the hospital with ATC senna and MiraLax plus Metamucil  Patient was evaluated by PT/OT and recommendations made for short-term rehab stay  Once considered medically stable, she was discharged to [facility] ordered  Today, she was seen and examined for admission to STR Unit at [facility]  Ernesto and JEAN PAUL at bedside  Patient is pleasant and somewhat confused but cooperative with exam/interview  She complains of lumbar pain that is intermittent, worse with movement, states today she had difficulty standing with brace due to pain  Patient otherwise offers no other complaints  States she is tolerating diet, denies any nausea/vomiting diarrhea/dysuria  Review of Systems     Review of Systems   Constitutional: Negative for activity change, appetite change, fatigue, fever and unexpected weight change  HENT: Negative for congestion, dental problem, hearing loss and trouble swallowing  Eyes: Negative for visual disturbance  Respiratory: Negative for apnea, cough, choking, chest tightness and shortness of breath  Cardiovascular: Negative for chest pain, palpitations and leg swelling  Gastrointestinal: Negative for abdominal distention, abdominal pain, constipation, diarrhea, nausea and vomiting  Genitourinary: Negative for difficulty urinating, dysuria and flank pain  Musculoskeletal: Positive for arthralgias, back pain and gait problem  Skin: Negative for rash and wound  Psychiatric/Behavioral: Negative for agitation, behavioral problems, confusion and sleep disturbance  History     Allergies   Allergen Reactions    Codeine     Lomotil [Diphenoxylate]     Quinidex [Quinidine]     Adhesive [Medical Tape] Rash    Penicillins Rash       Past Medical History:   Diagnosis Date    A-fib ([address] )     Anxiety     Cheilosis     Diverticulosis     Gallstone     Hiatal hernia     Hyperlipidemia     Hypertension     Hyponatremia     Inguinal hernia     Lacunar infarction (HCC)     Macular degeneration     Osteoarthritis     Renal cyst     Syncope     Tuberculosis     Umbilical hernia     Vertigo      Past Surgical History:   Procedure Laterality Date    HIP SURGERY      At  young age       Family History   Problem Relation Age of Onset    Leukemia Sister     Diabetes Sister     Hypertension Sister     Coronary artery disease Sister     Aortic aneurysm Sister      Social History     Tobacco Use    Smoking status: Never Smoker    Smokeless tobacco: Never Used   Substance Use Topics    Alcohol use: No    Drug use: No         She lives in a 2 story home with niece but has a lift from first floor to second  She ambulates with a walker at baseline and niece provides assistance at home  Objective   Vital Signs   BP: 184/61    HR:63    T:99    RR:20    O2Sat:96%     W:123 lb    Physical Exam   Constitutional: She appears well-developed  No distress  HENT:   Head: Normocephalic and atraumatic  Mouth/Throat: Oropharynx is clear and moist  No oropharyngeal exudate  Eyes: Pupils are equal, round, and reactive to light  Conjunctivae are normal  Right eye exhibits no discharge  Left eye exhibits no discharge  No scleral icterus  Neck: No JVD present  No tracheal deviation present  Cardiovascular: Normal rate and regular rhythm  Exam reveals no gallop and no friction rub  Murmur heard  Pulmonary/Chest: Breath sounds normal  No respiratory distress  Abdominal: Soft  Bowel sounds are normal  She exhibits no distension  There is no tenderness  Musculoskeletal: She exhibits no edema, tenderness or deformity  Neurological: She is alert  She displays normal reflexes  No cranial nerve deficit  Oriented to self  She knows she is here for rehab due to worsening back pain but not oriented to place and or time  She is able to answer questions appropriately and follow single step commands  Skin: No rash noted  She is not diaphoretic  No erythema  No pallor  Pertinent Laboratory/Diagnostic Studies:     Lab Results   Component Value Date    WBC 9 64 07/13/2019    HGB 14 4 07/13/2019    HCT 42 4 07/13/2019     (H) 07/13/2019     07/13/2019     Lab Results   Component Value Date    CALCIUM 8 0 (L) 07/19/2019    K 3 8 07/19/2019    CO2 23 07/19/2019     07/19/2019    BUN 9 07/19/2019    CREATININE 0 52 (L) 07/19/2019     Lab Results   Component Value Date    NGP8DYXAWIJB 1 828 07/16/2019     No results found for: HGBA1C      Current Medications     All medications reviewed and updated in [facility] EMR/Chart  Assessment and Plan     Closed compression fracture of L3 lumbar vertebra, initial encounter ([facility] [address] )  · Patient presented with worsening lumbar pain  · MRI lumbar spine showed closed linear fx of inferior L3 vertebral body without displacement or ligamentous injury  · Evaluated by Neurosurgery, conservative measures recommended including pain control and TLSO brace  She is to follow up in 2 weeks with repeat upright lumbar spine XRay in brace  · Seen by PT, recommended STR  · PT/OT to evaluate and treat as appropriate  · Continue current pain management with Tylenol ATC 975mg Q8H, lidoderm patch, gabapentin 100mg po QHS, Oxycodone 2 5mg po Q6 hr PRN and bengay cream      Ambulatory dysfunction  · Ambulating with assistive device  · PT/OT to evaluate and treat as appropriate  · Fall precautions  · Continue supportive care  Hyponatremia  · Per records, history of prior episodes of hyponatremia    · On hospital admission her sodium level was borderline low at 135  During hospitalization, she was noted to have hyponatremia with a sodium as low as 123  · Felt to be multifactorial in origin in the setting of steroid use, poor oral intake, dehydration  · After appropriate workup, patient was started on a fluid restriction to 1800 cc per day as well as sodium tablets 3 times a day  · Will check a BMP to monitor sodium levels  · Continue fluid restrictions and sodium tablets as ordered  Asymptomatic bacteriuria  · Initial UA in the ED was positive for 10-20 WBC in numerable bacteria  · Eventually her urine culture was positive for greater than 100,000 CFU per mL of E coli  However, patient remained completely asymptomatic throughout hospitalization and decision was made to discontinue her antibiotic therapy  Essential hypertension  · Blood pressure was poorly controlled in the hospital, attributed to pain as well as affected her home medication (irbesartan) had been replaced with formulary medication losartan  Once the family brought in her home medication her blood pressure remained better controlled  · Continue irbesartan at current dose  · Monitor renal function and electrolytes  Arthritis  · Per records, patient has had a longstanding history of arthritis requiring multiple hospitalizations in the past   · Currently pain is well controlled  · Continue PT  PAF (paroxysmal atrial fibrillation) (HCC)  · No complaints of palpitations, chest pain or shortness of breath  · Currently rate controlled, continue carvedilol and amiodarone  · Not on formal anticoagulation, currently only on aspirin 81 mg daily  Chronic constipation  · Requiring ATC senna and MiraLax in the hospital   · Most likely worsening due to use of oxycodone  · Ensure appropriate hydration and continue current protocol  · Continue p r n  bowel regimen as well  Discussed with arcelia and her niece (POA) at bedside   Patient has decided to remain DNR/DNI  Goal is to return home with steven Francisco MD  Geriatric Medicine  07/22/19    Please note:  Voice-recognition software may have been used in the preparation of this document  Occasional wrong word or "sound-alike" substitutions may have occurred due to the inherent limitations of voice recognition software  Interpretation should be guided by context

## 2019-07-22 NOTE — ASSESSMENT & PLAN NOTE
· Blood pressure was poorly controlled in the hospital, attributed to pain as well as affected her home medication (irbesartan) had been replaced with formulary medication losartan  Once the family brought in her home medication her blood pressure remained better controlled  · Continue irbesartan at current dose  · Monitor renal function and electrolytes

## 2019-07-22 NOTE — ASSESSMENT & PLAN NOTE
· Patient presented with worsening lumbar pain  · MRI lumbar spine showed closed linear fx of inferior L3 vertebral body without displacement or ligamentous injury  · Evaluated by Neurosurgery, conservative measures recommended including pain control and TLSO brace  She is to follow up in 2 weeks with repeat upright lumbar spine XRay in brace  · Seen by PT, recommended STR  · PT/OT to evaluate and treat as appropriate    · Continue current pain management with Tylenol ATC 975mg Q8H, lidoderm patch, gabapentin 100mg po QHS, Oxycodone 2 5mg po Q6 hr PRN and bengay cream

## 2019-07-22 NOTE — ASSESSMENT & PLAN NOTE
· No complaints of palpitations, chest pain or shortness of breath  · Currently rate controlled, continue carvedilol and amiodarone  · Not on formal anticoagulation, currently only on aspirin 81 mg daily

## 2019-07-22 NOTE — ASSESSMENT & PLAN NOTE
· Requiring ATC senna and MiraLax in the hospital   · Most likely worsening due to use of oxycodone  · Ensure appropriate hydration and continue current protocol  · Continue p r n  bowel regimen as well

## 2019-07-27 ENCOUNTER — TELEPHONE (OUTPATIENT)
Dept: OTHER | Facility: OTHER | Age: 84
End: 2019-07-27

## 2019-07-27 NOTE — TELEPHONE ENCOUNTER
Please Call [phone] for lab results on patient Gerry Barillas 10/24/1927  Paged Kira Gottlieb

## 2019-07-30 ENCOUNTER — HOSPITAL ENCOUNTER (OUTPATIENT)
Dept: RADIOLOGY | Facility: HOSPITAL | Age: 84
Discharge: HOME | End: 2019-07-30
Payer: COMMERCIAL

## 2019-07-30 DIAGNOSIS — S32.030A CLOSED COMPRESSION FRACTURE OF L3 LUMBAR VERTEBRA, INITIAL ENCOUNTER (HCC): ICD-10-CM

## 2019-07-30 DIAGNOSIS — S32.030D CLOSED COMPRESSION FRACTURE OF L3 LUMBAR VERTEBRA WITH ROUTINE HEALING, SUBSEQUENT ENCOUNTER: ICD-10-CM

## 2019-07-30 DIAGNOSIS — S32.030D CLOSED COMPRESSION FRACTURE OF L3 LUMBAR VERTEBRA WITH ROUTINE HEALING, SUBSEQUENT ENCOUNTER: Primary | ICD-10-CM

## 2019-07-30 PROCEDURE — 72100 X-RAY EXAM L-S SPINE 2/3 VWS: CPT

## 2019-08-01 ENCOUNTER — OFFICE VISIT (OUTPATIENT)
Dept: NEUROSURGERY | Facility: CLINIC | Age: 84
End: 2019-08-01
Payer: MEDICARE

## 2019-08-01 VITALS
BODY MASS INDEX: 21.79 KG/M2 | HEIGHT: 63 IN | RESPIRATION RATE: 16 BRPM | HEART RATE: 57 BPM | DIASTOLIC BLOOD PRESSURE: 67 MMHG | SYSTOLIC BLOOD PRESSURE: 132 MMHG | TEMPERATURE: 96.8 F

## 2019-08-01 DIAGNOSIS — S32.030D CLOSED COMPRESSION FRACTURE OF L3 LUMBAR VERTEBRA WITH ROUTINE HEALING, SUBSEQUENT ENCOUNTER: Primary | ICD-10-CM

## 2019-08-01 PROCEDURE — 99213 OFFICE O/P EST LOW 20 MIN: CPT | Performed by: PHYSICIAN ASSISTANT

## 2019-08-01 RX ORDER — HYDRALAZINE HYDROCHLORIDE 10 MG/1
10 TABLET, FILM COATED ORAL 2 TIMES DAILY
COMMUNITY
End: 2020-09-11 | Stop reason: SDUPTHER

## 2019-08-01 NOTE — PROGRESS NOTES
Office Note - Neurosurgery   Chandni Pack 80 y o  female MRN: [id number]      Assessment: This is a 80years old woman here today for 2 weeks follow-up of idiopathic inferior endplate of L3 fracture  Patient is here to go over her follow-up lumbar spine x-rays which shows stable L3 fracture and anatomical alignment  Patient noticed some improvement with her back pain, complains of itching sensation in the left hip  She is taking gabapentin at night and some oxycodone during the daytime  She is wearing LSO brace  She she is in rehab, doing physical therapy and some walking using walker  Denies any radiculopathy numbness, weakness or paresthesia and extremities  Denies any bowel bladder issues  Hx, PE discussed with the patient and her daughter  X-ray images reviewed  Patient advised to return to Neurosurgery outpatient Clinic for follow-up in 4 weeks with follow-up lumbar spine x-rays in brace to evaluate the stability of the fracture and anatomical alignment of the lumbar spines  Advised against falling  Advised to continue physical therapy  Advised to continue wearing LSO brace  Advised to see her PCP for left hip aging symptoms  Patient and daughter understand the instructions  Questions and concerns were addressed  Both agreed with the plan  Plan:    1  Follow-up in 4 weeks with another upright lumbar spine x-rays in brace  2  Continue wearing LSO brace  3  Advised against bending, lifting or twisting her back  4  Caution not fall  5  Advised to visit her PCP for left hip itching symptoms    Subjective/Objective     Chief Complaint: "I am feeling better now"/2 weeks follow-up L2 fracture of progress note      HPI  This is a 80years old woman here for 2 weeks follow-up of her inferior endplate closed compression fracture of L3 vertebral bones  Had 2 weeks follow-up lumbar spine x-rays which shows stable fracture and anatomical alignment of lumbar spines    Patient noticed improvement with her back pain, she is in rehab doing physical therapy and walks using walker  She wears LSO brace  Takes gabapentin at night and some oxycodone during the day for the pain  She states the pain is when she sits up for long per of time and pain goes away when she lies down  Denies any bowel or bladder issues  Denies fever, chills, rigors, cough or chest pain  ROS  Personally reviewed review of system as follows  Review of Systems   Constitutional: Negative  HENT: Negative  Eyes: Negative  Respiratory: Negative  Cardiovascular: Negative  Gastrointestinal: Negative  Endocrine: Negative  Genitourinary: Negative  Musculoskeletal: Positive for back pain and gait problem (ambulates with wheelchair)  Skin: Negative  Allergic/Immunologic: Negative  Neurological: Negative for weakness and numbness  Hematological: Negative  Psychiatric/Behavioral: Negative  Family History    Family History   Problem Relation Age of Onset    Leukemia Sister     Diabetes Sister     Hypertension Sister     Coronary artery disease Sister     Aortic aneurysm Sister        Social History    Social History     Socioeconomic History    Marital status:       Spouse name: Not on file    Number of children: Not on file    Years of education: Not on file    Highest education level: Not on file   Occupational History    Not on file   Social Needs    Financial resource strain: Not on file    Food insecurity:     Worry: Not on file     Inability: Not on file    Transportation needs:     Medical: Not on file     Non-medical: Not on file   Tobacco Use    Smoking status: Never Smoker    Smokeless tobacco: Never Used   Substance and Sexual Activity    Alcohol use: No    Drug use: No    Sexual activity: Not Currently   Lifestyle    Physical activity:     Days per week: Not on file     Minutes per session: Not on file    Stress: Not on file   Relationships    Social connections: Talks on phone: Not on file     Gets together: Not on file     Attends Spiritism service: Not on file     Active member of club or organization: Not on file     Attends meetings of clubs or organizations: Not on file     Relationship status: Not on file    Intimate partner violence:     Fear of current or ex partner: Not on file     Emotionally abused: Not on file     Physically abused: Not on file     Forced sexual activity: Not on file   Other Topics Concern    Not on file   Social History Narrative    Not on file       Past Medical History    Past Medical History:   Diagnosis Date    [facility] )     Anxiety     Cheilosis     Diverticulosis     Gallstone     Hiatal hernia     Hyperlipidemia     Hypertension     Hyponatremia     Inguinal hernia     [facility]     Macular degeneration     Osteoarthritis     Renal cyst     Syncope     Tuberculosis     Umbilical hernia     Vertigo        Surgical History    Past Surgical History:   Procedure Laterality Date    HIP SURGERY      At  young age       Medications      Current Outpatient Medications:     acetaminophen (TYLENOL) 325 mg tablet, Take 3 tablets (975 mg total) by mouth every 8 (eight) hours, Disp: , Rfl: 0    amiodarone 200 mg tablet, Take 100 mg by mouth daily , Disp: , Rfl:     aspirin 81 MG tablet, Take 81 mg by mouth daily, Disp: , Rfl:     carvedilol (COREG) 6 25 mg tablet, Take 1 tablet (6 25 mg total) by mouth 2 (two) times a day with meals (Patient taking differently: Take 6 25 mg by mouth daily ), Disp: 60 tablet, Rfl: 0    Cholecalciferol (VITAMIN D3) 1000 units CAPS, Take by mouth, Disp: , Rfl:     clotrimazole-betamethasone (LOTRISONE) 1-0 05 % cream, Apply topically 2 (two) times a day, Disp: , Rfl:     Coenzyme Q10 (COQ-10) 50 MG CAPS, Take by mouth daily, Disp: , Rfl:     diclofenac sodium (VOLTAREN) 1 %, Apply 2 g topically 4 (four) times a day, Disp: , Rfl:     gabapentin (NEURONTIN) 100 mg capsule, Take 1 capsule (100 mg total) by mouth daily at bedtime, Disp: , Rfl: 0    hydrALAZINE (APRESOLINE) 10 mg tablet, Take 10 mg by mouth 3 (three) times a day, Disp: , Rfl:     irbesartan (AVAPRO) 300 mg tablet, Take 150 mg by mouth 2 (two) times a day , Disp: , Rfl:     lidocaine (LIDODERM) 5 %, Apply 1 patch topically daily Remove & Discard patch within 12 hours or as directed by MD, Disp: 30 patch, Rfl: 0    menthol-methyl salicylate (BENGAY) 89-75 % cream, Apply topically 4 (four) times a day as needed (low back), Disp: , Rfl: 0    Multiple Vitamins-Minerals (ICAPS AREDS 2 PO), Take by mouth 2 (two) times a day, Disp: , Rfl:     oxyCODONE (ROXICODONE) 5 mg immediate release tablet, Take 0 5 tablets (2 5 mg total) by mouth every 6 (six) hours as needed for severe painMax Daily Amount: 10 mg, Disp: 30 tablet, Rfl: 0    polyethylene glycol (MIRALAX) 17 g packet, Take 17 g by mouth daily, Disp: , Rfl: 0    pravastatin (PRAVACHOL) 10 mg tablet, Take 10 mg by mouth daily, Disp: , Rfl:     Psyllium (METAMUCIL PO), Take by mouth, Disp: , Rfl:     senna (SENOKOT) 8 6 mg, Take 2 tablets (17 2 mg total) by mouth 2 (two) times a day, Disp: , Rfl: 0    sodium chloride 1 g tablet, Take 1 tablet (1 g total) by mouth 3 (three) times a day with meals, Disp: , Rfl: 0    Allergies    Allergies   Allergen Reactions    Codeine     Lomotil [Diphenoxylate]     Quinidex [Quinidine]     Adhesive [Medical Tape] Rash    Penicillins Rash       The following portions of the patient's history were reviewed and updated as appropriate: allergies, current medications, past family history, past medical history, past social history, past surgical history and problem list     Investigations    I personally reviewed the XRAY results with the patient:    X-ray of lumbar spine demonstrates stable L3 fracture and anatomical alignment of lumbar spines    Physical Exam    Vitals:  Resp  rate 16, height 5' 3" (1 6 m)  ,Body mass index is 21 79 kg/m²  Physical Exam   Constitutional: She is oriented to person, place, and time  She appears well-developed and well-nourished  Eyes: EOM are normal    Neck: Normal range of motion  Cardiovascular: Normal rate  Pulmonary/Chest: Effort normal    Musculoskeletal: She exhibits tenderness  At L3 region on palpation   Neurological: She is oriented to person, place, and time  She has normal strength and normal reflexes  No cranial nerve deficit or sensory deficit  She has a normal Finger-Nose-Finger Test  GCS eye subscore is 4  GCS verbal subscore is 5  GCS motor subscore is 6  She displays no Babinski's sign on the right side  She displays no Babinski's sign on the left side  Reflex Scores:       Tricep reflexes are 2+ on the right side and 2+ on the left side  Bicep reflexes are 2+ on the right side and 2+ on the left side  Brachioradialis reflexes are 2+ on the right side and 2+ on the left side  Patellar reflexes are 2+ on the right side and 2+ on the left side  Achilles reflexes are 2+ on the right side and 2+ on the left side  Skin: Skin is warm  Psychiatric: Her speech is normal      Neurologic Exam     Mental Status   Oriented to person, place, and time  Speech: speech is normal   Level of consciousness: alert    Cranial Nerves     CN II   Visual fields full to confrontation  CN III, IV, VI   Extraocular motions are normal    Nystagmus: none     CN V   Right facial sensation deficit: none  Left facial sensation deficit: none    CN VII   Right facial weakness: none  Left facial weakness: none    CN XI   CN XI normal      CN XII   CN XII normal      Motor Exam   Muscle bulk: normal  Overall muscle tone: normal  Right arm tone: normal  Left arm tone: normal  Right arm pronator drift: absent  Left arm pronator drift: absent  Right leg tone: normal  Left leg tone: normal    Strength   Strength 5/5 throughout       Sensory Exam   Light touch normal      Gait, Coordination, and Reflexes     Coordination   Finger to nose coordination: normal    Reflexes   Right brachioradialis: 2+  Left brachioradialis: 2+  Right biceps: 2+  Left biceps: 2+  Right triceps: 2+  Left triceps: 2+  Right patellar: 2+  Left patellar: 2+  Right achilles: 2+  Left achilles: 2+  Right : 2+  Left : 2+  Right plantar: normal  Left plantar: normal  Right Retana: absent  Left Retana: absent  Right ankle clonus: absent  Left ankle clonus: absent

## 2019-08-30 ENCOUNTER — TELEPHONE (OUTPATIENT)
Dept: NEUROSURGERY | Facility: CLINIC | Age: 84
End: 2019-08-30

## 2019-09-16 ENCOUNTER — TELEPHONE (OUTPATIENT)
Dept: NEUROSURGERY | Facility: CLINIC | Age: 84
End: 2019-09-16

## 2019-09-18 ENCOUNTER — HOSPITAL ENCOUNTER (OUTPATIENT)
Dept: RADIOLOGY | Facility: HOSPITAL | Age: 84
Discharge: HOME | End: 2019-09-18
Payer: MEDICARE

## 2019-09-18 DIAGNOSIS — S32.030D CLOSED COMPRESSION FRACTURE OF L3 LUMBAR VERTEBRA WITH ROUTINE HEALING, SUBSEQUENT ENCOUNTER: ICD-10-CM

## 2019-09-18 PROCEDURE — 72100 X-RAY EXAM L-S SPINE 2/3 VWS: CPT

## 2019-09-19 ENCOUNTER — OFFICE VISIT (OUTPATIENT)
Dept: NEUROSURGERY | Facility: CLINIC | Age: 84
End: 2019-09-19
Payer: MEDICARE

## 2019-09-19 VITALS
DIASTOLIC BLOOD PRESSURE: 80 MMHG | TEMPERATURE: 96.1 F | HEIGHT: 63 IN | BODY MASS INDEX: 21.79 KG/M2 | SYSTOLIC BLOOD PRESSURE: 160 MMHG | RESPIRATION RATE: 16 BRPM | HEART RATE: 55 BPM

## 2019-09-19 DIAGNOSIS — S32.030D CLOSED COMPRESSION FRACTURE OF L3 LUMBAR VERTEBRA WITH ROUTINE HEALING, SUBSEQUENT ENCOUNTER: Primary | ICD-10-CM

## 2019-09-19 PROCEDURE — 99214 OFFICE O/P EST MOD 30 MIN: CPT | Performed by: PHYSICIAN ASSISTANT

## 2019-09-19 RX ORDER — HYDRALAZINE HYDROCHLORIDE 10 MG/1
TABLET, FILM COATED ORAL
Qty: 180 TABLET | Refills: 0 | OUTPATIENT
Start: 2019-09-19

## 2019-09-19 RX ORDER — PRAVASTATIN SODIUM 10 MG
TABLET ORAL
Qty: 90 TABLET | Refills: 0 | OUTPATIENT
Start: 2019-09-19

## 2019-09-19 NOTE — PROGRESS NOTES
Office Note - Neurosurgery   [facility] 80 [facility]  female MRN: [id number]      Assessment:  Patient is 80years old woman here for 8 weeks follow-up of her inferior endplate of L3 compression fracture 2/2 fall  She had upright lumbar spine x-rays that shows stable L3 soup inferior endplate fracture  Patient noticed improvement with her back pain  However, her daughter states patient had experienced severe pain (unsure what was the cause) at the end of August 2019 after probably she was standing in the bathroom at sink while brushing her teeth  She stayed pain eventually improved  Denies any lower extremity radiculopathy with numbness, weakness, paresthesia in both lower extremities  Denies bowel bladder issues  Denies saddle anesthesia  She has she has been in rehab doing physical therapy and she states he completed the course  Currently taking Tylenol for and arthritis and also gabapentin  Neuro exam at baseline and nonfocal   Slight tenderness at L3 region noted on palpation  Patient wearing LSO brace  Follow-up Upright lumbar spine x-rays in brace demonstrates stable inferior endplate of L3 compression fracture  Hx, PE and image discussed with the patient and her daughter  Patient showed some clinical improvement, but still has some tenderness on palpation, so  I advised the patient to continue wearing LSO brace for the next 2 weeks and then gradually wean off and see if the pain is stable and not exacerbated without brace  Continue taking Tylenol for the pain  Flexion-extension lumbar spine x-rays ordered in 4 weeks  Patient's questions and concerns were answered  Patient and daughter understands the instructions and agreed with the plan  Follow-up in 4 weeks      Plan:  1  Continue wearing LSO brace for the next 2 weeks and then gradually wean off for 2 weeks  2  Flexion-extension lumbar spine x-rays 3-4 views in 4 weeks  3  Follow-up in 4 weeks with me and Dr Colleen Campbell  4   Take Tylenol if you have pain    Subjective/Objective     Chief Complaint: "Am feeling better, I want to take of my brace"/L3 fracture follow-up progress note      HPI  Patient is 80years old woman here for follow-up of inferior endplate of compression fracture of L3 vertebral body patient fell 8 weeks ago  She has been on LSO brace, and was in rehab doing physical therapy  States she felt better  She wants her brace to be taken off  Denies any numbness, weakness, paresthesia and extremities  Her daughter states that patient had breakthrough pain at the end of August for about 1 week, but the pain eventually improved since then and is not complaining any pain now  Denies any fever, chills, rigors, cough or chest pain  Denies bowel bladder issues  Patient is always wheelchair-bound, has history knee arthritis with stiffness  She uses walker at home  She lives with her daughter, who is supportive  REBEKAH EWLCH personally reviewed and updated  Review of Systems   Constitutional: Negative  HENT: Negative  Eyes: Negative  Respiratory: Negative  Cardiovascular: Positive for chest pain  Gastrointestinal: Positive for constipation  Endocrine: Negative  Genitourinary: Negative  Musculoskeletal: Positive for back pain (occasional back pain ) and gait problem  Skin: Negative  Neurological: Negative for weakness and numbness  Hematological:        Aspirin 81mg   Psychiatric/Behavioral: Negative for sleep disturbance  Family History    Family History   Problem Relation Age of Onset    Leukemia Sister     Diabetes Sister     Hypertension Sister     Coronary artery disease Sister     Aortic aneurysm Sister        Social History    Social History     Socioeconomic History    Marital status:       Spouse name: Not on file    Number of children: Not on file    Years of education: Not on file    Highest education level: Not on file   Occupational History    Not on file   Social Needs    Financial resource strain: Not on file    Food insecurity:     Worry: Not on file     Inability: Not on file    Transportation needs:     Medical: Not on file     Non-medical: Not on file   Tobacco Use    Smoking status: Never Smoker    Smokeless tobacco: Never Used   Substance and Sexual Activity    Alcohol use: No    Drug use: No    Sexual activity: Not Currently   Lifestyle    Physical activity:     Days per week: Not on file     Minutes per session: Not on file    Stress: Not on file   Relationships    Social connections:     Talks on phone: Not on file     Gets together: Not on file     Attends Evangel[employer] service: Not on file     Active member of club or organization: Not on file     Attends meetings of clubs or organizations: Not on file     Relationship status: Not on file    Intimate partner violence:     Fear of current or ex partner: Not on file     Emotionally abused: Not on file     Physically abused: Not on file     Forced sexual activity: Not on file   Other Topics Concern    Not on file   Social History Narrative    Not on file       Past Medical History    Past Medical History:   Diagnosis Date    [facility]-fib ([facility] )     Anxiety     Cheilosis     Diverticulosis     Gallstone     Hiatal hernia     Hyperlipidemia     Hypertension     Hyponatremia     Inguinal hernia     [facility] infarction ([facility] )     Macular degeneration     Osteoarthritis     Renal cyst     Syncope     Tuberculosis     Umbilical hernia     Vertigo        Surgical History    Past Surgical History:   Procedure Laterality Date    HIP SURGERY      At  young age       Medications      Current Outpatient Medications:     acetaminophen (TYLENOL) 325 mg tablet, Take 3 tablets (975 mg total) by mouth every 8 (eight) hours, Disp: , Rfl: 0    amiodarone 200 mg tablet, Take 100 mg by mouth daily , Disp: , Rfl:     aspirin 81 MG tablet, Take 81 mg by mouth daily, Disp: , Rfl:     carvedilol (COREG) 6 25 mg tablet, Take 1 tablet (6 25 mg total) by mouth 2 (two) times a day with meals (Patient taking differently: Take 6 25 mg by mouth daily ), Disp: 60 tablet, Rfl: 0    Cholecalciferol (VITAMIN D3) 1000 units CAPS, Take by mouth, Disp: , Rfl:     clotrimazole-betamethasone (LOTRISONE) 1-0 05 % cream, Apply topically 2 (two) times a day, Disp: , Rfl:     Coenzyme Q10 (COQ-10) 50 MG CAPS, Take by mouth daily, Disp: , Rfl:     gabapentin (NEURONTIN) 100 mg capsule, Take 1 capsule (100 mg total) by mouth daily at bedtime, Disp: , Rfl: 0    hydrALAZINE (APRESOLINE) 10 mg tablet, Take 10 mg by mouth 3 (three) times a day, Disp: , Rfl:     irbesartan (AVAPRO) 300 mg tablet, Take 150 mg by mouth 2 (two) times a day , Disp: , Rfl:     lidocaine (LIDODERM) 5 %, Apply 1 patch topically daily Remove & Discard patch within 12 hours or as directed by MD, Disp: 30 patch, Rfl: 0    menthol-methyl salicylate (BENGAY) 91-14 % cream, Apply topically 4 (four) times a day as needed (low back), Disp: , Rfl: 0    Multiple Vitamins-Minerals (ICAPS AREDS 2 PO), Take by mouth 2 (two) times a day, Disp: , Rfl:     polyethylene glycol (MIRALAX) 17 g packet, Take 17 g by mouth daily, Disp: , Rfl: 0    pravastatin (PRAVACHOL) 10 mg tablet, Take 10 mg by mouth daily, Disp: , Rfl:     Psyllium (METAMUCIL PO), Take by mouth, Disp: , Rfl:     senna (SENOKOT) 8 6 mg, Take 2 tablets (17 2 mg total) by mouth 2 (two) times a day, Disp: , Rfl: 0    sodium chloride 1 g tablet, Take 1 tablet (1 g total) by mouth 3 (three) times a day with meals, Disp: , Rfl: 0    diclofenac sodium (VOLTAREN) 1 %, Apply 2 g topically 4 (four) times a day, Disp: , Rfl:     oxyCODONE (ROXICODONE) 5 mg immediate release tablet, Take 0 5 tablets (2 5 mg total) by mouth every 6 (six) hours as needed for severe painMax Daily Amount: 10 mg (Patient not taking: Reported on 9/19/2019), Disp: 30 tablet, Rfl: 0    Allergies    Allergies   Allergen Reactions    Codeine     Lomotil [Diphenoxylate]     Quinidex [Quinidine]     Adhesive [Medical Tape] Rash    Penicillins Rash       The following portions of the patient's history were reviewed and updated as appropriate: allergies, current medications, past family history, past medical history, past social history, past surgical history and problem list     Investigations    I personally reviewed the XRAY results with the patient:    X-ray of lumbar spine demonstrates stable inferior endplate of L3 compression fracture    Physical Exam    Vitals:  Blood pressure 160/80, pulse 55, temperature (!) 96 1 °F (35 6 °C), temperature source Tympanic, resp  rate 16, height 5' 3" (1 6 m)  ,Body mass index is 21 79 kg/m²  Physical Exam   Constitutional: She appears well-developed and well-nourished  HENT:   Head: Normocephalic  Eyes: EOM are normal    Neck: Normal range of motion  Cardiovascular: Normal rate  Pulmonary/Chest: Effort normal    Musculoskeletal: Normal range of motion  She exhibits tenderness  She exhibits no edema  Neurological: She is alert  She has normal strength and normal reflexes  No cranial nerve deficit or sensory deficit  She displays no Babinski's sign on the right side  She displays no Babinski's sign on the left side  Reflex Scores:       Tricep reflexes are 2+ on the right side and 2+ on the left side  Bicep reflexes are 2+ on the right side and 2+ on the left side  Brachioradialis reflexes are 2+ on the right side and 2+ on the left side  Patellar reflexes are 2+ on the right side and 2+ on the left side  Achilles reflexes are 2+ on the right side and 2+ on the left side  Skin: Skin is warm     Psychiatric: Her speech is normal      Neurologic Exam     Mental Status   Speech: speech is normal   Level of consciousness: alert    Cranial Nerves     CN II   Right visual field deficit: none  Left visual field deficit: none     CN III, IV, VI   Extraocular motions are normal    Nystagmus: none CN VII   Right facial weakness: none  Left facial weakness: none    CN XI   CN XI normal      CN XII   CN XII normal      Motor Exam   Muscle bulk: normal  Overall muscle tone: normal  Right arm tone: normal  Left arm tone: normal  Right arm pronator drift: absent  Left arm pronator drift: absent  Right leg tone: normal  Left leg tone: normal    Strength   Strength 5/5 throughout       Sensory Exam   Light touch normal      Gait, Coordination, and Reflexes     Reflexes   Right brachioradialis: 2+  Left brachioradialis: 2+  Right biceps: 2+  Left biceps: 2+  Right triceps: 2+  Left triceps: 2+  Right patellar: 2+  Left patellar: 2+  Right achilles: 2+  Left achilles: 2+  Right : 2+  Left : 2+  Right ankle clonus: absent  Left ankle clonus: absent

## 2019-10-17 ENCOUNTER — TELEPHONE (OUTPATIENT)
Dept: NEUROSURGERY | Facility: CLINIC | Age: 84
End: 2019-10-17

## 2019-10-17 NOTE — TELEPHONE ENCOUNTER
Spoke with patient's niece on the phone and informed her that DZ will be out of the office on 10/21 but we could reschedule for 10/25  She said that works better for them but they still aren't sure if they will be bringing her due to transportation issues  She will call if she needs to cancel/reschedule  Reminded niece that Andrew Zacarias needs to complete L-Spine xr prior to appt and she verbalized understanding

## 2019-10-24 ENCOUNTER — TELEPHONE (OUTPATIENT)
Dept: NEUROSURGERY | Facility: CLINIC | Age: 84
End: 2019-10-24

## 2019-10-24 NOTE — TELEPHONE ENCOUNTER
Message received from steven that the pt was to be seen tomorrow but they cancelled  She reports the pt has had a small set back and she has been taking tylenol and using the Lidoderm patches, but questions if there is anything else to do? Returned call but phone just rang and never went to a message    Will try again

## 2019-11-01 NOTE — TELEPHONE ENCOUNTER
Ernesto reports the pt continues with pain mainly when rising from bed or from a chair  Once sitting, pain resolves  She questions if there is anything else she should do  Explained that ultimately the pt should have her xray completed and her f/u appt rescheduled since cancelled 10/25  Explained that without the study and examination it is very difficult to decide on a continued plan of care  She stated an understanding and will discuss with pt and call back

## 2019-11-20 ENCOUNTER — HOSPITAL ENCOUNTER (OUTPATIENT)
Dept: RADIOLOGY | Facility: HOSPITAL | Age: 84
Discharge: HOME | End: 2019-11-20
Payer: MEDICARE

## 2019-11-20 ENCOUNTER — OFFICE VISIT (OUTPATIENT)
Dept: NEUROSURGERY | Facility: CLINIC | Age: 84
End: 2019-11-20
Payer: MEDICARE

## 2019-11-20 VITALS
HEIGHT: 63 IN | HEART RATE: 55 BPM | SYSTOLIC BLOOD PRESSURE: 174 MMHG | DIASTOLIC BLOOD PRESSURE: 75 MMHG | BODY MASS INDEX: 21.79 KG/M2

## 2019-11-20 DIAGNOSIS — S32.030D CLOSED COMPRESSION FRACTURE OF L3 LUMBAR VERTEBRA WITH ROUTINE HEALING, SUBSEQUENT ENCOUNTER: Primary | ICD-10-CM

## 2019-11-20 DIAGNOSIS — S32.030D CLOSED COMPRESSION FRACTURE OF L3 LUMBAR VERTEBRA WITH ROUTINE HEALING, SUBSEQUENT ENCOUNTER: ICD-10-CM

## 2019-11-20 PROCEDURE — 72120 X-RAY BEND ONLY L-S SPINE: CPT

## 2019-11-20 PROCEDURE — 99214 OFFICE O/P EST MOD 30 MIN: CPT | Performed by: PHYSICIAN ASSISTANT

## 2019-11-20 RX ORDER — DIPHENOXYLATE HYDROCHLORIDE AND ATROPINE SULFATE 2.5; .025 MG/1; MG/1
1 TABLET ORAL DAILY
COMMUNITY
End: 2022-01-27 | Stop reason: ALTCHOICE

## 2019-11-20 NOTE — PROGRESS NOTES
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, here today for her for the months follow-up  She missed her appointment about a month ago  Had flexion-extension x-rays on brace  The x-ray appears stable closed inferior endplate of L3 deformity and anatomical lumbar spines  Neurologically non-focal   Lumbar spine nontender on palpation at L3 level  Patient has left hip arthrodesis many years ago and has difficulty walking and dependent on wheelchair and also walker  Hx, PE, images, management plan and prognosis discussed with the patient and her daughter  Advised to take of brace  Advised that Patient can resume her activity level gradually  Fall precaution  Watch your back  Avoid twisting bending or lifting heavy objects  Patient and daughter's questions and concerns were answered  Patient and daughter expressed their understanding this and agreed with the plan  Patient can follow up on p r n  Basis, or call office if the symptoms gets worse  Plan:  1  Follow-up on p r n  Basis or call office if pain comes back or symptoms get worse  2  Wean off LSO brace  3  Caution not to fall  Avoid bending, lifting heavy objects, or twisting    Subjective/Objective     Chief Complaint: "I am doing fine"      HPI  Patient is a 80years old pleasant lady with history of anxiety/fear, here for 4 weeks follow-up of her closed inferior endplate deformity of L3  She has been on LSO brace for the past 3-4 months  Had Flexion-extension x-rays of her lumbar spine which demonstrates stable L3 fracture and anatomical lumbar spine  Denies history of back pain or radiculopathy leg pain  Denies numbness, weakness or paresthesia and extremities  Had history of longstanding chronic left hip arthrosis with limited walking  Depends on walker for walking  And also most of the time wheelchair-bound  Patient denies history of fever, chills, rigors, cough or chest pain  Denies taking any pain medication or plan lidocaine patch on her back lately  REBEKAH WELCH personally reviewed and updated  Review of Systems   Constitutional: Negative  HENT: Negative  Eyes: Negative  Respiratory: Negative  Cardiovascular: Positive for palpitations  Negative for chest pain  Gastrointestinal: Negative  Endocrine: Negative  Genitourinary: Negative  Musculoskeletal: Positive for gait problem  Negative for back pain  Skin: Negative  Allergic/Immunologic: Negative  Neurological: Negative for dizziness, seizures, syncope, weakness, numbness and headaches  Hematological: Bruises/bleeds easily  Aspirin 81mg   Psychiatric/Behavioral: Negative for sleep disturbance  Family History    Family History   Problem Relation Age of Onset    Leukemia Sister     Diabetes Sister     Hypertension Sister     Coronary artery disease Sister     Aortic aneurysm Sister        Social History    Social History     Socioeconomic History    Marital status:       Spouse name: Not on file    Number of children: Not on file    Years of education: Not on file    Highest education level: Not on file   Occupational History    Not on file   Social Needs    Financial resource strain: Not on file    Food insecurity:     Worry: Not on file     Inability: Not on file    Transportation needs:     Medical: Not on file     Non-medical: Not on file   Tobacco Use    Smoking status: Never Smoker    Smokeless tobacco: Never Used   Substance and Sexual Activity    Alcohol use: No    Drug use: No    Sexual activity: Not Currently   Lifestyle    Physical activity:     Days per week: Not on file     Minutes per session: Not on file    Stress: Not on file   Relationships    Social connections:     Talks on phone: Not on file     Gets together: Not on file     Attends Jew service: Not on file     Active member of club or organization: Not on file     Attends meetings of clubs or organizations: Not on file     Relationship status: Not on file    Intimate partner violence:     Fear of current or ex partner: Not on file     Emotionally abused: Not on file     Physically abused: Not on file     Forced sexual activity: Not on file   Other Topics Concern    Not on file   Social History Narrative    Not on file       Past Medical History    Past Medical History:   Diagnosis Date    [facility]fib ([facility] )     Anxiety     Cheilosis     Diverticulosis     Gallstone     Hiatal hernia     Hyperlipidemia     Hypertension     Hyponatremia     Inguinal hernia     [facility]     Macular degeneration     Osteoarthritis     Renal cyst     Syncope     Tuberculosis     Umbilical hernia     Vertigo        Surgical History    Past Surgical History:   Procedure Laterality Date    HIP SURGERY      At  young age       Medications      Current Outpatient Medications:     acetaminophen (TYLENOL) 325 mg tablet, Take 3 tablets (975 mg total) by mouth every 8 (eight) hours, Disp: , Rfl: 0    amiodarone 200 mg tablet, Take 100 mg by mouth daily , Disp: , Rfl:     aspirin 81 MG tablet, Take 81 mg by mouth daily, Disp: , Rfl:     carvedilol (COREG) 6 25 mg tablet, Take 1 tablet (6 25 mg total) by mouth 2 (two) times a day with meals (Patient taking differently: Take 6 25 mg by mouth daily ), Disp: 60 tablet, Rfl: 0    Cholecalciferol (VITAMIN D3) 1000 units CAPS, Take by mouth, Disp: , Rfl:     clotrimazole-betamethasone (LOTRISONE) 1-0 05 % cream, Apply topically 2 (two) times a day, Disp: , Rfl:     Coenzyme Q10 (COQ-10) 50 MG CAPS, Take by mouth daily, Disp: , Rfl:     diclofenac sodium (VOLTAREN) 1 %, Apply 2 g topically 4 (four) times a day, Disp: , Rfl:     gabapentin (NEURONTIN) 100 mg capsule, Take 1 capsule (100 mg total) by mouth daily at bedtime, Disp: , Rfl: 0    hydrALAZINE (APRESOLINE) 10 mg tablet, Take 10 mg by mouth 3 (three) times a day, Disp: , Rfl:     irbesartan (AVAPRO) 300 mg tablet, Take 150 mg by mouth 2 (two) times a day , Disp: , Rfl:     lidocaine (LIDODERM) 5 %, Apply 1 patch topically daily Remove & Discard patch within 12 hours or as directed by MD, Disp: 30 patch, Rfl: 0    menthol-methyl salicylate (BENGAY) 58-21 % cream, Apply topically 4 (four) times a day as needed (low back), Disp: , Rfl: 0    Multiple Vitamins-Minerals (ICAPS AREDS 2 PO), Take by mouth 2 (two) times a day, Disp: , Rfl:     oxyCODONE (ROXICODONE) 5 mg immediate release tablet, Take 0 5 tablets (2 5 mg total) by mouth every 6 (six) hours as needed for severe painMax Daily Amount: 10 mg (Patient not taking: Reported on 9/19/2019), Disp: 30 tablet, Rfl: 0    polyethylene glycol (MIRALAX) 17 g packet, Take 17 g by mouth daily, Disp: , Rfl: 0    pravastatin (PRAVACHOL) 10 mg tablet, Take 10 mg by mouth daily, Disp: , Rfl:     Psyllium (METAMUCIL PO), Take by mouth, Disp: , Rfl:     senna (SENOKOT) 8 6 mg, Take 2 tablets (17 2 mg total) by mouth 2 (two) times a day, Disp: , Rfl: 0    sodium chloride 1 g tablet, Take 1 tablet (1 g total) by mouth 3 (three) times a day with meals, Disp: , Rfl: 0    Allergies    Allergies   Allergen Reactions    Codeine     Lomotil [Diphenoxylate]     Quinidex [Quinidine]     Adhesive [Medical Tape] Rash    Penicillins Rash       The following portions of the patient's history were reviewed and updated as appropriate: allergies, current medications, past family history, past medical history, past social history, past surgical history and problem list     Investigations    I personally reviewed the XRAY results with the patient:    Flexion-extension lumbar spine x-rays findings as described in the assessment section    Physical Exam    Vitals: There were no vitals taken for this visit  ,There is no height or weight on file to calculate BMI     Physical Exam   Constitutional: She is oriented to person, place, and time  She appears well-developed and well-nourished  Eyes: EOM are normal    Neck: Normal range of motion  Cardiovascular: Normal rate  Pulmonary/Chest: Effort normal    Musculoskeletal: Normal range of motion  Neurological: She is alert and oriented to person, place, and time  She has normal strength and normal reflexes  No cranial nerve deficit or sensory deficit  She has a normal Finger-Nose-Finger Test  She displays no Babinski's sign on the right side  She displays no Babinski's sign on the left side  Reflex Scores:       Tricep reflexes are 2+ on the right side and 2+ on the left side  Bicep reflexes are 2+ on the right side and 2+ on the left side  Brachioradialis reflexes are 2+ on the right side and 2+ on the left side  Patellar reflexes are 2+ on the right side and 2+ on the left side  Achilles reflexes are 2+ on the right side and 2+ on the left side  Skin: Skin is warm  Psychiatric: Her speech is normal      Neurologic Exam     Mental Status   Oriented to person, place, and time  Speech: speech is normal   Level of consciousness: alert    Cranial Nerves     CN II   Visual fields full to confrontation  CN III, IV, VI   Extraocular motions are normal    Nystagmus: none     CN V   Right facial sensation deficit: none  Left facial sensation deficit: none    CN VII   Facial expression full, symmetric  CN XI   CN XI normal      CN XII   CN XII normal      Motor Exam   Muscle bulk: normal  Overall muscle tone: normal  Right arm tone: normal  Left arm tone: normal  Right arm pronator drift: absent  Left arm pronator drift: absent    Strength   Strength 5/5 throughout       Sensory Exam   Light touch normal      Gait, Coordination, and Reflexes     Coordination   Finger to nose coordination: normal    Reflexes   Right brachioradialis: 2+  Left brachioradialis: 2+  Right biceps: 2+  Left biceps: 2+  Right triceps: 2+  Left triceps: 2+  Right patellar: 2+  Left patellar: 2+  Right achilles: 2+  Left achilles: 2+  Right : 2+  Left : 2+  Right plantar: normal  Left plantar: normal  Right ankle clonus: absent  Left ankle clonus: absent

## 2020-04-10 ENCOUNTER — TELEPHONE (OUTPATIENT)
Dept: FAMILY MEDICINE CLINIC | Facility: CLINIC | Age: 85
End: 2020-04-10

## 2020-04-10 DIAGNOSIS — E78.00 HYPERCHOLESTEREMIA: Primary | ICD-10-CM

## 2020-04-11 RX ORDER — PRAVASTATIN SODIUM 10 MG
10 TABLET ORAL DAILY
Qty: 30 TABLET | Refills: 0 | Status: SHIPPED | OUTPATIENT
Start: 2020-04-11 | End: 2020-05-13 | Stop reason: SDUPTHER

## 2020-04-30 ENCOUNTER — TELEPHONE (OUTPATIENT)
Dept: INTERNAL MEDICINE CLINIC | Facility: CLINIC | Age: 85
End: 2020-04-30

## 2020-04-30 ENCOUNTER — TELEMEDICINE (OUTPATIENT)
Dept: INTERNAL MEDICINE CLINIC | Facility: CLINIC | Age: 85
End: 2020-04-30
Payer: MEDICARE

## 2020-04-30 VITALS — HEIGHT: 63 IN | BODY MASS INDEX: 21.79 KG/M2

## 2020-04-30 DIAGNOSIS — I48.0 PAROXYSMAL ATRIAL FIBRILLATION (HCC): Primary | ICD-10-CM

## 2020-04-30 DIAGNOSIS — E78.49 OTHER HYPERLIPIDEMIA: ICD-10-CM

## 2020-04-30 DIAGNOSIS — I10 HYPERTENSION: ICD-10-CM

## 2020-04-30 DIAGNOSIS — R26.2 AMBULATORY DYSFUNCTION: ICD-10-CM

## 2020-04-30 DIAGNOSIS — E87.1 HYPONATREMIA: Chronic | ICD-10-CM

## 2020-04-30 DIAGNOSIS — H91.90 HEARING LOSS, UNSPECIFIED HEARING LOSS TYPE, UNSPECIFIED LATERALITY: ICD-10-CM

## 2020-04-30 DIAGNOSIS — I10 ESSENTIAL HYPERTENSION: ICD-10-CM

## 2020-04-30 DIAGNOSIS — K59.09 CHRONIC CONSTIPATION: ICD-10-CM

## 2020-04-30 DIAGNOSIS — R41.3 MEMORY CHANGES: ICD-10-CM

## 2020-04-30 DIAGNOSIS — R35.0 URINARY FREQUENCY: ICD-10-CM

## 2020-04-30 DIAGNOSIS — R60.0 LEG EDEMA: ICD-10-CM

## 2020-04-30 PROBLEM — R82.71 ASYMPTOMATIC BACTERIURIA: Status: RESOLVED | Noted: 2019-07-13 | Resolved: 2020-04-30

## 2020-04-30 PROBLEM — Z87.81 HISTORY OF VERTEBRAL FRACTURE: Status: ACTIVE | Noted: 2019-09-26

## 2020-04-30 PROBLEM — S99.922A TOE INJURY, LEFT, INITIAL ENCOUNTER: Status: RESOLVED | Noted: 2019-07-13 | Resolved: 2020-04-30

## 2020-04-30 PROCEDURE — 99215 OFFICE O/P EST HI 40 MIN: CPT | Performed by: INTERNAL MEDICINE

## 2020-04-30 RX ORDER — CARVEDILOL 6.25 MG/1
6.25 TABLET ORAL 2 TIMES DAILY WITH MEALS
Qty: 60 TABLET | Refills: 0
Start: 2020-04-30

## 2020-05-01 ENCOUNTER — TELEPHONE (OUTPATIENT)
Dept: LAB | Facility: HOSPITAL | Age: 85
End: 2020-05-01

## 2020-05-06 ENCOUNTER — APPOINTMENT (OUTPATIENT)
Dept: LAB | Facility: HOSPITAL | Age: 85
End: 2020-05-06
Payer: MEDICARE

## 2020-05-06 ENCOUNTER — TELEPHONE (OUTPATIENT)
Dept: INTERNAL MEDICINE CLINIC | Facility: CLINIC | Age: 85
End: 2020-05-06

## 2020-05-06 DIAGNOSIS — R35.0 URINARY FREQUENCY: Primary | ICD-10-CM

## 2020-05-06 LAB
ALBUMIN SERPL BCP-MCNC: 3.4 G/DL (ref 3.5–5)
ALP SERPL-CCNC: 111 U/L (ref 46–116)
ALT SERPL W P-5'-P-CCNC: 25 U/L (ref 12–78)
ANION GAP SERPL CALCULATED.3IONS-SCNC: 6 MMOL/L (ref 4–13)
AST SERPL W P-5'-P-CCNC: 25 U/L (ref 5–45)
BACTERIA UR QL AUTO: ABNORMAL /HPF
BASOPHILS # BLD AUTO: 0.04 THOUSANDS/ΜL (ref 0–0.1)
BASOPHILS NFR BLD AUTO: 1 % (ref 0–1)
BILIRUB SERPL-MCNC: 0.41 MG/DL (ref 0.2–1)
BILIRUB UR QL STRIP: NEGATIVE
BUN SERPL-MCNC: 11 MG/DL (ref 5–25)
CALCIUM SERPL-MCNC: 8.6 MG/DL (ref 8.3–10.1)
CHLORIDE SERPL-SCNC: 105 MMOL/L (ref 100–108)
CLARITY UR: ABNORMAL
CO2 SERPL-SCNC: 26 MMOL/L (ref 21–32)
COLOR UR: YELLOW
CREAT SERPL-MCNC: 0.57 MG/DL (ref 0.6–1.3)
EOSINOPHIL # BLD AUTO: 0.16 THOUSAND/ΜL (ref 0–0.61)
EOSINOPHIL NFR BLD AUTO: 3 % (ref 0–6)
ERYTHROCYTE [DISTWIDTH] IN BLOOD BY AUTOMATED COUNT: 13.4 % (ref 11.6–15.1)
FOLATE SERPL-MCNC: >20 NG/ML (ref 3.1–17.5)
GFR SERPL CREATININE-BSD FRML MDRD: 81 ML/MIN/1.73SQ M
GLUCOSE SERPL-MCNC: 75 MG/DL (ref 65–140)
GLUCOSE UR STRIP-MCNC: NEGATIVE MG/DL
HCT VFR BLD AUTO: 39.6 % (ref 34.8–46.1)
HGB BLD-MCNC: 13.4 G/DL (ref 11.5–15.4)
HGB UR QL STRIP.AUTO: NEGATIVE
HYALINE CASTS #/AREA URNS LPF: ABNORMAL /LPF
IMM GRANULOCYTES # BLD AUTO: 0.03 THOUSAND/UL (ref 0–0.2)
IMM GRANULOCYTES NFR BLD AUTO: 1 % (ref 0–2)
KETONES UR STRIP-MCNC: NEGATIVE MG/DL
LEUKOCYTE ESTERASE UR QL STRIP: ABNORMAL
LYMPHOCYTES # BLD AUTO: 1.14 THOUSANDS/ΜL (ref 0.6–4.47)
LYMPHOCYTES NFR BLD AUTO: 20 % (ref 14–44)
MCH RBC QN AUTO: 35.2 PG (ref 26.8–34.3)
MCHC RBC AUTO-ENTMCNC: 33.8 G/DL (ref 31.4–37.4)
MCV RBC AUTO: 104 FL (ref 82–98)
MONOCYTES # BLD AUTO: 0.85 THOUSAND/ΜL (ref 0.17–1.22)
MONOCYTES NFR BLD AUTO: 15 % (ref 4–12)
NEUTROPHILS # BLD AUTO: 3.37 THOUSANDS/ΜL (ref 1.85–7.62)
NEUTS SEG NFR BLD AUTO: 60 % (ref 43–75)
NITRITE UR QL STRIP: NEGATIVE
NON-SQ EPI CELLS URNS QL MICRO: ABNORMAL /HPF
NRBC BLD AUTO-RTO: 0 /100 WBCS
PH UR STRIP.AUTO: 6.5 [PH]
PLATELET # BLD AUTO: 249 THOUSANDS/UL (ref 149–390)
PMV BLD AUTO: 10.4 FL (ref 8.9–12.7)
POTASSIUM SERPL-SCNC: 4.7 MMOL/L (ref 3.5–5.3)
PROT SERPL-MCNC: 6.8 G/DL (ref 6.4–8.2)
PROT UR STRIP-MCNC: NEGATIVE MG/DL
RBC # BLD AUTO: 3.81 MILLION/UL (ref 3.81–5.12)
RBC #/AREA URNS AUTO: ABNORMAL /HPF
SODIUM SERPL-SCNC: 137 MMOL/L (ref 136–145)
SP GR UR STRIP.AUTO: 1.01 (ref 1–1.03)
TSH SERPL DL<=0.05 MIU/L-ACNC: 2.37 UIU/ML (ref 0.36–3.74)
UROBILINOGEN UR QL STRIP.AUTO: 1 E.U./DL
VIT B12 SERPL-MCNC: 589 PG/ML (ref 100–900)
WBC # BLD AUTO: 5.59 THOUSAND/UL (ref 4.31–10.16)
WBC #/AREA URNS AUTO: ABNORMAL /HPF

## 2020-05-06 PROCEDURE — 82746 ASSAY OF FOLIC ACID SERUM: CPT | Performed by: INTERNAL MEDICINE

## 2020-05-06 PROCEDURE — 80053 COMPREHEN METABOLIC PANEL: CPT | Performed by: INTERNAL MEDICINE

## 2020-05-06 PROCEDURE — 36415 COLL VENOUS BLD VENIPUNCTURE: CPT | Performed by: INTERNAL MEDICINE

## 2020-05-06 PROCEDURE — 81001 URINALYSIS AUTO W/SCOPE: CPT | Performed by: INTERNAL MEDICINE

## 2020-05-06 PROCEDURE — 85025 COMPLETE CBC W/AUTO DIFF WBC: CPT | Performed by: INTERNAL MEDICINE

## 2020-05-06 PROCEDURE — 82607 VITAMIN B-12: CPT | Performed by: INTERNAL MEDICINE

## 2020-05-06 PROCEDURE — 84443 ASSAY THYROID STIM HORMONE: CPT | Performed by: INTERNAL MEDICINE

## 2020-05-06 PROCEDURE — 87077 CULTURE AEROBIC IDENTIFY: CPT | Performed by: INTERNAL MEDICINE

## 2020-05-06 PROCEDURE — 87086 URINE CULTURE/COLONY COUNT: CPT | Performed by: INTERNAL MEDICINE

## 2020-05-06 PROCEDURE — 87186 SC STD MICRODIL/AGAR DIL: CPT | Performed by: INTERNAL MEDICINE

## 2020-05-06 RX ORDER — NITROFURANTOIN 25; 75 MG/1; MG/1
100 CAPSULE ORAL 2 TIMES DAILY
Qty: 10 CAPSULE | Refills: 0 | Status: SHIPPED | OUTPATIENT
Start: 2020-05-06 | End: 2020-05-07 | Stop reason: SDUPTHER

## 2020-05-07 ENCOUNTER — TELEPHONE (OUTPATIENT)
Dept: INTERNAL MEDICINE CLINIC | Facility: CLINIC | Age: 85
End: 2020-05-07

## 2020-05-07 DIAGNOSIS — R35.0 URINARY FREQUENCY: ICD-10-CM

## 2020-05-07 RX ORDER — NITROFURANTOIN 25; 75 MG/1; MG/1
100 CAPSULE ORAL 2 TIMES DAILY
Qty: 10 CAPSULE | Refills: 0 | Status: SHIPPED | OUTPATIENT
Start: 2020-05-07 | End: 2020-05-12

## 2020-05-08 LAB
BACTERIA UR CULT: ABNORMAL
BACTERIA UR CULT: ABNORMAL

## 2020-05-13 DIAGNOSIS — E78.00 HYPERCHOLESTEREMIA: ICD-10-CM

## 2020-05-13 RX ORDER — PRAVASTATIN SODIUM 10 MG
10 TABLET ORAL DAILY
Qty: 90 TABLET | Refills: 0 | Status: SHIPPED | OUTPATIENT
Start: 2020-05-13 | End: 2020-08-11

## 2020-05-14 ENCOUNTER — TELEPHONE (OUTPATIENT)
Dept: INTERNAL MEDICINE CLINIC | Facility: CLINIC | Age: 85
End: 2020-05-14

## 2020-05-15 ENCOUNTER — TELEPHONE (OUTPATIENT)
Dept: INTERNAL MEDICINE CLINIC | Facility: CLINIC | Age: 85
End: 2020-05-15

## 2020-05-15 DIAGNOSIS — S91.301A OPEN WOUND OF RIGHT HEEL, INITIAL ENCOUNTER: Primary | ICD-10-CM

## 2020-05-18 ENCOUNTER — TELEMEDICINE (OUTPATIENT)
Dept: INTERNAL MEDICINE CLINIC | Facility: CLINIC | Age: 85
End: 2020-05-18
Payer: MEDICARE

## 2020-05-18 DIAGNOSIS — K59.09 CHRONIC CONSTIPATION: ICD-10-CM

## 2020-05-18 DIAGNOSIS — R21 RASH: ICD-10-CM

## 2020-05-18 DIAGNOSIS — S91.301D OPEN WOUND OF RIGHT HEEL, SUBSEQUENT ENCOUNTER: Primary | ICD-10-CM

## 2020-05-18 DIAGNOSIS — R60.0 LEG EDEMA: ICD-10-CM

## 2020-05-18 DIAGNOSIS — R35.0 URINARY FREQUENCY: ICD-10-CM

## 2020-05-18 DIAGNOSIS — S32.030A CLOSED COMPRESSION FRACTURE OF L3 LUMBAR VERTEBRA, INITIAL ENCOUNTER (HCC): ICD-10-CM

## 2020-05-18 PROCEDURE — 99214 OFFICE O/P EST MOD 30 MIN: CPT | Performed by: INTERNAL MEDICINE

## 2020-05-20 ENCOUNTER — TELEPHONE (OUTPATIENT)
Dept: INTERNAL MEDICINE CLINIC | Facility: CLINIC | Age: 85
End: 2020-05-20

## 2020-05-22 ENCOUNTER — TELEPHONE (OUTPATIENT)
Dept: INTERNAL MEDICINE CLINIC | Facility: CLINIC | Age: 85
End: 2020-05-22

## 2020-05-22 PROBLEM — M19.90 OSTEOARTHRITIS: Status: ACTIVE | Noted: 2020-05-22

## 2020-05-22 RX ORDER — AMIODARONE HYDROCHLORIDE 100 MG/1
200 TABLET ORAL
COMMUNITY
Start: 2020-05-14

## 2020-05-26 ENCOUNTER — TELEPHONE (OUTPATIENT)
Dept: INTERNAL MEDICINE CLINIC | Facility: CLINIC | Age: 85
End: 2020-05-26

## 2020-05-28 ENCOUNTER — TELEPHONE (OUTPATIENT)
Dept: INTERNAL MEDICINE CLINIC | Facility: CLINIC | Age: 85
End: 2020-05-28

## 2020-05-30 ENCOUNTER — APPOINTMENT (EMERGENCY)
Dept: CT IMAGING | Facility: HOSPITAL | Age: 85
DRG: 552 | End: 2020-05-30
Payer: MEDICARE

## 2020-05-30 ENCOUNTER — HOSPITAL ENCOUNTER (INPATIENT)
Facility: HOSPITAL | Age: 85
LOS: 4 days | Discharge: HOME HEALTH | DRG: 552 | End: 2020-06-03
Attending: EMERGENCY MEDICINE | Admitting: INTERNAL MEDICINE
Payer: MEDICARE

## 2020-05-30 DIAGNOSIS — R26.2 AMBULATORY DYSFUNCTION: ICD-10-CM

## 2020-05-30 DIAGNOSIS — E87.1 HYPONATREMIA: Chronic | ICD-10-CM

## 2020-05-30 DIAGNOSIS — S32.000A LUMBAR COMPRESSION FRACTURE, CLOSED, INITIAL ENCOUNTER (HCC): Primary | ICD-10-CM

## 2020-05-30 DIAGNOSIS — R41.3 MEMORY CHANGES: ICD-10-CM

## 2020-05-30 PROBLEM — L89.301 PRESSURE INJURY OF BUTTOCK, STAGE 1: Status: ACTIVE | Noted: 2020-05-30

## 2020-05-30 PROBLEM — M54.9 BACK PAIN: Status: ACTIVE | Noted: 2020-05-30

## 2020-05-30 PROBLEM — S32.009A CLOSED LUMBAR VERTEBRAL FRACTURE (HCC): Status: ACTIVE | Noted: 2020-05-30

## 2020-05-30 LAB
ALBUMIN SERPL BCP-MCNC: 3.2 G/DL (ref 3.5–5)
ALP SERPL-CCNC: 127 U/L (ref 46–116)
ALT SERPL W P-5'-P-CCNC: 30 U/L (ref 12–78)
ANION GAP SERPL CALCULATED.3IONS-SCNC: 10 MMOL/L (ref 4–13)
APTT PPP: 26 SECONDS (ref 23–37)
AST SERPL W P-5'-P-CCNC: 31 U/L (ref 5–45)
BACTERIA UR QL AUTO: ABNORMAL /HPF
BASOPHILS # BLD AUTO: 0.04 THOUSANDS/ΜL (ref 0–0.1)
BASOPHILS NFR BLD AUTO: 1 % (ref 0–1)
BILIRUB SERPL-MCNC: 0.65 MG/DL (ref 0.2–1)
BILIRUB UR QL STRIP: NEGATIVE
BUN SERPL-MCNC: 7 MG/DL (ref 5–25)
CALCIUM SERPL-MCNC: 8.6 MG/DL (ref 8.3–10.1)
CHLORIDE SERPL-SCNC: 100 MMOL/L (ref 100–108)
CLARITY UR: CLEAR
CO2 SERPL-SCNC: 24 MMOL/L (ref 21–32)
COLOR UR: YELLOW
CREAT SERPL-MCNC: 0.61 MG/DL (ref 0.6–1.3)
EOSINOPHIL # BLD AUTO: 0.13 THOUSAND/ΜL (ref 0–0.61)
EOSINOPHIL NFR BLD AUTO: 2 % (ref 0–6)
ERYTHROCYTE [DISTWIDTH] IN BLOOD BY AUTOMATED COUNT: 12.8 % (ref 11.6–15.1)
GFR SERPL CREATININE-BSD FRML MDRD: 79 ML/MIN/1.73SQ M
GLUCOSE SERPL-MCNC: 95 MG/DL (ref 65–140)
GLUCOSE UR STRIP-MCNC: NEGATIVE MG/DL
HCT VFR BLD AUTO: 44.9 % (ref 34.8–46.1)
HGB BLD-MCNC: 15.3 G/DL (ref 11.5–15.4)
HGB UR QL STRIP.AUTO: ABNORMAL
IMM GRANULOCYTES # BLD AUTO: 0.05 THOUSAND/UL (ref 0–0.2)
IMM GRANULOCYTES NFR BLD AUTO: 1 % (ref 0–2)
INR PPP: 1.12 (ref 0.84–1.19)
KETONES UR STRIP-MCNC: NEGATIVE MG/DL
LEUKOCYTE ESTERASE UR QL STRIP: NEGATIVE
LYMPHOCYTES # BLD AUTO: 1.18 THOUSANDS/ΜL (ref 0.6–4.47)
LYMPHOCYTES NFR BLD AUTO: 16 % (ref 14–44)
MAGNESIUM SERPL-MCNC: 2 MG/DL (ref 1.6–2.6)
MCH RBC QN AUTO: 33.6 PG (ref 26.8–34.3)
MCHC RBC AUTO-ENTMCNC: 34.1 G/DL (ref 31.4–37.4)
MCV RBC AUTO: 99 FL (ref 82–98)
MONOCYTES # BLD AUTO: 0.95 THOUSAND/ΜL (ref 0.17–1.22)
MONOCYTES NFR BLD AUTO: 13 % (ref 4–12)
NEUTROPHILS # BLD AUTO: 5.02 THOUSANDS/ΜL (ref 1.85–7.62)
NEUTS SEG NFR BLD AUTO: 67 % (ref 43–75)
NITRITE UR QL STRIP: NEGATIVE
NON-SQ EPI CELLS URNS QL MICRO: ABNORMAL /HPF
NRBC BLD AUTO-RTO: 0 /100 WBCS
PH UR STRIP.AUTO: 7.5 [PH] (ref 4.5–8)
PLATELET # BLD AUTO: 323 THOUSANDS/UL (ref 149–390)
PMV BLD AUTO: 8.9 FL (ref 8.9–12.7)
POTASSIUM SERPL-SCNC: 3.8 MMOL/L (ref 3.5–5.3)
PROT SERPL-MCNC: 7 G/DL (ref 6.4–8.2)
PROT UR STRIP-MCNC: NEGATIVE MG/DL
PROTHROMBIN TIME: 13.8 SECONDS (ref 11.6–14.5)
RBC # BLD AUTO: 4.56 MILLION/UL (ref 3.81–5.12)
RBC #/AREA URNS AUTO: ABNORMAL /HPF
SODIUM SERPL-SCNC: 134 MMOL/L (ref 136–145)
SP GR UR STRIP.AUTO: 1.01 (ref 1–1.03)
TROPONIN I SERPL-MCNC: <0.02 NG/ML
TSH SERPL DL<=0.05 MIU/L-ACNC: 1.25 UIU/ML (ref 0.36–3.74)
UROBILINOGEN UR QL STRIP.AUTO: 0.2 E.U./DL
WBC # BLD AUTO: 7.37 THOUSAND/UL (ref 4.31–10.16)
WBC #/AREA URNS AUTO: ABNORMAL /HPF

## 2020-05-30 PROCEDURE — 85730 THROMBOPLASTIN TIME PARTIAL: CPT | Performed by: EMERGENCY MEDICINE

## 2020-05-30 PROCEDURE — 74177 CT ABD & PELVIS W/CONTRAST: CPT

## 2020-05-30 PROCEDURE — 99285 EMERGENCY DEPT VISIT HI MDM: CPT | Performed by: EMERGENCY MEDICINE

## 2020-05-30 PROCEDURE — 96361 HYDRATE IV INFUSION ADD-ON: CPT

## 2020-05-30 PROCEDURE — 83735 ASSAY OF MAGNESIUM: CPT | Performed by: EMERGENCY MEDICINE

## 2020-05-30 PROCEDURE — 81001 URINALYSIS AUTO W/SCOPE: CPT

## 2020-05-30 PROCEDURE — 84484 ASSAY OF TROPONIN QUANT: CPT | Performed by: EMERGENCY MEDICINE

## 2020-05-30 PROCEDURE — 80053 COMPREHEN METABOLIC PANEL: CPT | Performed by: EMERGENCY MEDICINE

## 2020-05-30 PROCEDURE — 36415 COLL VENOUS BLD VENIPUNCTURE: CPT | Performed by: EMERGENCY MEDICINE

## 2020-05-30 PROCEDURE — 93005 ELECTROCARDIOGRAM TRACING: CPT

## 2020-05-30 PROCEDURE — 96360 HYDRATION IV INFUSION INIT: CPT

## 2020-05-30 PROCEDURE — 85610 PROTHROMBIN TIME: CPT | Performed by: EMERGENCY MEDICINE

## 2020-05-30 PROCEDURE — 99285 EMERGENCY DEPT VISIT HI MDM: CPT

## 2020-05-30 PROCEDURE — 99223 1ST HOSP IP/OBS HIGH 75: CPT | Performed by: NURSE PRACTITIONER

## 2020-05-30 PROCEDURE — 85025 COMPLETE CBC W/AUTO DIFF WBC: CPT | Performed by: EMERGENCY MEDICINE

## 2020-05-30 PROCEDURE — 84443 ASSAY THYROID STIM HORMONE: CPT | Performed by: EMERGENCY MEDICINE

## 2020-05-30 RX ORDER — ACETAMINOPHEN 325 MG/1
975 TABLET ORAL EVERY 8 HOURS SCHEDULED
Status: DISCONTINUED | OUTPATIENT
Start: 2020-05-30 | End: 2020-06-03 | Stop reason: HOSPADM

## 2020-05-30 RX ORDER — ASPIRIN 81 MG/1
81 TABLET ORAL DAILY
Status: DISCONTINUED | OUTPATIENT
Start: 2020-05-31 | End: 2020-06-03 | Stop reason: HOSPADM

## 2020-05-30 RX ORDER — AMIODARONE HYDROCHLORIDE 200 MG/1
200 TABLET ORAL 2 TIMES DAILY
Status: DISCONTINUED | OUTPATIENT
Start: 2020-05-30 | End: 2020-06-03 | Stop reason: HOSPADM

## 2020-05-30 RX ORDER — OXYCODONE HYDROCHLORIDE 5 MG/1
2.5 TABLET ORAL EVERY 6 HOURS PRN
Status: DISCONTINUED | OUTPATIENT
Start: 2020-05-30 | End: 2020-06-03 | Stop reason: HOSPADM

## 2020-05-30 RX ORDER — LIDOCAINE HYDROCHLORIDE 20 MG/ML
1 JELLY TOPICAL ONCE
Status: COMPLETED | OUTPATIENT
Start: 2020-05-30 | End: 2020-05-30

## 2020-05-30 RX ORDER — MELATONIN
1000 DAILY
Status: DISCONTINUED | OUTPATIENT
Start: 2020-05-31 | End: 2020-06-03 | Stop reason: HOSPADM

## 2020-05-30 RX ORDER — KETOROLAC TROMETHAMINE 30 MG/ML
1 INJECTION, SOLUTION INTRAMUSCULAR; INTRAVENOUS ONCE
Status: COMPLETED | OUTPATIENT
Start: 2020-05-30 | End: 2020-05-30

## 2020-05-30 RX ORDER — HYDRALAZINE HYDROCHLORIDE 10 MG/1
10 TABLET, FILM COATED ORAL 2 TIMES DAILY
Status: DISCONTINUED | OUTPATIENT
Start: 2020-05-30 | End: 2020-06-03 | Stop reason: HOSPADM

## 2020-05-30 RX ORDER — SODIUM CHLORIDE 1000 MG
1 TABLET, SOLUBLE MISCELLANEOUS
Status: DISCONTINUED | OUTPATIENT
Start: 2020-05-31 | End: 2020-06-03 | Stop reason: HOSPADM

## 2020-05-30 RX ORDER — LIDOCAINE 50 MG/G
2 PATCH TOPICAL DAILY
Status: COMPLETED | OUTPATIENT
Start: 2020-05-31 | End: 2020-05-31

## 2020-05-30 RX ORDER — CARVEDILOL 6.25 MG/1
6.25 TABLET ORAL 2 TIMES DAILY WITH MEALS
Status: DISCONTINUED | OUTPATIENT
Start: 2020-05-31 | End: 2020-06-03 | Stop reason: HOSPADM

## 2020-05-30 RX ORDER — PRAVASTATIN SODIUM 10 MG
10 TABLET ORAL DAILY
Status: DISCONTINUED | OUTPATIENT
Start: 2020-05-31 | End: 2020-06-03 | Stop reason: HOSPADM

## 2020-05-30 RX ORDER — HEPARIN SODIUM 5000 [USP'U]/ML
5000 INJECTION, SOLUTION INTRAVENOUS; SUBCUTANEOUS EVERY 8 HOURS SCHEDULED
Status: DISCONTINUED | OUTPATIENT
Start: 2020-05-30 | End: 2020-06-03 | Stop reason: HOSPADM

## 2020-05-30 RX ORDER — POLYETHYLENE GLYCOL 3350 17 G/17G
17 POWDER, FOR SOLUTION ORAL DAILY
Status: DISCONTINUED | OUTPATIENT
Start: 2020-05-31 | End: 2020-06-03 | Stop reason: HOSPADM

## 2020-05-30 RX ORDER — HYDROMORPHONE HCL/PF 1 MG/ML
0.2 SYRINGE (ML) INJECTION EVERY 6 HOURS PRN
Status: DISCONTINUED | OUTPATIENT
Start: 2020-05-30 | End: 2020-06-03 | Stop reason: HOSPADM

## 2020-05-30 RX ORDER — SENNOSIDES 8.6 MG
2 TABLET ORAL
Status: DISCONTINUED | OUTPATIENT
Start: 2020-05-30 | End: 2020-06-03 | Stop reason: HOSPADM

## 2020-05-30 RX ORDER — LOSARTAN POTASSIUM 50 MG/1
50 TABLET ORAL 2 TIMES DAILY
Status: DISCONTINUED | OUTPATIENT
Start: 2020-05-30 | End: 2020-06-03 | Stop reason: HOSPADM

## 2020-05-30 RX ADMIN — AMIODARONE HYDROCHLORIDE 200 MG: 200 TABLET ORAL at 22:41

## 2020-05-30 RX ADMIN — LOSARTAN POTASSIUM 50 MG: 50 TABLET, FILM COATED ORAL at 22:40

## 2020-05-30 RX ADMIN — HYDRALAZINE HYDROCHLORIDE 10 MG: 10 TABLET, FILM COATED ORAL at 22:40

## 2020-05-30 RX ADMIN — HEPARIN SODIUM 5000 UNITS: 5000 INJECTION INTRAVENOUS; SUBCUTANEOUS at 22:55

## 2020-05-30 RX ADMIN — LIDOCAINE HYDROCHLORIDE 1 APPLICATION: 20 JELLY TOPICAL at 18:40

## 2020-05-30 RX ADMIN — IOHEXOL 100 ML: 350 INJECTION, SOLUTION INTRAVENOUS at 17:23

## 2020-05-30 RX ADMIN — ACETAMINOPHEN 975 MG: 325 TABLET, FILM COATED ORAL at 22:40

## 2020-05-30 RX ADMIN — SODIUM CHLORIDE 1000 ML: 0.9 INJECTION, SOLUTION INTRAVENOUS at 18:40

## 2020-05-31 LAB
ALBUMIN SERPL BCP-MCNC: 3.1 G/DL (ref 3.5–5)
ALP SERPL-CCNC: 123 U/L (ref 46–116)
ALT SERPL W P-5'-P-CCNC: 27 U/L (ref 12–78)
ANION GAP SERPL CALCULATED.3IONS-SCNC: 10 MMOL/L (ref 4–13)
AST SERPL W P-5'-P-CCNC: 31 U/L (ref 5–45)
ATRIAL RATE: 53 BPM
BASOPHILS # BLD AUTO: 0.05 THOUSANDS/ΜL (ref 0–0.1)
BASOPHILS NFR BLD AUTO: 1 % (ref 0–1)
BILIRUB SERPL-MCNC: 0.6 MG/DL (ref 0.2–1)
BUN SERPL-MCNC: 7 MG/DL (ref 5–25)
CALCIUM SERPL-MCNC: 8.7 MG/DL (ref 8.3–10.1)
CHLORIDE SERPL-SCNC: 98 MMOL/L (ref 100–108)
CO2 SERPL-SCNC: 24 MMOL/L (ref 21–32)
CREAT SERPL-MCNC: 0.62 MG/DL (ref 0.6–1.3)
EOSINOPHIL # BLD AUTO: 0.18 THOUSAND/ΜL (ref 0–0.61)
EOSINOPHIL NFR BLD AUTO: 2 % (ref 0–6)
ERYTHROCYTE [DISTWIDTH] IN BLOOD BY AUTOMATED COUNT: 12.9 % (ref 11.6–15.1)
GFR SERPL CREATININE-BSD FRML MDRD: 79 ML/MIN/1.73SQ M
GLUCOSE SERPL-MCNC: 83 MG/DL (ref 65–140)
HCT VFR BLD AUTO: 43.8 % (ref 34.8–46.1)
HGB BLD-MCNC: 14.6 G/DL (ref 11.5–15.4)
IMM GRANULOCYTES # BLD AUTO: 0.06 THOUSAND/UL (ref 0–0.2)
IMM GRANULOCYTES NFR BLD AUTO: 1 % (ref 0–2)
LYMPHOCYTES # BLD AUTO: 1.18 THOUSANDS/ΜL (ref 0.6–4.47)
LYMPHOCYTES NFR BLD AUTO: 14 % (ref 14–44)
MCH RBC QN AUTO: 32.7 PG (ref 26.8–34.3)
MCHC RBC AUTO-ENTMCNC: 33.3 G/DL (ref 31.4–37.4)
MCV RBC AUTO: 98 FL (ref 82–98)
MONOCYTES # BLD AUTO: 1.25 THOUSAND/ΜL (ref 0.17–1.22)
MONOCYTES NFR BLD AUTO: 15 % (ref 4–12)
NEUTROPHILS # BLD AUTO: 5.8 THOUSANDS/ΜL (ref 1.85–7.62)
NEUTS SEG NFR BLD AUTO: 67 % (ref 43–75)
NRBC BLD AUTO-RTO: 0 /100 WBCS
P AXIS: 79 DEGREES
PLATELET # BLD AUTO: 330 THOUSANDS/UL (ref 149–390)
PMV BLD AUTO: 9.3 FL (ref 8.9–12.7)
POTASSIUM SERPL-SCNC: 3.7 MMOL/L (ref 3.5–5.3)
PR INTERVAL: 216 MS
PROT SERPL-MCNC: 6.9 G/DL (ref 6.4–8.2)
QRS AXIS: 76 DEGREES
QRSD INTERVAL: 104 MS
QT INTERVAL: 486 MS
QTC INTERVAL: 456 MS
RBC # BLD AUTO: 4.46 MILLION/UL (ref 3.81–5.12)
SODIUM SERPL-SCNC: 132 MMOL/L (ref 136–145)
T WAVE AXIS: 71 DEGREES
VENTRICULAR RATE: 53 BPM
WBC # BLD AUTO: 8.52 THOUSAND/UL (ref 4.31–10.16)

## 2020-05-31 PROCEDURE — NC001 PR NO CHARGE: Performed by: PHYSICIAN ASSISTANT

## 2020-05-31 PROCEDURE — 93010 ELECTROCARDIOGRAM REPORT: CPT | Performed by: INTERNAL MEDICINE

## 2020-05-31 PROCEDURE — 80053 COMPREHEN METABOLIC PANEL: CPT | Performed by: NURSE PRACTITIONER

## 2020-05-31 PROCEDURE — 85025 COMPLETE CBC W/AUTO DIFF WBC: CPT | Performed by: NURSE PRACTITIONER

## 2020-05-31 PROCEDURE — 99232 SBSQ HOSP IP/OBS MODERATE 35: CPT | Performed by: INTERNAL MEDICINE

## 2020-05-31 RX ORDER — LANOLIN ALCOHOL/MO/W.PET/CERES
3 CREAM (GRAM) TOPICAL
Status: DISCONTINUED | OUTPATIENT
Start: 2020-05-31 | End: 2020-06-03 | Stop reason: HOSPADM

## 2020-05-31 RX ADMIN — ACETAMINOPHEN 975 MG: 325 TABLET, FILM COATED ORAL at 21:47

## 2020-05-31 RX ADMIN — CARVEDILOL 6.25 MG: 6.25 TABLET, FILM COATED ORAL at 16:05

## 2020-05-31 RX ADMIN — CARVEDILOL 6.25 MG: 6.25 TABLET, FILM COATED ORAL at 08:02

## 2020-05-31 RX ADMIN — HYDRALAZINE HYDROCHLORIDE 10 MG: 10 TABLET, FILM COATED ORAL at 19:13

## 2020-05-31 RX ADMIN — AMIODARONE HYDROCHLORIDE 200 MG: 200 TABLET ORAL at 08:02

## 2020-05-31 RX ADMIN — LOSARTAN POTASSIUM 50 MG: 50 TABLET, FILM COATED ORAL at 08:01

## 2020-05-31 RX ADMIN — ACETAMINOPHEN 975 MG: 325 TABLET, FILM COATED ORAL at 05:05

## 2020-05-31 RX ADMIN — B-COMPLEX W/ C & FOLIC ACID TAB 1 TABLET: TAB at 08:03

## 2020-05-31 RX ADMIN — HYDRALAZINE HYDROCHLORIDE 10 MG: 10 TABLET, FILM COATED ORAL at 08:02

## 2020-05-31 RX ADMIN — ASPIRIN 81 MG: 81 TABLET, COATED ORAL at 08:02

## 2020-05-31 RX ADMIN — HEPARIN SODIUM 5000 UNITS: 5000 INJECTION INTRAVENOUS; SUBCUTANEOUS at 05:06

## 2020-05-31 RX ADMIN — Medication 1 G: at 13:07

## 2020-05-31 RX ADMIN — Medication 1 G: at 16:05

## 2020-05-31 RX ADMIN — HEPARIN SODIUM 5000 UNITS: 5000 INJECTION INTRAVENOUS; SUBCUTANEOUS at 21:48

## 2020-05-31 RX ADMIN — POLYETHYLENE GLYCOL 3350 17 G: 17 POWDER, FOR SOLUTION ORAL at 08:01

## 2020-05-31 RX ADMIN — HEPARIN SODIUM 5000 UNITS: 5000 INJECTION INTRAVENOUS; SUBCUTANEOUS at 13:10

## 2020-05-31 RX ADMIN — MELATONIN 3 MG: at 22:34

## 2020-05-31 RX ADMIN — Medication 1 G: at 08:02

## 2020-05-31 RX ADMIN — PRAVASTATIN SODIUM 10 MG: 10 TABLET ORAL at 08:02

## 2020-05-31 RX ADMIN — AMIODARONE HYDROCHLORIDE 200 MG: 200 TABLET ORAL at 19:12

## 2020-05-31 RX ADMIN — ACETAMINOPHEN 975 MG: 325 TABLET, FILM COATED ORAL at 13:07

## 2020-05-31 RX ADMIN — LOSARTAN POTASSIUM 50 MG: 50 TABLET, FILM COATED ORAL at 19:12

## 2020-05-31 RX ADMIN — LIDOCAINE 2 PATCH: 50 PATCH TOPICAL at 08:01

## 2020-05-31 RX ADMIN — MELATONIN 1000 UNITS: at 08:02

## 2020-06-01 ENCOUNTER — APPOINTMENT (INPATIENT)
Dept: RADIOLOGY | Facility: HOSPITAL | Age: 85
DRG: 552 | End: 2020-06-01
Payer: MEDICARE

## 2020-06-01 LAB
ANION GAP SERPL CALCULATED.3IONS-SCNC: 11 MMOL/L (ref 4–13)
BASOPHILS # BLD AUTO: 0.04 THOUSANDS/ΜL (ref 0–0.1)
BASOPHILS NFR BLD AUTO: 1 % (ref 0–1)
BUN SERPL-MCNC: 11 MG/DL (ref 5–25)
CALCIUM SERPL-MCNC: 8.4 MG/DL (ref 8.3–10.1)
CHLORIDE SERPL-SCNC: 98 MMOL/L (ref 100–108)
CO2 SERPL-SCNC: 23 MMOL/L (ref 21–32)
CREAT SERPL-MCNC: 0.76 MG/DL (ref 0.6–1.3)
EOSINOPHIL # BLD AUTO: 0.22 THOUSAND/ΜL (ref 0–0.61)
EOSINOPHIL NFR BLD AUTO: 3 % (ref 0–6)
ERYTHROCYTE [DISTWIDTH] IN BLOOD BY AUTOMATED COUNT: 12.9 % (ref 11.6–15.1)
GFR SERPL CREATININE-BSD FRML MDRD: 68 ML/MIN/1.73SQ M
GLUCOSE SERPL-MCNC: 92 MG/DL (ref 65–140)
HCT VFR BLD AUTO: 40.5 % (ref 34.8–46.1)
HGB BLD-MCNC: 13.8 G/DL (ref 11.5–15.4)
IMM GRANULOCYTES # BLD AUTO: 0.04 THOUSAND/UL (ref 0–0.2)
IMM GRANULOCYTES NFR BLD AUTO: 1 % (ref 0–2)
LYMPHOCYTES # BLD AUTO: 1.33 THOUSANDS/ΜL (ref 0.6–4.47)
LYMPHOCYTES NFR BLD AUTO: 19 % (ref 14–44)
MCH RBC QN AUTO: 33.2 PG (ref 26.8–34.3)
MCHC RBC AUTO-ENTMCNC: 34.1 G/DL (ref 31.4–37.4)
MCV RBC AUTO: 97 FL (ref 82–98)
MONOCYTES # BLD AUTO: 1.06 THOUSAND/ΜL (ref 0.17–1.22)
MONOCYTES NFR BLD AUTO: 15 % (ref 4–12)
NEUTROPHILS # BLD AUTO: 4.44 THOUSANDS/ΜL (ref 1.85–7.62)
NEUTS SEG NFR BLD AUTO: 61 % (ref 43–75)
NRBC BLD AUTO-RTO: 0 /100 WBCS
PLATELET # BLD AUTO: 304 THOUSANDS/UL (ref 149–390)
PMV BLD AUTO: 8.9 FL (ref 8.9–12.7)
POTASSIUM SERPL-SCNC: 3.5 MMOL/L (ref 3.5–5.3)
RBC # BLD AUTO: 4.16 MILLION/UL (ref 3.81–5.12)
SODIUM SERPL-SCNC: 132 MMOL/L (ref 136–145)
WBC # BLD AUTO: 7.13 THOUSAND/UL (ref 4.31–10.16)

## 2020-06-01 PROCEDURE — 80048 BASIC METABOLIC PNL TOTAL CA: CPT | Performed by: INTERNAL MEDICINE

## 2020-06-01 PROCEDURE — 97760 ORTHOTIC MGMT&TRAING 1ST ENC: CPT

## 2020-06-01 PROCEDURE — 99232 SBSQ HOSP IP/OBS MODERATE 35: CPT | Performed by: INTERNAL MEDICINE

## 2020-06-01 PROCEDURE — 85025 COMPLETE CBC W/AUTO DIFF WBC: CPT | Performed by: INTERNAL MEDICINE

## 2020-06-01 PROCEDURE — 99222 1ST HOSP IP/OBS MODERATE 55: CPT | Performed by: PHYSICIAN ASSISTANT

## 2020-06-01 PROCEDURE — 97116 GAIT TRAINING THERAPY: CPT

## 2020-06-01 PROCEDURE — 97163 PT EVAL HIGH COMPLEX 45 MIN: CPT

## 2020-06-01 RX ADMIN — HEPARIN SODIUM 5000 UNITS: 5000 INJECTION INTRAVENOUS; SUBCUTANEOUS at 21:07

## 2020-06-01 RX ADMIN — AMIODARONE HYDROCHLORIDE 200 MG: 200 TABLET ORAL at 17:00

## 2020-06-01 RX ADMIN — Medication 1 G: at 16:59

## 2020-06-01 RX ADMIN — AMIODARONE HYDROCHLORIDE 200 MG: 200 TABLET ORAL at 08:17

## 2020-06-01 RX ADMIN — CARVEDILOL 6.25 MG: 6.25 TABLET, FILM COATED ORAL at 08:17

## 2020-06-01 RX ADMIN — B-COMPLEX W/ C & FOLIC ACID TAB 1 TABLET: TAB at 08:17

## 2020-06-01 RX ADMIN — LOSARTAN POTASSIUM 50 MG: 50 TABLET, FILM COATED ORAL at 17:00

## 2020-06-01 RX ADMIN — CARVEDILOL 6.25 MG: 6.25 TABLET, FILM COATED ORAL at 17:00

## 2020-06-01 RX ADMIN — ACETAMINOPHEN 975 MG: 325 TABLET, FILM COATED ORAL at 13:48

## 2020-06-01 RX ADMIN — HYDRALAZINE HYDROCHLORIDE 10 MG: 10 TABLET, FILM COATED ORAL at 08:17

## 2020-06-01 RX ADMIN — HEPARIN SODIUM 5000 UNITS: 5000 INJECTION INTRAVENOUS; SUBCUTANEOUS at 13:48

## 2020-06-01 RX ADMIN — PRAVASTATIN SODIUM 10 MG: 10 TABLET ORAL at 08:17

## 2020-06-01 RX ADMIN — Medication 1 G: at 08:17

## 2020-06-01 RX ADMIN — ASPIRIN 81 MG: 81 TABLET, COATED ORAL at 08:17

## 2020-06-01 RX ADMIN — HEPARIN SODIUM 5000 UNITS: 5000 INJECTION INTRAVENOUS; SUBCUTANEOUS at 05:45

## 2020-06-01 RX ADMIN — MELATONIN 3 MG: at 21:07

## 2020-06-01 RX ADMIN — HYDRALAZINE HYDROCHLORIDE 10 MG: 10 TABLET, FILM COATED ORAL at 17:00

## 2020-06-01 RX ADMIN — LOSARTAN POTASSIUM 50 MG: 50 TABLET, FILM COATED ORAL at 08:17

## 2020-06-01 RX ADMIN — ACETAMINOPHEN 975 MG: 325 TABLET, FILM COATED ORAL at 05:44

## 2020-06-01 RX ADMIN — MELATONIN 1000 UNITS: at 08:17

## 2020-06-01 RX ADMIN — ACETAMINOPHEN 975 MG: 325 TABLET, FILM COATED ORAL at 21:07

## 2020-06-01 RX ADMIN — POLYETHYLENE GLYCOL 3350 17 G: 17 POWDER, FOR SOLUTION ORAL at 08:17

## 2020-06-01 RX ADMIN — Medication 1 G: at 12:56

## 2020-06-02 ENCOUNTER — TELEPHONE (OUTPATIENT)
Dept: INTERNAL MEDICINE CLINIC | Facility: CLINIC | Age: 85
End: 2020-06-02

## 2020-06-02 LAB
ANION GAP SERPL CALCULATED.3IONS-SCNC: 6 MMOL/L (ref 4–13)
BUN SERPL-MCNC: 9 MG/DL (ref 5–25)
CALCIUM SERPL-MCNC: 8.2 MG/DL (ref 8.3–10.1)
CHLORIDE SERPL-SCNC: 103 MMOL/L (ref 100–108)
CO2 SERPL-SCNC: 23 MMOL/L (ref 21–32)
CREAT SERPL-MCNC: 0.7 MG/DL (ref 0.6–1.3)
GFR SERPL CREATININE-BSD FRML MDRD: 75 ML/MIN/1.73SQ M
GLUCOSE SERPL-MCNC: 87 MG/DL (ref 65–140)
POTASSIUM SERPL-SCNC: 3.3 MMOL/L (ref 3.5–5.3)
SODIUM SERPL-SCNC: 132 MMOL/L (ref 136–145)

## 2020-06-02 PROCEDURE — 99232 SBSQ HOSP IP/OBS MODERATE 35: CPT | Performed by: INTERNAL MEDICINE

## 2020-06-02 PROCEDURE — 80048 BASIC METABOLIC PNL TOTAL CA: CPT | Performed by: INTERNAL MEDICINE

## 2020-06-02 RX ORDER — POTASSIUM CHLORIDE 20MEQ/15ML
40 LIQUID (ML) ORAL ONCE
Status: COMPLETED | OUTPATIENT
Start: 2020-06-02 | End: 2020-06-02

## 2020-06-02 RX ORDER — HYDRALAZINE HYDROCHLORIDE 20 MG/ML
5 INJECTION INTRAMUSCULAR; INTRAVENOUS ONCE
Status: COMPLETED | OUTPATIENT
Start: 2020-06-02 | End: 2020-06-02

## 2020-06-02 RX ADMIN — MELATONIN 1000 UNITS: at 09:51

## 2020-06-02 RX ADMIN — CARVEDILOL 6.25 MG: 6.25 TABLET, FILM COATED ORAL at 09:51

## 2020-06-02 RX ADMIN — ACETAMINOPHEN 975 MG: 325 TABLET, FILM COATED ORAL at 05:33

## 2020-06-02 RX ADMIN — Medication 1 G: at 09:51

## 2020-06-02 RX ADMIN — ACETAMINOPHEN 975 MG: 325 TABLET, FILM COATED ORAL at 14:33

## 2020-06-02 RX ADMIN — LOSARTAN POTASSIUM 50 MG: 50 TABLET, FILM COATED ORAL at 09:51

## 2020-06-02 RX ADMIN — PRAVASTATIN SODIUM 10 MG: 10 TABLET ORAL at 09:51

## 2020-06-02 RX ADMIN — B-COMPLEX W/ C & FOLIC ACID TAB 1 TABLET: TAB at 09:57

## 2020-06-02 RX ADMIN — ASPIRIN 81 MG: 81 TABLET, COATED ORAL at 09:51

## 2020-06-02 RX ADMIN — CARVEDILOL 6.25 MG: 6.25 TABLET, FILM COATED ORAL at 16:05

## 2020-06-02 RX ADMIN — AMIODARONE HYDROCHLORIDE 200 MG: 200 TABLET ORAL at 09:51

## 2020-06-02 RX ADMIN — POLYETHYLENE GLYCOL 3350 17 G: 17 POWDER, FOR SOLUTION ORAL at 09:45

## 2020-06-02 RX ADMIN — ACETAMINOPHEN 975 MG: 325 TABLET, FILM COATED ORAL at 21:42

## 2020-06-02 RX ADMIN — Medication 1 G: at 14:33

## 2020-06-02 RX ADMIN — HYDRALAZINE HYDROCHLORIDE 10 MG: 10 TABLET, FILM COATED ORAL at 18:12

## 2020-06-02 RX ADMIN — HYDRALAZINE HYDROCHLORIDE 5 MG: 20 INJECTION INTRAMUSCULAR; INTRAVENOUS at 20:09

## 2020-06-02 RX ADMIN — HEPARIN SODIUM 5000 UNITS: 5000 INJECTION INTRAVENOUS; SUBCUTANEOUS at 21:42

## 2020-06-02 RX ADMIN — HEPARIN SODIUM 5000 UNITS: 5000 INJECTION INTRAVENOUS; SUBCUTANEOUS at 05:32

## 2020-06-02 RX ADMIN — HEPARIN SODIUM 5000 UNITS: 5000 INJECTION INTRAVENOUS; SUBCUTANEOUS at 14:44

## 2020-06-02 RX ADMIN — HYDRALAZINE HYDROCHLORIDE 10 MG: 10 TABLET, FILM COATED ORAL at 09:51

## 2020-06-02 RX ADMIN — AMIODARONE HYDROCHLORIDE 200 MG: 200 TABLET ORAL at 18:12

## 2020-06-02 RX ADMIN — Medication 1 G: at 18:12

## 2020-06-02 RX ADMIN — POTASSIUM CHLORIDE 40 MEQ: 20 SOLUTION ORAL at 09:46

## 2020-06-02 RX ADMIN — LOSARTAN POTASSIUM 50 MG: 50 TABLET, FILM COATED ORAL at 18:12

## 2020-06-02 RX ADMIN — MELATONIN 3 MG: at 21:42

## 2020-06-03 VITALS
WEIGHT: 113.98 LBS | DIASTOLIC BLOOD PRESSURE: 78 MMHG | TEMPERATURE: 97.5 F | SYSTOLIC BLOOD PRESSURE: 168 MMHG | OXYGEN SATURATION: 97 % | RESPIRATION RATE: 18 BRPM | HEART RATE: 58 BPM | BODY MASS INDEX: 20.19 KG/M2

## 2020-06-03 LAB
ANION GAP SERPL CALCULATED.3IONS-SCNC: 9 MMOL/L (ref 4–13)
BUN SERPL-MCNC: 6 MG/DL (ref 5–25)
CALCIUM SERPL-MCNC: 8.8 MG/DL (ref 8.3–10.1)
CHLORIDE SERPL-SCNC: 102 MMOL/L (ref 100–108)
CO2 SERPL-SCNC: 22 MMOL/L (ref 21–32)
CREAT SERPL-MCNC: 0.6 MG/DL (ref 0.6–1.3)
GFR SERPL CREATININE-BSD FRML MDRD: 79 ML/MIN/1.73SQ M
GLUCOSE SERPL-MCNC: 100 MG/DL (ref 65–140)
MAGNESIUM SERPL-MCNC: 1.9 MG/DL (ref 1.6–2.6)
POTASSIUM SERPL-SCNC: 3.8 MMOL/L (ref 3.5–5.3)
SODIUM SERPL-SCNC: 133 MMOL/L (ref 136–145)

## 2020-06-03 PROCEDURE — 83735 ASSAY OF MAGNESIUM: CPT | Performed by: INTERNAL MEDICINE

## 2020-06-03 PROCEDURE — 80048 BASIC METABOLIC PNL TOTAL CA: CPT | Performed by: INTERNAL MEDICINE

## 2020-06-03 PROCEDURE — 99239 HOSP IP/OBS DSCHRG MGMT >30: CPT | Performed by: INTERNAL MEDICINE

## 2020-06-03 RX ORDER — LANOLIN ALCOHOL/MO/W.PET/CERES
3 CREAM (GRAM) TOPICAL
Qty: 30 TABLET | Refills: 0 | Status: SHIPPED | OUTPATIENT
Start: 2020-06-03 | End: 2020-07-03

## 2020-06-03 RX ORDER — ACETAMINOPHEN 325 MG/1
975 TABLET ORAL EVERY 8 HOURS SCHEDULED
Qty: 30 TABLET | Refills: 0 | Status: SHIPPED | OUTPATIENT
Start: 2020-06-03 | End: 2020-06-12 | Stop reason: SDUPTHER

## 2020-06-03 RX ADMIN — Medication 1 G: at 08:18

## 2020-06-03 RX ADMIN — HEPARIN SODIUM 5000 UNITS: 5000 INJECTION INTRAVENOUS; SUBCUTANEOUS at 05:45

## 2020-06-03 RX ADMIN — PRAVASTATIN SODIUM 10 MG: 10 TABLET ORAL at 08:18

## 2020-06-03 RX ADMIN — HYDRALAZINE HYDROCHLORIDE 10 MG: 10 TABLET, FILM COATED ORAL at 08:18

## 2020-06-03 RX ADMIN — B-COMPLEX W/ C & FOLIC ACID TAB 1 TABLET: TAB at 08:18

## 2020-06-03 RX ADMIN — MELATONIN 1000 UNITS: at 08:18

## 2020-06-03 RX ADMIN — ASPIRIN 81 MG: 81 TABLET, COATED ORAL at 08:18

## 2020-06-03 RX ADMIN — CARVEDILOL 6.25 MG: 6.25 TABLET, FILM COATED ORAL at 08:18

## 2020-06-03 RX ADMIN — LOSARTAN POTASSIUM 50 MG: 50 TABLET, FILM COATED ORAL at 08:18

## 2020-06-03 RX ADMIN — ACETAMINOPHEN 975 MG: 325 TABLET, FILM COATED ORAL at 05:43

## 2020-06-03 RX ADMIN — AMIODARONE HYDROCHLORIDE 200 MG: 200 TABLET ORAL at 08:18

## 2020-06-04 ENCOUNTER — TRANSITIONAL CARE MANAGEMENT (OUTPATIENT)
Dept: INTERNAL MEDICINE CLINIC | Facility: CLINIC | Age: 85
End: 2020-06-04

## 2020-06-05 ENCOUNTER — TELEPHONE (OUTPATIENT)
Dept: INTERNAL MEDICINE CLINIC | Facility: CLINIC | Age: 85
End: 2020-06-05

## 2020-06-10 ENCOUNTER — APPOINTMENT (OUTPATIENT)
Dept: LAB | Facility: CLINIC | Age: 85
End: 2020-06-10
Payer: MEDICARE

## 2020-06-10 ENCOUNTER — TRANSCRIBE ORDERS (OUTPATIENT)
Dept: LAB | Facility: CLINIC | Age: 85
End: 2020-06-10

## 2020-06-10 DIAGNOSIS — I10 ESSENTIAL HYPERTENSION, MALIGNANT: ICD-10-CM

## 2020-06-10 DIAGNOSIS — Z78.9 AMERICAN DIABETES ASSOCIATION (ADA) 1100 CALORIE DIET: Primary | ICD-10-CM

## 2020-06-10 LAB
ANION GAP SERPL CALCULATED.3IONS-SCNC: 5 MMOL/L (ref 4–13)
BASOPHILS # BLD AUTO: 0.07 THOUSANDS/ΜL (ref 0–0.1)
BASOPHILS NFR BLD AUTO: 1 % (ref 0–1)
BUN SERPL-MCNC: 7 MG/DL (ref 5–25)
CALCIUM SERPL-MCNC: 8.4 MG/DL (ref 8.3–10.1)
CHLORIDE SERPL-SCNC: 98 MMOL/L (ref 100–108)
CO2 SERPL-SCNC: 25 MMOL/L (ref 21–32)
CREAT SERPL-MCNC: 0.51 MG/DL (ref 0.6–1.3)
EOSINOPHIL # BLD AUTO: 0.65 THOUSAND/ΜL (ref 0–0.61)
EOSINOPHIL NFR BLD AUTO: 9 % (ref 0–6)
ERYTHROCYTE [DISTWIDTH] IN BLOOD BY AUTOMATED COUNT: 14.3 % (ref 11.6–15.1)
GFR SERPL CREATININE-BSD FRML MDRD: 84 ML/MIN/1.73SQ M
GLUCOSE SERPL-MCNC: 83 MG/DL (ref 65–140)
HCT VFR BLD AUTO: 38.1 % (ref 34.8–46.1)
HGB BLD-MCNC: 13.5 G/DL (ref 11.5–15.4)
IMM GRANULOCYTES # BLD AUTO: 0.04 THOUSAND/UL (ref 0–0.2)
IMM GRANULOCYTES NFR BLD AUTO: 1 % (ref 0–2)
LYMPHOCYTES # BLD AUTO: 1.67 THOUSANDS/ΜL (ref 0.6–4.47)
LYMPHOCYTES NFR BLD AUTO: 23 % (ref 14–44)
MCH RBC QN AUTO: 35.9 PG (ref 26.8–34.3)
MCHC RBC AUTO-ENTMCNC: 35.4 G/DL (ref 31.4–37.4)
MCV RBC AUTO: 101 FL (ref 82–98)
MONOCYTES # BLD AUTO: 1.02 THOUSAND/ΜL (ref 0.17–1.22)
MONOCYTES NFR BLD AUTO: 14 % (ref 4–12)
NEUTROPHILS # BLD AUTO: 3.92 THOUSANDS/ΜL (ref 1.85–7.62)
NEUTS SEG NFR BLD AUTO: 52 % (ref 43–75)
NRBC BLD AUTO-RTO: 0 /100 WBCS
PLATELET # BLD AUTO: 288 THOUSANDS/UL (ref 149–390)
PMV BLD AUTO: 9.4 FL (ref 8.9–12.7)
POTASSIUM SERPL-SCNC: 4.5 MMOL/L (ref 3.5–5.3)
RBC # BLD AUTO: 3.76 MILLION/UL (ref 3.81–5.12)
SODIUM SERPL-SCNC: 128 MMOL/L (ref 136–145)
WBC # BLD AUTO: 7.37 THOUSAND/UL (ref 4.31–10.16)

## 2020-06-10 PROCEDURE — 80048 BASIC METABOLIC PNL TOTAL CA: CPT

## 2020-06-10 PROCEDURE — 36415 COLL VENOUS BLD VENIPUNCTURE: CPT

## 2020-06-10 PROCEDURE — 85025 COMPLETE CBC W/AUTO DIFF WBC: CPT

## 2020-06-12 ENCOUNTER — TELEMEDICINE (OUTPATIENT)
Dept: INTERNAL MEDICINE CLINIC | Facility: CLINIC | Age: 85
End: 2020-06-12
Payer: MEDICARE

## 2020-06-12 DIAGNOSIS — K59.09 CHRONIC CONSTIPATION: ICD-10-CM

## 2020-06-12 DIAGNOSIS — R82.71 BACTERIURIA: ICD-10-CM

## 2020-06-12 DIAGNOSIS — L89.301 PRESSURE INJURY OF BUTTOCK, STAGE 1, UNSPECIFIED LATERALITY: ICD-10-CM

## 2020-06-12 DIAGNOSIS — E87.1 HYPONATREMIA: Chronic | ICD-10-CM

## 2020-06-12 DIAGNOSIS — S91.301D OPEN WOUND OF RIGHT HEEL, SUBSEQUENT ENCOUNTER: ICD-10-CM

## 2020-06-12 DIAGNOSIS — S32.030A CLOSED COMPRESSION FRACTURE OF L3 LUMBAR VERTEBRA, INITIAL ENCOUNTER (HCC): Primary | ICD-10-CM

## 2020-06-12 DIAGNOSIS — S32.000A LUMBAR COMPRESSION FRACTURE, CLOSED, INITIAL ENCOUNTER (HCC): ICD-10-CM

## 2020-06-12 DIAGNOSIS — I10 ESSENTIAL HYPERTENSION: ICD-10-CM

## 2020-06-12 PROCEDURE — 99495 TRANSJ CARE MGMT MOD F2F 14D: CPT | Performed by: INTERNAL MEDICINE

## 2020-06-12 RX ORDER — SODIUM CHLORIDE 1000 MG
1 TABLET, SOLUBLE MISCELLANEOUS
Qty: 90 TABLET | Refills: 2 | Status: SHIPPED | OUTPATIENT
Start: 2020-06-12 | End: 2020-09-14 | Stop reason: SDUPTHER

## 2020-06-12 RX ORDER — ACETAMINOPHEN 325 MG/1
975 TABLET ORAL EVERY 8 HOURS SCHEDULED
Qty: 270 TABLET | Refills: 0 | Status: SHIPPED | OUTPATIENT
Start: 2020-06-12 | End: 2020-07-21

## 2020-07-01 ENCOUNTER — TELEMEDICINE (OUTPATIENT)
Dept: NEUROSURGERY | Facility: CLINIC | Age: 85
End: 2020-07-01
Payer: MEDICARE

## 2020-07-01 ENCOUNTER — HOSPITAL ENCOUNTER (OUTPATIENT)
Dept: RADIOLOGY | Facility: HOSPITAL | Age: 85
Discharge: HOME | End: 2020-07-01
Payer: MEDICARE

## 2020-07-01 DIAGNOSIS — S32.030D CLOSED COMPRESSION FRACTURE OF L3 LUMBAR VERTEBRA, WITH ROUTINE HEALING, SUBSEQUENT ENCOUNTER: Primary | ICD-10-CM

## 2020-07-01 DIAGNOSIS — S32.000A LUMBAR COMPRESSION FRACTURE, CLOSED, INITIAL ENCOUNTER (HCC): ICD-10-CM

## 2020-07-01 PROCEDURE — 1036F TOBACCO NON-USER: CPT | Performed by: PHYSICIAN ASSISTANT

## 2020-07-01 PROCEDURE — 1160F RVW MEDS BY RX/DR IN RCRD: CPT | Performed by: PHYSICIAN ASSISTANT

## 2020-07-01 PROCEDURE — 3078F DIAST BP <80 MM HG: CPT | Performed by: PHYSICIAN ASSISTANT

## 2020-07-01 PROCEDURE — 1111F DSCHRG MED/CURRENT MED MERGE: CPT | Performed by: PHYSICIAN ASSISTANT

## 2020-07-01 PROCEDURE — 4040F PNEUMOC VAC/ADMIN/RCVD: CPT | Performed by: PHYSICIAN ASSISTANT

## 2020-07-01 PROCEDURE — 3077F SYST BP >= 140 MM HG: CPT | Performed by: PHYSICIAN ASSISTANT

## 2020-07-01 PROCEDURE — 72100 X-RAY EXAM L-S SPINE 2/3 VWS: CPT

## 2020-07-01 PROCEDURE — 99443 PR PHYS/QHP TELEPHONE EVALUATION 21-30 MIN: CPT | Performed by: PHYSICIAN ASSISTANT

## 2020-07-01 NOTE — PROGRESS NOTES
Virtual Regular Visit      Assessment/Plan:    Patient is 80years old here virtual visit with 1 month follow-up of L1 and C3 inferior endplate compression fracture  Her daughter reports patient has intermittent pain specially when she moves, otherwise pain improves when she lies down  Patient wears LSO brace and takes Tylenol around the clock  She noticed the pain is improving compared to 2-3 weeks ago  Denies any radiculopathy symptoms in the legs  Denies bowel/bladder dysfunction related to the fracture  Patient doing physical therapy  Use walker for walking  Upright lumbar spine x-rays demonstrates stable L1 and L3 endplate compression fracture  Neuro exam not done   Hx, PE, images, management plan and prognosis discussed with the patient and her daughter  Upright of lumbar spine x-rays in brace AP and lateral in 4 weeks ordered placed  Advised to continue wearing LSO brace when upright and doing physical therapy, also advised to continue with Tylenol around the clock  Fall precaution  Avoid lifting heavy objects, twisting or bending excessively  Questions and concerns were answered  Her daughter understands instructions and agreed with the plan  Plan:   1  Upright lumbar spine x-rays in brace in 4 weeks  2  Continue wearing LSO brace when upright and at 45 degree head of bed  3  Continue Tylenol  4  Fall precaution  5  Follow up in 4 weeks  Problem List Items Addressed This Visit     None               Reason for visit is   Chief Complaint   Patient presents with    Follow-up    Virtual Regular Visit        Encounter provider Lester Valencia PA-C    Provider located at [address] [place] [address] [place] [id number]      Recent Visits  No visits were found meeting these conditions     Showing recent visits within past 7 days and meeting all other requirements     Today's Visits  Date Type Provider Dept   07/01/20 Telemedicine [facility]   Showing today's visits and meeting all other requirements     Future Appointments  No visits were found meeting these conditions  Showing future appointments within next 150 days and meeting all other requirements        The patient was identified by name and date of birth  Jim Baca was informed that this is a telemedicine visit and that the visit is being conducted through telephone  My office door was closed  No one else was in the room  She acknowledged consent and understanding of privacy and security of the video platform  The patient has agreed to participate and understands they can discontinue the visit at any time  Patient is aware this is a billable service  It was my intent to perform this visit via video technology but the patient was not able to do a video connection so the visit was completed via audio telephone only  Subjective   c/c: Four weeks follow-up of lumbar fracture  HPI  Jim Baca is a 80 y o  female here for 4 weeks follow-up of L1 and L3 compression deformities  Had upright lumbar spine x-rays in brace which demonstrates stable fractures  Patient's daughter reports that patient has still pain mechanical specially when she does physical therapy and walks around, otherwise pain gets relieved when patient lies down  Patient wearing LSO brace  And taking Tylenol around the clock  Denies any radiculopathy pain  Denies bowel/bladder dysfunction related to fractures  Denies fever, chills, rigors, cough or chest pain  Patient is doing physical therapy            Past Medical History:   Diagnosis Date    [facility])     Anxiety     Cheilosis     Diverticulosis     1970s    Fracture of iliac wing ([facility] [address] ) 2016    Gallstone     Hiatal hernia     Hyperlipidemia     Hypertension     Hyponatremia     Inguinal hernia     Lacunar infarction (HCC)     Macular degeneration     Osteoarthritis     Renal cyst  Rib fractures 2017    Syncope     Tuberculosis     Umbilical hernia     Vertigo        Past Surgical History:   Procedure Laterality Date    ABDOMINAL SURGERY      HIP SURGERY      At  young age 26's   [facility] [place] KNEE CARTILAGE SURGERY         Current Outpatient Medications   Medication Sig Dispense Refill    acetaminophen (TYLENOL) 325 mg tablet Take 3 tablets (975 mg total) by mouth every 8 (eight) hours 270 tablet 0    aspirin 81 MG tablet Take 81 mg by mouth daily      carvedilol (COREG) 6 25 mg tablet Take 1 tablet (6 25 mg total) by mouth 2 (two) times a day with meals 60 tablet 0    Cholecalciferol (VITAMIN D3) 1000 units CAPS Take by mouth      Coenzyme Q10 (COQ-10) 50 MG CAPS Take by mouth daily      hydrALAZINE (APRESOLINE) 10 mg tablet Take 10 mg by mouth 2 (two) times a day       irbesartan (AVAPRO) 300 mg tablet Take 150 mg by mouth 2 (two) times a day       melatonin 3 mg Take 1 tablet (3 mg total) by mouth daily at bedtime (Patient not taking: Reported on 6/11/2020) 30 tablet 0    Multiple Vitamins-Minerals (ICAPS AREDS 2 PO) Take by mouth 2 (two) times a day      multivitamin (THERAGRAN) TABS Take 1 tablet by mouth daily      PACERONE 100 MG tablet Take 200 mg by mouth 2 (two) times a day       polyethylene glycol (MIRALAX) 17 g packet Take 17 g by mouth daily (Patient taking differently: Take 8 5 g by mouth 2 (two) times a day )  0    pravastatin (PRAVACHOL) 10 mg tablet Take 1 tablet (10 mg total) by mouth daily 90 tablet 0    Psyllium (METAMUCIL PO) Take by mouth       sodium chloride 1 g tablet Take 1 tablet (1 g total) by mouth 3 (three) times a day with meals 90 tablet 2     No current facility-administered medications for this visit  Allergies   Allergen Reactions    Codeine     Lomotil [Diphenoxylate]     Mirabegron Hypertension    Quinidex [Quinidine]     Adhesive [Medical Tape] Rash    Penicillins Rash       Review of Systems   Constitutional: Negative  HENT: Negative  Eyes: Negative  Respiratory: Negative  Cardiovascular: Positive for palpitations  Negative for chest pain  Gastrointestinal: Negative for constipation  Endocrine: Negative  Genitourinary: Positive for difficulty urinating  Musculoskeletal: Positive for gait problem  Negative for back pain (pain when standing up and down)  Skin: Negative  Allergic/Immunologic: Negative  Neurological: Negative for dizziness, seizures, syncope, weakness, numbness and headaches  Hematological: Bruises/bleeds easily  Aspirin 81mg   Psychiatric/Behavioral: Negative for sleep disturbance  Video Exam    There were no vitals filed for this visit  Physical Exam     As a result of this visit, I have not referred the patient for further respiratory evaluation  I spent 25 minutes with patient today in which greater than 50% of the time was spent in counseling/coordination of care regarding Fall precaution, excessive bending, lifting, or twisting her lower back  Continue around the clock Tylenol, wearing LSO brace when doing physical therapy      VIRTUAL VISIT [address] acknowledges that she has consented to an online visit or consultation  She understands that the online visit is based solely on information provided by her, and that, in the absence of a face-to-face physical evaluation by the physician, the diagnosis she receives is both limited and provisional in terms of accuracy and completeness  This is not intended to replace a full medical face-to-face evaluation by the physician  Yue Torre understands and accepts these terms

## 2020-07-02 ENCOUNTER — TELEPHONE (OUTPATIENT)
Dept: INTERNAL MEDICINE CLINIC | Facility: CLINIC | Age: 85
End: 2020-07-02

## 2020-07-02 NOTE — TELEPHONE ENCOUNTER
Bhavna just wanted to let you know they usually see Pt twice a week but due to conflicting appointments they only saw her once this week

## 2020-07-06 ENCOUNTER — TELEPHONE (OUTPATIENT)
Dept: INTERNAL MEDICINE CLINIC | Facility: CLINIC | Age: 85
End: 2020-07-06

## 2020-07-07 NOTE — TELEPHONE ENCOUNTER
May speak to niece  X-ray showed progression of L1 compression fracture  Keep appointment with neurosurgery later this month

## 2020-07-09 ENCOUNTER — TELEPHONE (OUTPATIENT)
Dept: LAB | Facility: HOSPITAL | Age: 85
End: 2020-07-09

## 2020-07-13 ENCOUNTER — LAB (OUTPATIENT)
Dept: LAB | Facility: HOSPITAL | Age: 85
End: 2020-07-13
Payer: MEDICARE

## 2020-07-13 DIAGNOSIS — E87.1 HYPONATREMIA: Chronic | ICD-10-CM

## 2020-07-13 LAB
ANION GAP SERPL CALCULATED.3IONS-SCNC: 9 MMOL/L (ref 4–13)
BUN SERPL-MCNC: 8 MG/DL (ref 5–25)
CALCIUM SERPL-MCNC: 8.7 MG/DL (ref 8.3–10.1)
CHLORIDE SERPL-SCNC: 105 MMOL/L (ref 100–108)
CO2 SERPL-SCNC: 22 MMOL/L (ref 21–32)
CREAT SERPL-MCNC: 0.56 MG/DL (ref 0.6–1.3)
GFR SERPL CREATININE-BSD FRML MDRD: 81 ML/MIN/1.73SQ M
GLUCOSE SERPL-MCNC: 96 MG/DL (ref 65–140)
POTASSIUM SERPL-SCNC: 3.9 MMOL/L (ref 3.5–5.3)
SODIUM SERPL-SCNC: 136 MMOL/L (ref 136–145)

## 2020-07-13 PROCEDURE — 36415 COLL VENOUS BLD VENIPUNCTURE: CPT

## 2020-07-13 PROCEDURE — 80048 BASIC METABOLIC PNL TOTAL CA: CPT

## 2020-07-21 DIAGNOSIS — S32.000A LUMBAR COMPRESSION FRACTURE, CLOSED, INITIAL ENCOUNTER (HCC): ICD-10-CM

## 2020-07-21 RX ORDER — ACETAMINOPHEN 325 MG/1
TABLET ORAL
Qty: 270 TABLET | Refills: 0 | Status: ON HOLD | OUTPATIENT
Start: 2020-07-21 | End: 2021-02-13 | Stop reason: SDUPTHER

## 2020-07-29 ENCOUNTER — HOSPITAL ENCOUNTER (OUTPATIENT)
Dept: RADIOLOGY | Facility: HOSPITAL | Age: 85
Discharge: HOME | End: 2020-07-29
Payer: MEDICARE

## 2020-07-29 ENCOUNTER — OFFICE VISIT (OUTPATIENT)
Dept: NEUROSURGERY | Facility: CLINIC | Age: 85
End: 2020-07-29
Payer: MEDICARE

## 2020-07-29 VITALS
HEIGHT: 63 IN | HEART RATE: 51 BPM | RESPIRATION RATE: 14 BRPM | TEMPERATURE: 98.1 F | DIASTOLIC BLOOD PRESSURE: 77 MMHG | SYSTOLIC BLOOD PRESSURE: 176 MMHG | BODY MASS INDEX: 20.19 KG/M2

## 2020-07-29 DIAGNOSIS — S32.010D CLOSED WEDGE COMPRESSION FRACTURE OF FIRST LUMBAR VERTEBRA WITH ROUTINE HEALING, SUBSEQUENT ENCOUNTER: Primary | ICD-10-CM

## 2020-07-29 DIAGNOSIS — S32.030D CLOSED COMPRESSION FRACTURE OF L3 LUMBAR VERTEBRA, WITH ROUTINE HEALING, SUBSEQUENT ENCOUNTER: ICD-10-CM

## 2020-07-29 PROCEDURE — 1036F TOBACCO NON-USER: CPT | Performed by: PHYSICIAN ASSISTANT

## 2020-07-29 PROCEDURE — 1111F DSCHRG MED/CURRENT MED MERGE: CPT | Performed by: PHYSICIAN ASSISTANT

## 2020-07-29 PROCEDURE — 3077F SYST BP >= 140 MM HG: CPT | Performed by: PHYSICIAN ASSISTANT

## 2020-07-29 PROCEDURE — 1160F RVW MEDS BY RX/DR IN RCRD: CPT | Performed by: PHYSICIAN ASSISTANT

## 2020-07-29 PROCEDURE — 4040F PNEUMOC VAC/ADMIN/RCVD: CPT | Performed by: PHYSICIAN ASSISTANT

## 2020-07-29 PROCEDURE — 99213 OFFICE O/P EST LOW 20 MIN: CPT | Performed by: PHYSICIAN ASSISTANT

## 2020-07-29 PROCEDURE — 3078F DIAST BP <80 MM HG: CPT | Performed by: PHYSICIAN ASSISTANT

## 2020-07-29 PROCEDURE — 72100 X-RAY EXAM L-S SPINE 2/3 VWS: CPT

## 2020-07-29 NOTE — PROGRESS NOTES
Office Note - Neurosurgery   Yanni Gomez 80 y o  female MRN: [id number]      Assessment:  Patient is a 80years old woman here for 8 weeks follow-up of inferior endplate of L1 and L3 fractures  Patient is wearing LSO brace  No history of fall appears spontaneous fracture  Had 2 months follow-up lumbar spine x-rays which demonstrates slight progression of her L1 fracture otherwise stable lumbar spine alignment  Patient denies any back pain over the past 1 week  He takes Tylenol around the clock  Denies any radiculopathy pain in the extremities nor bowel/bladder dysfunction related to the fracture  Most of the time, patient is in wheelchair   Neuro exam-moves all extremities, strength is 5/5 bilaterally  Sensation to light touch intact throughout  DTR 2+ without clonus  Patient wearing LSO brace  Nontender on palpation of posterior L1 and L2-3 region on palpation  Hx, PE, and images discussed with the patient and her daughter  Management plan and prognosis discussed  Another 4 weeks follow-up upright lumbar spine x-rays in brace ordered  Advised fall precaution  Continue wearing LSO brace when upright and at 45 degree head of bed  Questions and concerns were answered  Patient and her daughter expressed their understanding is and agreed with the plan  Plan:  1  Upright lumbar spine x-rays in brace, AP and lateral 2-3 views in 4 weeks  2  Continue wearing LSO brace when upright and at 45 degree head of bed  3  Continue Tylenol  4  Fall precaution  5  Call 4 question or concern  6  Follow up in 4 weeks    Chief Complaint:  Here for 2 months follow-up of inferior endplate of L1 and L3 fractures        HPI  Patient is 80years old pleasant lady with history of spontaneous inferior endplate of L1 and L3 fractures here today for 2 months follow-up with upright lumbar spine x-rays which demonstrate slightly progressed L1 fracture but stable L3 inferior endplate and anatomical lumbar spine  Patient is wearing LSO brace, improved back pain  Taking Tylenol and or the clock now every 12 hours  Denies any radiculopathy pain in the extremities, numbness, weakness or paresthesia  Denies any new development of urinary or bowel dysfunction following the fracture  Denies fever, chills, rigors, cough or chest pain  ROS  Review of system personally reviewed and updated  Review of Systems   Constitutional: Negative  HENT: Negative  Eyes: Negative  Respiratory: Negative  Cardiovascular: Positive for palpitations  Negative for chest pain  Gastrointestinal: Negative for constipation  Endocrine: Negative  Genitourinary: Positive for difficulty urinating  Musculoskeletal: Positive for gait problem  Negative for back pain (pain when standing up and down)  Skin: Negative  Allergic/Immunologic: Negative  Neurological: Negative for dizziness, seizures, syncope, weakness, numbness and headaches  Hematological: Bruises/bleeds easily  Aspirin 81mg   Psychiatric/Behavioral: Negative for sleep disturbance  Family History    Family History   Problem Relation Age of Onset    Leukemia Sister     Coronary artery disease Sister     Diabetes Sister     Hypertension Sister     Coronary artery disease Sister     Aortic aneurysm Sister     Coronary artery disease Mother     Coronary artery disease Brother     Substance Abuse Neg Hx     Alcohol abuse Neg Hx     Depression Neg Hx     Mental illness Neg Hx        Social History    Social History     Socioeconomic History    Marital status:       Spouse name: Not on file    Number of children: Not on file    Years of education: Not on file    Highest education level: Not on file   Occupational History    Not on file   Social Needs    Financial resource strain: Not on file    Food insecurity:     Worry: Not on file     Inability: Not on file    Transportation needs:     Medical: Not on file     Non-medical: Not on file   Tobacco Use    Smoking status: Never Smoker    Smokeless tobacco: Never Used   Substance and Sexual Activity    Alcohol use: No    Drug use: No    Sexual activity: Not Currently   Lifestyle    Physical activity:     Days per week: Not on file     Minutes per session: Not on file    Stress: Not on file   Relationships    Social connections:     Talks on phone: Not on file     Gets together: Not on file     Attends Voodoo service: Not on file     Active member of club or organization: Not on file     Attends meetings of clubs or organizations: Not on file     Relationship status: Not on file    Intimate partner violence:     Fear of current or ex partner: Not on file     Emotionally abused: Not on file     Physically abused: Not on file     Forced sexual activity: Not on file   Other Topics Concern    Not on file   Social History Narrative    Lives with niparviz Kirby and her son Flaco Portillo       Past Medical History    Past Medical History:   Diagnosis Date    [facility]-fib [facility])     Anxiety     Cheilosis     Diverticulosis     1970s    F[facility] of [facility]ac wing ([facility] [address] ) 2016    Gallstone     Hiatal hernia     Hyperlipidemia     Hypertension     Hyponatremia     Inguinal hernia     Lacunar infarction ([facility] [address] )     Macular degeneration     Osteoarthritis     Renal cyst     Rib fractures 2017    Syncope     Tuberculosis     Umbilical hernia     Vertigo        Surgical History    Past Surgical History:   Procedure Laterality Date    ABDOMINAL SURGERY      HIP SURGERY      At  young age 26's   [facility] KNEE CARTILAGE SURGERY         Medications      Current Outpatient Medications:     acetaminophen (TYLENOL) 325 mg tablet, TAKE 3 TABLETS(975MG TOTAL) BY MOUTH EVERY 8 HOURS, Disp: 270 tablet, Rfl: 0    aspirin 81 MG tablet, Take 81 mg by mouth daily, Disp: , Rfl:     carvedilol (COREG) 6 25 mg tablet, Take 1 tablet (6 25 mg total) by mouth 2 (two) times a day with meals, Disp: 60 tablet, Rfl: 0   Cholecalciferol (VITAMIN D3) 1000 units CAPS, Take by mouth, Disp: , Rfl:     Coenzyme Q10 (COQ-10) 50 MG CAPS, Take by mouth daily, Disp: , Rfl:     hydrALAZINE (APRESOLINE) 10 mg tablet, Take 10 mg by mouth 2 (two) times a day , Disp: , Rfl:     irbesartan (AVAPRO) 300 mg tablet, Take 150 mg by mouth 2 (two) times a day , Disp: , Rfl:     Multiple Vitamins-Minerals (ICAPS AREDS 2 PO), Take by mouth 2 (two) times a day, Disp: , Rfl:     multivitamin (THERAGRAN) TABS, Take 1 tablet by mouth daily, Disp: , Rfl:     PACERONE 100 MG tablet, Take 100 mg by mouth 2 (two) times a day , Disp: , Rfl:     polyethylene glycol (MIRALAX) 17 g packet, Take 17 g by mouth daily (Patient taking differently: Take 8 5 g by mouth 2 (two) times a day ), Disp: , Rfl: 0    pravastatin (PRAVACHOL) 10 mg tablet, Take 1 tablet (10 mg total) by mouth daily, Disp: 90 tablet, Rfl: 0    Psyllium (METAMUCIL PO), Take by mouth , Disp: , Rfl:     sodium chloride 1 g tablet, Take 1 tablet (1 g total) by mouth 3 (three) times a day with meals, Disp: 90 tablet, Rfl: 2    Allergies    Allergies   Allergen Reactions    Codeine     Lomotil [Diphenoxylate]     Mirabegron Hypertension    Quinidex [Quinidine]     Adhesive [Medical Tape] Rash    Penicillins Rash       The following portions of the patient's history were reviewed and updated as appropriate: allergies, current medications, past family history, past medical history, past social history, past surgical history and problem list     Investigations    I personally reviewed the XRAY results with the patient:    Findings as described in the assessment section above    Physical Exam    There were no vitals filed for this visit  Physical Exam   Constitutional: She appears well-developed and well-nourished  HENT:   Head: Normocephalic and atraumatic  Neck: Normal range of motion  Cardiovascular: Normal rate     Pulmonary/Chest: Effort normal    Musculoskeletal: Normal range of motion  Neurological: She is alert  She has normal strength and normal reflexes  No cranial nerve deficit or sensory deficit  GCS eye subscore is 4  GCS verbal subscore is 5  GCS motor subscore is 6  Reflex Scores:       Tricep reflexes are 2+ on the right side and 2+ on the left side  Bicep reflexes are 2+ on the right side and 2+ on the left side  Brachioradialis reflexes are 2+ on the right side and 2+ on the left side  Patellar reflexes are 2+ on the right side and 2+ on the left side  Achilles reflexes are 2+ on the right side and 2+ on the left side  Skin: Skin is warm  Psychiatric: Her speech is normal      Neurologic Exam     Mental Status   Speech: speech is normal   Level of consciousness: alert    Cranial Nerves     CN III, IV, VI   Nystagmus: none     CN XI   CN XI normal      Motor Exam   Muscle bulk: normal  Overall muscle tone: normal  Right arm tone: normal  Left arm tone: normal  Right arm pronator drift: absent  Left arm pronator drift: absent  Right leg tone: normal  Left leg tone: normal    Strength   Strength 5/5 throughout       Sensory Exam   Light touch normal      Gait, Coordination, and Reflexes     Reflexes   Right brachioradialis: 2+  Left brachioradialis: 2+  Right biceps: 2+  Left biceps: 2+  Right triceps: 2+  Left triceps: 2+  Right patellar: 2+  Left patellar: 2+  Right achilles: 2+  Left achilles: 2+  Right : 2+  Left : 2+  Right Retana: absent  Left Retana: absent  Right ankle clonus: absent  Left ankle clonus: absent

## 2020-08-11 DIAGNOSIS — E78.00 HYPERCHOLESTEREMIA: ICD-10-CM

## 2020-08-11 RX ORDER — PRAVASTATIN SODIUM 10 MG
TABLET ORAL
Qty: 90 TABLET | Refills: 0 | Status: SHIPPED | OUTPATIENT
Start: 2020-08-11 | End: 2020-11-11 | Stop reason: SDUPTHER

## 2020-08-26 ENCOUNTER — HOSPITAL ENCOUNTER (OUTPATIENT)
Dept: RADIOLOGY | Facility: HOSPITAL | Age: 85
Discharge: HOME | End: 2020-08-26
Payer: MEDICARE

## 2020-08-26 ENCOUNTER — OFFICE VISIT (OUTPATIENT)
Dept: NEUROSURGERY | Facility: CLINIC | Age: 85
End: 2020-08-26
Payer: MEDICARE

## 2020-08-26 VITALS
SYSTOLIC BLOOD PRESSURE: 175 MMHG | HEIGHT: 63 IN | HEART RATE: 51 BPM | TEMPERATURE: 98.2 F | DIASTOLIC BLOOD PRESSURE: 73 MMHG | BODY MASS INDEX: 20.19 KG/M2

## 2020-08-26 DIAGNOSIS — S32.010D CLOSED WEDGE COMPRESSION FRACTURE OF FIRST LUMBAR VERTEBRA WITH ROUTINE HEALING, SUBSEQUENT ENCOUNTER: ICD-10-CM

## 2020-08-26 DIAGNOSIS — S32.030D CLOSED COMPRESSION FRACTURE OF L3 LUMBAR VERTEBRA WITH ROUTINE HEALING, SUBSEQUENT ENCOUNTER: Primary | ICD-10-CM

## 2020-08-26 PROCEDURE — 99213 OFFICE O/P EST LOW 20 MIN: CPT | Performed by: PHYSICIAN ASSISTANT

## 2020-08-26 PROCEDURE — 1160F RVW MEDS BY RX/DR IN RCRD: CPT | Performed by: PHYSICIAN ASSISTANT

## 2020-08-26 PROCEDURE — 72100 X-RAY EXAM L-S SPINE 2/3 VWS: CPT

## 2020-08-26 PROCEDURE — 4040F PNEUMOC VAC/ADMIN/RCVD: CPT | Performed by: PHYSICIAN ASSISTANT

## 2020-08-26 PROCEDURE — 1036F TOBACCO NON-USER: CPT | Performed by: PHYSICIAN ASSISTANT

## 2020-08-26 PROCEDURE — 3078F DIAST BP <80 MM HG: CPT | Performed by: PHYSICIAN ASSISTANT

## 2020-08-26 PROCEDURE — 3077F SYST BP >= 140 MM HG: CPT | Performed by: PHYSICIAN ASSISTANT

## 2020-08-26 NOTE — PROGRESS NOTES
Office Note - Neurosurgery   Juventino Cassidy 80 y o  female MRN: [id number]      Assessment:    Patient is a 80years old woman here for 12 weeks follow-up of inferior endplate of L1 and L3 fractures  Patient is wearing LSO brace  No history of fall  But appears spontaneous fracture  Had 3 months follow-up lumbar spine x-rays with brace which demonstrates slight progression of her L1 fracture otherwise stable L3 compression deformity  her knees reports patient having pain when she gets up from sitting position and also when she tried to see down  No radiculopathy pain, weakness, paresthesia or numbness in the extremities  Denies bowel/bladder dysfunction related to the fracture  History of right hip fusion procedure, and knee arthritis  Patient wearing LSO brace  Patient takes Tylenol on p r n  basis  Neuro exam-patient in wheelchair  Slightly confused appears age-related, but patient is very pleasant  She  communicates well and in no pain distress  strength is 5/5 bilaterally, right leg range of motion limited due to arthritis of the knee and also fused right hip  Sensation to light touch intact bilaterally  Nontender on palpation of lumbar spines  Patient wearing LSO brace  Hx, PEx, and images reviewed with patient and her niece  Management plan discussed  Advised to wean of LSO brace over the next 2 weeks  Two weeks Follow-up flexion-extension lumbar spine x-rays ordered placed  Advised fall precaution  Continue Tylenol p r n  questions and concerns were answered  Patient needs expressed their understanding is and agreed with the plan    Plan:  1  Wean of LSO brace for the next 2 weeks  2  Flexion-extension lumbar spine x-rays without brace in 2 weeks  3  Fall precaution  4   Continue Tylenol p r n   5  Call 4 question or concern      Subjective/Objective     Chief Complaint: " Back pain during sitting down and standing up"        HPI  Patient is a 80years old woman here for 12 weeks follow-up of inferior endplate of L1 and L3 fractures  Patient is wearing LSO brace  No history of fall  But appears spontaneous fracture  Had 3 months follow-up lumbar spine x-rays with brace which demonstrates slight progression of her L1 fracture otherwise stable L3 compression deformity  Patient's niece reports that patient feels lower back pain when she stands up from sitting position and also when she sits down  Denies any pain when she walks  Denies radiculopathy pain, numbness, weakness or paresthesia and extremities  Denies bowel/bladder dysfunction related to the fracture  Overall the pain is improving  She is wearing LSO brace, takes Tylenol p r n  ROS  Review of system Personally reviewed and updated  Review of Systems   Constitutional: Negative  HENT: Negative  Eyes: Negative  Respiratory: Negative  Cardiovascular: Positive for palpitations  Negative for chest pain  Gastrointestinal: Negative for constipation  Endocrine: Negative  Genitourinary: Positive for difficulty urinating  Musculoskeletal: Positive for back pain (pain when standing up and down especially when using the restroom, achey and soreness more then pain, increase in pain only with movement) and gait problem  Seems to have more pain or increase in pain only when using brace and it's more achey/soreness pain    Skin: Negative  Allergic/Immunologic: Negative  Neurological: Negative for dizziness, seizures, syncope, weakness, numbness and headaches  Hematological: Bruises/bleeds easily  Aspirin 81mg   Psychiatric/Behavioral: Negative for sleep disturbance         Family History    Family History   Problem Relation Age of Onset    Leukemia Sister     Coronary artery disease Sister     Diabetes Sister     Hypertension Sister     Coronary artery disease Sister     Aortic aneurysm Sister     Coronary artery disease Mother     Coronary artery disease Brother     Substance Abuse Neg Hx     Alcohol abuse Neg Hx     Depression Neg Hx     Mental illness Neg Hx        Social History    Social History     Socioeconomic History    Marital status:       Spouse name: Not on file    Number of children: Not on file    Years of education: Not on file    Highest education level: Not on file   Occupational History    Not on file   Social Needs    Financial resource strain: Not on file    Food insecurity     Worry: Not on file     Inability: Not on file    Transportation needs     Medical: Not on file     Non-medical: Not on file   Tobacco Use    Smoking status: Never Smoker    Smokeless tobacco: Never Used   Substance and Sexual Activity    Alcohol use: No    Drug use: No    Sexual activity: Not Currently   Lifestyle    Physical activity     Days per week: Not on file     Minutes per session: Not on file    Stress: Not on file   Relationships    Social connections     Talks on phone: Not on file     Gets together: Not on file     Attends Quaker service: Not on file     Active member of club or organization: Not on file     Attends meetings of clubs or organizations: Not on file     Relationship status: Not on file    Intimate partner violence     Fear of current or ex partner: Not on file     Emotionally abused: Not on file     Physically abused: Not on file     Forced sexual activity: Not on file   Other Topics Concern    Not on file   Social History Narrative    Lives with niparviz Lunsford and her son Divya Garza       Past Medical History    Past Medical History:   Diagnosis Date    A-fib [facility])     Anxiety     Cheilosis     Diverticulosis     1970s    [facility] 2016    Gallstone     Hiatal hernia     Hyperlipidemia     Hypertension     Hyponatremia     Inguinal hernia     Lac[facility]ar infarction ([facility] )     Macular degeneration     Osteoarthritis     Renal cyst     Rib fractures 2017    Syncope     Tuberculosis     Umbilical hernia     Vertigo Surgical History    Past Surgical History:   Procedure Laterality Date    ABDOMINAL SURGERY      HIP SURGERY      At  young age 26's   Jacy Henrys KNEE CARTILAGE SURGERY         Medications      Current Outpatient Medications:     acetaminophen (TYLENOL) 325 mg tablet, TAKE 3 TABLETS(975MG TOTAL) BY MOUTH EVERY 8 HOURS (Patient taking differently: Take by mouth every 8 (eight) hours as needed ), Disp: 270 tablet, Rfl: 0    aspirin 81 MG tablet, Take 81 mg by mouth daily, Disp: , Rfl:     carvedilol (COREG) 6 25 mg tablet, Take 1 tablet (6 25 mg total) by mouth 2 (two) times a day with meals, Disp: 60 tablet, Rfl: 0    Cholecalciferol (VITAMIN D3) 1000 units CAPS, Take by mouth, Disp: , Rfl:     Coenzyme Q10 (COQ-10) 50 MG CAPS, Take by mouth daily, Disp: , Rfl:     hydrALAZINE (APRESOLINE) 10 mg tablet, Take 10 mg by mouth 2 (two) times a day , Disp: , Rfl:     irbesartan (AVAPRO) 300 mg tablet, Take 150 mg by mouth 2 (two) times a day , Disp: , Rfl:     Multiple Vitamins-Minerals (ICAPS AREDS 2 PO), Take by mouth 2 (two) times a day, Disp: , Rfl:     multivitamin (THERAGRAN) TABS, Take 1 tablet by mouth daily, Disp: , Rfl:     PACERONE 100 MG tablet, Take 100 mg by mouth 2 (two) times a day , Disp: , Rfl:     polyethylene glycol (MIRALAX) 17 g packet, Take 17 g by mouth daily (Patient taking differently: Take 8 5 g by mouth daily ), Disp: , Rfl: 0    pravastatin (PRAVACHOL) 10 mg tablet, TAKE 1 TABLET(10 MG) BY MOUTH DAILY, Disp: 90 tablet, Rfl: 0    sodium chloride 1 g tablet, Take 1 tablet (1 g total) by mouth 3 (three) times a day with meals, Disp: 90 tablet, Rfl: 2    Allergies    Allergies   Allergen Reactions    Codeine     Lomotil [Diphenoxylate]     Mirabegron Hypertension    Quinidex [Quinidine]     Adhesive [Medical Tape] Rash    Penicillins Rash       The following portions of the patient's history were reviewed and updated as appropriate: allergies, current medications, past family history, past medical history, past social history, past surgical history and problem list     Investigations    I personally reviewed lumbar XRAY results with the patient and her niece:    Findings as described in the assessment section above    Physical Exam    There were no vitals filed for this visit  Physical Exam  Constitutional:       Appearance: Normal appearance  Comments: Slightly confused, could be age-related   HENT:      Head: Normocephalic and atraumatic  Eyes:      Extraocular Movements: Extraocular movements intact  Neck:      Musculoskeletal: Normal range of motion  Cardiovascular:      Rate and Rhythm: Normal rate  Pulmonary:      Effort: Pulmonary effort is normal    Musculoskeletal:         General: No tenderness  Right lower leg: No edema  Left lower leg: No edema  Skin:     General: Skin is warm  Neurological:      General: No focal deficit present  Mental Status: She is alert  GCS: GCS eye subscore is 4  GCS verbal subscore is 4  GCS motor subscore is 6  Cranial Nerves: Cranial nerves are intact  Sensory: Sensation is intact  Motor: Motor function is intact  Gait: Gait is intact  Deep Tendon Reflexes: Reflexes are normal and symmetric  Reflex Scores:       Tricep reflexes are 2+ on the right side and 2+ on the left side  Bicep reflexes are 2+ on the right side and 2+ on the left side  Brachioradialis reflexes are 2+ on the right side and 2+ on the left side  Patellar reflexes are 2+ on the right side and 2+ on the left side  Achilles reflexes are 2+ on the right side and 2+ on the left side    Psychiatric:         Speech: Speech normal        Neurologic Exam     Mental Status   Speech: speech is normal   Level of consciousness: alert    Cranial Nerves     CN III, IV, VI   Nystagmus: none     CN XI   CN XI normal      Motor Exam   Muscle bulk: normal  Overall muscle tone: normal  Right arm tone: normal  Left arm tone: normal  Right arm pronator drift: absent  Left arm pronator drift: absent  Right leg tone: normal  Left leg tone: normal    Sensory Exam   Light touch normal      Gait, Coordination, and Reflexes     Gait  Gait: normal    Reflexes   Right brachioradialis: 2+  Left brachioradialis: 2+  Right biceps: 2+  Left biceps: 2+  Right triceps: 2+  Left triceps: 2+  Right patellar: 2+  Left patellar: 2+  Right achilles: 2+  Left achilles: 2+  Right : 2+  Left : 2+  Right Retana: absent  Left Retana: absent

## 2020-08-27 ENCOUNTER — APPOINTMENT (OUTPATIENT)
Dept: LAB | Facility: CLINIC | Age: 85
End: 2020-08-27
Payer: MEDICARE

## 2020-08-27 ENCOUNTER — TELEPHONE (OUTPATIENT)
Dept: INTERNAL MEDICINE CLINIC | Facility: CLINIC | Age: 85
End: 2020-08-27

## 2020-08-27 ENCOUNTER — TRANSCRIBE ORDERS (OUTPATIENT)
Dept: LAB | Facility: CLINIC | Age: 85
End: 2020-08-27

## 2020-08-27 ENCOUNTER — OFFICE VISIT (OUTPATIENT)
Dept: INTERNAL MEDICINE CLINIC | Facility: CLINIC | Age: 85
End: 2020-08-27
Payer: MEDICARE

## 2020-08-27 VITALS
DIASTOLIC BLOOD PRESSURE: 72 MMHG | SYSTOLIC BLOOD PRESSURE: 162 MMHG | TEMPERATURE: 97.8 F | HEIGHT: 63 IN | OXYGEN SATURATION: 98 % | HEART RATE: 53 BPM | BODY MASS INDEX: 20.19 KG/M2

## 2020-08-27 DIAGNOSIS — I10 ESSENTIAL HYPERTENSION: ICD-10-CM

## 2020-08-27 DIAGNOSIS — H91.90 HEARING LOSS, UNSPECIFIED HEARING LOSS TYPE, UNSPECIFIED LATERALITY: ICD-10-CM

## 2020-08-27 DIAGNOSIS — K59.09 CHRONIC CONSTIPATION: ICD-10-CM

## 2020-08-27 DIAGNOSIS — Z00.00 HEALTH MAINTENANCE EXAMINATION: ICD-10-CM

## 2020-08-27 DIAGNOSIS — S32.030A CLOSED COMPRESSION FRACTURE OF L3 LUMBAR VERTEBRA, INITIAL ENCOUNTER (HCC): ICD-10-CM

## 2020-08-27 DIAGNOSIS — I48.0 PAROXYSMAL ATRIAL FIBRILLATION (HCC): Primary | ICD-10-CM

## 2020-08-27 DIAGNOSIS — R21 GROIN RASH: ICD-10-CM

## 2020-08-27 DIAGNOSIS — R35.0 URINARY FREQUENCY: ICD-10-CM

## 2020-08-27 DIAGNOSIS — E78.49 OTHER HYPERLIPIDEMIA: ICD-10-CM

## 2020-08-27 DIAGNOSIS — E87.1 HYPONATREMIA: Chronic | ICD-10-CM

## 2020-08-27 PROBLEM — R82.71 BACTERIURIA: Status: RESOLVED | Noted: 2019-07-13 | Resolved: 2020-08-27

## 2020-08-27 PROBLEM — S91.301A OPEN WOUND OF RIGHT HEEL: Status: RESOLVED | Noted: 2020-05-15 | Resolved: 2020-08-27

## 2020-08-27 PROBLEM — L89.301 PRESSURE INJURY OF BUTTOCK, STAGE 1: Status: RESOLVED | Noted: 2020-05-30 | Resolved: 2020-08-27

## 2020-08-27 LAB
ALBUMIN SERPL BCP-MCNC: 3.6 G/DL (ref 3.5–5)
ALP SERPL-CCNC: 93 U/L (ref 46–116)
ALT SERPL W P-5'-P-CCNC: 30 U/L (ref 12–78)
ANION GAP SERPL CALCULATED.3IONS-SCNC: 6 MMOL/L (ref 4–13)
AST SERPL W P-5'-P-CCNC: 26 U/L (ref 5–45)
BASOPHILS # BLD AUTO: 0.03 THOUSANDS/ΜL (ref 0–0.1)
BASOPHILS NFR BLD AUTO: 0 % (ref 0–1)
BILIRUB SERPL-MCNC: 0.66 MG/DL (ref 0.2–1)
BUN SERPL-MCNC: 12 MG/DL (ref 5–25)
CALCIUM SERPL-MCNC: 8.7 MG/DL (ref 8.3–10.1)
CHLORIDE SERPL-SCNC: 100 MMOL/L (ref 100–108)
CO2 SERPL-SCNC: 25 MMOL/L (ref 21–32)
CREAT SERPL-MCNC: 0.66 MG/DL (ref 0.6–1.3)
EOSINOPHIL # BLD AUTO: 0.26 THOUSAND/ΜL (ref 0–0.61)
EOSINOPHIL NFR BLD AUTO: 4 % (ref 0–6)
ERYTHROCYTE [DISTWIDTH] IN BLOOD BY AUTOMATED COUNT: 13.7 % (ref 11.6–15.1)
GFR SERPL CREATININE-BSD FRML MDRD: 77 ML/MIN/1.73SQ M
GLUCOSE SERPL-MCNC: 98 MG/DL (ref 65–140)
HCT VFR BLD AUTO: 39.4 % (ref 34.8–46.1)
HGB BLD-MCNC: 13.3 G/DL (ref 11.5–15.4)
IMM GRANULOCYTES # BLD AUTO: 0.02 THOUSAND/UL (ref 0–0.2)
IMM GRANULOCYTES NFR BLD AUTO: 0 % (ref 0–2)
LYMPHOCYTES # BLD AUTO: 1.65 THOUSANDS/ΜL (ref 0.6–4.47)
LYMPHOCYTES NFR BLD AUTO: 23 % (ref 14–44)
MCH RBC QN AUTO: 35.3 PG (ref 26.8–34.3)
MCHC RBC AUTO-ENTMCNC: 33.8 G/DL (ref 31.4–37.4)
MCV RBC AUTO: 105 FL (ref 82–98)
MONOCYTES # BLD AUTO: 1.05 THOUSAND/ΜL (ref 0.17–1.22)
MONOCYTES NFR BLD AUTO: 15 % (ref 4–12)
NEUTROPHILS # BLD AUTO: 4.07 THOUSANDS/ΜL (ref 1.85–7.62)
NEUTS SEG NFR BLD AUTO: 58 % (ref 43–75)
NRBC BLD AUTO-RTO: 0 /100 WBCS
PLATELET # BLD AUTO: 265 THOUSANDS/UL (ref 149–390)
PMV BLD AUTO: 9 FL (ref 8.9–12.7)
POTASSIUM SERPL-SCNC: 4.6 MMOL/L (ref 3.5–5.3)
PROT SERPL-MCNC: 6.9 G/DL (ref 6.4–8.2)
RBC # BLD AUTO: 3.77 MILLION/UL (ref 3.81–5.12)
SODIUM SERPL-SCNC: 131 MMOL/L (ref 136–145)
WBC # BLD AUTO: 7.08 THOUSAND/UL (ref 4.31–10.16)

## 2020-08-27 PROCEDURE — 4040F PNEUMOC VAC/ADMIN/RCVD: CPT | Performed by: INTERNAL MEDICINE

## 2020-08-27 PROCEDURE — 85025 COMPLETE CBC W/AUTO DIFF WBC: CPT

## 2020-08-27 PROCEDURE — 1036F TOBACCO NON-USER: CPT | Performed by: INTERNAL MEDICINE

## 2020-08-27 PROCEDURE — 1170F FXNL STATUS ASSESSED: CPT | Performed by: INTERNAL MEDICINE

## 2020-08-27 PROCEDURE — 36415 COLL VENOUS BLD VENIPUNCTURE: CPT

## 2020-08-27 PROCEDURE — 1160F RVW MEDS BY RX/DR IN RCRD: CPT | Performed by: INTERNAL MEDICINE

## 2020-08-27 PROCEDURE — 3078F DIAST BP <80 MM HG: CPT | Performed by: INTERNAL MEDICINE

## 2020-08-27 PROCEDURE — 1125F AMNT PAIN NOTED PAIN PRSNT: CPT | Performed by: INTERNAL MEDICINE

## 2020-08-27 PROCEDURE — 3077F SYST BP >= 140 MM HG: CPT | Performed by: INTERNAL MEDICINE

## 2020-08-27 PROCEDURE — 99214 OFFICE O/P EST MOD 30 MIN: CPT | Performed by: INTERNAL MEDICINE

## 2020-08-27 PROCEDURE — G0438 PPPS, INITIAL VISIT: HCPCS | Performed by: INTERNAL MEDICINE

## 2020-08-27 PROCEDURE — 80053 COMPREHEN METABOLIC PANEL: CPT

## 2020-08-27 NOTE — ASSESSMENT & PLAN NOTE
Saw neurosurgery yesterday, has not been using back brace regularly  Discussed schedule to be in and out of bed during the day

## 2020-08-27 NOTE — PROGRESS NOTES
Assessment and Plan:     Problem List Items Addressed This Visit        Digestive    Chronic constipation     Improved, taking 1/2 dose of Miralax daily  Cardiovascular and Mediastinum    Essential hypertension     Elevated BP  Instructed to monitor BP at ome, would increase hydralazine  Also on carvedilol and irbesartan  Relevant Orders    CBC and differential (Completed)    Comprehensive metabolic panel    Paroxysmal atrial fibrillation ([place] [address] ) - Primary     On ASA  Nervous and Auditory    Hearing loss     Refer to hearing evaluation  Relevant Orders    Comprehensive hearing evaluation       Musculoskeletal and Integument    Closed compression fracture of L3 lumbar vertebra, initial encounter ([place] [address] )     Saw neurosurgery yesterday, has not been using back brace regularly  Discussed schedule to be in and out of bed during the day  Other    Hyponatremia (Chronic)     Taking NaCl tid  Recheck labs today  On 1800 fluid restriction  Other hyperlipidemia     Lipids due, on statin  Urinary frequency     Prefer not to start any medication, was on Myrbetriq before  Discussed low acid diet  Other Visit Diagnoses     Groin rash        Relevant Medications    nystatin-triamcinolone (MYCOLOG-II) cream    Health maintenance examination        No screening  Preventive health issues were discussed with patient, and age appropriate screening tests were ordered as noted in patient's After Visit Summary  Personalized health advice and appropriate referrals for health education or preventive services given if needed, as noted in patient's After Visit Summary       History of Present Illness:     Patient presents for Medicare Annual Wellness visit    Patient Care Team:  Rosalba Arreaga MD as PCP - General (Internal Medicine)     Problem List:     Patient Active Problem List   Diagnosis    [facility]mal [facility] fibrillation ([facility] )    Hyponatremia  Ambulatory dysfunction    Valvular heart disease    Closed compression fracture of L3 lumbar vertebra, initial encounter ([facility] )    Essential hypertension    Chronic constipation    Arthritis    History of vertebral fracture    Memory changes    Hearing loss    Urinary frequency    Other hyperlipidemia    Osteoarthritis    Closed lumbar vertebral fracture (HCC)      Past Medical and Surgical History:     Past Medical History:   Diagnosis Date    A-fib ([facility] )     Anxiety     Cheilosis     Diverticulosis     1970s    Fracture of iliac wing ([facility] ) 2016    Gallstone     Hiatal hernia     Hyperlipidemia     Hypertension     Hyponatremia     Inguinal hernia     Lacunar infarction ([facility] )     Macular degeneration     Osteoarthritis     Renal cyst     Rib fractures 2017    Syncope     Tuberculosis     Umbilical hernia     Vertigo      Past Surgical History:   Procedure Laterality Date    ABDOMINAL SURGERY      HIP SURGERY      At  young age 26's    KNEE CARTILAGE SURGERY        Family History:     Family History   Problem Relation Age of Onset    Leukemia Sister     Coronary artery disease Sister     Diabetes Sister     Hypertension Sister     Coronary artery disease Sister     Aortic aneurysm Sister     Coronary artery disease Mother     Coronary artery disease Brother     Substance Abuse Neg Hx     Alcohol abuse Neg Hx     Depression Neg Hx     Mental illness Neg Hx       Social History:     E-Cigarette/Vaping    E-Cigarette Use Never User      E-Cigarette/Vaping Substances    Nicotine No     THC No     CBD No     Flavoring No     Other No     Unknown No      Social History     Socioeconomic History    Marital status:       Spouse name: None    Number of children: None    Years of education: None    Highest education level: None   Occupational History    None   Social Needs    Financial resource strain: None    Food insecurity     Worry: None Inability: None    Transportation needs     Medical: None     Non-medical: None   Tobacco Use    Smoking status: Never Smoker    Smokeless tobacco: Never Used   Substance and Sexual Activity    Alcohol use: No    Drug use: No    Sexual activity: Not Currently   Lifestyle    Physical activity     Days per week: None     Minutes per session: None    Stress: None   Relationships    Social connections     Talks on phone: None     Gets together: None     Attends Spiritism service: None     Active member of club or organization: None     Attends meetings of clubs or organizations: None     Relationship status: None    Intimate partner violence     Fear of current or ex partner: None     Emotionally abused: None     Physically abused: None     Forced sexual activity: None   Other Topics Concern    None   Social History Narrative    Lives with niece Queenie Mccall and her son Abner      Medications and Allergies:     Current Outpatient Medications   Medication Sig Dispense Refill    acetaminophen (TYLENOL) 325 mg tablet TAKE 3 TABLETS(975MG TOTAL) BY MOUTH EVERY 8 HOURS (Patient taking differently: Take by mouth as needed ) 270 tablet 0    aspirin 81 MG tablet Take 81 mg by mouth daily      carvedilol (COREG) 6 25 mg tablet Take 1 tablet (6 25 mg total) by mouth 2 (two) times a day with meals 60 tablet 0    Cholecalciferol (VITAMIN D3) 1000 units CAPS Take by mouth      Coenzyme Q10 (COQ-10) 50 MG CAPS Take by mouth daily      hydrALAZINE (APRESOLINE) 10 mg tablet Take 10 mg by mouth 2 (two) times a day       irbesartan (AVAPRO) 300 mg tablet Take 150 mg by mouth 2 (two) times a day       Multiple Vitamins-Minerals (ICAPS AREDS 2 PO) Take by mouth 2 (two) times a day      multivitamin (THERAGRAN) TABS Take 1 tablet by mouth daily      PACERONE 100 MG tablet Take 100 mg by mouth 2 (two) times a day       polyethylene glycol (MIRALAX) 17 g packet Take 17 g by mouth daily (Patient taking differently: Take 8 5 g by mouth daily )  0    pravastatin (PRAVACHOL) 10 mg tablet TAKE 1 TABLET(10 MG) BY MOUTH DAILY 90 tablet 0    sodium chloride 1 g tablet Take 1 tablet (1 g total) by mouth 3 (three) times a day with meals 90 tablet 2    nystatin-triamcinolone (MYCOLOG-II) cream Apply topically 2 (two) times a day as needed (rash) 60 g 1     No current facility-administered medications for this visit  Allergies   Allergen Reactions    Codeine     Lomotil [Diphenoxylate]     Mirabegron Hypertension    Quinidex [Quinidine]     Adhesive [Medical Tape] Rash    Penicillins Rash      Immunizations:     Immunization History   Administered Date(s) Administered    Influenza Split High Dose Preservative Free IM 10/06/2016, 10/20/2017, 10/17/2018    Influenza, high dose seasonal 0 7 mL 10/30/2019    Pneumococcal Conjugate 13-Valent 02/01/2016      Health Maintenance: There are no preventive care reminders to display for this patient  Topic Date Due    Pneumococcal Vaccine: 65+ Years (2 of 2 - PPSV23) 02/01/2017    Influenza Vaccine  07/01/2020      Medicare Health Risk Assessment:     /72   Pulse (!) 53   Temp 97 8 °F (36 6 °C)   Ht 5' 3" (1 6 m)   SpO2 98%   BMI 20 19 kg/m²      Oanh Garces is here for her Subsequent Wellness visit  Health Risk Assessment:   Patient rates overall health as fair  Patient feels that their physical health rating is slightly worse  Eyesight was rated as same  Hearing was rated as slightly worse  Patient feels that their emotional and mental health rating is same  Pain experienced in the last 7 days has been some  Patient's pain rating has been 5/10  Patient states that she has experienced no weight loss or gain in last 6 months  Fall Risk Screening: In the past year, patient has experienced: no history of falling in past year      Urinary Incontinence Screening:   Patient has leaked urine accidently in the last six months       Home Safety:  Patient has trouble with stairs inside or outside of their home  Patient has working smoke alarms and has working carbon monoxide detector  Home safety hazards include: none  Nutrition:   Current diet is Regular  Medications:   Patient is currently taking over-the-counter supplements  OTC medications include: see medication list  Patient is not able to manage medications  Activities of Daily Living (ADLs)/Instrumental Activities of Daily Living (IADLs):   Walk and transfer into and out of bed and chair?: No  Dress and groom yourself?: No    Bathe or shower yourself?: No    Feed yourself? No  Do your laundry/housekeeping?: No  Manage your money, pay your bills and track your expenses?: No  Make your own meals?: No    Do your own shopping?: No    Previous Hospitalizations:   Any hospitalizations or ED visits within the last 12 months?: Yes    How many hospitalizations have you had in the last year?: 1-2    Advance Care Planning:   Living will: Yes    Durable POA for healthcare: Yes    Advanced directive: Yes    End of Life Decisions reviewed with patient: No      Cognitive Screening:   Provider or family/friend/caregiver concerned regarding cognition?: Yes    PREVENTIVE SCREENINGS      Cardiovascular Screening:    General: History Lipid Disorder    Due for: Lipid Panel      Diabetes Screening:     General: Screening Current      Colorectal Cancer Screening:     General: Screening Not Indicated      Breast Cancer Screening:     General: Screening Not Indicated      Cervical Cancer Screening:    General: Screening Not Indicated      Osteoporosis Screening:    General: Screening Not Indicated      Abdominal Aortic Aneurysm (AAA) Screening:        General: Screening Not Indicated      Lung Cancer Screening:     General: Screening Not Indicated      Hepatitis C Screening:    General: Screening Not Indicated    Other Counseling Topics:   Regular weightbearing exercise         Kevon Weinstein MD

## 2020-08-27 NOTE — TELEPHONE ENCOUNTER
May speak to [employer]  Labs results: sodium slightly low at 131  Continue salt tablets 3x a day  Rest of labs ok

## 2020-08-27 NOTE — ASSESSMENT & PLAN NOTE
Elevated BP  Instructed to monitor BP at ome, would increase hydralazine  Also on carvedilol and irbesartan

## 2020-08-27 NOTE — PROGRESS NOTES
Assessment/Plan:    Closed compression fracture of L3 lumbar vertebra, initial encounter ([facility] [address] )  Saw neurosurgery yesterday, has not been using back brace regularly  Discussed schedule to be in and out of bed during the day  Chronic constipation  Improved, taking 1/2 dose of Miralax daily  Hearing loss  Refer to hearing evaluation  Hyponatremia  Taking NaCl tid  Recheck labs today  On 1800 fluid restriction  Pressure injury of buttock, stage 1  Resolved  Urinary frequency  Prefer not to start any medication, was on Myrbetriq before  Discussed low acid diet  Essential hypertension  Elevated BP  Instructed to monitor BP at [facility], would increase hydralazine  Also on carvedilol and irbesartan  Other hyperlipidemia  Lipids due, on statin  Memory changes  May be worse due to hearing loss  Paroxysmal atrial fibrillation (HCC)  On ASA  Diagnoses and all orders for this visit:    [facility]    Other hyperlipidemia    Essential hypertension  -     CBC and differential; Future  -     Comprehensive metabolic panel; Future    Closed compression fracture of L3 lumbar vertebra, initial encounter ([facility])    Groin rash  -     nystatin-triamcinolone (MYCOLOG-II) cream; Apply topically 2 (two) times a day as needed (rash)    Hearing loss, unspecified hearing loss type, unspecified laterality  -     Comprehensive hearing evaluation; Future    Chronic constipation    Hyponatremia    Urinary frequency    Health maintenance examination  Comments:  No screening  Follow up in 4 months or as needed  Subjective:      Patient ID: Paul Chung is a 80 y o  female, here with her niece, Duy Medel  Most of the history taken from her niece  She reports that Perlita Daughters would sometimes complain of pain when she pushes herself up, from a chair, bed or the cold  It does not occur regularly, does not occur daily  She has not been wearing her back brace regularly    They put her on her wheelchair later in the morning and would stay there for 6-8 hours at a time  She saw your surgery yesterday  Niece reports a red rash on her left groin, with small dots  She thinks it may be irritation from the diaper  She has not applied any medication on it yet  She is concerned about her hearing and memory loss  She is also working to schedule her an appointment with a dentist   Her constipation is well controlled, currently taking half a dose of MiraLax daily  She noticed her appetite has decreased, eats 2 main meals, snack middle of the day  The following portions of the patient's history were reviewed and updated as appropriate: allergies, current medications, past medical history, past social history and problem list     Review of Systems   Constitutional: Negative for appetite change and fatigue  HENT: Negative for congestion, ear pain and postnasal drip  Eyes: Negative for visual disturbance  Respiratory: Negative for cough and shortness of breath  Cardiovascular: Negative for chest pain and leg swelling  Gastrointestinal: Negative for abdominal pain, constipation and diarrhea  Genitourinary: Negative for dysuria, frequency and urgency  Musculoskeletal: Positive for arthralgias and back pain  Negative for myalgias  Skin: Negative for rash and wound  Neurological: Negative for dizziness, numbness and headaches  Hematological: Does not bruise/bleed easily  Psychiatric/Behavioral: Negative for confusion  The patient is not nervous/anxious  Objective:      /72   Pulse (!) 53   Temp 97 8 °F (36 6 °C)   Ht 5' 3" (1 6 m)   SpO2 98%   BMI 20 19 kg/m²          Physical Exam  Vitals signs and nursing note reviewed  Constitutional:       General: She is not in acute distress  Appearance: She is well-developed  HENT:      Head: Normocephalic and atraumatic  Eyes:      Pupils: Pupils are equal, round, and reactive to light     Cardiovascular: Rate and Rhythm: Normal rate and regular rhythm  Heart sounds: Normal heart sounds  Pulmonary:      Effort: Pulmonary effort is normal       Breath sounds: Normal breath sounds  No wheezing  Abdominal:      General: Bowel sounds are normal       Palpations: Abdomen is soft  Musculoskeletal:      Comments: Wheelchair bound   Skin:     General: Skin is warm  Findings: No rash  Neurological:      Mental Status: She is alert and oriented to person, place, and time  Psychiatric:         Behavior: Behavior normal            Labs & imaging results reviewed with patient

## 2020-08-27 NOTE — PATIENT INSTRUCTIONS
Monitor blood pressure once a day  Use A&D ointment on rash to protect skin  Use steroid cream as needed, apply thinly  Look for another dentist (check with insurance), can call [facility]  Add protein shake / pudding once a day  Divide out of bed / chair twice a day

## 2020-09-10 ENCOUNTER — TELEPHONE (OUTPATIENT)
Dept: INTERNAL MEDICINE CLINIC | Facility: CLINIC | Age: 85
End: 2020-09-10

## 2020-09-10 DIAGNOSIS — R42 DIZZINESS: ICD-10-CM

## 2020-09-10 DIAGNOSIS — I10 ESSENTIAL HYPERTENSION: Primary | ICD-10-CM

## 2020-09-10 NOTE — TELEPHONE ENCOUNTER
Pt 's bp's:  Range is 107/56 to 191/86  Pt  feels like her head is whirling while she is moving  Also, when she rolls to get on the bed pan  Stewart siitting up it doesn't happen  When she stands up it happens also  She is taking her bp meds as prescribed  Can call Hema Castro at [phone] or call pt  at her home-Harriett is there w/ pt

## 2020-09-10 NOTE — TELEPHONE ENCOUNTER
When are the readings high or low? In the morning or at night? Is she taking the BP medication once or twice a day? Is the dizziness with movement only?

## 2020-09-10 NOTE — TELEPHONE ENCOUNTER
Delilah Gomez states the readings are high mostly in the morning she's noticed  It was 190/86 this morning  States it varies during the afternoon about 170's- 150's/70-80's  She has been taking her BP medications 2 times a day  States the dizziness happens with movement only

## 2020-09-11 RX ORDER — HYDRALAZINE HYDROCHLORIDE 10 MG/1
20 TABLET, FILM COATED ORAL 2 TIMES DAILY
Qty: 90 TABLET | Refills: 0
Start: 2020-09-11 | End: 2020-09-14 | Stop reason: SDUPTHER

## 2020-09-11 RX ORDER — MECLIZINE HYDROCHLORIDE 25 MG/1
25 TABLET ORAL 3 TIMES DAILY PRN
Qty: 30 TABLET | Refills: 0 | Status: ON HOLD | OUTPATIENT
Start: 2020-09-11 | End: 2022-07-28

## 2020-09-11 NOTE — TELEPHONE ENCOUNTER
Gomez Simmonds states it is OK to send mediations to the [place]  Will the vertigo medicine be as needed? Because so far this morning patient has not felt dizzy      Also when should she give the BP medication to patient, call with instructions

## 2020-09-11 NOTE — TELEPHONE ENCOUNTER
The dizziness may be vertigo  We can add another BP medication (metoprolol) and try a medication for vertigo  Ok to send to the pharmacy?

## 2020-09-11 NOTE — TELEPHONE ENCOUNTER
Sent meclizine to the pharmacy for the dizziness, give as needed only  No new blood pressure medication sent  Adjust the hydralazine instead, take 2 tablets (total 20 mg) twice a day  Please call next week for BP readings

## 2020-09-14 DIAGNOSIS — E87.1 HYPONATREMIA: Chronic | ICD-10-CM

## 2020-09-14 DIAGNOSIS — I10 ESSENTIAL HYPERTENSION: ICD-10-CM

## 2020-09-14 RX ORDER — SODIUM CHLORIDE 1000 MG
1 TABLET, SOLUBLE MISCELLANEOUS
Qty: 90 TABLET | Refills: 5 | Status: SHIPPED | OUTPATIENT
Start: 2020-09-14 | End: 2021-03-17

## 2020-09-14 RX ORDER — HYDRALAZINE HYDROCHLORIDE 25 MG/1
25 TABLET, FILM COATED ORAL 2 TIMES DAILY
Qty: 60 TABLET | Refills: 5 | Status: ON HOLD | OUTPATIENT
Start: 2020-09-14 | End: 2021-02-13 | Stop reason: SDUPTHER

## 2020-09-16 ENCOUNTER — TELEPHONE (OUTPATIENT)
Dept: INTERNAL MEDICINE CLINIC | Facility: CLINIC | Age: 85
End: 2020-09-16

## 2020-09-16 NOTE — TELEPHONE ENCOUNTER
Pt"s bp's:  9//75-2pm;at 10:45pm-112/50;11;45pm-144/77  9//67 at 9am; at 3;20pm-156/67;6;15pm-176/76; the rest days ranged from 174/105 and 132/53

## 2020-09-17 ENCOUNTER — HOSPITAL ENCOUNTER (OUTPATIENT)
Dept: RADIOLOGY | Facility: HOSPITAL | Age: 85
Discharge: HOME | End: 2020-09-17
Payer: MEDICARE

## 2020-09-17 ENCOUNTER — OFFICE VISIT (OUTPATIENT)
Dept: NEUROSURGERY | Facility: CLINIC | Age: 85
End: 2020-09-17
Payer: MEDICARE

## 2020-09-17 VITALS
BODY MASS INDEX: 20.19 KG/M2 | TEMPERATURE: 97.6 F | HEART RATE: 51 BPM | DIASTOLIC BLOOD PRESSURE: 71 MMHG | SYSTOLIC BLOOD PRESSURE: 181 MMHG | HEIGHT: 63 IN

## 2020-09-17 DIAGNOSIS — S32.030D CLOSED COMPRESSION FRACTURE OF L3 LUMBAR VERTEBRA WITH ROUTINE HEALING, SUBSEQUENT ENCOUNTER: Primary | ICD-10-CM

## 2020-09-17 DIAGNOSIS — S32.030D CLOSED COMPRESSION FRACTURE OF L3 LUMBAR VERTEBRA WITH ROUTINE HEALING, SUBSEQUENT ENCOUNTER: ICD-10-CM

## 2020-09-17 PROCEDURE — 72120 X-RAY BEND ONLY L-S SPINE: CPT

## 2020-09-17 PROCEDURE — 99213 OFFICE O/P EST LOW 20 MIN: CPT | Performed by: PHYSICIAN ASSISTANT

## 2020-09-17 NOTE — PROGRESS NOTES
Office Note - Neurosurgery   Desmond King 80 y o  female MRN: [id number]      Assessment:  The patient is a 80years old pleasant lady with traumatic inferior endplate fracture of L1 and L3  Here today for 3 month follow-up with flexion-extension lumbar spine x-rays which demonstrates stable L1 and L3 fractures  Patient denies any back pain, numbness, weakness or paresthesia and extremities  No bowel bladder dysfunction  Has baseline chronic gait instability uses walker for ambulation  She weaned off of LSO brace  She completed physical therapy  Occasionally takes Tylenol  Overall she is feeling better and has no complaints  Exam-communicates well and in no pain distress, moves all extremities  Strength is 5/5 bilaterally  Sensation to light touch intact  DTR 2+ without clonus  Nontender on palpation of L1  and L3 region  Hx, and physical examination reviewed with the patient and her niece  PACU fails to open up images, but I told the patient and her knees that when I check on the PACU the fractures are stable  Management plan discussed  From Neurosurgery perspective, no regular follow-up of or surgical procedure is required  Advised fall precaution  Avoid strenuous activities of the neck  Take Tylenol when feel pain  Wean off LSO brace  Questions and concerns were answered  Patient and her niece understand the instructions and agreed with the plan  Plan:  1  Patient is cleared from Neurosurgery perspective  No regular follow-up required  2  Follow-up on p r n  basis  3  Advised fall precaution  4  Call 4 question or concern    Subjective/Objective     Chief Complaint: " I am feeling better"/10 weeks follow-up progress note          HPI  Patient is a 80years old pleasant lady with history of inferior endplate of L 1 and L3 fractures, here for 3 months follow-up  Had flexion-extension lumbar spine x-rays which demonstrates stable L1 and L3 fracture on both extension and flexion views  Patient weaned off of  LSO brace  Patient denies any back pain, or radiculopathy pain associated with numbness, weakness or paresthesia in the extremities  Denies bowel/bladder dysfunction  Takes Tylenol rarely  Denies fever, chills, rigors, cough or chest pain  ROS  Review of system personally reviewed and updated  Review of Systems   Constitutional: Negative  HENT: Negative  Eyes: Negative  Respiratory: Negative  Cardiovascular: Negative  Gastrointestinal: Negative  Endocrine: Negative  Genitourinary: Positive for difficulty urinating (sometimes) and frequency  Negative for urgency  Musculoskeletal: Positive for back pain (improving pain when standing up and down especially when using the restroom, achey and soreness more then pain, increase in pain only with movement) and gait problem  Seems to have more pain or increase in pain only when using brace and it's more achey/soreness pain    Skin: Negative  Allergic/Immunologic: Negative  Neurological: Negative for dizziness, seizures, syncope, weakness, numbness and headaches  Hematological: Bruises/bleeds easily  Aspirin 81mg   Psychiatric/Behavioral: Negative  Family History    Family History   Problem Relation Age of Onset    Leukemia Sister     Coronary artery disease Sister     Diabetes Sister     Hypertension Sister     Coronary artery disease Sister     Aortic aneurysm Sister     Coronary artery disease Mother     Coronary artery disease Brother     Substance Abuse Neg Hx     Alcohol abuse Neg Hx     Depression Neg Hx     Mental illness Neg Hx        Social History    Social History     Socioeconomic History    Marital status:       Spouse name: Not on file    Number of children: Not on file    Years of education: Not on file    Highest education level: Not on file   Occupational History    Not on file   Social Needs    Financial resource strain: Not on file    Food insecurity Worry: Not on file     Inability: Not on file    Transportation needs     Medical: Not on file     Non-medical: Not on file   Tobacco Use    Smoking status: Never Smoker    Smokeless tobacco: Never Used   Substance and Sexual Activity    Alcohol use: No    Drug use: No    Sexual activity: Not Currently   Lifestyle    Physical activity     Days per week: Not on file     Minutes per session: Not on file    Stress: Not on file   Relationships    Social connections     Talks on phone: Not on file     Gets together: Not on file     Attends Shinto service: Not on file     Active member of club or organization: Not on file     Attends meetings of clubs or organizations: Not on file     Relationship status: Not on file    Intimate partner violence     Fear of current or ex partner: Not on file     Emotionally abused: Not on file     Physically abused: Not on file     Forced sexual activity: Not on file   Other Topics Concern    Not on file   Social History Narrative    Lives with niece Wilbert Bernardo and her son James Mccarty       Past Medical History    Past Medical History:   Diagnosis Date    A-fib [facility])     Anxiety     Cheilosis     Diverticulosis     1970s    Fracture of iliac wing ([facility] [address] ) 2016    Gallstone     Hiatal hernia     Hyperlipidemia     Hypertension     Hyponatremia     Inguinal hernia     Lacunar infarction ([facility] [address] )     Macular degeneration     Osteoarthritis     Renal cyst     Rib fractures 2017    Syncope     Tuberculosis     Umbilical hernia     Vertigo        Surgical History    Past Surgical History:   Procedure Laterality Date    ABDOMINAL SURGERY      HIP SURGERY      At  young age 26's   [facility] KNEE CARTILAGE SURGERY         Medications      Current Outpatient Medications:     acetaminophen (TYLENOL) 325 mg tablet, TAKE 3 TABLETS(975MG TOTAL) BY MOUTH EVERY 8 HOURS (Patient taking differently: Take by mouth as needed ), Disp: 270 tablet, Rfl: 0    aspirin 81 MG tablet, Take 81 mg by mouth daily, Disp: , Rfl:     carvedilol (COREG) 6 25 mg tablet, Take 1 tablet (6 25 mg total) by mouth 2 (two) times a day with meals, Disp: 60 tablet, Rfl: 0    Cholecalciferol (VITAMIN D3) 1000 units CAPS, Take by mouth, Disp: , Rfl:     Coenzyme Q10 (COQ-10) 50 MG CAPS, Take by mouth daily, Disp: , Rfl:     hydrALAZINE (APRESOLINE) 25 mg tablet, Take 1 tablet (25 mg total) by mouth 2 (two) times a day, Disp: 60 tablet, Rfl: 5    irbesartan (AVAPRO) 300 mg tablet, Take 150 mg by mouth 2 (two) times a day , Disp: , Rfl:     meclizine (ANTIVERT) 25 mg tablet, Take 1 tablet (25 mg total) by mouth 3 (three) times a day as needed for dizziness, Disp: 30 tablet, Rfl: 0    Multiple Vitamins-Minerals (ICAPS AREDS 2 PO), Take by mouth 2 (two) times a day, Disp: , Rfl:     multivitamin (THERAGRAN) TABS, Take 1 tablet by mouth daily, Disp: , Rfl:     nystatin-triamcinolone (MYCOLOG-II) cream, Apply topically 2 (two) times a day as needed (rash), Disp: 60 g, Rfl: 1    PACERONE 100 MG tablet, Take 100 mg by mouth 2 (two) times a day , Disp: , Rfl:     polyethylene glycol (MIRALAX) 17 g packet, Take 17 g by mouth daily (Patient taking differently: Take 8 5 g by mouth daily ), Disp: , Rfl: 0    pravastatin (PRAVACHOL) 10 mg tablet, TAKE 1 TABLET(10 MG) BY MOUTH DAILY, Disp: 90 tablet, Rfl: 0    sodium chloride 1 g tablet, Take 1 tablet (1 g total) by mouth 3 (three) times a day with meals, Disp: 90 tablet, Rfl: 5    Allergies    Allergies   Allergen Reactions    Codeine     Lomotil [Diphenoxylate]     Mirabegron Hypertension    Quinidex [Quinidine]     Adhesive [Medical Tape] Rash    Penicillins Rash       The following portions of the patient's history were reviewed and updated as appropriate: allergies, current medications, past family history, past medical history, past social history, past surgical history and problem list     Investigations    I personally reviewed the XRAY results with the patient:    X-rays findings as described in the assessment section above    Physical Exam    There were no vitals filed for this visit  Physical Exam  Constitutional:       Appearance: Normal appearance  HENT:      Head: Normocephalic and atraumatic  Eyes:      Extraocular Movements: Extraocular movements intact  Neck:      Musculoskeletal: Normal range of motion  Cardiovascular:      Rate and Rhythm: Normal rate  Pulmonary:      Effort: Pulmonary effort is normal    Musculoskeletal: Normal range of motion  Skin:     General: Skin is warm  Neurological:      General: No focal deficit present  Mental Status: She is alert and oriented to person, place, and time  Cranial Nerves: Cranial nerves are intact  Sensory: Sensation is intact  Motor: Motor function is intact  Deep Tendon Reflexes: Reflexes are normal and symmetric  Reflex Scores:       Tricep reflexes are 2+ on the right side and 2+ on the left side  Bicep reflexes are 2+ on the right side and 2+ on the left side  Brachioradialis reflexes are 2+ on the right side and 2+ on the left side  Patellar reflexes are 2+ on the right side and 2+ on the left side  Achilles reflexes are 2+ on the right side and 2+ on the left side  Psychiatric:         Speech: Speech normal        Neurologic Exam     Mental Status   Oriented to person, place, and time     Speech: speech is normal   Level of consciousness: alert    Cranial Nerves     CN III, IV, VI   Nystagmus: none     CN XI   CN XI normal      Motor Exam   Muscle bulk: normal  Overall muscle tone: normal  Right arm tone: normal  Left arm tone: normal  Right leg tone: normal  Left leg tone: normal    Sensory Exam   Light touch normal      Gait, Coordination, and Reflexes     Reflexes   Right brachioradialis: 2+  Left brachioradialis: 2+  Right biceps: 2+  Left biceps: 2+  Right triceps: 2+  Left triceps: 2+  Right patellar: 2+  Left patellar: 2+  Right achilles: 2+  Left achilles: 2+  Right : 2+  Left : 2+  Right Retana: absent  Left Retana: absent  Right ankle clonus: absent  Left ankle clonus: absent

## 2020-09-22 PROBLEM — H35.30 MACULAR DEGENERATION: Status: ACTIVE | Noted: 2020-09-22

## 2020-09-22 PROBLEM — I63.81 LACUNAR INFARCTION (HCC): Status: ACTIVE | Noted: 2020-09-22

## 2020-09-22 PROBLEM — K44.9 HIATAL HERNIA: Status: ACTIVE | Noted: 2020-09-22

## 2020-09-22 PROBLEM — S32.309A FRACTURE OF ILIAC WING (HCC): Status: ACTIVE | Noted: 2020-09-22

## 2020-09-22 PROBLEM — R42 VERTIGO: Status: ACTIVE | Noted: 2020-09-22

## 2020-09-22 PROBLEM — M47.812 CERVICAL SPONDYLOSIS: Status: ACTIVE | Noted: 2020-09-22

## 2020-09-22 PROBLEM — I34.0 MITRAL VALVE REGURGITATION: Status: ACTIVE | Noted: 2018-11-30

## 2020-11-11 DIAGNOSIS — E78.00 HYPERCHOLESTEREMIA: ICD-10-CM

## 2020-11-11 RX ORDER — PRAVASTATIN SODIUM 10 MG
10 TABLET ORAL DAILY
Qty: 90 TABLET | Refills: 1 | Status: SHIPPED | OUTPATIENT
Start: 2020-11-11 | End: 2021-02-13 | Stop reason: HOSPADM

## 2021-01-18 ENCOUNTER — TELEPHONE (OUTPATIENT)
Dept: INTERNAL MEDICINE CLINIC | Facility: CLINIC | Age: 86
End: 2021-01-18

## 2021-01-18 DIAGNOSIS — E87.1 HYPONATREMIA: Chronic | ICD-10-CM

## 2021-01-18 DIAGNOSIS — I10 ESSENTIAL HYPERTENSION: ICD-10-CM

## 2021-01-18 DIAGNOSIS — R41.3 MEMORY CHANGES: ICD-10-CM

## 2021-01-18 DIAGNOSIS — I48.0 PAROXYSMAL ATRIAL FIBRILLATION (HCC): Primary | ICD-10-CM

## 2021-01-19 ENCOUNTER — TELEPHONE (OUTPATIENT)
Dept: LAB | Facility: HOSPITAL | Age: 86
End: 2021-01-19

## 2021-01-25 ENCOUNTER — APPOINTMENT (OUTPATIENT)
Dept: LAB | Facility: HOSPITAL | Age: 86
End: 2021-01-25
Payer: MEDICARE

## 2021-01-25 DIAGNOSIS — E87.1 HYPONATREMIA: Chronic | ICD-10-CM

## 2021-01-25 LAB
ANION GAP SERPL CALCULATED.3IONS-SCNC: 4 MMOL/L (ref 4–13)
BASOPHILS # BLD AUTO: 0.02 THOUSANDS/ΜL (ref 0–0.1)
BASOPHILS NFR BLD AUTO: 0 % (ref 0–1)
BUN SERPL-MCNC: 12 MG/DL (ref 5–25)
CALCIUM SERPL-MCNC: 9 MG/DL (ref 8.3–10.1)
CHLORIDE SERPL-SCNC: 104 MMOL/L (ref 100–108)
CO2 SERPL-SCNC: 27 MMOL/L (ref 21–32)
CREAT SERPL-MCNC: 0.52 MG/DL (ref 0.6–1.3)
EOSINOPHIL # BLD AUTO: 0.12 THOUSAND/ΜL (ref 0–0.61)
EOSINOPHIL NFR BLD AUTO: 2 % (ref 0–6)
ERYTHROCYTE [DISTWIDTH] IN BLOOD BY AUTOMATED COUNT: 16.1 % (ref 11.6–15.1)
GFR SERPL CREATININE-BSD FRML MDRD: 83 ML/MIN/1.73SQ M
GLUCOSE SERPL-MCNC: 83 MG/DL (ref 65–140)
HCT VFR BLD AUTO: 40.4 % (ref 34.8–46.1)
HGB BLD-MCNC: 13.8 G/DL (ref 11.5–15.4)
IMM GRANULOCYTES # BLD AUTO: 0.03 THOUSAND/UL (ref 0–0.2)
IMM GRANULOCYTES NFR BLD AUTO: 1 % (ref 0–2)
LYMPHOCYTES # BLD AUTO: 1.54 THOUSANDS/ΜL (ref 0.6–4.47)
LYMPHOCYTES NFR BLD AUTO: 25 % (ref 14–44)
MCH RBC QN AUTO: 37.8 PG (ref 26.8–34.3)
MCHC RBC AUTO-ENTMCNC: 34.2 G/DL (ref 31.4–37.4)
MCV RBC AUTO: 111 FL (ref 82–98)
MONOCYTES # BLD AUTO: 0.74 THOUSAND/ΜL (ref 0.17–1.22)
MONOCYTES NFR BLD AUTO: 12 % (ref 4–12)
NEUTROPHILS # BLD AUTO: 3.66 THOUSANDS/ΜL (ref 1.85–7.62)
NEUTS SEG NFR BLD AUTO: 60 % (ref 43–75)
NRBC BLD AUTO-RTO: 0 /100 WBCS
PLATELET # BLD AUTO: 271 THOUSANDS/UL (ref 149–390)
PMV BLD AUTO: 10.3 FL (ref 8.9–12.7)
POTASSIUM SERPL-SCNC: 4 MMOL/L (ref 3.5–5.3)
RBC # BLD AUTO: 3.65 MILLION/UL (ref 3.81–5.12)
SODIUM SERPL-SCNC: 135 MMOL/L (ref 136–145)
VIT B12 SERPL-MCNC: 446 PG/ML (ref 100–900)
WBC # BLD AUTO: 6.11 THOUSAND/UL (ref 4.31–10.16)

## 2021-01-25 PROCEDURE — 80048 BASIC METABOLIC PNL TOTAL CA: CPT

## 2021-01-25 PROCEDURE — 82607 VITAMIN B-12: CPT

## 2021-01-25 PROCEDURE — 36415 COLL VENOUS BLD VENIPUNCTURE: CPT

## 2021-01-25 PROCEDURE — 85025 COMPLETE CBC W/AUTO DIFF WBC: CPT

## 2021-01-27 ENCOUNTER — TELEPHONE (OUTPATIENT)
Dept: INTERNAL MEDICINE CLINIC | Facility: CLINIC | Age: 86
End: 2021-01-27

## 2021-01-27 ENCOUNTER — TELEMEDICINE (OUTPATIENT)
Dept: INTERNAL MEDICINE CLINIC | Facility: CLINIC | Age: 86
End: 2021-01-27
Payer: MEDICARE

## 2021-01-27 DIAGNOSIS — S32.030A CLOSED COMPRESSION FRACTURE OF L3 LUMBAR VERTEBRA, INITIAL ENCOUNTER (HCC): Primary | ICD-10-CM

## 2021-01-27 DIAGNOSIS — I10 ESSENTIAL HYPERTENSION: ICD-10-CM

## 2021-01-27 DIAGNOSIS — R35.0 URINARY FREQUENCY: ICD-10-CM

## 2021-01-27 DIAGNOSIS — I48.0 PAROXYSMAL ATRIAL FIBRILLATION (HCC): ICD-10-CM

## 2021-01-27 DIAGNOSIS — Z71.9 HEALTH COUNSELING: ICD-10-CM

## 2021-01-27 DIAGNOSIS — K59.09 CHRONIC CONSTIPATION: ICD-10-CM

## 2021-01-27 DIAGNOSIS — E53.8 VITAMIN B12 DEFICIENCY: ICD-10-CM

## 2021-01-27 DIAGNOSIS — E87.1 HYPONATREMIA: Chronic | ICD-10-CM

## 2021-01-27 DIAGNOSIS — H91.90 HEARING LOSS, UNSPECIFIED HEARING LOSS TYPE, UNSPECIFIED LATERALITY: ICD-10-CM

## 2021-01-27 DIAGNOSIS — E78.49 OTHER HYPERLIPIDEMIA: ICD-10-CM

## 2021-01-27 PROCEDURE — 99214 OFFICE O/P EST MOD 30 MIN: CPT | Performed by: INTERNAL MEDICINE

## 2021-01-27 NOTE — ASSESSMENT & PLAN NOTE
Instructed to use back brace if with increase pain,  Continue Tylenol prn  Consider physical therapy, declined at this time

## 2021-01-27 NOTE — TELEPHONE ENCOUNTER
May speak to [facility] AT [facility]  I forgot to mention earlier  She may have a large ovarian cyst, not sure if this is affecting her bladder / frequent urination  We can do an ultrasound to further evaluate  Let me know what you decide

## 2021-01-27 NOTE — PROGRESS NOTES
Virtual Regular Visit      Assessment/Plan:    Problem List Items Addressed This Visit        Digestive    Chronic constipation     Stable, on Miralax  Cardiovascular and Mediastinum    Essential hypertension     On hydralazine, carvedilol, irbesartan  Paroxysmal atrial fibrillation (HCC)     No symptoms, on ASA  Nervous and Auditory    Hearing loss     Using hearing aids  Musculoskeletal and Integument    Closed compression fracture of L3 lumbar vertebra, initial encounter ([facility] [address] ) - Primary     Instructed to use back brace if with increase pain,  Continue Tylenol prn  Consider physical therapy, declined at this time  Other    Hyponatremia (Chronic)     On NaCl  Other hyperlipidemia     On statin  Urinary frequency     Minimal improvement with low acid diet  Vitamin B12 deficiency     Start daily supplement  Other Visit Diagnoses     Health counseling        COVID vaccine scheduled today  May continue protein shake 1-2x a day  Follow up in 4 months or as needed  Reason for visit is f/u  Chief Complaint   Patient presents with    Follow-up    Virtual Regular Visit        Encounter provider Keisha Weber MD    Provider located at [address] [place] [phone]      Recent Visits  No visits were found meeting these conditions  Showing recent visits within past 7 days and meeting all other requirements     Today's Visits  Date Type Provider Dept   01/27/21 Telemedicine Keisha Weber, [facility]   Showing today's visits and meeting all other requirements     Future Appointments  No visits were found meeting these conditions  Showing future appointments within next 150 days and meeting all other requirements        The patient was identified by name and date of birth   Macie Jeans was informed that this is a telemedicine visit and that the visit is being conducted through [employer] and patient was informed that this is a secure, HIPAA-compliant platform  She agrees to proceed     My office door was closed  No one else was in the room  She acknowledged consent and understanding of privacy and security of the video platform  The patient has agreed to participate and understands they can discontinue the visit at any time  Patient is aware this is a billable service  Subjective  Moon Garcia is a 80 y o  female f/u  Glenna present  Most of the [facility] and the [facility]  She complains of back pain today, worse today  She took Tylenol earlier today  Teresa Holloway reports that she had an exacerbation about 2 months ago  She did not put on the back brace immediately, had pain with movement only and then at rest   She restarted Tylenol 3 times a day  Since before Christmas, she has not needed regular Tylenol or has used the back brace  She is able to ambulate around the home  She has had no issues with constipation, takes MiraLax daily  They had tried a low acid diet but did not notice a significant change in urine symptoms  She remains anxious about the burden she is on loose when, worries about this constantly  Teresa Holloway also reports that her appetite has decreased recently  She does give her a protein shake every night         Past Medical History:   Diagnosis Date    A-fib [facility])     Anxiety     Cheilosis     Diverticulosis     1970s    Fracture of iliac wing ([facility] [address] ) 2016    Fracture, ribs     Gallstone     Hiatal hernia     Hyperlipidemia     Hypertension     Hyponatremia     Inguinal hernia     Lacunar infarction (HCC)     Macrocytosis     Macular degeneration     Osteoarthritis     Ovarian cyst     stable    Renal cyst     Rib fractures 2017    Simple renal cyst     Syncope     Tuberculosis 2251    Umbilical hernia     Vertigo        Past Surgical History:   Procedure Laterality Date    ABDOMINAL SURGERY      HIP SURGERY      At  young age 26's   Emaline Folds KNEE CARTILAGE SURGERY         Current Outpatient Medications   Medication Sig Dispense Refill    acetaminophen (TYLENOL) 325 mg tablet TAKE 3 TABLETS(975MG TOTAL) BY MOUTH EVERY 8 HOURS (Patient taking differently: Take by mouth as needed ) 270 tablet 0    aspirin 81 MG tablet Take 81 mg by mouth daily      carvedilol (COREG) 6 25 mg tablet Take 1 tablet (6 25 mg total) by mouth 2 (two) times a day with meals 60 tablet 0    Cholecalciferol (VITAMIN D3) 1000 units CAPS Take by mouth      Coenzyme Q10 (COQ-10) 50 MG CAPS Take by mouth daily      hydrALAZINE (APRESOLINE) 25 mg tablet Take 1 tablet (25 mg total) by mouth 2 (two) times a day 60 tablet 5    irbesartan (AVAPRO) 300 mg tablet Take 150 mg by mouth 2 (two) times a day       meclizine (ANTIVERT) 25 mg tablet Take 1 tablet (25 mg total) by mouth 3 (three) times a day as needed for dizziness 30 tablet 0    Multiple Vitamins-Minerals (ICAPS AREDS 2 PO) Take by mouth 2 (two) times a day      multivitamin (THERAGRAN) TABS Take 1 tablet by mouth daily      nystatin-triamcinolone (MYCOLOG-II) cream Apply topically 2 (two) times a day as needed (rash) 60 g 1    PACERONE 100 MG tablet Take 100 mg by mouth 2 (two) times a day       polyethylene glycol (MIRALAX) 17 g packet Take 17 g by mouth daily (Patient taking differently: Take 8 5 g by mouth daily )  0    pravastatin (PRAVACHOL) 10 mg tablet Take 1 tablet (10 mg total) by mouth daily 90 tablet 1    sodium chloride 1 g tablet Take 1 tablet (1 g total) by mouth 3 (three) times a day with meals 90 tablet 5     No current facility-administered medications for this visit           Allergies   Allergen Reactions    Codeine     Lomotil [Diphenoxylate]     Mirabegron Hypertension    Quinidex [Quinidine]     Adhesive [Medical Tape] Rash    Penicillins Rash       Review of Systems   Constitutional: Positive for appetite change  Negative for activity change and fatigue  HENT: Positive for hearing loss  Negative for congestion and ear pain  Eyes: Negative for visual disturbance  Respiratory: Negative for cough and shortness of breath  Cardiovascular: Negative for chest pain and leg swelling  Gastrointestinal: Negative for abdominal pain, constipation and diarrhea  Genitourinary: Positive for frequency  Negative for dysuria and urgency  Musculoskeletal: Positive for arthralgias, back pain and gait problem  Negative for myalgias  Skin: Negative for rash and wound  Neurological: Negative for dizziness and headaches  Hematological: Does not bruise/bleed easily  Psychiatric/Behavioral: Negative for confusion  The patient is nervous/anxious  Video Exam    There were no vitals filed for this visit  Physical Exam  Constitutional:       General: She is not in acute distress  Appearance: Normal appearance  She is not ill-appearing  HENT:      Head: Normocephalic and atraumatic  Eyes:      Pupils: Pupils are equal, round, and reactive to light  Pulmonary:      Effort: Pulmonary effort is normal  No respiratory distress  Neurological:      General: No focal deficit present  Mental Status: She is alert  Psychiatric:         Mood and Affect: Mood is anxious  Behavior: Behavior normal           I spent 22 minutes directly with the patient during this visit      [address] Person acknowledges that she has consented to an online visit or consultation  She understands that the online visit is based solely on information provided by her, and that, in the absence of a face-to-face physical evaluation by the physician, the diagnosis she receives is both limited and provisional in terms of accuracy and completeness  This is not intended to replace a full medical face-to-face evaluation by the physician  Sheldon Sherwood understands and accepts these terms

## 2021-02-09 ENCOUNTER — HOSPITAL ENCOUNTER (OUTPATIENT)
Facility: HOSPITAL | Age: 86
Setting detail: OBSERVATION
End: 2021-02-10
Attending: EMERGENCY MEDICINE | Admitting: INTERNAL MEDICINE
Payer: MEDICARE

## 2021-02-09 ENCOUNTER — APPOINTMENT (EMERGENCY)
Dept: CT IMAGING | Facility: HOSPITAL | Age: 86
End: 2021-02-09
Payer: MEDICARE

## 2021-02-09 ENCOUNTER — APPOINTMENT (OUTPATIENT)
Dept: ULTRASOUND IMAGING | Facility: HOSPITAL | Age: 86
End: 2021-02-09
Payer: MEDICARE

## 2021-02-09 ENCOUNTER — APPOINTMENT (EMERGENCY)
Dept: RADIOLOGY | Facility: HOSPITAL | Age: 86
End: 2021-02-09
Payer: MEDICARE

## 2021-02-09 DIAGNOSIS — R10.13 EPIGASTRIC PAIN: ICD-10-CM

## 2021-02-09 DIAGNOSIS — R07.9 CHEST PAIN, UNSPECIFIED TYPE: Primary | ICD-10-CM

## 2021-02-09 LAB
ALBUMIN SERPL BCP-MCNC: 3.5 G/DL (ref 3.4–4.8)
ALP SERPL-CCNC: 224.2 U/L (ref 35–140)
ALT SERPL W P-5'-P-CCNC: 49 U/L (ref 5–54)
ANION GAP SERPL CALCULATED.3IONS-SCNC: 7 MMOL/L (ref 4–13)
AST SERPL W P-5'-P-CCNC: 92 U/L (ref 15–41)
BASOPHILS # BLD AUTO: 0.03 THOUSANDS/ΜL (ref 0–0.1)
BASOPHILS NFR BLD AUTO: 0 % (ref 0–1)
BILIRUB SERPL-MCNC: 0.97 MG/DL (ref 0.3–1.2)
BUN SERPL-MCNC: 11 MG/DL (ref 6–20)
CALCIUM SERPL-MCNC: 8.5 MG/DL (ref 8.4–10.2)
CHLORIDE SERPL-SCNC: 99 MMOL/L (ref 96–108)
CO2 SERPL-SCNC: 24 MMOL/L (ref 22–33)
CREAT SERPL-MCNC: 0.52 MG/DL (ref 0.4–1.1)
D DIMER PPP FEU-MCNC: 1.14 MG/L FEU (ref 0.19–0.49)
EOSINOPHIL # BLD AUTO: 0.24 THOUSAND/ΜL (ref 0–0.61)
EOSINOPHIL NFR BLD AUTO: 2 % (ref 0–6)
ERYTHROCYTE [DISTWIDTH] IN BLOOD BY AUTOMATED COUNT: 13.2 % (ref 11.6–15.1)
GFR SERPL CREATININE-BSD FRML MDRD: 83 ML/MIN/1.73SQ M
GLUCOSE SERPL-MCNC: 138 MG/DL (ref 65–140)
HCT VFR BLD AUTO: 39.5 % (ref 34.8–46.1)
HGB BLD-MCNC: 13.4 G/DL (ref 11.5–15.4)
IMM GRANULOCYTES # BLD AUTO: 0.02 THOUSAND/UL (ref 0–0.2)
IMM GRANULOCYTES NFR BLD AUTO: 0 % (ref 0–2)
LIPASE SERPL-CCNC: 54 U/L (ref 13–60)
LYMPHOCYTES # BLD AUTO: 1.16 THOUSANDS/ΜL (ref 0.6–4.47)
LYMPHOCYTES NFR BLD AUTO: 11 % (ref 14–44)
MCH RBC QN AUTO: 34.4 PG (ref 26.8–34.3)
MCHC RBC AUTO-ENTMCNC: 33.9 G/DL (ref 31.4–37.4)
MCV RBC AUTO: 101 FL (ref 82–98)
MONOCYTES # BLD AUTO: 0.96 THOUSAND/ΜL (ref 0.17–1.22)
MONOCYTES NFR BLD AUTO: 9 % (ref 4–12)
NEUTROPHILS # BLD AUTO: 7.81 THOUSANDS/ΜL (ref 1.85–7.62)
NEUTS SEG NFR BLD AUTO: 78 % (ref 43–75)
PLATELET # BLD AUTO: 263 THOUSANDS/UL (ref 149–390)
PLATELET # BLD AUTO: 281 THOUSANDS/UL (ref 149–390)
PMV BLD AUTO: 9.1 FL (ref 8.9–12.7)
PMV BLD AUTO: 9.4 FL (ref 8.9–12.7)
POTASSIUM SERPL-SCNC: 3.9 MMOL/L (ref 3.5–5)
PROT SERPL-MCNC: 6.2 G/DL (ref 6.4–8.3)
RBC # BLD AUTO: 3.9 MILLION/UL (ref 3.81–5.12)
SODIUM SERPL-SCNC: 130 MMOL/L (ref 133–145)
TROPONIN I SERPL-MCNC: <0.03 NG/ML (ref 0–0.07)
TROPONIN I SERPL-MCNC: <0.03 NG/ML (ref 0–0.07)
WBC # BLD AUTO: 10.22 THOUSAND/UL (ref 4.31–10.16)

## 2021-02-09 PROCEDURE — 84484 ASSAY OF TROPONIN QUANT: CPT | Performed by: INTERNAL MEDICINE

## 2021-02-09 PROCEDURE — 83690 ASSAY OF LIPASE: CPT | Performed by: PHYSICIAN ASSISTANT

## 2021-02-09 PROCEDURE — 36415 COLL VENOUS BLD VENIPUNCTURE: CPT | Performed by: PHYSICIAN ASSISTANT

## 2021-02-09 PROCEDURE — 71275 CT ANGIOGRAPHY CHEST: CPT

## 2021-02-09 PROCEDURE — G1004 CDSM NDSC: HCPCS

## 2021-02-09 PROCEDURE — 85379 FIBRIN DEGRADATION QUANT: CPT | Performed by: PHYSICIAN ASSISTANT

## 2021-02-09 PROCEDURE — 96374 THER/PROPH/DIAG INJ IV PUSH: CPT

## 2021-02-09 PROCEDURE — 80053 COMPREHEN METABOLIC PANEL: CPT | Performed by: PHYSICIAN ASSISTANT

## 2021-02-09 PROCEDURE — 84484 ASSAY OF TROPONIN QUANT: CPT | Performed by: PHYSICIAN ASSISTANT

## 2021-02-09 PROCEDURE — 76705 ECHO EXAM OF ABDOMEN: CPT

## 2021-02-09 PROCEDURE — 96361 HYDRATE IV INFUSION ADD-ON: CPT

## 2021-02-09 PROCEDURE — 85049 AUTOMATED PLATELET COUNT: CPT | Performed by: INTERNAL MEDICINE

## 2021-02-09 PROCEDURE — 93005 ELECTROCARDIOGRAM TRACING: CPT

## 2021-02-09 PROCEDURE — 99285 EMERGENCY DEPT VISIT HI MDM: CPT | Performed by: PHYSICIAN ASSISTANT

## 2021-02-09 PROCEDURE — 71045 X-RAY EXAM CHEST 1 VIEW: CPT

## 2021-02-09 PROCEDURE — 1124F ACP DISCUSS-NO DSCNMKR DOCD: CPT | Performed by: PHYSICIAN ASSISTANT

## 2021-02-09 PROCEDURE — 85025 COMPLETE CBC W/AUTO DIFF WBC: CPT | Performed by: PHYSICIAN ASSISTANT

## 2021-02-09 PROCEDURE — 99220 PR INITIAL OBSERVATION CARE/DAY 70 MINUTES: CPT | Performed by: INTERNAL MEDICINE

## 2021-02-09 PROCEDURE — 99285 EMERGENCY DEPT VISIT HI MDM: CPT

## 2021-02-09 RX ORDER — ASPIRIN 81 MG/1
81 TABLET ORAL DAILY
Status: DISCONTINUED | OUTPATIENT
Start: 2021-02-10 | End: 2021-02-10 | Stop reason: HOSPADM

## 2021-02-09 RX ORDER — HYDRALAZINE HYDROCHLORIDE 25 MG/1
25 TABLET, FILM COATED ORAL 2 TIMES DAILY
Status: DISCONTINUED | OUTPATIENT
Start: 2021-02-09 | End: 2021-02-10 | Stop reason: HOSPADM

## 2021-02-09 RX ORDER — CARVEDILOL 6.25 MG/1
6.25 TABLET ORAL 2 TIMES DAILY WITH MEALS
Status: DISCONTINUED | OUTPATIENT
Start: 2021-02-10 | End: 2021-02-10 | Stop reason: HOSPADM

## 2021-02-09 RX ORDER — ONDANSETRON 2 MG/ML
4 INJECTION INTRAMUSCULAR; INTRAVENOUS ONCE
Status: COMPLETED | OUTPATIENT
Start: 2021-02-09 | End: 2021-02-09

## 2021-02-09 RX ORDER — SODIUM CHLORIDE 1000 MG
1 TABLET, SOLUBLE MISCELLANEOUS
Status: DISCONTINUED | OUTPATIENT
Start: 2021-02-10 | End: 2021-02-10 | Stop reason: HOSPADM

## 2021-02-09 RX ORDER — AMIODARONE HYDROCHLORIDE 200 MG/1
100 TABLET ORAL 2 TIMES DAILY WITH MEALS
Status: DISCONTINUED | OUTPATIENT
Start: 2021-02-10 | End: 2021-02-10 | Stop reason: HOSPADM

## 2021-02-09 RX ORDER — CEFTRIAXONE 1 G/50ML
1000 INJECTION, SOLUTION INTRAVENOUS EVERY 24 HOURS
Status: DISCONTINUED | OUTPATIENT
Start: 2021-02-09 | End: 2021-02-10 | Stop reason: HOSPADM

## 2021-02-09 RX ORDER — MELATONIN
1000 DAILY
Status: DISCONTINUED | OUTPATIENT
Start: 2021-02-10 | End: 2021-02-10 | Stop reason: HOSPADM

## 2021-02-09 RX ORDER — LOSARTAN POTASSIUM 50 MG/1
100 TABLET ORAL DAILY
Status: DISCONTINUED | OUTPATIENT
Start: 2021-02-10 | End: 2021-02-10 | Stop reason: HOSPADM

## 2021-02-09 RX ORDER — POLYETHYLENE GLYCOL 3350 17 G/17G
17 POWDER, FOR SOLUTION ORAL DAILY
Status: DISCONTINUED | OUTPATIENT
Start: 2021-02-10 | End: 2021-02-10 | Stop reason: HOSPADM

## 2021-02-09 RX ORDER — CHOLECALCIFEROL (VITAMIN D3) 125 MCG
500 CAPSULE ORAL DAILY
COMMUNITY

## 2021-02-09 RX ORDER — PRAVASTATIN SODIUM 20 MG
10 TABLET ORAL DAILY
Status: DISCONTINUED | OUTPATIENT
Start: 2021-02-10 | End: 2021-02-10 | Stop reason: HOSPADM

## 2021-02-09 RX ORDER — SODIUM CHLORIDE 9 MG/ML
100 INJECTION, SOLUTION INTRAVENOUS CONTINUOUS
Status: DISCONTINUED | OUTPATIENT
Start: 2021-02-09 | End: 2021-02-10 | Stop reason: HOSPADM

## 2021-02-09 RX ADMIN — SODIUM CHLORIDE 250 ML/HR: 0.9 INJECTION, SOLUTION INTRAVENOUS at 18:46

## 2021-02-09 RX ADMIN — CEFTRIAXONE 1000 MG: 1 INJECTION, SOLUTION INTRAVENOUS at 23:20

## 2021-02-09 RX ADMIN — ONDANSETRON 4 MG: 2 INJECTION INTRAMUSCULAR; INTRAVENOUS at 19:35

## 2021-02-09 RX ADMIN — IOHEXOL 82 ML: 350 INJECTION, SOLUTION INTRAVENOUS at 20:04

## 2021-02-09 RX ADMIN — HYDRALAZINE HYDROCHLORIDE 25 MG: 25 TABLET, FILM COATED ORAL at 22:13

## 2021-02-09 NOTE — ED PROVIDER NOTES
History  Chief Complaint   Patient presents with    Abdominal Pain     pt c/o abdominal pain starting around 4pm  family also c/o htn  324asa, 1 nitro given by ems     Pt with Past Medical History: A-fib, Anxiety, Diverticulosis, Gallstones, Hiatal hernia, Hyperlipidemia, HTN, Inguinal hernia, Lacunar infarction, Macular degeneration, Osteoarthritis, chronic back pain, compression fracture lumbar spine, Simple renal cyst, Umbilical hernia, Vertigo   Past Surgical History: ABDOMINAL SURGERY, vertical low midline scar, daughter states exploratory surgery from diverticulitis, where concerned for cancer, right HIP SURGERY/fusion , KNEE CARTILAGE SURGERY   Presents to ED with daughter with whom she lives/who helps with history, for further evaluation of c/o midsternal cp earlier today, approx  2 hrs pta, no NV, no diaphoresis, no sob related to sx; for which daughter called pcp who recommended pt come to ED  Pt c/o right sided chest pain in ED, which daughter states is different from her complaints at home, but states her chronic back pain, lumbar compression fractures,  hurts her more; needs to sit in certain positions  Pt got 324asa, 1 nitro given by ems  Prior to Admission Medications   Prescriptions Last Dose Informant Patient Reported? Taking?    Cholecalciferol (VITAMIN D3) 1000 units CAPS  Family Member Yes No   Sig: Take by mouth   Coenzyme Q10 (COQ-10) 50 MG CAPS  Family Member Yes No   Sig: Take by mouth daily   Multiple Vitamins-Minerals (ICAPS AREDS 2 PO)  Family Member Yes No   Sig: Take by mouth 2 (two) times a day   PACERONE 100 MG tablet  Family Member Yes No   Sig: Take 100 mg by mouth 2 (two) times a day    acetaminophen (TYLENOL) 325 mg tablet  Family Member No No   Sig: TAKE 3 TABLETS(975MG TOTAL) BY MOUTH EVERY 8 HOURS   Patient taking differently: Take by mouth as needed    aspirin 81 MG tablet  Family Member Yes No   Sig: Take 81 mg by mouth daily   carvedilol (COREG) 6 25 mg tablet Family Member No No   Sig: Take 1 tablet (6 25 mg total) by mouth 2 (two) times a day with meals   hydrALAZINE (APRESOLINE) 25 mg tablet  Family Member No No   Sig: Take 1 tablet (25 mg total) by mouth 2 (two) times a day   irbesartan (AVAPRO) 300 mg tablet  Family Member Yes No   Sig: Take 150 mg by mouth 2 (two) times a day    meclizine (ANTIVERT) 25 mg tablet  Family Member No No   Sig: Take 1 tablet (25 mg total) by mouth 3 (three) times a day as needed for dizziness   multivitamin (THERAGRAN) TABS  Family Member Yes No   Sig: Take 1 tablet by mouth daily   nystatin-triamcinolone (MYCOLOG-II) cream  Family Member No No   Sig: Apply topically 2 (two) times a day as needed (rash)   polyethylene glycol (MIRALAX) 17 g packet  Family Member No No   Sig: Take 17 g by mouth daily   Patient taking differently: Take 8 5 g by mouth daily    pravastatin (PRAVACHOL) 10 mg tablet  Family Member No No   Sig: Take 1 tablet (10 mg total) by mouth daily   sodium chloride 1 g tablet  Family Member No No   Sig: Take 1 tablet (1 g total) by mouth 3 (three) times a day with meals   vitamin B-12 (VITAMIN B-12) 500 mcg tablet   Yes Yes   Sig: Take 500 mcg by mouth daily      Facility-Administered Medications: None       Past Medical History:   Diagnosis Date    [facility] ([facility] )     Anxiety     Cheilosis     Diverticulosis     1970s    [facility]    Fracture, ribs     Gallstone     Hiatal hernia     Hyperlipidemia     Hypertension     Hyponatremia     Inguinal hernia     Lacunar infarction (HCC)     Macrocytosis     Macular degeneration     Osteoarthritis     Ovarian cyst     stable    Renal cyst     Rib fractures 2017    Simple renal cyst     Syncope     Tuberculosis 0222    Umbilical hernia     Vertigo        Past Surgical History:   Procedure Laterality Date    ABDOMINAL SURGERY      HIP SURGERY      At  young age 26's    KNEE CARTILAGE SURGERY         Family History   Problem Relation Age of Onset    Leukemia Sister     Coronary artery disease Sister     Diabetes Sister     Hypertension Sister     Coronary artery disease Sister     Aortic aneurysm Sister     Coronary artery disease Mother     Coronary artery disease Brother     Substance Abuse Neg Hx     Alcohol abuse Neg Hx     Depression Neg Hx     Mental illness Neg Hx      I have reviewed and agree with the history as documented  E-Cigarette/Vaping    E-Cigarette Use Never User      E-Cigarette/Vaping Substances    Nicotine No     THC No     CBD No     Flavoring No     Other No     Unknown No      Social History     Tobacco Use    Smoking status: Never Smoker    Smokeless tobacco: Never Used   Substance Use Topics    Alcohol use: No    Drug use: No       Review of Systems   Constitutional: Negative for chills and fever  HENT: Negative for hearing loss, sore throat and trouble swallowing  Eyes: Negative for visual disturbance  Respiratory: Negative for cough and shortness of breath  Cardiovascular: Negative for chest pain and leg swelling  Gastrointestinal: Negative for abdominal pain, diarrhea, nausea and vomiting  Genitourinary: Negative for dysuria and frequency  Musculoskeletal: Positive for back pain and myalgias  Negative for arthralgias  Skin: Negative for color change and pallor  Neurological: Negative for dizziness, weakness, light-headedness and headaches  Psychiatric/Behavioral: Negative for behavioral problems  All other systems reviewed and are negative  Physical Exam  Physical Exam  Vitals signs and nursing note reviewed  Constitutional:       General: She is not in acute distress  Appearance: She is well-developed  HENT:      Head: Normocephalic and atraumatic  Right Ear: External ear normal       Left Ear: External ear normal       Nose: Nose normal       Mouth/Throat:      Mouth: Mucous membranes are moist       Pharynx: Oropharynx is clear     Eyes: Conjunctiva/sclera: Conjunctivae normal    Neck:      Musculoskeletal: Normal range of motion  Cardiovascular:      Rate and Rhythm: Normal rate and regular rhythm  Pulmonary:      Effort: Pulmonary effort is normal  No respiratory distress  Breath sounds: Normal breath sounds  Abdominal:      General: Bowel sounds are normal       Palpations: Abdomen is soft  Tenderness: There is no abdominal tenderness  Musculoskeletal: Normal range of motion  Lumbar back: She exhibits tenderness  Skin:     General: Skin is warm and dry  Capillary Refill: Capillary refill takes less than 2 seconds  Findings: No rash  Neurological:      General: No focal deficit present  Mental Status: She is alert and oriented to person, place, and time     Psychiatric:         Behavior: Behavior normal          Vital Signs  ED Triage Vitals   Temperature Pulse Respirations Blood Pressure SpO2   02/09/21 1752 02/09/21 1750 02/09/21 1750 02/09/21 1750 02/09/21 1750   97 6 °F (36 4 °C) 58 17 137/61 95 %      Temp Source Heart Rate Source Patient Position - Orthostatic VS BP Location FiO2 (%)   02/09/21 1752 02/09/21 1750 02/09/21 1750 02/09/21 1750 --   Oral Monitor Lying Right arm       Pain Score       02/09/21 1750       Worst Possible Pain           Vitals:    02/09/21 1750   BP: 137/61   Pulse: 58   Patient Position - Orthostatic VS: Lying         Visual Acuity      ED Medications  Medications   sodium chloride 0 9 % infusion (250 mL/hr Intravenous New Bag 2/9/21 1846)   ondansetron (ZOFRAN) injection 4 mg (4 mg Intravenous Given 2/9/21 1935)   iohexol (OMNIPAQUE) 350 MG/ML injection (SINGLE-DOSE) 100 mL (82 mL Intravenous Given 2/9/21 2004)       Diagnostic Studies  Results Reviewed     Procedure Component Value Units Date/Time    D-Dimer [[id number]]  (Abnormal) Collected: 02/09/21 1814    Lab Status: Final result Specimen: Blood from Arm, Left Updated: 02/09/21 1848     D-Dimer, Quant  1 14 mg/L FEU Troponin I [[id number]]  (Normal) Collected: 02/09/21 1814    Lab Status: Final result Specimen: Blood from Arm, Left Updated: 02/09/21 1846     Troponin I <0 03 ng/mL     Comprehensive metabolic panel [[id number]]  (Abnormal) Collected: 02/09/21 1814    Lab Status: Final result Specimen: Blood from Arm, Left Updated: 02/09/21 1845     Sodium 130 mmol/L      Potassium 3 9 mmol/L      Chloride 99 mmol/L      CO2 24 mmol/L      ANION GAP 7 mmol/L      BUN 11 mg/dL      Creatinine 0 52 mg/dL      Glucose 138 mg/dL      Calcium 8 5 mg/dL      AST 92 U/L      ALT 49 U/L      Alkaline Phosphatase 224 2 U/L      Total Protein 6 2 g/dL      Albumin 3 5 g/dL      Total Bilirubin 0 97 mg/dL      eGFR 83 ml/min/1 73sq m     Narrative:      [facility] guidelines for Chronic Kidney Disease (CKD):     Stage 1 with normal or high GFR (GFR > 90 mL/min/1 73 square meters)    Stage 2 Mild CKD (GFR = 60-89 mL/min/1 73 square meters)    Stage 3A Moderate CKD (GFR = 45-59 mL/min/1 73 square meters)    Stage 3B Moderate CKD (GFR = 30-44 mL/min/1 73 square meters)    Stage 4 Severe CKD (GFR = 15-29 mL/min/1 73 square meters)    Stage 5 End Stage CKD (GFR <15 mL/min/1 73 square meters)  Note: GFR calculation is accurate only with a steady state creatinine    Lipase [[id number]]  (Normal) Collected: 02/09/21 1814    Lab Status: Final result Specimen: Blood from Arm, Left Updated: 02/09/21 1845     Lipase 54 u/L     CBC and differential [[id number]]  (Abnormal) Collected: 02/09/21 1814    Lab Status: Final result Specimen: Blood from Arm, Left Updated: 02/09/21 1839     WBC 10 22 Thousand/uL      RBC 3 90 Million/uL      Hemoglobin 13 4 g/dL      Hematocrit 39 5 %       fL      MCH 34 4 pg      MCHC 33 9 g/dL      RDW 13 2 %      MPV 9 4 fL      Platelets 660 Thousands/uL      Neutrophils Relative 78 %      Immat GRANS % 0 %      Lymphocytes Relative 11 %      Monocytes Relative 9 %      Eosinophils Relative 2 %      Basophils Relative 0 %      Neutrophils Absolute 7 81 Thousands/µL      Immature Grans Absolute 0 02 Thousand/uL      Lymphocytes Absolute 1 16 Thousands/µL      Monocytes Absolute 0 96 Thousand/µL      Eosinophils Absolute 0 24 Thousand/µL      Basophils Absolute 0 03 Thousands/µL                  CTA ED chest PE study   Final Result by Rene Ambrosio MD (02/09 2026)      No acute pulmonary embolism  Cardiomegaly  Enlarged main pulmonary artery suggesting pulmonary hypertension  Workstation performed: [id number]         XR chest 1 view portable   Final Result by Concetta Ann DO (02/09 1922)      No acute cardiopulmonary disease  Workstation performed: [id number]         US gallbladder    (Results Pending)              Procedures  ECG 12 Lead Documentation Only    Date/Time: 2/9/2021 6:33 PM  Performed by: Kacie Harper PA-C  Authorized by: Kacie Harper PA-C     Indications / Diagnosis:  Chest pain  ECG reviewed by me, the ED Provider: yes    Patient location:  ED  Previous ECG:     Comparison to cardiac monitor: Yes    Interpretation:     Interpretation: normal    Rate:     ECG rate:  54    ECG rate assessment: bradycardic    Rhythm:     Rhythm: sinus bradycardia    Comments:      No acute ischemic changes             ED Course  ED Course as of Feb 09 2053   Tue Feb 09, 2021 1923 CXR: NAD      2027 CTA: IMPRESSION: No acute pulmonary embolism  Cardiomegaly  Enlarged main pulmonary artery suggesting pulmonary hypertension                  HEART Risk Score      Most Recent Value   Heart Score Risk Calculator   History  0 Filed at: 02/09/2021 2026   ECG  1 Filed at: 02/09/2021 2026   Age  2 Filed at: 02/09/2021 2026   Risk Factors  1 Filed at: 02/09/2021 2026   Troponin  0 Filed at: 02/09/2021 2026   HEART Score  4 Filed at: 02/09/2021 2026                      SBIRT 22yo+      Most Recent Value   SBIRT (23 yo +)   In order to provide better care to our patients, we are screening all of our patients for alcohol and drug use  Would it be okay to ask you these screening questions? Yes Filed at: 02/09/2021 1815   Initial Alcohol Screen: US AUDIT-C    1  How often do you have a drink containing alcohol?  0 Filed at: 02/09/2021 1815   2  How many drinks containing alcohol do you have on a typical day you are drinking? 0 Filed at: 02/09/2021 1815   3a  Male UNDER 65: How often do you have five or more drinks on one occasion? 0 Filed at: 02/09/2021 1815   3b  FEMALE Any Age, or MALE 65+: How often do you have 4 or more drinks on one occassion? 0 Filed at: 02/09/2021 1815   Audit-C Score  0 Filed at: 02/09/2021 1815   DEANDRE: How many times in the past year have you    Used an illegal drug or used a prescription medication for non-medical reasons? Never Filed at: 02/09/2021 1815          Wells' Criteria for PE      Most Recent Value   Wells' Criteria for PE   Clinical signs and symptoms of DVT  0 Filed at: 02/09/2021 3678   PE is primary diagnosis or equally likely  3 Filed at: 02/09/2021 1922   HR >100  0 Filed at: 02/09/2021 1922   Immobilization at least 3 days or Surgery in the previous 4 weeks  0 Filed at: 02/09/2021 1922   Previous, objectively diagnosed PE or DVT  0 Filed at: 02/09/2021 1922   Hemoptysis  0 Filed at: 02/09/2021 1922   Malignancy with treatment within 6 months or palliative  0 Filed at: 02/09/2021 262 Dustin Mcclain Criteria Total  3 Filed at: 02/09/2021 1922                MDM  Number of Diagnoses or Management Options  Diagnosis management comments: Case discussed with attending Lopez Roman, who recommends admission, US    Spoke to admission team who will obs admit       Amount and/or Complexity of Data Reviewed  Clinical lab tests: ordered and reviewed  Tests in the radiology section of CPT®: ordered and reviewed  Review and summarize past medical records: yes  Discuss the patient with other providers: yes        Disposition  Final diagnoses:   None ED Disposition     ED Disposition Condition Date/Time Comment    Admit Stable Tue Feb 9, 2021  8:51 PM Case was discussed with resident for Dr Lopez and the patient's admission status was agreed to be Admission Status: observation status to the service of Dr Phoebe Norman   Follow-up Information    None         Patient's Medications   Discharge Prescriptions    No medications on file     No discharge procedures on file      PDMP Review     None          ED Provider  Electronically Signed by           Isamar Moreno PA-C  02/09/21 2054

## 2021-02-10 ENCOUNTER — HOSPITAL ENCOUNTER (INPATIENT)
Facility: HOSPITAL | Age: 86
LOS: 3 days | Discharge: HOME HEALTH | DRG: 445 | End: 2021-02-13
Attending: FAMILY MEDICINE | Admitting: FAMILY MEDICINE
Payer: MEDICARE

## 2021-02-10 ENCOUNTER — TELEPHONE (OUTPATIENT)
Dept: OTHER | Facility: OTHER | Age: 86
End: 2021-02-10

## 2021-02-10 VITALS
RESPIRATION RATE: 20 BRPM | HEART RATE: 66 BPM | TEMPERATURE: 98.5 F | OXYGEN SATURATION: 97 % | DIASTOLIC BLOOD PRESSURE: 51 MMHG | SYSTOLIC BLOOD PRESSURE: 128 MMHG

## 2021-02-10 DIAGNOSIS — E87.1 HYPONATREMIA: Chronic | ICD-10-CM

## 2021-02-10 DIAGNOSIS — R79.89 ELEVATED LFTS: ICD-10-CM

## 2021-02-10 DIAGNOSIS — R10.13 EPIGASTRIC PAIN: ICD-10-CM

## 2021-02-10 DIAGNOSIS — I48.0 PAROXYSMAL ATRIAL FIBRILLATION (HCC): Primary | ICD-10-CM

## 2021-02-10 DIAGNOSIS — S32.000A LUMBAR COMPRESSION FRACTURE, CLOSED, INITIAL ENCOUNTER (HCC): ICD-10-CM

## 2021-02-10 DIAGNOSIS — I10 ESSENTIAL HYPERTENSION: ICD-10-CM

## 2021-02-10 DIAGNOSIS — E78.49 OTHER HYPERLIPIDEMIA: ICD-10-CM

## 2021-02-10 DIAGNOSIS — S32.030A CLOSED COMPRESSION FRACTURE OF L3 LUMBAR VERTEBRA, INITIAL ENCOUNTER (HCC): ICD-10-CM

## 2021-02-10 LAB
ALBUMIN SERPL BCP-MCNC: 3.1 G/DL (ref 3.5–5)
ALP SERPL-CCNC: 270 U/L (ref 46–116)
ALT SERPL W P-5'-P-CCNC: 103 U/L (ref 12–78)
ANION GAP SERPL CALCULATED.3IONS-SCNC: 6 MMOL/L (ref 4–13)
AST SERPL W P-5'-P-CCNC: 85 U/L (ref 5–45)
ATRIAL RATE: 54 BPM
ATRIAL RATE: 79 BPM
BASOPHILS # BLD MANUAL: 0 THOUSAND/UL (ref 0–0.1)
BASOPHILS NFR MAR MANUAL: 0 % (ref 0–1)
BILIRUB SERPL-MCNC: 0.7 MG/DL (ref 0.2–1)
BUN SERPL-MCNC: 9 MG/DL (ref 5–25)
CALCIUM ALBUM COR SERPL-MCNC: 9.3 MG/DL (ref 8.3–10.1)
CALCIUM SERPL-MCNC: 8.6 MG/DL (ref 8.3–10.1)
CHLORIDE SERPL-SCNC: 102 MMOL/L (ref 100–108)
CO2 SERPL-SCNC: 25 MMOL/L (ref 21–32)
CREAT SERPL-MCNC: 0.72 MG/DL (ref 0.6–1.3)
EOSINOPHIL # BLD MANUAL: 0 THOUSAND/UL (ref 0–0.4)
EOSINOPHIL NFR BLD MANUAL: 0 % (ref 0–6)
ERYTHROCYTE [DISTWIDTH] IN BLOOD BY AUTOMATED COUNT: 13.3 % (ref 11.6–15.1)
FLUAV RNA RESP QL NAA+PROBE: NEGATIVE
FLUBV RNA RESP QL NAA+PROBE: NEGATIVE
GFR SERPL CREATININE-BSD FRML MDRD: 72 ML/MIN/1.73SQ M
GLUCOSE SERPL-MCNC: 92 MG/DL (ref 65–140)
HCT VFR BLD AUTO: 37.7 % (ref 34.8–46.1)
HGB BLD-MCNC: 12.8 G/DL (ref 11.5–15.4)
LACTATE SERPL-SCNC: 1.4 MMOL/L (ref 0–2)
LYMPHOCYTES # BLD AUTO: 0.23 THOUSAND/UL (ref 0.6–4.47)
LYMPHOCYTES # BLD AUTO: 1 % (ref 14–44)
MCH RBC QN AUTO: 34.3 PG (ref 26.8–34.3)
MCHC RBC AUTO-ENTMCNC: 34 G/DL (ref 31.4–37.4)
MCV RBC AUTO: 101 FL (ref 82–98)
MONOCYTES # BLD AUTO: 1.38 THOUSAND/UL (ref 0–1.22)
MONOCYTES NFR BLD: 6 % (ref 4–12)
NEUTROPHILS # BLD MANUAL: 21.1 THOUSAND/UL (ref 1.85–7.62)
NEUTS BAND NFR BLD MANUAL: 15 % (ref 0–8)
NEUTS SEG NFR BLD AUTO: 77 % (ref 43–75)
P AXIS: 0 DEGREES
P AXIS: 187 DEGREES
PLATELET # BLD AUTO: 256 THOUSANDS/UL (ref 149–390)
PLATELET BLD QL SMEAR: ADEQUATE
PMV BLD AUTO: 8.9 FL (ref 8.9–12.7)
POIKILOCYTOSIS BLD QL SMEAR: PRESENT
POTASSIUM SERPL-SCNC: 4.4 MMOL/L (ref 3.5–5.3)
PR INTERVAL: 241 MS
PR INTERVAL: 51 MS
PROT SERPL-MCNC: 6.6 G/DL (ref 6.4–8.2)
QRS AXIS: 26 DEGREES
QRS AXIS: 59 DEGREES
QRSD INTERVAL: 126 MS
QRSD INTERVAL: 99 MS
QT INTERVAL: 404 MS
QT INTERVAL: 467 MS
QTC INTERVAL: 443 MS
QTC INTERVAL: 464 MS
RBC # BLD AUTO: 3.73 MILLION/UL (ref 3.81–5.12)
RSV RNA RESP QL NAA+PROBE: NEGATIVE
SARS-COV-2 RNA RESP QL NAA+PROBE: NEGATIVE
SODIUM SERPL-SCNC: 133 MMOL/L (ref 136–145)
T WAVE AXIS: 177 DEGREES
T WAVE AXIS: 52 DEGREES
TOTAL CELLS COUNTED SPEC: 100
TROPONIN I SERPL-MCNC: <0.03 NG/ML (ref 0–0.07)
VARIANT LYMPHS # BLD AUTO: 1 %
VENTRICULAR RATE: 54 BPM
VENTRICULAR RATE: 79 BPM
WBC # BLD AUTO: 22.94 THOUSAND/UL (ref 4.31–10.16)

## 2021-02-10 PROCEDURE — 87040 BLOOD CULTURE FOR BACTERIA: CPT | Performed by: INTERNAL MEDICINE

## 2021-02-10 PROCEDURE — 83605 ASSAY OF LACTIC ACID: CPT | Performed by: INTERNAL MEDICINE

## 2021-02-10 PROCEDURE — 99232 SBSQ HOSP IP/OBS MODERATE 35: CPT | Performed by: PHYSICIAN ASSISTANT

## 2021-02-10 PROCEDURE — 85027 COMPLETE CBC AUTOMATED: CPT | Performed by: INTERNAL MEDICINE

## 2021-02-10 PROCEDURE — 84484 ASSAY OF TROPONIN QUANT: CPT | Performed by: INTERNAL MEDICINE

## 2021-02-10 PROCEDURE — 36415 COLL VENOUS BLD VENIPUNCTURE: CPT | Performed by: INTERNAL MEDICINE

## 2021-02-10 PROCEDURE — 0241U HB NFCT DS VIR RESP RNA 4 TRGT: CPT | Performed by: INTERNAL MEDICINE

## 2021-02-10 PROCEDURE — 99223 1ST HOSP IP/OBS HIGH 75: CPT | Performed by: FAMILY MEDICINE

## 2021-02-10 PROCEDURE — 80053 COMPREHEN METABOLIC PANEL: CPT | Performed by: PHYSICIAN ASSISTANT

## 2021-02-10 PROCEDURE — 93010 ELECTROCARDIOGRAM REPORT: CPT | Performed by: INTERNAL MEDICINE

## 2021-02-10 PROCEDURE — 99203 OFFICE O/P NEW LOW 30 MIN: CPT | Performed by: SURGERY

## 2021-02-10 PROCEDURE — 99222 1ST HOSP IP/OBS MODERATE 55: CPT | Performed by: INTERNAL MEDICINE

## 2021-02-10 PROCEDURE — 85007 BL SMEAR W/DIFF WBC COUNT: CPT | Performed by: INTERNAL MEDICINE

## 2021-02-10 PROCEDURE — 93005 ELECTROCARDIOGRAM TRACING: CPT

## 2021-02-10 RX ORDER — METOPROLOL TARTRATE 5 MG/5ML
2.5 INJECTION INTRAVENOUS EVERY 6 HOURS PRN
Status: DISCONTINUED | OUTPATIENT
Start: 2021-02-10 | End: 2021-02-10 | Stop reason: HOSPADM

## 2021-02-10 RX ORDER — ACETAMINOPHEN 325 MG/1
650 TABLET ORAL EVERY 6 HOURS PRN
Status: CANCELLED | OUTPATIENT
Start: 2021-02-10

## 2021-02-10 RX ORDER — POLYETHYLENE GLYCOL 3350 17 G/17G
17 POWDER, FOR SOLUTION ORAL DAILY
Status: CANCELLED | OUTPATIENT
Start: 2021-02-10

## 2021-02-10 RX ORDER — CEFTRIAXONE 1 G/50ML
1000 INJECTION, SOLUTION INTRAVENOUS EVERY 24 HOURS
Status: DISCONTINUED | OUTPATIENT
Start: 2021-02-10 | End: 2021-02-13 | Stop reason: HOSPADM

## 2021-02-10 RX ORDER — ACETAMINOPHEN 325 MG/1
650 TABLET ORAL EVERY 6 HOURS PRN
Status: DISCONTINUED | OUTPATIENT
Start: 2021-02-10 | End: 2021-02-13 | Stop reason: HOSPADM

## 2021-02-10 RX ORDER — ACETAMINOPHEN 325 MG/1
650 TABLET ORAL EVERY 6 HOURS PRN
Status: DISCONTINUED | OUTPATIENT
Start: 2021-02-10 | End: 2021-02-10 | Stop reason: HOSPADM

## 2021-02-10 RX ORDER — MELATONIN
1000 DAILY
Status: DISCONTINUED | OUTPATIENT
Start: 2021-02-11 | End: 2021-02-13 | Stop reason: HOSPADM

## 2021-02-10 RX ORDER — SODIUM CHLORIDE, SODIUM LACTATE, POTASSIUM CHLORIDE, CALCIUM CHLORIDE 600; 310; 30; 20 MG/100ML; MG/100ML; MG/100ML; MG/100ML
75 INJECTION, SOLUTION INTRAVENOUS CONTINUOUS
Status: DISCONTINUED | OUTPATIENT
Start: 2021-02-10 | End: 2021-02-12

## 2021-02-10 RX ORDER — HYDRALAZINE HYDROCHLORIDE 25 MG/1
25 TABLET, FILM COATED ORAL 2 TIMES DAILY
Status: CANCELLED | OUTPATIENT
Start: 2021-02-10

## 2021-02-10 RX ORDER — HYDRALAZINE HYDROCHLORIDE 25 MG/1
25 TABLET, FILM COATED ORAL 2 TIMES DAILY
Status: DISCONTINUED | OUTPATIENT
Start: 2021-02-10 | End: 2021-02-12

## 2021-02-10 RX ORDER — CARVEDILOL 6.25 MG/1
6.25 TABLET ORAL 2 TIMES DAILY WITH MEALS
Status: DISCONTINUED | OUTPATIENT
Start: 2021-02-10 | End: 2021-02-13 | Stop reason: HOSPADM

## 2021-02-10 RX ORDER — AMIODARONE HYDROCHLORIDE 200 MG/1
100 TABLET ORAL 2 TIMES DAILY WITH MEALS
Status: CANCELLED | OUTPATIENT
Start: 2021-02-10

## 2021-02-10 RX ORDER — PRAVASTATIN SODIUM 10 MG
10 TABLET ORAL DAILY
Status: CANCELLED | OUTPATIENT
Start: 2021-02-10

## 2021-02-10 RX ORDER — METOPROLOL TARTRATE 5 MG/5ML
2.5 INJECTION INTRAVENOUS EVERY 6 HOURS PRN
Status: CANCELLED | OUTPATIENT
Start: 2021-02-10

## 2021-02-10 RX ORDER — METOPROLOL TARTRATE 5 MG/5ML
2.5 INJECTION INTRAVENOUS EVERY 6 HOURS PRN
Status: DISCONTINUED | OUTPATIENT
Start: 2021-02-10 | End: 2021-02-13 | Stop reason: HOSPADM

## 2021-02-10 RX ORDER — AMIODARONE HYDROCHLORIDE 200 MG/1
100 TABLET ORAL 2 TIMES DAILY WITH MEALS
Status: DISCONTINUED | OUTPATIENT
Start: 2021-02-10 | End: 2021-02-13 | Stop reason: HOSPADM

## 2021-02-10 RX ORDER — PRAVASTATIN SODIUM 10 MG
10 TABLET ORAL
Status: DISCONTINUED | OUTPATIENT
Start: 2021-02-11 | End: 2021-02-10

## 2021-02-10 RX ORDER — SODIUM CHLORIDE 1000 MG
1 TABLET, SOLUBLE MISCELLANEOUS
Status: CANCELLED | OUTPATIENT
Start: 2021-02-10

## 2021-02-10 RX ORDER — CEFTRIAXONE 1 G/50ML
1000 INJECTION, SOLUTION INTRAVENOUS EVERY 24 HOURS
Status: CANCELLED | OUTPATIENT
Start: 2021-02-10

## 2021-02-10 RX ORDER — MELATONIN
1000 DAILY
Status: CANCELLED | OUTPATIENT
Start: 2021-02-10

## 2021-02-10 RX ORDER — POLYETHYLENE GLYCOL 3350 17 G/17G
17 POWDER, FOR SOLUTION ORAL DAILY
Status: DISCONTINUED | OUTPATIENT
Start: 2021-02-11 | End: 2021-02-13 | Stop reason: HOSPADM

## 2021-02-10 RX ORDER — ASPIRIN 81 MG/1
81 TABLET ORAL DAILY
Status: DISCONTINUED | OUTPATIENT
Start: 2021-02-11 | End: 2021-02-13 | Stop reason: HOSPADM

## 2021-02-10 RX ORDER — LOSARTAN POTASSIUM 50 MG/1
100 TABLET ORAL DAILY
Status: DISCONTINUED | OUTPATIENT
Start: 2021-02-11 | End: 2021-02-13 | Stop reason: HOSPADM

## 2021-02-10 RX ORDER — SODIUM CHLORIDE 9 MG/ML
250 INJECTION, SOLUTION INTRAVENOUS CONTINUOUS
Status: CANCELLED | OUTPATIENT
Start: 2021-02-10

## 2021-02-10 RX ORDER — ASPIRIN 81 MG/1
81 TABLET ORAL DAILY
Status: CANCELLED | OUTPATIENT
Start: 2021-02-10

## 2021-02-10 RX ORDER — SODIUM CHLORIDE 1000 MG
1 TABLET, SOLUBLE MISCELLANEOUS
Status: DISCONTINUED | OUTPATIENT
Start: 2021-02-10 | End: 2021-02-13 | Stop reason: HOSPADM

## 2021-02-10 RX ORDER — LOSARTAN POTASSIUM 50 MG/1
100 TABLET ORAL DAILY
Status: CANCELLED | OUTPATIENT
Start: 2021-02-10

## 2021-02-10 RX ORDER — CARVEDILOL 6.25 MG/1
6.25 TABLET ORAL 2 TIMES DAILY WITH MEALS
Status: CANCELLED | OUTPATIENT
Start: 2021-02-10

## 2021-02-10 RX ADMIN — HYDRALAZINE HYDROCHLORIDE 25 MG: 25 TABLET, FILM COATED ORAL at 17:10

## 2021-02-10 RX ADMIN — METRONIDAZOLE 500 MG: 500 INJECTION, SOLUTION INTRAVENOUS at 14:24

## 2021-02-10 RX ADMIN — CYANOCOBALAMIN TAB 500 MCG 500 MCG: 500 TAB at 08:42

## 2021-02-10 RX ADMIN — Medication 1 G: at 17:10

## 2021-02-10 RX ADMIN — Medication 1 G: at 14:23

## 2021-02-10 RX ADMIN — CARVEDILOL 6.25 MG: 6.25 TABLET, FILM COATED ORAL at 17:11

## 2021-02-10 RX ADMIN — HYDRALAZINE HYDROCHLORIDE 25 MG: 25 TABLET, FILM COATED ORAL at 08:41

## 2021-02-10 RX ADMIN — ENOXAPARIN SODIUM 40 MG: 40 INJECTION SUBCUTANEOUS at 08:42

## 2021-02-10 RX ADMIN — Medication 1 G: at 08:41

## 2021-02-10 RX ADMIN — METRONIDAZOLE 500 MG: 500 INJECTION, SOLUTION INTRAVENOUS at 00:03

## 2021-02-10 RX ADMIN — METRONIDAZOLE 500 MG: 500 INJECTION, SOLUTION INTRAVENOUS at 21:48

## 2021-02-10 RX ADMIN — CEFTRIAXONE 1000 MG: 1 INJECTION, SOLUTION INTRAVENOUS at 23:12

## 2021-02-10 RX ADMIN — SODIUM CHLORIDE, SODIUM LACTATE, POTASSIUM CHLORIDE, AND CALCIUM CHLORIDE 75 ML/HR: .6; .31; .03; .02 INJECTION, SOLUTION INTRAVENOUS at 17:09

## 2021-02-10 RX ADMIN — AMIODARONE HYDROCHLORIDE 100 MG: 200 TABLET ORAL at 08:42

## 2021-02-10 RX ADMIN — ASPIRIN 81 MG: 81 TABLET, COATED ORAL at 08:42

## 2021-02-10 RX ADMIN — AMIODARONE HYDROCHLORIDE 100 MG: 200 TABLET ORAL at 17:10

## 2021-02-10 RX ADMIN — CARVEDILOL 6.25 MG: 6.25 TABLET, FILM COATED ORAL at 08:44

## 2021-02-10 RX ADMIN — METOROPROLOL TARTRATE 2.5 MG: 5 INJECTION, SOLUTION INTRAVENOUS at 01:59

## 2021-02-10 RX ADMIN — Medication 1000 UNITS: at 08:41

## 2021-02-10 RX ADMIN — ACETAMINOPHEN 650 MG: 325 TABLET ORAL at 01:59

## 2021-02-10 RX ADMIN — PRAVASTATIN SODIUM 10 MG: 20 TABLET ORAL at 08:41

## 2021-02-10 RX ADMIN — LOSARTAN POTASSIUM 100 MG: 50 TABLET, FILM COATED ORAL at 08:42

## 2021-02-10 NOTE — DISCHARGE SUMMARY
Discharge- Gutierrez Nelson 10/24/1927, 80 y o  female MRN: [id number]    Unit/Bed#: ED 16 Encounter: [id number]    Primary Care Provider: Gary Cabello MD   Date and time admitted to hospital: 2/9/2021  5:49 PM        Other hyperlipidemia  Assessment & Plan  - Continue pre-admission statin  Essential hypertension  Assessment & Plan  - Continue pre-admission antihypertensive medications  Hyponatremia  Assessment & Plan  History of chronic hyponatremia, with a baseline of 130-135  Current sodium of 130     - Continue sodium tablets 1 g p o  T i d  With meals     - Monitor serum sodium  Paroxysmal atrial fibrillation [facility])  Assessment & Plan  Patient is currently rate controlled  [id number] score of 4, however patient currently not on anticoagulation  EKG during this admission shows sinus bradycardia  - Continue pre-admission carvedilol and amiodarone  * Atypical chest pain acute coronary syndrome versus hepatobiliary disease on the differential  Assessment & Plan  -Patient presented with atypical chest pain needed to rule out acute coronary syndrome and on the differential and also is hepatic we are pathology since right upper quadrant ultrasound showed gallbladder sludge with wall thickening  -on presentation patient has leukocytosis and fever although abdomen is benign  -mildly elevated AST, normal bilirubin  -initial EKG showed no signs of ischemia    Plan  -will consult general surgery for possible needed of cholecystectomy  -patient might need HIDA scan  -initiate treatment with ceftriaxone and Flagyl for possible cholecystitis  -trend troponins    Initial troponin 0 03 x3  -continue telemetry monitoring  - follow BC  -follow lactic acid  -follow UA             Discharging Resident Physician: Beverley Muñiz MD  Attending: Haylie Fox MD  PCP: Gary Cabello MD  Admission Date: 2/9/2021  Discharge Date: 02/10/21    Disposition:     Other: Transfer to an or [address] [place] [address] [facility] facility  Accepted by Dr Kendal Kruger from [address] [place] [address] Course:     Bill Gonsalez is a 80 y o  female patient with past medical history significant for AFib not on anticoagulation, diverticulosis, cholelithiasis, hyperlipidemia, hypertension, inguinal hernia, macular degeneration, who originally presented to the hospital on 2/9/2021 due to a typical midsternal chest pain of 2 hours evolution  The chest pain was located on the midsternal and epigastric area with radiation to the right upper quadrant  There were no aggravating or alleviating factors  Patient denied palpitations, shortness of breath, vomiting or nausea  Denies reflux, headaches, diarrhea, constipation, fever or chills  Not blurry vision or new onset weakness or numbness  Initial workup on presentation showed an x-ray with no acute cardiopulmonary disease  EKG shows sinus bradycardia with no ST T waves abnormalities  Initial troponin was negative  In the view of elevated D-dimer patient underwent CTA which showed no signs of pulmonary emboli  Labs were unremarkable with the exception of elevated AST on the 90s as well as a total in phosphate  She does have mild leukocytosis  Patient had hyponatremia on admission of 130 which is known for the patient  Her sodium levels go between 130 and 135 she is on salt tablets for these recent  On physical exam patient abdomen was benign, Keeley Cellar sign was negative there was no rebound and chest pain was not reproducible reason why she was admitted for observation  Unfortunately patient is started to deteriorate her blood pressure which was initially controlled went up to 200/100, she started having tachycardia, tachypnea, as well as a temp of 101° reason why surgery was contacted  At this point they are considered that the patient might have CBD obst She needs further workup reason why she will be transfer to another  [facility]      Condition at Discharge: fair     Discharge Day Visit / Exam:       Vitals: Blood Pressure: (!) 153/49 (02/10/21 0205)  Pulse: 68 (02/10/21 0205)  Temperature: (!) 101 5 °F (38 6 °C) (02/10/21 0130)  Temp Source: Oral (02/10/21 0130)  Respirations: (!) 28 (02/10/21 0205)  SpO2: 94 % (02/10/21 0205)  Exam:   Physical Exam  Constitutional:       General: She is in acute distress  HENT:      Head: Normocephalic and atraumatic  Neck:      Musculoskeletal: Normal range of motion  Cardiovascular:      Rate and Rhythm: Regular rhythm  Bradycardia present  Pulses: Normal pulses  Heart sounds: No murmur  Pulmonary:      Effort: Pulmonary effort is normal  No respiratory distress  Breath sounds: Normal breath sounds  Abdominal:      General: Abdomen is flat  Bowel sounds are normal  There is no distension  Palpations: Abdomen is soft  Tenderness: There is abdominal tenderness  There is no guarding or rebound  Hernia: A hernia is present  Comments: Negative McBurney sign, negative White sign, negative psoas sign  Musculoskeletal: Normal range of motion  Skin:     General: Skin is warm  Capillary Refill: Capillary refill takes less than 2 seconds  Neurological:      General: No focal deficit present  Mental Status: She is alert  (Discussion with Family:  Patient niece was contacted and informed about that needed for the patient to be transfer  Discharge instructions/Information to patient and family:   See after visit summary for information provided to patient and family  Provisions for Follow-Up Care:  See after visit summary for information related to follow-up care and any pertinent home health orders  Discharge Medications:  See after visit summary for reconciled discharge medications provided to patient and family        ** Please Note: This note has been constructed using a voice recognition system **

## 2021-02-10 NOTE — ASSESSMENT & PLAN NOTE
-Patient presented with atypical chest pain needed to rule out acute coronary syndrome and on the differential and also is hepatic we are pathology since right upper quadrant ultrasound showed gallbladder sludge with wall thickening  -on presentation patient has leukocytosis and fever although abdomen is benign  -mildly elevated AST, normal bilirubin  -initial EKG showed no signs of ischemia    Plan  -will consult general surgery for possible needed of cholecystectomy  -patient might need HIDA scan  -initiate treatment with ceftriaxone and Flagyl for possible cholecystitis  -trend troponins    Initial troponin 0 03 x3  -continue telemetry monitoring  - follow BC  -follow lactic acid  -follow UA

## 2021-02-10 NOTE — H&P
History and Physical - [facility]    Patient Information: Windy Race 80 y o  female MRN: [id number]  Unit/Bed#: [address] Encounter: [id number]  Admitting Physician: Lex Gaspar DO  PCP: Kim Mary MD  Date of Admission:  02/10/21        Hospital Problem List:     Principal Problem:    Elevated LFTs  Active Problems:    Paroxysmal atrial fibrillation ([address] )    Hyponatremia    Essential hypertension    Other hyperlipidemia      Assessment/Plan:    * Elevated LFTs  Assessment & Plan  Patient was [facility] to [facility] with atypical chest pain  Troponins negative  Patient noted to have elevated LFTs on lab work and right upper quadrant ultrasound showed gallbladder sludge with gallbladder wall thickening and possible tiny stones  Patient also with leukocytosis on lab work  Patient was evaluated by surgery there who recommended transfer here for GI evaluation  Patient on IV Rocephin and Flagyl for now  Discussed with GI and plan is for MRI/MRCP for further evaluation  Repeat lab work in a m  Patient to be seen by surgery while here as well    Other hyperlipidemia  Assessment & Plan  Holding statin due to elevated LFTs    Essential hypertension  Assessment & Plan  Continue Cozaar, Coreg, hydralazine    Hyponatremia  Assessment & Plan  Patient with history of chronic hyponatremia  Continue sodium chloride tablets  Repeat lab work in a m  Paroxysmal atrial fibrillation (HCC)  Assessment & Plan  Continue Coreg, amiodarone  Patient not on any anticoagulation at home          VTE Prophylaxis: Enoxaparin (Lovenox)  / sequential compression device   Code Status: Level 3 - DNAR and DNI    Anticipated Length of Stay:  Patient will be admitted on an Inpatient basis with an anticipated length of stay of atleast 2 midnights  Justification for Hospital Stay: abnormal LFTs    Total Time for Visit, including Counseling / Coordination of Care: 45 minutes    Greater than 50% of this total time spent on direct patient counseling and coordination of care  Chief Complaint:     No chief complaint on file  of Present Illness:    Onelia Vazquez is a 80 y o  female who was initially admitted to [facility] in hospital with midsternal/epigastric area pain radiating to the right upper quadrant  troponins were negative  Patient noted to have elevated LFTs along with fever, leukocytosis and patient was seen by surgery there and transferred here for further evaluation/management  Patient with dementia and unable to provide any meaningful information but does reports some abdominal pain when she tries to sit up in bed    Review of Systems:    Review of Systems   Unable to perform ROS: Dementia       Past Medical and Surgical History:     Past Medical History:   Diagnosis Date    [facility] )     Anxiety     Cheilosis     Diverticulosis     1970s    Fracture of iliac wing ([facility] ) 2016    Fracture, ribs     Gallstone     Hiatal hernia     Hyperlipidemia     Hypertension     Hyponatremia     Inguinal hernia     [facility]     Macrocytosis     Macular degeneration     Osteoarthritis     Ovarian cyst     stable    Renal cyst     Rib fractures 2017    Simple renal cyst     Syncope     Tuberculosis 0408    Umbilical hernia     Vertigo        Past Surgical History:   Procedure Laterality Date    ABDOMINAL SURGERY      HIP SURGERY      At  young age 26's   Prashant Ede KNEE CARTILAGE SURGERY         Meds/Allergies:    PTA meds:   Prior to Admission Medications   Prescriptions Last Dose Informant Patient Reported? Taking?    Cholecalciferol (VITAMIN D3) 1000 units CAPS  Family Member Yes No   Sig: Take by mouth   Coenzyme Q10 (COQ-10) 50 MG CAPS  Family Member Yes No   Sig: Take by mouth daily   Multiple Vitamins-Minerals (ICAPS AREDS 2 PO)  Family Member Yes No   Sig: Take by mouth 2 (two) times a day   PACERONE 100 MG tablet  Family Member Yes No   Sig: Take 100 mg by mouth 2 (two) times a day    acetaminophen (TYLENOL) 325 mg tablet  Family Member No No   Sig: TAKE 3 TABLETS(975MG TOTAL) BY MOUTH EVERY 8 HOURS   Patient taking differently: Take by mouth as needed    aspirin 81 MG tablet  Family Member Yes No   Sig: Take 81 mg by mouth daily   carvedilol (COREG) 6 25 mg tablet  Family Member No No   Sig: Take 1 tablet (6 25 mg total) by mouth 2 (two) times a day with meals   hydrALAZINE (APRESOLINE) 25 mg tablet  Family Member No No   Sig: Take 1 tablet (25 mg total) by mouth 2 (two) times a day   irbesartan (AVAPRO) 300 mg tablet  Family Member Yes No   Sig: Take 150 mg by mouth 2 (two) times a day    meclizine (ANTIVERT) 25 mg tablet  Family Member No No   Sig: Take 1 tablet (25 mg total) by mouth 3 (three) times a day as needed for dizziness   multivitamin (THERAGRAN) TABS  Family Member Yes No   Sig: Take 1 tablet by mouth daily   nystatin-triamcinolone (MYCOLOG-II) cream  Family Member No No   Sig: Apply topically 2 (two) times a day as needed (rash)   polyethylene glycol (MIRALAX) 17 g packet  Family Member No No   Sig: Take 17 g by mouth daily   Patient taking differently: Take 8 5 g by mouth daily    pravastatin (PRAVACHOL) 10 mg tablet  Family Member No No   Sig: Take 1 tablet (10 mg total) by mouth daily   sodium chloride 1 g tablet  Family Member No No   Sig: Take 1 tablet (1 g total) by mouth 3 (three) times a day with meals   vitamin B-12 (VITAMIN B-12) 500 mcg tablet   Yes No   Sig: Take 500 mcg by mouth daily      Facility-Administered Medications: None       Allergies: Allergies   Allergen Reactions    Codeine     Lomotil [Diphenoxylate]     Mirabegron Hypertension    Quinidex [Quinidine]     Adhesive [Medical Tape] Rash    Penicillins Rash     History:     Marital Status:       Substance Use History:   Social History     Substance and Sexual Activity   Alcohol Use No     Social History     Tobacco Use   Smoking Status Never Smoker   Smokeless Tobacco Never Used Social History     Substance and Sexual Activity   Drug Use No       Family History:    Family History   Problem Relation Age of Onset    Leukemia Sister     Coronary artery disease Sister     Diabetes Sister     Hypertension Sister     Coronary artery disease Sister     Aortic aneurysm Sister     Coronary artery disease Mother     Coronary artery disease Brother     Substance Abuse Neg Hx     Alcohol abuse Neg Hx     Depression Neg Hx     Mental illness Neg Hx        Physical Exam:     Vitals:   Blood Pressure: 169/62 (02/10/21 1049)  Pulse: 65 (02/10/21 1049)  Temperature: 98 °F (36 7 °C) (02/10/21 1049)  SpO2: 95 % (02/10/21 1049)    Physical Exam  Constitutional:       General: She is not in acute distress  Appearance: She is not diaphoretic  HENT:      Head: Normocephalic and atraumatic  Eyes:      General:         Right eye: No discharge  Left eye: No discharge  Cardiovascular:      Rate and Rhythm: Normal rate and regular rhythm  Pulmonary:      Effort: Pulmonary effort is normal  No respiratory distress  Breath sounds: No wheezing or rales  Comments: Decreased breath sounds bilaterally  Abdominal:      General: Bowel sounds are normal  There is no distension  Palpations: Abdomen is soft  Tenderness: There is no abdominal tenderness  Neurological:      Mental Status: She is alert  Comments: Oriented to self, confused   Psychiatric:         Cognition and Memory: Cognition is impaired  Memory is impaired  Lab Results: I have personally reviewed pertinent reports        Results from last 7 days   Lab Units 02/10/21  0705  02/09/21  1814   WBC Thousand/uL 22 94*  --  10 22*   HEMOGLOBIN g/dL 12 8  --  13 4   HEMATOCRIT % 37 7  --  39 5   PLATELETS Thousands/uL 256   < > 281   NEUTROS PCT %  --   --  78*   LYMPHS PCT %  --   --  11*   LYMPHO PCT % 1*  --   --    MONOS PCT %  --   --  9   MONO PCT % 6  --   --    EOS PCT % 0  --  2    < > = values in this interval not displayed  Results from last 7 days   Lab Units 02/09/21  1814   POTASSIUM mmol/L 3 9   CHLORIDE mmol/L 99   CO2 mmol/L 24   BUN mg/dL 11   CREATININE mg/dL 0 52   CALCIUM mg/dL 8 5   ALK PHOS U/L 224 2*   ALT U/L 49   AST U/L 92*           Imaging: I have personally reviewed pertinent reports  Xr Chest 1 View Portable    Result Date: 2/9/2021  Narrative: CHEST INDICATION:  Abdominal pain, hypertension  COMPARISON:  11/30/2018, CT chest, abdomen and pelvis 7/13/2019 EXAM PERFORMED/VIEWS:  XR CHEST PORTABLE FINDINGS: Cardiomediastinal silhouette appears unremarkable  The lungs are clear  Calcified granuloma right upper lung zone  No pneumothorax or pleural effusion  Osseous structures appear within normal limits for patient age  Impression: No acute cardiopulmonary disease  Workstation performed: [id number]     Us Gallbladder    Result Date: 2/9/2021  Narrative: RIGHT UPPER QUADRANT ULTRASOUND INDICATION:     Chest and back pain  COMPARISON: 2/9/2021 TECHNIQUE:   Real-time ultrasound of the right upper quadrant was performed with a curvilinear transducer with both volumetric sweeps and still imaging techniques  FINDINGS: PANCREAS:  Visualized portions of the pancreas demonstrate normal echogenicity  AORTA AND IVC:  Visualized portions are normal for patient age  LIVER: Size:  Within normal range  The liver measures 13 cm in the midclavicular line  Contour:  Surface contour is smooth  Parenchyma:  Normal echogenicity and echotexture  No evidence of mass  Limited imaging of the main portal vein shows it to be patent and hepatopetal   BILIARY: Partially distended gallbladder  Sludge and possible tiny stones in the dependent portion of the fundus  Gallbladder wall thickening of 7 mm  Absent White's sign  No intrahepatic biliary dilatation  Common bile duct measures 7 mm at the mary hepatis  No choledocholithiasis  KIDNEY: Right kidney measures 10 4 cm   Normal cortical thickness and echogenicity  Subcentimeter simple cyst  No hydronephrosis  ASCITES:   None  Impression: Gallbladder sludge and possible tiny stones with evidence of gallbladder wall thickening though absent White's sign  If there is persistent concern for acute cholecystitis, HIDA scan may be helpful  Workstation performed: [id number]     Cta Ed Chest Pe Study    Result Date: 2/9/2021  Narrative: CTA - CHEST WITH IV CONTRAST - PULMONARY ANGIOGRAM INDICATION:   PE suspected, intermediate prob, positive D-dimer right sided cp, sob  COMPARISON: X-ray from earlier today and CT dated 7/13/2019  TECHNIQUE: CTA examination of the chest was performed using angiographic technique according to a protocol specifically tailored to evaluate for pulmonary embolism  Axial, sagittal, and coronal 2D reformatted images were created from the source data and  submitted for interpretation  In addition, coronal 3D MIP postprocessing was performed on the acquisition scanner  Radiation dose length product (DLP) for this visit:  188 4 mGy-cm   This examination, like all CT scans performed in the [facility], was performed utilizing techniques to minimize radiation dose exposure, including the use of iterative reconstruction and automated exposure control  IV Contrast:  82 mL of iohexol (OMNIPAQUE)  FINDINGS: PULMONARY ARTERIAL TREE:  No pulmonary embolus is seen  Stable enlarged main pulmonary artery suggesting pulmonary hypertension  LUNGS:  Stable bilateral upper lobe scarring and bronchiectasis  Mild bibasilar atelectasis or scarring is again seen  Stable calcified granulomas  No suspicious lung nodule, mass or consolidation  There is no tracheal or endobronchial lesion  PLEURA:  Unremarkable  HEART/GREAT VESSELS:  Atherosclerotic changes are noted in thoracic aorta and coronary arteries  Heart is enlarged  No pericardial effusion  No thoracic aneurysm  MEDIASTINUM AND JONES:  Small hiatal hernia noted    No mediastinal or hilar lymphadenopathy  CHEST WALL AND LOWER NECK:   Unremarkable  VISUALIZED STRUCTURES IN THE UPPER ABDOMEN:  Unremarkable  OSSEOUS STRUCTURES:  No acute osseous abnormality  Degenerative changes  Thoracic kyphosis  Mild chronic compression deformity at T11 is stable compared with x-ray dated 9/17/2020  Osteopenia  Impression: No acute pulmonary embolism  Cardiomegaly  Enlarged main pulmonary artery suggesting pulmonary hypertension  Workstation performed: [id number]       No orders to display       EKG, Pathology, and Other Studies Reviewed on Admission:   · No ekg noted    Allscripts/EPIC Records Reviewed: Yes     ** Please Note: "This note has been constructed using a voice recognition system  Therefore there may be syntax, spelling, and/or grammatical errors   Please call if you have any questions  "**

## 2021-02-10 NOTE — CONSULTS
Consultation - General Surgery   Manuela Dorsey 80 y o  female MRN: [id number]  Unit/Bed#: -01 Encounter: [id number]    Assessment/Plan  No evidence of acute cholecystitis  However, having passed a stone is possible/probable  I am worried about cholangitis although her VS are stable at present  I suggest GI consultation  An MRCP if possible would rule out cbd stones  ERCP may be needed    Assessment:  Atypical chest pain with elevated WBC and alk phos  Plan:  I would prefer a GI evaluation and MRCP  Continue wide spectrum antibiotics  Note:  GB wall thickening is present and a 7mm cbd is enlarged; I think she passed a stone  History of Present Illness     HPI:  Manuela Dorsey is a 80 y o  female who presents with atypical chest pain  This was stated as lower chest/epigastrum and then later referred to the right side  US shows stones and sludge; CTA neg for PE  WBC 22 with nl BI but elevated alk phos      Consults    Review of Systems    Historical Information   Past Medical History:   Diagnosis Date    A-fib [facility])     Anxiety     Cheilosis     Diverticulosis     1970s    Fracture of iliac wing ([facility] [address] ) 2016    Fracture, ribs     Gallstone     Hiatal hernia     Hyperlipidemia     Hypertension     Hyponatremia     Inguinal hernia     Lacunar infarction (HCC)     Macrocytosis     Macular degeneration     Osteoarthritis     Ovarian cyst     stable    Renal cyst     Rib fractures 2017    Simple renal cyst     Syncope     Tuberculosis 8723    Umbilical hernia     Vertigo      Past Surgical History:   Procedure Laterality Date    ABDOMINAL SURGERY      HIP SURGERY      At  young age 26's   Ginny Lezama KNEE CARTILAGE SURGERY       Social History   Social History     Substance and Sexual Activity   Alcohol Use No     Social History     Substance and Sexual Activity   Drug Use No     E-Cigarette/Vaping    E-Cigarette Use Never User      E-Cigarette/Vaping Substances    Nicotine No     THC No  CBD No     Flavoring No     Other No     Unknown No      Social History     Tobacco Use   Smoking Status Never Smoker   Smokeless Tobacco Never Used     Family History: non-contributory    Meds/Allergies   all current active meds have been reviewed  Allergies   Allergen Reactions    Codeine     Lomotil [Diphenoxylate]     Mirabegron Hypertension    Quinidex [Quinidine]     Adhesive [Medical Tape] Rash    Penicillins Rash       Objective   First Vitals:   Blood Pressure: 137/61 (02/09/21 1750)  Pulse: 58 (02/09/21 1750)  Temperature: 97 6 °F (36 4 °C) (02/09/21 1752)  Temp Source: Oral (02/09/21 1752)  Respirations: 17 (02/09/21 1750)  SpO2: 95 % (02/09/21 1750)    Current Vitals:   Blood Pressure: 128/51 (02/10/21 0650)  Pulse: 66 (02/10/21 0650)  Temperature: 98 5 °F (36 9 °C) (02/10/21 0650)  Temp Source: Tympanic (02/10/21 0650)  Respirations: 20 (02/10/21 0650)  SpO2: 97 % (02/10/21 0650)    No intake or output data in the 24 hours ending 02/10/21 0756    Invasive Devices     Peripheral Intravenous Line            Peripheral IV 02/09/21 Left Forearm less than 1 day                Physical Exam  Vitals signs reviewed  Constitutional:       General: She is not in acute distress  Appearance: She is normal weight  She is not ill-appearing or toxic-appearing  HENT:      Head: Normocephalic and atraumatic  Cardiovascular:      Rate and Rhythm: Normal rate and regular rhythm  Heart sounds: No murmur  No friction rub  Pulmonary:      Effort: Pulmonary effort is normal  No respiratory distress  Breath sounds: Normal breath sounds  No stridor  No wheezing or rhonchi  Abdominal:      General: Abdomen is flat and scaphoid  A surgical scar is present  Bowel sounds are normal  There is no distension  Palpations: Abdomen is soft  There is no shifting dullness, hepatomegaly, splenomegaly or mass  Tenderness: There is right CVA tenderness   Negative signs include White's sign and McBurney's sign  Skin:     General: Skin is warm and dry  Coloration: Skin is not jaundiced  Psychiatric:         Mood and Affect: Mood normal          Behavior: Behavior normal          Thought Content: Thought content normal          Judgment: Judgment normal          Lab Results: I have personally reviewed pertinent lab results  Imaging: I have personally reviewed pertinent reports  and I have personally reviewed pertinent films in PACS  EKG, Pathology, and Other Studies: I have personally reviewed pertinent reports  and I have personally reviewed pertinent films in PACS    Counseling / Coordination of Care  Total floor / unit time spent today 30 minutes  Greater than 50% of total time was spent with the patient and / or family counseling and / or coordination of care    A description of the counseling / coordination of care:D/w medicine and a plan was adopted

## 2021-02-10 NOTE — ASSESSMENT & PLAN NOTE
Patient is currently rate controlled  [id number] score of 4, however patient currently not on anticoagulation  EKG during this admission shows sinus bradycardia  - Continue pre-admission carvedilol and amiodarone

## 2021-02-10 NOTE — ASSESSMENT & PLAN NOTE
History of chronic hyponatremia, with a baseline of 130-135  Current sodium of 130     - Continue sodium tablets 1 g p o  T i d  With meals     - Monitor serum sodium

## 2021-02-10 NOTE — QUICK NOTE
I have spoken with medicine and they have spoken with GI  The patient probably needs an MRCP  The patient is going to be transferred to [facility]    I have spoken with [address] [place] [address] surgery [facility] and they will see her when she gets there and follow

## 2021-02-10 NOTE — ASSESSMENT & PLAN NOTE
Patient is currently rate controlled  [id number] score of 4, however patient currently not on anticoagulation  EKG during this admission shows sinus bradycardia      - Continue pre-admission carvedilol 6 25 mg p o  B i d

## 2021-02-10 NOTE — CONSULTS
Consultation - [facility]  Lalita Royal 80 y o  female MRN: [id number]  Unit/Bed#: [address] [place] [address] A Encounter: [id number]         Reason for Consult / Principal Problem:  Elevated LFTs, positive right upper quadrant ultrasound    HPI:  Justino Melendrez is a 45-year-old female with history of paroxysmal atrial fibrillation not on anticoagulation, mitral valve regurgitation, hypertension, osteoarthritis, and vertigo  Most of patient history was obtained by patient's neice who the patient lives with  She reports that yesterday after eating a piece of toast and a couple of grapes, the patient developed chest pain  At 1st, the patient's niece thought this was secondary to paroxysmal atrial fibrillation history  However, the patient then began pointing to her upper abdomen and told her to call her family doctor  At that time, EMS was contacted and she was transported to [facility]   There, the patient had initially is CTA of the chest that was negative  Her LFTs were mildly elevated with an alkaline phosphatase of 224 and AST of 92  Therefore, a right upper quadrant ultrasound was ordered revealing gallbladder sludge, mild gallbladder wall thickening and possibility of tiny stones within the gallbladder  The CBD measures 7 mm  Today, her white count rusty to 22,000  She is afebrile, but on IV antibiotics  When I saw the patient at the bedside, she is confused but is oriented to person and place  She denied any pain when I spoke to her  Patient's niece does report 1 episode of vomiting when she got to the emergency room  Normally, the patient does not experience any abdominal discomfort according to the patient's niece  However, she has a list of patient's medical problems from another facility marking gallstone history in 2016  Review of Systems:    CONSTITUTIONAL: Denies any fever, chills, or rigors  Good appetite, and no recent weight loss  HEENT: No earache or tinnitus   Denies hearing loss or visual disturbances  CARDIOVASCULAR: No chest pain or palpitations  RESPIRATORY: Denies any cough, hemoptysis, shortness of breath or dyspnea on exertion  GASTROINTESTINAL: As noted in the History of Present Illness  GENITOURINARY: No problems with urination  Denies any hematuria or dysuria  NEUROLOGIC: No dizziness or vertigo, denies headaches  MUSCULOSKELETAL: Denies any muscle or joint pain  SKIN: Denies skin rashes or itching  ENDOCRINE: Denies excessive thirst  Denies intolerance to heat or cold  PSYCHOSOCIAL: Denies depression or anxiety  Denies any recent memory loss         Historical Information   Past Medical History:   Diagnosis Date    A-fib ([facility] )     Anxiety     Cheilosis     Diverticulosis     1970s    Fracture of iliac wing ([facility] ) 2016    Fracture, ribs     Gallstone     Hiatal hernia     Hyperlipidemia     Hypertension     Hyponatremia     Inguinal hernia     Lacunar infarction (HCC)     Macrocytosis     Macular degeneration     Osteoarthritis     Ovarian cyst     stable    Renal cyst     Rib fractures 2017    Simple renal cyst     Syncope     Tuberculosis 7376    Umbilical hernia     Vertigo      Past Surgical History:   Procedure Laterality Date    ABDOMINAL SURGERY      HIP SURGERY      At  young age 26's   [facility] KNEE CARTILAGE SURGERY       Social History   Social History     Substance and Sexual Activity   Alcohol Use No     Social History     Substance and Sexual Activity   Drug Use No     Social History     Tobacco Use   Smoking Status Never Smoker   Smokeless Tobacco Never Used     Family History   Problem Relation Age of Onset    Leukemia Sister     Coronary artery disease Sister     Diabetes Sister     Hypertension Sister     Coronary artery disease Sister     Aortic aneurysm Sister     Coronary artery disease Mother     Coronary artery disease Brother     Substance Abuse Neg Hx     Alcohol abuse Neg Hx     Depression Neg Hx  Mental illness Neg Hx         Meds/Allergies     Current Facility-Administered Medications   Medication Dose Route Frequency    acetaminophen (TYLENOL) tablet 650 mg  650 mg Oral Q6H PRN    amiodarone tablet 100 mg  100 mg Oral BID With Meals    [START ON 2/11/2021] aspirin (ECOTRIN LOW STRENGTH) EC tablet 81 mg  81 mg Oral Daily    carvedilol (COREG) tablet 6 25 mg  6 25 mg Oral BID With Meals    cefTRIAXone (ROCEPHIN) IVPB (premix in dextrose) 1,000 mg 50 mL  1,000 mg Intravenous Q24H    [START ON 2/11/2021] cholecalciferol (VITAMIN D3) tablet 1,000 Units  1,000 Units Oral Daily    [START ON 2/11/2021] cyanocobalamin (VITAMIN B-12) tablet 500 mcg  500 mcg Oral Daily    [START ON 2/11/2021] enoxaparin (LOVENOX) subcutaneous injection 40 mg  40 mg Subcutaneous Daily    hydrALAZINE (APRESOLINE) tablet 25 mg  25 mg Oral BID    [START ON 2/11/2021] losartan (COZAAR) tablet 100 mg  100 mg Oral Daily    metoprolol (LOPRESSOR) injection 2 5 mg  2 5 mg Intravenous Q6H PRN    metroNIDAZOLE (FLAGYL) IVPB (premix) 500 mg 100 mL  500 mg Intravenous Q8H    [START ON 2/11/2021] polyethylene glycol (MIRALAX) packet 17 g  17 g Oral Daily    [START ON 2/11/2021] pravastatin (PRAVACHOL) tablet 10 mg  10 mg Oral Daily With Dinner    sodium chloride tablet 1 g  1 g Oral TID With Meals       Allergies   Allergen Reactions    Codeine     Lomotil [Diphenoxylate]     Mirabegron Hypertension    Quinidex [Quinidine]     Adhesive [Medical Tape] Rash    Penicillins Rash         Objective     Blood pressure 169/62, pulse 65, temperature 98 °F (36 7 °C), SpO2 95 %  No intake or output data in the 24 hours ending 02/10/21 1308      PHYSICAL EXAM:      General Appearance:   Alert and oriented to person and place   Cooperative, and in no respiratory distress   HEENT:   Normocephalic, atraumatic, anicteric      Neck:  Supple, symmetrical, trachea midline   Lungs:   Clear to auscultation bilaterally; no rales, rhonchi or wheezing; respirations unlabored    Heart[de-identified]   S1 and S2 normal; regular rate and rhythm; no murmur, rub, or gallop  Abdomen:   Soft, non-tender, non-distended; normal bowel sounds; no masses, no organomegaly    Genitalia:   Deferred    Rectal:   Deferred    Extremities:  No cyanosis, clubbing or edema    Pulses:  2+ and symmetric all extremities    Skin:  Skin color, texture, turgor normal, no rashes or lesions    Lymph nodes:  No palpable cervical or supraclavicular lymphadenopathy        Lab Results:   Results from last 7 days   Lab Units 02/10/21  0705  02/09/21  1814   WBC Thousand/uL 22 94*  --  10 22*   HEMOGLOBIN g/dL 12 8  --  13 4   HEMATOCRIT % 37 7  --  39 5   PLATELETS Thousands/uL 256   < > 281   NEUTROS PCT %  --   --  78*   LYMPHS PCT %  --   --  11*   LYMPHO PCT % 1*  --   --    MONOS PCT %  --   --  9   MONO PCT % 6  --   --    EOS PCT % 0  --  2    < > = values in this interval not displayed  Results from last 7 days   Lab Units 02/09/21  1814   POTASSIUM mmol/L 3 9   CHLORIDE mmol/L 99   CO2 mmol/L 24   BUN mg/dL 11   CREATININE mg/dL 0 52   CALCIUM mg/dL 8 5   ALK PHOS U/L 224 2*   ALT U/L 49   AST U/L 92*         Results from last 7 days   Lab Units 02/09/21  1814   LIPASE u/L 54       Imaging Studies: I have personally reviewed pertinent imaging studies  No results found  ASSESSMENT and PLAN:      1) Leukocytosis, mildly elevated LFTs and reported upper abdominal pain with gallstones on imaging - Unclear if pain secondary to passed gallstone  CBD measuring 7 mm, which is WNL for patient's age  On exam, her abdomen is benign  Patient's niece tells me that she has a history of gallstones in the past however has never had symptoms secondary to them    - We will attempt MRCP to rule out choledocholithiasis   - Continue to monitor LFTs closely  - Continue IV antibiotics as you are doing  - Discussed case with the surgery team, appreciate their recs        The patient was seen and examined by Dr River Watson, all khan medical decisions were made with Dr River Watson  Thank you for allowing us to participate in the care of this pleasant patient  We will follow up with you closely

## 2021-02-10 NOTE — TELEPHONE ENCOUNTER
I reviewed her chart, surgery was recommended due to ongoing symptoms  Did you have specific questions?   You can also ask the hospitalist in charge of her, they may be able to answer your questions better

## 2021-02-10 NOTE — TELEPHONE ENCOUNTER
Harriett calling to notify Dr Silvia Carter of patient being admitted to hospital and might need gallbladder surgery  She would like to speak with  about this

## 2021-02-10 NOTE — PROGRESS NOTES
Progress Note - General Surgery   Junious Rust 80 y o  female MRN: [id number]  Unit/Bed#: 2 [place] [address] A Encounter: [id number]    Assessment:  81 yo female with history of rate controlled atrial fibrillation not on anticoagulation, HTN, HLD, hiatal hernia, cholelithiasis and ?lacunar infarct, who presents with elevated alk phos and increasing leukocytosis  Most of history obtained via niece, Jytoi Huddle  Over the last 48 hours patient began to complain of upper chest pain that then changed to upper abdominal pain  Patient asked her niece to consider calling the doctor for her and then patient was transported by EMS to [facility]   Patient had one episode of vomiting at the ED per niece  CTA chest negative and troponins normal  Her alkaline phosphatase was elevated to 224 and AST 92 with WBC count 10  RUQ ultrasound revealed mildly thickened gallbladder wall with sludge and CBD measuring 7mm  This morning patient is WBC count was 22 and LFTs are worsening  Abdominal exam is completely benign upon my exam  COVID negative      Plan:  · Discussed with GI team  · Follow up MRCP and UA  · NPO for now  · Rocephin/flagyl day 1  · will continue to follow        Subjective/Objective     Subjective:  Patient denies any nausea, vomiting, abdominal pain, fevers, rigors  Had some minor suprapubic tenderness at one point    Objective:     Blood pressure (!) 138/109, pulse 64, temperature 98 7 °F (37 1 °C), SpO2 95 %  ,There is no height or weight on file to calculate BMI      No intake or output data in the 24 hours ending 02/10/21 1431    Invasive Devices     Peripheral Intravenous Line            Peripheral IV 02/09/21 Left Forearm less than 1 day                Physical Exam: BP (!) 138/109   Pulse 64   Temp 98 7 °F (37 1 °C)   SpO2 95%   General appearance: speech is tangential, alert to person and place but not time  Head: Normocephalic, without obvious abnormality, atraumatic  Lungs: clear to auscultation bilaterally  Heart: regular rate and rhythm, holosystolic murmur appreciated  Abdomen: soft, non-tender; bowel sounds normal; no masses,  no organomegaly, infraumbilical laparotomy scar appreciated  Extremities: extremities normal, warm and well-perfused; no cyanosis, clubbing, or edema  Skin: Skin color, texture, turgor normal  No rashes or lesions    Lab, Imaging and other studies:  I have personally reviewed pertinent lab results    , CBC:   Lab Results   Component Value Date    WBC 22 94 (H) 02/10/2021    HGB 12 8 02/10/2021    HCT 37 7 02/10/2021     (H) 02/10/2021     02/10/2021    MCH 34 3 02/10/2021    MCHC 34 0 02/10/2021    RDW 13 3 02/10/2021    MPV 8 9 02/10/2021   , CMP:   Lab Results   Component Value Date    SODIUM 133 (L) 02/10/2021    K 4 4 02/10/2021     02/10/2021    CO2 25 02/10/2021    BUN 9 02/10/2021    CREATININE 0 72 02/10/2021    CALCIUM 8 6 02/10/2021    AST 85 (H) 02/10/2021     (H) 02/10/2021    ALKPHOS 270 (H) 02/10/2021    EGFR 72 02/10/2021   , Coagulation: No results found for: PT, INR, APTT  VTE Pharmacologic Prophylaxis: Enoxaparin (Lovenox) SQ  VTE Mechanical Prophylaxis: sequential compression device

## 2021-02-10 NOTE — PLAN OF CARE
Problem: Potential for Falls  Goal: Patient will remain free of falls  Description: INTERVENTIONS:  - Assess patient frequently for physical needs  -  Identify cognitive and physical deficits and behaviors that affect risk of falls    -  [place] fall precautions as indicated by assessment   - Educate patient/family on patient safety including physical limitations  - Instruct patient to call for assistance with activity based on assessment  - Modify environment to reduce risk of injury  - Consider OT/PT consult to assist with strengthening/mobility  2/10/2021 1457 by Almita Hughes RN  Outcome: Kimberlyfede Bentonwer  2/10/2021 1251 by Almita Hughes RN  Outcome: Progressing     Problem: PAIN - ADULT  Goal: Verbalizes/displays adequate comfort level or baseline comfort level  Description: Interventions:  - Encourage patient to monitor pain and request assistance  - Assess pain using appropriate pain scale  - Administer analgesics based on type and severity of pain and evaluate response  - Implement non-pharmacological measures as appropriate and evaluate response  - Consider cultural and social influences on pain and pain management  - Notify physician/advanced practitioner if interventions unsuccessful or patient reports new pain  2/10/2021 1457 by Almita Hughes RN  Outcome: Progressing  2/10/2021 1251 by Almita Hughes RN  Outcome: Progressing     Problem: Prexisting or High Potential for Compromised Skin Integrity  Goal: Skin integrity is maintained or improved  Description: INTERVENTIONS:  - Identify patients at risk for skin breakdown  - Assess and monitor skin integrity  - Assess and monitor nutrition and hydration status  - Monitor labs   - Assess for incontinence   - Turn and reposition patient  - Assist with mobility/ambulation  - Relieve pressure over bony prominences  - Avoid friction and shearing  - Provide appropriate hygiene as needed including keeping skin clean and dry  - Evaluate need for skin moisturizer/barrier cream  - Collaborate with interdisciplinary team   - Patient/family teaching  - Consider wound care consult   Outcome: Progressing

## 2021-02-10 NOTE — H&P
H&P- Mago Brood 10/24/1927, 80 y o  female MRN: [id number]    Unit/Bed#: ED 16 Encounter: [id number]    Primary Care Provider: Gabriel Corado MD   Date and time admitted to hospital: 2/9/2021  5:49 PM        * Chest pain  Assessment & Plan  Differential diagnosis for chest pain includes acute coronary syndrome and hepatobiliary etiology     - RUQ ultrasound shows gallbladder sludge and possible tiny stones with evidence of gallbladder wall thickening   - Will provide IV antibiotic coverage with ceftriaxone and metronidazole  - Continue to trend troponins for a total of 3  Paroxysmal atrial fibrillation [facility])  Assessment & Plan  Patient is currently rate controlled  [id number] score of 4, however patient currently not on anticoagulation  EKG during this admission shows sinus bradycardia  - Continue pre-admission carvedilol 6 25 mg p o  B i d       Essential hypertension  Assessment & Plan  - Continue pre-admission antihypertensive medications  Hyponatremia  Assessment & Plan  History of chronic hyponatremia, with a baseline of 130-135  Current sodium of 130     - Continue sodium tablets 1 g p o  T i d  With meals     - Monitor serum sodium  Other hyperlipidemia  Assessment & Plan  - Continue pre-admission statin  VTE Prophylaxis: Enoxaparin (Lovenox)  / sequential compression device   Code Status:  DNR/DNI  POLST: POLST form is not discussed and not completed at this time  Discussion with family:  Daughter present at the bedside at the time of initial encounter  Anticipated Length of Stay:  Patient will be admitted on an Observation basis with an anticipated length of stay of   2 midnights  Justification for Hospital Stay:  Evaluation of chest pain  Total Time for Visit, including Counseling / Coordination of Care: 45 minutes  Greater than 50% of this total time spent on direct patient counseling and coordination of care      Chief Complaint:   Chest pain     History of Present Illness:    Allie Howell is a 80 y o  female who presents with acute onset of sharp chest pain, which was initially indicated to be located at the lower midsternal and epigastric areas, however on presentation, patient reported pain located at the right side just below the breast   No aggravating or alleviating factors noted  Aside from some nausea reported after administration of nitroglycerin by EMS, patient denies any palpitations, shortness of breath, vomiting or changes in bowel or bladder function  Patient has a medical history significant for atrial fibrillation, hypertension, hyperlipidemia, chronic hyponatremia, lumbar compression fractures, and chronic constipation  Preliminary workup on initial presentation including chest x-ray which showed no acute cardiopulmonary disease  Troponin x1 was negative  D-dimer however was elevated and a subsequent CTA PE revealed no evidence of pulmonary embolism  EKG showed no acute ST-T wave changes  Review of patient's serum chemistries however revealed elevated AST as well as alkaline phosphatase  CBC revealed a mild leukocytosis  Physical exam however was unremarkable for any abdominal pain and White sign was absent  Chest pain resolved at the time of encounter by SLIM  The patient however was noted to have a heart score of 4, and evidence of hepatic biliary obstruction, and was subsequently admitted internal medicine service for further evaluation of chest pain and work-up for hepatic biliary obstruction  Review of Systems:    Review of Systems   Constitutional: Negative for appetite change, chills and fever  HENT: Negative for ear pain and sore throat  Eyes: Negative for pain and visual disturbance  Respiratory: Negative for cough and shortness of breath  Cardiovascular: Positive for chest pain  Negative for palpitations  Reports of right-sided infra-mammary discomfort     Gastrointestinal: Positive for abdominal pain and nausea  Negative for diarrhea and vomiting  Reports of epigastric discomfort  Genitourinary: Negative for dysuria and hematuria  Musculoskeletal: Negative for arthralgias and myalgias  Skin: Negative for color change and rash  Neurological: Negative for seizures and syncope  Psychiatric/Behavioral: Negative for agitation and behavioral problems  All other systems reviewed and are negative  Past Medical and Surgical History:     Past Medical History:   Diagnosis Date    A-fib [facility])     Anxiety     Cheilosis     Diverticulosis     1970s    Fracture of iliac wing ([address] ) 2016    Fracture, ribs     Gallstone     Hiatal hernia     Hyperlipidemia     Hypertension     Hyponatremia     Inguinal hernia     Lacunar infarction (HCC)     Macrocytosis     Macular degeneration     Osteoarthritis     Ovarian cyst     stable    Renal cyst     Rib fractures 2017    Simple renal cyst     Syncope     Tuberculosis 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  Authorizing Provider   vitamin B-12 (VITAMIN B-12) 500 mcg tablet Take 500 mcg by mouth daily   Yes Historical Provider, MD   acetaminophen (TYLENOL) 325 mg tablet TAKE 3 TABLETS(975MG TOTAL) BY MOUTH EVERY 8 HOURS  Patient taking differently: Take by mouth as needed  7/21/20   Brittani Madrid MD   aspirin 81 MG tablet Take 81 mg by mouth daily    Historical Provider, MD   carvedilol (COREG) 6 25 mg tablet Take 1 tablet (6 25 mg total) by mouth 2 (two) times a day with meals 4/30/20   Brittani Madrid MD   Cholecalciferol (VITAMIN D3) 1000 units CAPS Take by mouth    Historical Provider, MD   Coenzyme Q10 (COQ-10) 50 MG CAPS Take by mouth daily    Historical Provider, MD   hydrALAZINE (APRESOLINE) 25 mg tablet Take 1 tablet (25 mg total) by mouth 2 (two) times a day 9/14/20   Nohemi Miles MD   irbesartan (AVAPRO) 300 mg tablet Take 150 mg by mouth 2 (two) times a day     Historical Provider, MD   meclizine (ANTIVERT) 25 mg tablet Take 1 tablet (25 mg total) by mouth 3 (three) times a day as needed for dizziness 9/11/20   Nohemi Miles MD   Multiple Vitamins-Minerals (ICAPS AREDS 2 PO) Take by mouth 2 (two) times a day    Historical Provider, MD   multivitamin (THERAGRAN) TABS Take 1 tablet by mouth daily    Historical Provider, MD   nystatin-triamcinolone (MYCOLOG-II) cream Apply topically 2 (two) times a day as needed (rash) 8/27/20   Nohemi Miles MD   PACERONE 100 MG tablet Take 100 mg by mouth 2 (two) times a day  5/14/20   Historical Provider, MD   polyethylene glycol (MIRALAX) 17 g packet Take 17 g by mouth daily  Patient taking differently: Take 8 5 g by mouth daily  7/20/19   Suri Sharpe MD   pravastatin (PRAVACHOL) 10 mg tablet Take 1 tablet (10 mg total) by mouth daily 11/11/20   Nohemi Miles MD   sodium chloride 1 g tablet Take 1 tablet (1 g total) by mouth 3 (three) times a day with meals 9/14/20   Nohemi Miles MD     I have reviewed home medications using allscripts  Allergies: Allergies   Allergen Reactions    Codeine     Lomotil [Diphenoxylate]     Mirabegron Hypertension    Quinidex [Quinidine]     Adhesive [Medical Tape] Rash    Penicillins Rash       Social History:     Marital Status:    Occupation: N/a  Patient Pre-hospital Living Situation: Home  Lives with daughter    Patient Pre-hospital Level of Mobility:   Patient Pre-hospital Diet Restrictions:   Substance Use History:   Social History     Substance and Sexual Activity   Alcohol Use No     Social History     Tobacco Use   Smoking Status Never Smoker   Smokeless Tobacco Never Used     Social History     Substance and Sexual Activity Drug Use No       Family History:    Family History   Problem Relation Age of Onset    Leukemia Sister     Coronary artery disease Sister     Diabetes Sister     Hypertension Sister     Coronary artery disease Sister     Aortic aneurysm Sister     Coronary artery disease Mother     Coronary artery disease Brother     Substance Abuse Neg Hx     Alcohol abuse Neg Hx     Depression Neg Hx     Mental illness Neg Hx        Physical Exam:     Vitals:   Blood Pressure: (!) 191/78 (02/09/21 2213)  Pulse: 63 (02/09/21 2030)  Temperature: 97 6 °F (36 4 °C) (02/09/21 1752)  Temp Source: Oral (02/09/21 1752)  Respirations: 16 (02/09/21 2035)  SpO2: 98 % (02/09/21 2030)    Physical Exam  Vitals signs and nursing note reviewed  Constitutional:       General: She is not in acute distress  Appearance: She is well-developed  She is obese  HENT:      Head: Normocephalic and atraumatic  Mouth/Throat:      Mouth: Mucous membranes are moist    Eyes:      Conjunctiva/sclera: Conjunctivae normal    Neck:      Musculoskeletal: Neck supple  Cardiovascular:      Rate and Rhythm: Regular rhythm  Bradycardia present  Heart sounds: No murmur  Pulmonary:      Effort: Pulmonary effort is normal  No respiratory distress  Breath sounds: Normal breath sounds  Chest:      Chest wall: No tenderness  Abdominal:      General: There is no distension  Palpations: Abdomen is soft  There is no mass  Tenderness: There is no abdominal tenderness  There is no guarding or rebound  Comments: White's sign negative  Musculoskeletal:      Right lower leg: No edema  Left lower leg: No edema  Skin:     General: Skin is warm and dry  Neurological:      General: No focal deficit present  Mental Status: She is alert  Psychiatric:         Mood and Affect: Mood normal          Behavior: Behavior normal            Additional Data:     Lab Results: I have personally reviewed pertinent reports  Results from last 7 days   Lab Units 02/09/21  2218 02/09/21  1814   WBC Thousand/uL  --  10 22*   HEMOGLOBIN g/dL  --  13 4   HEMATOCRIT %  --  39 5   PLATELETS Thousands/uL 263 281   NEUTROS PCT %  --  78*   LYMPHS PCT %  --  11*   MONOS PCT %  --  9   EOS PCT %  --  2     Results from last 7 days   Lab Units 02/09/21  1814   SODIUM mmol/L 130*   POTASSIUM mmol/L 3 9   CHLORIDE mmol/L 99   CO2 mmol/L 24   BUN mg/dL 11   CREATININE mg/dL 0 52   ANION GAP mmol/L 7   CALCIUM mg/dL 8 5   ALBUMIN g/dL 3 5   TOTAL BILIRUBIN mg/dL 0 97   ALK PHOS U/L 224 2*   ALT U/L 49   AST U/L 92*   GLUCOSE RANDOM mg/dL 138                       Imaging: I have personally reviewed pertinent reports  US gallbladder   Final Result by Gurdeep Belle MD (02/09 2204)      Gallbladder sludge and possible tiny stones with evidence of gallbladder wall thickening though absent White's sign  If there is persistent concern for acute cholecystitis, HIDA scan may be helpful  Workstation performed: [id number]         CTA ED chest PE study   Final Result by Estiven Mario MD (02/09 2026)      No acute pulmonary embolism  Cardiomegaly  Enlarged main pulmonary artery suggesting pulmonary hypertension  Workstation performed: [id number]         XR chest 1 view portable   Final Result by Kolby Wagoner DO (02/09 1922)      No acute cardiopulmonary disease  Workstation performed: [id number]             EKG, Pathology, and Other Studies Reviewed on Admission:   · EKG: Sinus bradycardia  No acute ST-T wave changes  Allscripts / Epic Records Reviewed: Yes     ** Please Note: This note has been constructed using a voice recognition system   **

## 2021-02-10 NOTE — PLAN OF CARE
Problem: Potential for Falls  Goal: Patient will remain free of falls  Description: INTERVENTIONS:  - Assess patient frequently for physical needs  -  Identify cognitive and physical deficits and behaviors that affect risk of falls    -  [place] fall precautions as indicated by assessment   - Educate patient/family on patient safety including physical limitations  - Instruct patient to call for assistance with activity based on assessment  - Modify environment to reduce risk of injury  - Consider OT/PT consult to assist with strengthening/mobility  Outcome: Progressing     Problem: Prexisting or High Potential for Compromised Skin Integrity  Goal: Skin integrity is maintained or improved  Description: INTERVENTIONS:  - Identify patients at risk for skin breakdown  - Assess and monitor skin integrity  - Assess and monitor nutrition and hydration status  - Monitor labs   - Assess for incontinence   - Turn and reposition patient  - Assist with mobility/ambulation  - Relieve pressure over bony prominences  - Avoid friction and shearing  - Provide appropriate hygiene as needed including keeping skin clean and dry  - Evaluate need for skin moisturizer/barrier cream  - Collaborate with interdisciplinary team   - Patient/family teaching  - Consider wound care consult   Outcome: Progressing     Problem: CARDIOVASCULAR - ADULT  Goal: Maintains optimal cardiac output and hemodynamic stability  Description: INTERVENTIONS:  - Monitor I/O, vital signs and rhythm  - Monitor for S/S and trends of decreased cardiac output  - Administer and titrate ordered vasoactive medications to optimize hemodynamic stability  - Assess quality of pulses, skin color and temperature  - Assess for signs of decreased coronary artery perfusion  - Instruct patient to report change in severity of symptoms  Outcome: Progressing  Goal: Absence of cardiac dysrhythmias or at baseline rhythm  Description: INTERVENTIONS:  - Continuous cardiac monitoring, vital signs, obtain 12 lead EKG if ordered  - Administer antiarrhythmic and heart rate control medications as ordered  - Monitor electrolytes and administer replacement therapy as ordered  Outcome: Progressing

## 2021-02-10 NOTE — ED NOTES
This RN contacted admitting team regarding patients fever of 101 5, BP of 226/94 and increased respiratory rate of 25  Residents made aware, they will come down to see patient        Andrew Vargas RN  02/10/21 2528

## 2021-02-10 NOTE — PLAN OF CARE
Problem: Potential for Falls  Goal: Patient will remain free of falls  Description: INTERVENTIONS:  - Assess patient frequently for physical needs  -  Identify cognitive and physical deficits and behaviors that affect risk of falls    -  [place] fall precautions as indicated by assessment   - Educate patient/family on patient safety including physical limitations  - Instruct patient to call for assistance with activity based on assessment  - Modify environment to reduce risk of injury  - Consider OT/PT consult to assist with strengthening/mobility  Outcome: Progressing     Problem: PAIN - ADULT  Goal: Verbalizes/displays adequate comfort level or baseline comfort level  Description: Interventions:  - Encourage patient to monitor pain and request assistance  - Assess pain using appropriate pain scale  - Administer analgesics based on type and severity of pain and evaluate response  - Implement non-pharmacological measures as appropriate and evaluate response  - Consider cultural and social influences on pain and pain management  - Notify physician/advanced practitioner if interventions unsuccessful or patient reports new pain  Outcome: Progressing

## 2021-02-10 NOTE — PROGRESS NOTES
Patient was a ED admit that was to be transferred out to [facility]  transfer order put in at 2:30am  Patient admitted at 6:15 and to be transferred  time 9am  Focused assessment completed  Patient placed on tele

## 2021-02-10 NOTE — UTILIZATION REVIEW
Initial Clinical Review    Admission: Date/Time/Statement:   Admission Orders (From admission, onward)     Ordered        02/09/21 2053  Place in Observation  Once         Signed and Held  Place in Observation  Once                   Orders Placed This Encounter   Procedures    Place in Observation     Standing Status:   Standing     Number of Occurrences:   1     Order Specific Question:   Level of Care     Answer:   Med Surg [16]     Order Specific Question:   Bed request comments     Answer:   obs tele     ED Arrival Information     Expected Arrival 70 [address] Theresa Riggs of Arrival Escorted By Service Admission Type    - 2/9/2021 17:45 Urgent Ambulance [facility] [address] [facility] [address] Urgent    Arrival Complaint    chest pain, abdominal pain        Chief Complaint   Patient presents with    Abdominal Pain     pt c/o abdominal pain starting around 4pm  family also c/o htn  324asa, 1 nitro given by ems     Assessment/Plan: 79 yo female to ED by ems admitted observation d/t  * Chest pain  Assessment & Plan  Differential diagnosis for chest pain includes acute coronary syndrome and hepatobiliary etiology      - RUQ ultrasound shows gallbladder sludge and possible tiny stones with evidence of gallbladder wall thickening   - Will provide IV antibiotic coverage with ceftriaxone and metronidazole  - Continue to trend troponins for a total of 3         Paroxysmal atrial fibrillation [facility])  Assessment & Plan  Patient is currently rate controlled  [id number] score of 4, however patient currently not on anticoagulation  EKG during this admission shows sinus bradycardia      - Continue pre-admission carvedilol 6 25 mg p o  B i d         Essential hypertension  Assessment & Plan  - Continue pre-admission antihypertensive medications      Hyponatremia  Assessment & Plan  History of chronic hyponatremia, with a baseline of 130-135  Current sodium of 130      - Continue sodium tablets 1 g p o  T i d   With meals     - Monitor serum sodium      Other hyperlipidemia  Assessment & Plan  - Continue pre-admission statin         Anticipated Length of Stay:  Patient will be admitted on an Observation basis with an anticipated length of stay of   2 midnights  Justification for Hospital Stay:  Evaluation of chest pain    2/10:Consultation - General Surgery   Doyle Chu 80 y o  female MRN: [id number]  Unit/Bed#: -01 Encounter: [id number]        Assessment/Plan     No evidence of acute cholecystitis  However, having passed a stone is possible/probable  I am worried about cholangitis although her VS are stable at present  I suggest GI consultation  An MRCP if possible would rule out cbd stones  ERCP may be needed     Assessment:  Atypical chest pain with elevated WBC and alk phos  Plan:  I would prefer a GI evaluation and MRCP  Continue wide spectrum antibiotics    Note:  GB wall thickening is present and a 7mm cbd is enlarged; I think she passed a stone       ED Triage Vitals   Temperature Pulse Respirations Blood Pressure SpO2   02/09/21 1752 02/09/21 1750 02/09/21 1750 02/09/21 1750 02/09/21 1750   97 6 °F (36 4 °C) 58 17 137/61 95 %      Temp Source Heart Rate Source Patient Position - Orthostatic VS BP Location FiO2 (%)   02/09/21 1752 02/09/21 1750 02/09/21 1750 02/09/21 1750 --   Oral Monitor Lying Right arm       Pain Score       02/09/21 1750       Worst Possible Pain          Wt Readings from Last 1 Encounters:   06/03/20 51 7 kg (113 lb 15 7 oz)     Additional Vital Signs:   02/10/21 0650  98 5 °F (36 9 °C)  66  20  128/51  81  97 %  None (Room air)  Lying   02/10/21 0330  99 1 °F (37 3 °C)  71  22  112/41Abnormal   --  95 %  None (Room air)  Lying   02/10/21 0205  --  68  28Abnormal   153/49Abnormal   --  94 %  None (Room air)  Lying   02/10/21 0130  101 5 °F (38 6 °C)Abnormal   88  25Abnormal   226/94Abnormal   --  96 %  None (Room air)  Lying   02/09/21 2320  --  59  19  165/55  --  97 %  None (Room air)  Lying 02/09/21 2213  --  --  --  191/78Abnormal   --  --  --  --   02/09/21 2035  --  --  16  --  --  --  --  Lying   02/09/21 2030  --  63  20  175/75Abnormal   --  98 %  None (Room air)  Lying   02/09/21 1752  97 6 °F (36 4 °C)  --  --  --  --  --  --  --   02/09/21 1750  --  58  17  137/61  --  95 %  None (Room air)  Lying       Pertinent Labs/Diagnostic Test Results:   Results from last 7 days   Lab Units 02/10/21  0350   SARS-COV-2  Negative     Results from last 7 days   Lab Units 02/10/21  0705 02/09/21 2218 02/09/21  1814   WBC Thousand/uL 22 94*  --  10 22*   HEMOGLOBIN g/dL 12 8  --  13 4   HEMATOCRIT % 37 7  --  39 5   PLATELETS Thousands/uL 256 263 281   NEUTROS ABS Thousands/µL  --   --  7 81*         Results from last 7 days   Lab Units 02/09/21  1814   SODIUM mmol/L 130*   POTASSIUM mmol/L 3 9   CHLORIDE mmol/L 99   CO2 mmol/L 24   ANION GAP mmol/L 7   BUN mg/dL 11   CREATININE mg/dL 0 52   EGFR ml/min/1 73sq m 83   CALCIUM mg/dL 8 5     Results from last 7 days   Lab Units 02/09/21  1814   AST U/L 92*   ALT U/L 49   ALK PHOS U/L 224 2*   TOTAL PROTEIN g/dL 6 2*   ALBUMIN g/dL 3 5   TOTAL BILIRUBIN mg/dL 0 97         Results from last 7 days   Lab Units 02/09/21  1814   GLUCOSE RANDOM mg/dL 138       Results from last 7 days   Lab Units 02/10/21  0116 02/09/21 2218 02/09/21  1814   TROPONIN I ng/mL <0 03 <0 03 <0 03     Results from last 7 days   Lab Units 02/09/21  1814   D-DIMER QUANTITATIVE mg/L FEU 1 14*       Results from last 7 days   Lab Units 02/10/21  0156   LACTIC ACID mmol/L 1 4       Results from last 7 days   Lab Units 02/09/21  1814   LIPASE u/L 54       Results from last 7 days   Lab Units 02/10/21  0350   INFLUENZA A PCR  Negative   INFLUENZA B PCR  Negative   RSV PCR  Negative     2/9 u/s gallbladder:Gallbladder sludge and possible tiny stones with evidence of gallbladder wall thickening though absent White's sign    If there is persistent concern for acute cholecystitis, HIDA scan may be helpful  2/9 CTA chest:No acute pulmonary embolism  Cardiomegaly  Enlarged main pulmonary artery suggesting pulmonary hypertension  2/9 CXR:No acute cardiopulmonary disease  2/10 ekg:Sinus rhythm  Left bundle branch block     Confirmed by Magan Caballero ([id number]) on 2/10/2021 4:53:07 AM    2/9 ekg:Normal sinus rhythm  Prolonged W[place] interval  Nonspecific ST-t wave changes     Confirmed by Magan Caballero ([id number]) on 2/10/2021 4:53:34 AM    2/9 ekg: Indications / Diagnosis:  Chest pain   ECG reviewed by me, the ED Provider: yes     Patient location:  ED   Previous ECG:     Comparison to cardiac monitor:  Yes     Interpretation:     Interpretation: normal     Rate:     ECG rate:  54     ECG rate assessment: bradycardic     Rhythm:     Rhythm: sinus bradycardia     Comments:      No acute ischemic changes        ED Treatment:   Medication Administration from 02/09/2021 1745 to 02/10/2021 0620       Date/Time Order Dose Route Action Action by Comments     02/10/2021 0346 sodium chloride 0 9 % infusion 100 mL/hr Intravenous Rate/Dose Change       02/09/2021 1846 sodium chloride 0 9 % infusion 250 mL/hr Intravenous New Bag       02/09/2021 1935 ondansetron (ZOFRAN) injection 4 mg 4 mg Intravenous Given                  02/09/2021 2213 hydrALAZINE (APRESOLINE) tablet 25 mg 25 mg Oral Given                  02/09/2021 2320 cefTRIAXone (ROCEPHIN) IVPB (premix in dextrose) 1,000 mg 50 mL 1,000 mg Intravenous New Bag                  02/10/2021 0003 metroNIDAZOLE (FLAGYL) IVPB (premix) 500 mg 100 mL 500 mg Intravenous New Bag       02/10/2021 0159 metoprolol (LOPRESSOR) injection 2 5 mg 2 5 mg Intravenous Given       02/10/2021 0159 acetaminophen (TYLENOL) tablet 650 mg 650 mg Oral Given          Past Medical History:   Diagnosis Date    A-fib ([facility] )     Anxiety     Cheilosis     Diverticulosis     1970s    Fracture of iliac wing ([facility] ) 2016    Fracture, ribs     Gallstone     Hiatal hernia     Hyperlipidemia  Hypertension     Hyponatremia     Inguinal hernia     Lacunar infarction (HCC)     Macrocytosis     Macular degeneration     Osteoarthritis     Ovarian cyst     stable    Renal cyst     Rib fractures 2017    Simple renal cyst     Syncope     Tuberculosis 7340    Umbilical hernia     Vertigo      Present on Admission:   Paroxysmal atrial fibrillation (HCC)   Essential hypertension   Other hyperlipidemia      Admitting Diagnosis: Epigastric pain [R10 13]  Abdominal pain [R10 9]  Chest pain, unspecified type [R07 9]  Age/Sex: 80 y o  female  Admission Orders:  Scheduled Medications:  amiodarone, 100 mg, Oral, BID With Meals  aspirin, 81 mg, Oral, Daily  carvedilol, 6 25 mg, Oral, BID With Meals  cefTRIAXone, 1,000 mg, Intravenous, Q24H  cholecalciferol, 1,000 Units, Oral, Daily  vitamin B-12, 500 mcg, Oral, Daily  enoxaparin, 40 mg, Subcutaneous, Daily  hydrALAZINE, 25 mg, Oral, BID  losartan, 100 mg, Oral, Daily  metroNIDAZOLE, 500 mg, Intravenous, Q8H  polyethylene glycol, 17 g, Oral, Daily  pravastatin, 10 mg, Oral, Daily  sodium chloride, 1 g, Oral, TID With Meals      Continuous IV Infusions:  sodium chloride, 100 mL/hr, Intravenous, Continuous      PRN Meds:  acetaminophen, 650 mg, Oral, Q6H PRN 2/10 x 1  metoprolol, 2 5 mg, Intravenous, Q6H PRN 2/10 x 1    scd  Npo  oob  Tele      IP CONSULT TO ACUTE CARE SURGERY  IP CONSULT TO GASTROENTEROLOGY    Network Utilization Review Department  ATTENTION: Please call with any questions or concerns to [phone] and carefully listen to the prompts so that you are directed to the right person  All voicemails are confidential   Randy Feliciano all requests for admission clinical reviews, approved or denied determinations and any other requests to dedicated fax number below belonging to the campus where the patient is receiving treatment   List of dedicated fax numbers for the Facilities:  FACILITY NAME UR FAX NUMBER   ADMISSION DENIALS (Administrative/Medical [facility]) [phone]   [address] (Maternity/NICU/Pediatrics) 261 [facility] 40 125 [facility]  [phone]   Nini Watkins 7068 (Jovanny Gatica "Carrie" 103) [address] [address] Latesha Chacko 1481 [phone]   Delon Molina [address] [place] [address] 32 [phone]

## 2021-02-10 NOTE — ASSESSMENT & PLAN NOTE
Differential diagnosis for chest pain includes acute coronary syndrome and hepatobiliary etiology      - RUQ ultrasound shows gallbladder sludge and possible tiny stones with evidence of gallbladder wall thickening   - Will obtain GI consultation   - Continue to trend troponins for a total of 3   - Will continue telemetry

## 2021-02-10 NOTE — PLAN OF CARE
Problem: Potential for Falls  Goal: Patient will remain free of falls  Description: INTERVENTIONS:  - Assess patient frequently for physical needs  -  Identify cognitive and physical deficits and behaviors that affect risk of falls    -  [place] fall precautions as indicated by assessment   - Educate patient/family on patient safety including physical limitations  - Instruct patient to call for assistance with activity based on assessment  - Modify environment to reduce risk of injury  - Consider OT/PT consult to assist with strengthening/mobility  2/10/2021 0828 by Kati Martell RN  Outcome: Progressing  2/10/2021 0826 by Kati Martell RN  Outcome: Progressing     Problem: CARDIOVASCULAR - ADULT  Goal: Maintains optimal cardiac output and hemodynamic stability  Description: INTERVENTIONS:  - Monitor I/O, vital signs and rhythm  - Monitor for S/S and trends of decreased cardiac output  - Administer and titrate ordered vasoactive medications to optimize hemodynamic stability  - Assess quality of pulses, skin color and temperature  - Assess for signs of decreased coronary artery perfusion  - Instruct patient to report change in severity of symptoms  2/10/2021 0828 by Kati Martell RN  Outcome: Progressing  2/10/2021 0826 by Kati Martell RN  Outcome: Progressing  Goal: Absence of cardiac dysrhythmias or at baseline rhythm  Description: INTERVENTIONS:  - Continuous cardiac monitoring, vital signs, obtain 12 lead EKG if ordered  - Administer antiarrhythmic and heart rate control medications as ordered  - Monitor electrolytes and administer replacement therapy as ordered  2/10/2021 0828 by Kati Martell RN  Outcome: Progressing  2/10/2021 0826 by Kati Martell RN  Outcome: Progressing     Problem: Prexisting or High Potential for Compromised Skin Integrity  Goal: Skin integrity is maintained or improved  Description: INTERVENTIONS:  - Identify patients at risk for skin breakdown  - Assess and monitor skin integrity  - Assess and monitor nutrition and hydration status  - Monitor labs   - Assess for incontinence   - Turn and reposition patient  - Assist with mobility/ambulation  - Relieve pressure over bony prominences  - Avoid friction and shearing  - Provide appropriate hygiene as needed including keeping skin clean and dry  - Evaluate need for skin moisturizer/barrier cream  - Collaborate with interdisciplinary team   - Patient/family teaching  - Consider wound care consult   2/10/2021 0828 by Komal Spann RN  Outcome: Progressing  2/10/2021 0826 by Komal Spann RN  Outcome: Progressing

## 2021-02-11 ENCOUNTER — APPOINTMENT (INPATIENT)
Dept: RADIOLOGY | Facility: HOSPITAL | Age: 86
DRG: 445 | End: 2021-02-11
Payer: MEDICARE

## 2021-02-11 LAB
ALBUMIN SERPL BCP-MCNC: 2.6 G/DL (ref 3.5–5)
ALP SERPL-CCNC: 212 U/L (ref 46–116)
ALT SERPL W P-5'-P-CCNC: 86 U/L (ref 12–78)
ANION GAP SERPL CALCULATED.3IONS-SCNC: 10 MMOL/L (ref 4–13)
AST SERPL W P-5'-P-CCNC: 67 U/L (ref 5–45)
BILIRUB SERPL-MCNC: 0.4 MG/DL (ref 0.2–1)
BUN SERPL-MCNC: 11 MG/DL (ref 5–25)
CALCIUM ALBUM COR SERPL-MCNC: 9.3 MG/DL (ref 8.3–10.1)
CALCIUM SERPL-MCNC: 8.2 MG/DL (ref 8.3–10.1)
CHLORIDE SERPL-SCNC: 104 MMOL/L (ref 100–108)
CO2 SERPL-SCNC: 23 MMOL/L (ref 21–32)
CREAT SERPL-MCNC: 0.6 MG/DL (ref 0.6–1.3)
ERYTHROCYTE [DISTWIDTH] IN BLOOD BY AUTOMATED COUNT: 15.1 % (ref 11.6–15.1)
GFR SERPL CREATININE-BSD FRML MDRD: 79 ML/MIN/1.73SQ M
GLUCOSE SERPL-MCNC: 68 MG/DL (ref 65–140)
HCT VFR BLD AUTO: 34.4 % (ref 34.8–46.1)
HGB BLD-MCNC: 11.9 G/DL (ref 11.5–15.4)
MCH RBC QN AUTO: 38.1 PG (ref 26.8–34.3)
MCHC RBC AUTO-ENTMCNC: 34.6 G/DL (ref 31.4–37.4)
MCV RBC AUTO: 110 FL (ref 82–98)
PLATELET # BLD AUTO: 226 THOUSANDS/UL (ref 149–390)
PMV BLD AUTO: 9.7 FL (ref 8.9–12.7)
POTASSIUM SERPL-SCNC: 3.4 MMOL/L (ref 3.5–5.3)
PROT SERPL-MCNC: 5.8 G/DL (ref 6.4–8.2)
RBC # BLD AUTO: 3.12 MILLION/UL (ref 3.81–5.12)
SODIUM SERPL-SCNC: 137 MMOL/L (ref 136–145)
WBC # BLD AUTO: 11.04 THOUSAND/UL (ref 4.31–10.16)

## 2021-02-11 PROCEDURE — 78226 HEPATOBILIARY SYSTEM IMAGING: CPT

## 2021-02-11 PROCEDURE — G1004 CDSM NDSC: HCPCS

## 2021-02-11 PROCEDURE — 74181 MRI ABDOMEN W/O CONTRAST: CPT

## 2021-02-11 PROCEDURE — 80053 COMPREHEN METABOLIC PANEL: CPT | Performed by: FAMILY MEDICINE

## 2021-02-11 PROCEDURE — 85027 COMPLETE CBC AUTOMATED: CPT | Performed by: FAMILY MEDICINE

## 2021-02-11 PROCEDURE — 99232 SBSQ HOSP IP/OBS MODERATE 35: CPT | Performed by: PHYSICIAN ASSISTANT

## 2021-02-11 PROCEDURE — A9537 TC99M MEBROFENIN: HCPCS

## 2021-02-11 PROCEDURE — G0009 ADMIN PNEUMOCOCCAL VACCINE: HCPCS | Performed by: FAMILY MEDICINE

## 2021-02-11 PROCEDURE — 90732 PPSV23 VACC 2 YRS+ SUBQ/IM: CPT | Performed by: FAMILY MEDICINE

## 2021-02-11 PROCEDURE — 99232 SBSQ HOSP IP/OBS MODERATE 35: CPT | Performed by: FAMILY MEDICINE

## 2021-02-11 RX ORDER — POTASSIUM CHLORIDE 29.8 MG/ML
40 INJECTION INTRAVENOUS ONCE
Status: DISCONTINUED | OUTPATIENT
Start: 2021-02-11 | End: 2021-02-11 | Stop reason: CLARIF

## 2021-02-11 RX ORDER — POTASSIUM CHLORIDE 14.9 MG/ML
20 INJECTION INTRAVENOUS ONCE
Status: COMPLETED | OUTPATIENT
Start: 2021-02-11 | End: 2021-02-11

## 2021-02-11 RX ADMIN — POLYETHYLENE GLYCOL 3350 17 G: 17 POWDER, FOR SOLUTION ORAL at 10:03

## 2021-02-11 RX ADMIN — AMIODARONE HYDROCHLORIDE 100 MG: 200 TABLET ORAL at 16:59

## 2021-02-11 RX ADMIN — ASPIRIN 81 MG: 81 TABLET, COATED ORAL at 10:02

## 2021-02-11 RX ADMIN — Medication 1 G: at 16:58

## 2021-02-11 RX ADMIN — CEFTRIAXONE 1000 MG: 1 INJECTION, SOLUTION INTRAVENOUS at 22:03

## 2021-02-11 RX ADMIN — METOPROLOL TARTRATE 2.5 MG: 5 INJECTION INTRAVENOUS at 15:50

## 2021-02-11 RX ADMIN — CARVEDILOL 6.25 MG: 6.25 TABLET, FILM COATED ORAL at 10:02

## 2021-02-11 RX ADMIN — METOPROLOL TARTRATE 2.5 MG: 5 INJECTION INTRAVENOUS at 22:52

## 2021-02-11 RX ADMIN — Medication 1 G: at 12:20

## 2021-02-11 RX ADMIN — HYDRALAZINE HYDROCHLORIDE 25 MG: 25 TABLET, FILM COATED ORAL at 10:01

## 2021-02-11 RX ADMIN — POTASSIUM CHLORIDE 20 MEQ: 14.9 INJECTION, SOLUTION INTRAVENOUS at 12:20

## 2021-02-11 RX ADMIN — Medication 1 G: at 10:02

## 2021-02-11 RX ADMIN — POTASSIUM CHLORIDE 20 MEQ: 14.9 INJECTION, SOLUTION INTRAVENOUS at 10:03

## 2021-02-11 RX ADMIN — LOSARTAN POTASSIUM 100 MG: 50 TABLET, FILM COATED ORAL at 10:02

## 2021-02-11 RX ADMIN — AMIODARONE HYDROCHLORIDE 100 MG: 200 TABLET ORAL at 10:02

## 2021-02-11 RX ADMIN — Medication 1000 UNITS: at 10:02

## 2021-02-11 RX ADMIN — PNEUMOCOCCAL VACCINE POLYVALENT 0.5 ML
25; 25; 25; 25; 25; 25; 25; 25; 25; 25; 25; 25; 25; 25; 25; 25; 25; 25; 25; 25; 25; 25; 25 INJECTION, SOLUTION INTRAMUSCULAR; SUBCUTANEOUS at 05:47

## 2021-02-11 RX ADMIN — METRONIDAZOLE 500 MG: 500 INJECTION, SOLUTION INTRAVENOUS at 23:02

## 2021-02-11 RX ADMIN — HYDRALAZINE HYDROCHLORIDE 25 MG: 25 TABLET, FILM COATED ORAL at 18:37

## 2021-02-11 RX ADMIN — ENOXAPARIN SODIUM 40 MG: 40 INJECTION SUBCUTANEOUS at 10:03

## 2021-02-11 RX ADMIN — CYANOCOBALAMIN TAB 500 MCG 500 MCG: 500 TAB at 10:02

## 2021-02-11 RX ADMIN — METRONIDAZOLE 500 MG: 500 INJECTION, SOLUTION INTRAVENOUS at 05:48

## 2021-02-11 RX ADMIN — METRONIDAZOLE 500 MG: 500 INJECTION, SOLUTION INTRAVENOUS at 15:56

## 2021-02-11 RX ADMIN — CARVEDILOL 6.25 MG: 6.25 TABLET, FILM COATED ORAL at 16:59

## 2021-02-11 NOTE — PROGRESS NOTES
CASE MANAGEMENT INITIAL ASSESSMENT      Reviewed chart and met with patient today, re: 76year old female. Explained Case Management role/services. Family present: spouse and friend  Primary contact information: Juan Victor [phone]    Admit date/status: 7/23/19  Diagnosis: lung Nodule    Insurance: Medicare primary and [facility] secondary   Precert required for SNF -no       3 night stay required - yes    Living arrangements, Adls, care needs, prior to admission: patient lives in a single story house with spouse. Independent in ADL's     Transportation: private    [employer] at home: none    Services in the home and/or outpatient, prior to admission: none    PT/OT recs: 2 [place] Notification (HEN): not initiated    Barriers to discharge: none    Plan/comments:   Met with patient and family at bedside. Patient is independent prior to admission. Volunteers passing out food on [address]. Denies any needs.   Johnny Villarreal RN Progress Note - Juan Jose Mock 80 y o  female MRN: [id number]    Unit/Bed#: 2 [place] [address] A Encounter: [id number]        Subjective:     Underwent MRCP this morning  No new complaints  She continues to be afebrile  Vitals are stable  ROS: As noted in the HPI, otherwise all others negative  Objective:     Vitals: Blood pressure (!) 194/84, pulse 67, temperature 98 4 °F (36 9 °C), resp  rate 17, height 5' (1 524 m), weight 53 4 kg (117 lb 11 6 oz), SpO2 96 %  ,Body mass index is 22 99 kg/m²  Intake/Output Summary (Last 24 hours) at 2/11/2021 0951  Last data filed at 2/11/2021 0701  Gross per 24 hour   Intake 1038 75 ml   Output --   Net 1038 75 ml       Physical Exam:     General Appearance: Alert and oriented to person and place  In no respiratory distress  Lungs: Clear to auscultation bilaterally, no rales or rhonchi  Heart: Regular rate and rhythm, S1, S2 normal, no murmur, click, rub or gallop  Abdomen: Soft, non-tender, non-distended; bowel sounds normal; no masses or no organomegaly  Extremities: No cyanosis, edema    Invasive Devices     Peripheral Intravenous Line            Peripheral IV 02/09/21 Left Forearm 1 day                Lab Results:  Results from last 7 days   Lab Units 02/11/21  0444 02/10/21  0705  02/09/21  1814   WBC Thousand/uL 11 04* 22 94*  --  10 22*   HEMOGLOBIN g/dL 11 9 12 8  --  13 4   HEMATOCRIT % 34 4* 37 7  --  39 5   PLATELETS Thousands/uL 226 256   < > 281   NEUTROS PCT %  --   --   --  78*   LYMPHS PCT %  --   --   --  11*   LYMPHO PCT %  --  1*  --   --    MONOS PCT %  --   --   --  9   MONO PCT %  --  6  --   --    EOS PCT %  --  0  --  2    < > = values in this interval not displayed       Results from last 7 days   Lab Units 02/11/21  0444   POTASSIUM mmol/L 3 4*   CHLORIDE mmol/L 104   CO2 mmol/L 23   BUN mg/dL 11   CREATININE mg/dL 0 60   CALCIUM mg/dL 8 2*   ALK PHOS U/L 212*   ALT U/L 86*   AST U/L 67*         Results from last 7 days   Lab Units 02/09/21  1068 LIPASE u/L 54       Imaging Studies: I have personally reviewed pertinent imaging studies  No results found  Assessment and Plan:     1) Elevated LFTs likely secondary to passed gallstones/sludge - Patient presented as a transfer from [facility]  Patient's niece explained to me yesterday that her aunt was complaining upper abdominal pain at home, and asked her to contact her family doctor  On arrival to [facility], patient had mildly elevated alkaline phosphatase  Patient was transferred to [facility] for MRCP, which was done this morning revealing in no obvious choledocholithiasis  Gallbladder sludge and wall thickening as seen on ultrasound  Her white blood cell count [place] 329[address] today    She is still on IV Flagyl and ceftriaxone   - No plan for ERCP at this time  - Continue to monitor LFTs closely  - Appreciate surgery recommendations   - OK to have diet from our standpoint   - Please contact GI with any further questions, thank you

## 2021-02-11 NOTE — ASSESSMENT & PLAN NOTE
Patient was admitted to [facility] with atypical chest pain/epigastric pain  Troponins negative  Patient noted to have elevated LFTs on lab work and right upper quadrant ultrasound showed gallbladder sludge with gallbladder wall thickening and possible tiny stones  Patient also with leukocytosis on lab work which has normalized  UA was ordered but not obtained however patient has received 4 days of Rocephin already  Patient was evaluated by surgery there who recommended transfer here for GI evaluation  Patient was on IV Rocephin and Flagyl while here but no indication for antibiotics on discharge per GI and surgery  LFTs normalized except for alk-phos and AST  MRI/MRCP showed slightly dilated CBD but no choledocholithiasis  Gallbladder sludge was seen and right adnexal cyst  HIDA scan showed patent cystic duct  No further intervention per GI and surgery  Advanced diet patient tolerating without difficulty    Patient with some dental issues for which she needs to see her dentist and after speaking with niece, recommended to be on dental soft  Repeat lab work after discharge and follow up with PCP

## 2021-02-11 NOTE — ASSESSMENT & PLAN NOTE
Continue Avapro Coreg  Blood pressures were not well controlled  Increased hydralazine to t i d  Dosing  These reported that patient has blood pressures also tend to run high at times at home  Discussed with steven that if blood pressures are low while at home, she can be decreased down to her b i d   Hydralazine dosing as she was using

## 2021-02-11 NOTE — ASSESSMENT & PLAN NOTE
Holding statin due to elevated LFTs  Repeat lab work as outpatient and follow up with PCP to see if statin can be restarted

## 2021-02-11 NOTE — PROGRESS NOTES
Progress Note - General Surgery   Wood Argueta 80 y o  female MRN: [id number]  Unit/Bed#: 2 [place] [address] A Encounter: [id number]    Assessment:  · Transaminitis -- LFTs improving, T bili back within normal limits, Abdominal exam continues to be completely benign upon my exam  · Leukocytosis -- in the last 24 hrs, WBC count 22>11  · Hypertension  · Atrial fibrillation -- not on anticoagulation        Plan:  · Follow up MRCP and UA results   · Can eat after MRCP from surgical standpoint  · Rocephin/flagyl day 2  · Update niece Josefina Pleasure  · At this point, not convinced this is biliary related, no RUQ pain on exam, hopeful for conservative management but we will f/u MRCP results      Subjective/Objective     Subjective:   Had some minor suprapubic tenderness at one point, but patient doesn't complain of any abdominal pain and her abdominal exam continues to be benign  She only elicits complaint of back pain when it is brought up to her    Objective:     Blood pressure (!) 194/84, pulse 67, temperature 98 4 °F (36 9 °C), resp  rate 17, height 5' (1 524 m), weight 53 4 kg (117 lb 11 6 oz), SpO2 96 %  ,Body mass index is 22 99 kg/m²        Intake/Output Summary (Last 24 hours) at 2/11/2021 0943  Last data filed at 2/11/2021 0701  Gross per 24 hour   Intake 1038 75 ml   Output --   Net 1038 75 ml       Invasive Devices     Peripheral Intravenous Line            Peripheral IV 02/09/21 Left Forearm 1 day                Physical Exam: BP (!) 194/84   Pulse 67   Temp 98 4 °F (36 9 °C)   Resp 17   Ht 5' (1 524 m)   Wt 53 4 kg (117 lb 11 6 oz)   SpO2 96%   BMI 22 99 kg/m²   General appearance: alert to person and place but not time, rambling speech  Head: Normocephalic, without obvious abnormality, atraumatic  Lungs: clear to auscultation bilaterally  Heart: regular rate and rhythm, holosystolic murmur appreciated  Abdomen: soft, non-tender; bowel sounds normal; no masses,  no organomegaly, infraumbilical laparotomy scar appreciated  Extremities: extremities normal, warm and well-perfused; no cyanosis, clubbing, or edema  Skin: Skin color, texture, turgor normal  No rashes or lesions    Lab, Imaging and other studies:  I have personally reviewed pertinent lab results    , CBC:   Lab Results   Component Value Date    WBC 11 04 (H) 02/11/2021    HGB 11 9 02/11/2021    HCT 34 4 (L) 02/11/2021     (H) 02/11/2021     02/11/2021    MCH 38 1 (H) 02/11/2021    MCHC 34 6 02/11/2021    RDW 15 1 02/11/2021    MPV 9 7 02/11/2021   , CMP:   Lab Results   Component Value Date    SODIUM 137 02/11/2021    K 3 4 (L) 02/11/2021     02/11/2021    CO2 23 02/11/2021    BUN 11 02/11/2021    CREATININE 0 60 02/11/2021    CALCIUM 8 2 (L) 02/11/2021    AST 67 (H) 02/11/2021    ALT 86 (H) 02/11/2021    ALKPHOS 212 (H) 02/11/2021    EGFR 79 02/11/2021   , Coagulation: No results found for: PT, INR, APTT  VTE Pharmacologic Prophylaxis: Enoxaparin (Lovenox) SQ  VTE Mechanical Prophylaxis: sequential compression device

## 2021-02-11 NOTE — PROGRESS NOTES
[address] [facility] Internal Medicine Progress Note  Patient: Onelia Kras 80 y o  female   MRN: [id number]  PCP: Brittani Madrid MD  Unit/Bed#: [address] [place] [address] A Encounter: [id number]  Date Of Visit: 02/11/21    Problem List:    Principal Problem:    Elevated LFTs  Active Problems:    Paroxysmal atrial fibrillation ([address] )    Hyponatremia    Essential hypertension    Other hyperlipidemia      Assessment & Plan:    * Elevated LFTs  Assessment & Plan  Patient was admitted to [facility] with atypical chest pain/epigastric pain  Troponins negative  Patient noted to have elevated LFTs on lab work and right upper quadrant ultrasound showed gallbladder sludge with gallbladder wall thickening and possible tiny stones  Patient also with leukocytosis on lab work which is improving  Patient was evaluated by surgery there who recommended transfer here for GI evaluation  Patient being seen by surgery here as well  Patient on IV Rocephin and Flagyl for now  LFTs trending down  Per GI, plan is for MRI/MRCP for further evaluation  Repeat lab work in a m  Other hyperlipidemia  Assessment & Plan  Holding statin due to elevated LFTs  Essential hypertension  Assessment & Plan  Continue Cozaar, Coreg, hydralazine  Hyponatremia  Assessment & Plan  Patient with history of chronic hyponatremia  Continue sodium chloride tablets  Sodium level has improved with IVF as well  Repeat lab work in a m  Paroxysmal atrial fibrillation (HCC)  Assessment & Plan  Continue Coreg, amiodarone  Patient not on any anticoagulation at home  VTE Pharmacologic Prophylaxis:   Pharmacologic: Enoxaparin (Lovenox)  Mechanical VTE Prophylaxis in Place: Yes    Patient Centered Rounds: I have performed bedside rounds with nursing staff today  Discussions with Specialists or Other Care Team Provider: yes    Education and Discussions with Family / Patient: yes - Nelsy Dimas    Time Spent for Care: 30 minutes    More than 50% of total time spent on counseling and coordination of care as described above  Current Length of Stay: 1 day(s)    Current Patient Status: Inpatient   Certification Statement: The patient will continue to require additional inpatient hospital stay due to Epigastric pain/elevated LFTs    Discharge Plan: Pending    Code Status: Level 3 - DNAR and DNI      Subjective:   Denies any abdominal pain    Objective:     Vitals:   Temp (24hrs), Av 5 °F (36 9 °C), Min:98 °F (36 7 °C), Max:98 9 °F (37 2 °C)    Temp:  [98 °F (36 7 °C)-98 9 °F (37 2 °C)] 98 4 °F (36 9 °C)  HR:  [62-67] 67  Resp:  [17-18] 17  BP: (138-194)/() 194/84  SpO2:  [95 %-97 %] 96 %  Body mass index is 22 99 kg/m²  Input and Output Summary (last 24 hours): Intake/Output Summary (Last 24 hours) at 2021 0443  Last data filed at 2021 0701  Gross per 24 hour   Intake 1038 75 ml   Output --   Net 1038 75 ml       Physical Exam:     Physical Exam  Constitutional:       General: She is not in acute distress  Appearance: She is not diaphoretic  HENT:      Head: Normocephalic and atraumatic  Eyes:      General:         Right eye: No discharge  Left eye: No discharge  Cardiovascular:      Rate and Rhythm: Normal rate and regular rhythm  Pulmonary:      Effort: Pulmonary effort is normal  No respiratory distress  Breath sounds: No wheezing or rales  Comments: Decreased breath sounds bilaterally  Abdominal:      General: Bowel sounds are normal  There is no distension  Palpations: Abdomen is soft  Tenderness: There is no abdominal tenderness  Musculoskeletal:      Right lower leg: No edema  Left lower leg: No edema  Neurological:      Mental Status: She is alert  Psychiatric:         Cognition and Memory: Cognition is impaired  Memory is impaired           Additional Data:     Labs:    Results from last 7 days   Lab Units 21  0444 02/10/21  0705  21  1814   WBC Thousand/uL 11 04* 22 94* --  10 22*   HEMOGLOBIN g/dL 11 9 12 8  --  13 4   HEMATOCRIT % 34 4* 37 7  --  39 5   PLATELETS Thousands/uL 226 256   < > 281   NEUTROS PCT %  --   --   --  78*   LYMPHS PCT %  --   --   --  11*   LYMPHO PCT %  --  1*  --   --    MONOS PCT %  --   --   --  9   MONO PCT %  --  6  --   --    EOS PCT %  --  0  --  2    < > = values in this interval not displayed  Results from last 7 days   Lab Units 02/11/21  0444   POTASSIUM mmol/L 3 4*   CHLORIDE mmol/L 104   CO2 mmol/L 23   BUN mg/dL 11   CREATININE mg/dL 0 60   CALCIUM mg/dL 8 2*   ALK PHOS U/L 212*   ALT U/L 86*   AST U/L 67*           * I Have Reviewed All Lab Data Listed Above  * Additional Pertinent Lab Tests Reviewed: [facility] Admission Reviewed      Imaging:  Imaging Reports Reviewed Today Include: RUQ U/S  Imaging Personally Reviewed by Myself Includes:  N/A    Recent Cultures (last 7 days):     Results from last 7 days   Lab Units 02/10/21  0156 02/10/21  0144   BLOOD CULTURE  Received in Microbiology Lab  Culture in Progress  Received in Microbiology Lab  Culture in Progress         Last 24 Hours Medication List:   Current Facility-Administered Medications   Medication Dose Route Frequency Provider Last Rate    acetaminophen  650 mg Oral Q6H PRN Agustina Revankar, DO      amiodarone  100 mg Oral BID With Meals Agustina Revankar, DO      aspirin  81 mg Oral Daily Agustina Revankar, DO      carvedilol  6 25 mg Oral BID With Meals Agustina Revankar, DO      cefTRIAXone  1,000 mg Intravenous Q24H Agustina Revankar, DO Stopped (02/10/21 5241)    cholecalciferol  1,000 Units Oral Daily Agustina Revankar, DO      vitamin B-12  500 mcg Oral Daily Agustina Revankar, DO      enoxaparin  40 mg Subcutaneous Daily Agustina Revankar, DO      hydrALAZINE  25 mg Oral BID Agustina Revankar, DO      lactated ringers  75 mL/hr Intravenous Continuous Agustina Revankar, DO 75 mL/hr (02/11/21 0618)    losartan  100 mg Oral Daily Agustina Revankar, DO      metoprolol  2 5 mg Intravenous Q6H PRN Agustina Revankar, DO      metroNIDAZOLE  500 mg Intravenous Q8H Agustina Revankar, DO Stopped (02/11/21 0618)    polyethylene glycol  17 g Oral Daily Agustina Revankar, DO      potassium chloride  40 mEq Intravenous Once Agustina Revankar, DO      sodium chloride  1 g Oral TID With Meals Agustina Revankar, DO            Today, Patient Was Seen By: Sierra Dunn DO    ** Please Note: "This note has been constructed using a voice recognition system  Therefore there may be syntax, spelling, and/or grammatical errors   Please call if you have any questions  "**

## 2021-02-11 NOTE — PLAN OF CARE
Problem: Potential for Falls  Goal: Patient will remain free of falls  Description: INTERVENTIONS:  - Assess patient frequently for physical needs  -  Identify cognitive and physical deficits and behaviors that affect risk of falls    -  [place] fall precautions as indicated by assessment   - Educate patient/family on patient safety including physical limitations  - Instruct patient to call for assistance with activity based on assessment  - Modify environment to reduce risk of injury  - Consider OT/PT consult to assist with strengthening/mobility  Outcome: Progressing     Problem: PAIN - ADULT  Goal: Verbalizes/displays adequate comfort level or baseline comfort level  Description: Interventions:  - Encourage patient to monitor pain and request assistance  - Assess pain using appropriate pain scale  - Administer analgesics based on type and severity of pain and evaluate response  - Implement non-pharmacological measures as appropriate and evaluate response  - Consider cultural and social influences on pain and pain management  - Notify physician/advanced practitioner if interventions unsuccessful or patient reports new pain  Outcome: Progressing     Problem: Prexisting or High Potential for Compromised Skin Integrity  Goal: Skin integrity is maintained or improved  Description: INTERVENTIONS:  - Identify patients at risk for skin breakdown  - Assess and monitor skin integrity  - Assess and monitor nutrition and hydration status  - Monitor labs   - Assess for incontinence   - Turn and reposition patient  - Assist with mobility/ambulation  - Relieve pressure over bony prominences  - Avoid friction and shearing  - Provide appropriate hygiene as needed including keeping skin clean and dry  - Evaluate need for skin moisturizer/barrier cream  - Collaborate with interdisciplinary team   - Patient/family teaching  - Consider wound care consult   Outcome: Progressing

## 2021-02-11 NOTE — QUICK NOTE
Ordering HIDA scan to rule out acute cholecystitis  Please do not allow patient to eat or have any narcotics until after nuclear medicine imaging

## 2021-02-11 NOTE — TELEPHONE ENCOUNTER
Concerns about her age and ability to stand it, recovery  She didn't talk to surgeon OR hospitalist yet

## 2021-02-12 DIAGNOSIS — Z23 ENCOUNTER FOR IMMUNIZATION: ICD-10-CM

## 2021-02-12 LAB
ALBUMIN SERPL BCP-MCNC: 3 G/DL (ref 3.5–5)
ALP SERPL-CCNC: 221 U/L (ref 46–116)
ALT SERPL W P-5'-P-CCNC: 77 U/L (ref 12–78)
ANION GAP SERPL CALCULATED.3IONS-SCNC: 15 MMOL/L (ref 4–13)
AST SERPL W P-5'-P-CCNC: 53 U/L (ref 5–45)
BILIRUB SERPL-MCNC: 0.6 MG/DL (ref 0.2–1)
BUN SERPL-MCNC: 8 MG/DL (ref 5–25)
CALCIUM ALBUM COR SERPL-MCNC: 9.3 MG/DL (ref 8.3–10.1)
CALCIUM SERPL-MCNC: 8.5 MG/DL (ref 8.3–10.1)
CHLORIDE SERPL-SCNC: 97 MMOL/L (ref 100–108)
CO2 SERPL-SCNC: 21 MMOL/L (ref 21–32)
CREAT SERPL-MCNC: 0.44 MG/DL (ref 0.6–1.3)
ERYTHROCYTE [DISTWIDTH] IN BLOOD BY AUTOMATED COUNT: 13 % (ref 11.6–15.1)
GFR SERPL CREATININE-BSD FRML MDRD: 87 ML/MIN/1.73SQ M
GLUCOSE SERPL-MCNC: 72 MG/DL (ref 65–140)
HCT VFR BLD AUTO: 38.1 % (ref 34.8–46.1)
HGB BLD-MCNC: 13.2 G/DL (ref 11.5–15.4)
MCH RBC QN AUTO: 36.3 PG (ref 26.8–34.3)
MCHC RBC AUTO-ENTMCNC: 34.6 G/DL (ref 31.4–37.4)
MCV RBC AUTO: 105 FL (ref 82–98)
PLATELET # BLD AUTO: 246 THOUSANDS/UL (ref 149–390)
PMV BLD AUTO: 9.6 FL (ref 8.9–12.7)
POTASSIUM SERPL-SCNC: 3.4 MMOL/L (ref 3.5–5.3)
PROT SERPL-MCNC: 6.6 G/DL (ref 6.4–8.2)
RBC # BLD AUTO: 3.64 MILLION/UL (ref 3.81–5.12)
SODIUM SERPL-SCNC: 133 MMOL/L (ref 136–145)
WBC # BLD AUTO: 10.27 THOUSAND/UL (ref 4.31–10.16)

## 2021-02-12 PROCEDURE — 97163 PT EVAL HIGH COMPLEX 45 MIN: CPT

## 2021-02-12 PROCEDURE — 80053 COMPREHEN METABOLIC PANEL: CPT | Performed by: FAMILY MEDICINE

## 2021-02-12 PROCEDURE — 85027 COMPLETE CBC AUTOMATED: CPT | Performed by: FAMILY MEDICINE

## 2021-02-12 PROCEDURE — 99232 SBSQ HOSP IP/OBS MODERATE 35: CPT | Performed by: SURGERY

## 2021-02-12 PROCEDURE — 97530 THERAPEUTIC ACTIVITIES: CPT

## 2021-02-12 PROCEDURE — 99232 SBSQ HOSP IP/OBS MODERATE 35: CPT | Performed by: FAMILY MEDICINE

## 2021-02-12 RX ORDER — POTASSIUM CHLORIDE 14.9 MG/ML
20 INJECTION INTRAVENOUS ONCE
Status: COMPLETED | OUTPATIENT
Start: 2021-02-12 | End: 2021-02-12

## 2021-02-12 RX ORDER — HYDRALAZINE HYDROCHLORIDE 25 MG/1
25 TABLET, FILM COATED ORAL EVERY 8 HOURS SCHEDULED
Status: DISCONTINUED | OUTPATIENT
Start: 2021-02-12 | End: 2021-02-13 | Stop reason: HOSPADM

## 2021-02-12 RX ORDER — HYDRALAZINE HYDROCHLORIDE 20 MG/ML
10 INJECTION INTRAMUSCULAR; INTRAVENOUS ONCE
Status: COMPLETED | OUTPATIENT
Start: 2021-02-12 | End: 2021-02-12

## 2021-02-12 RX ADMIN — Medication 1000 UNITS: at 10:31

## 2021-02-12 RX ADMIN — Medication 1 G: at 16:31

## 2021-02-12 RX ADMIN — POTASSIUM CHLORIDE 20 MEQ: 14.9 INJECTION, SOLUTION INTRAVENOUS at 13:04

## 2021-02-12 RX ADMIN — Medication 1 G: at 13:06

## 2021-02-12 RX ADMIN — METOPROLOL TARTRATE 2.5 MG: 5 INJECTION INTRAVENOUS at 04:56

## 2021-02-12 RX ADMIN — ENOXAPARIN SODIUM 40 MG: 40 INJECTION SUBCUTANEOUS at 10:33

## 2021-02-12 RX ADMIN — POTASSIUM CHLORIDE 20 MEQ: 14.9 INJECTION, SOLUTION INTRAVENOUS at 16:31

## 2021-02-12 RX ADMIN — SODIUM CHLORIDE, SODIUM LACTATE, POTASSIUM CHLORIDE, AND CALCIUM CHLORIDE 75 ML/HR: .6; .31; .03; .02 INJECTION, SOLUTION INTRAVENOUS at 04:36

## 2021-02-12 RX ADMIN — HYDRALAZINE HYDROCHLORIDE 10 MG: 20 INJECTION INTRAMUSCULAR; INTRAVENOUS at 06:59

## 2021-02-12 RX ADMIN — CYANOCOBALAMIN TAB 500 MCG 500 MCG: 500 TAB at 10:31

## 2021-02-12 RX ADMIN — AMIODARONE HYDROCHLORIDE 100 MG: 200 TABLET ORAL at 16:39

## 2021-02-12 RX ADMIN — AMIODARONE HYDROCHLORIDE 100 MG: 200 TABLET ORAL at 10:32

## 2021-02-12 RX ADMIN — Medication 1 G: at 10:33

## 2021-02-12 RX ADMIN — HYDRALAZINE HYDROCHLORIDE 25 MG: 25 TABLET, FILM COATED ORAL at 18:46

## 2021-02-12 RX ADMIN — METRONIDAZOLE 500 MG: 500 INJECTION, SOLUTION INTRAVENOUS at 10:41

## 2021-02-12 RX ADMIN — CARVEDILOL 6.25 MG: 6.25 TABLET, FILM COATED ORAL at 10:32

## 2021-02-12 RX ADMIN — CARVEDILOL 6.25 MG: 6.25 TABLET, FILM COATED ORAL at 16:31

## 2021-02-12 RX ADMIN — POLYETHYLENE GLYCOL 3350 17 G: 17 POWDER, FOR SOLUTION ORAL at 13:04

## 2021-02-12 RX ADMIN — HYDRALAZINE HYDROCHLORIDE 25 MG: 25 TABLET, FILM COATED ORAL at 10:32

## 2021-02-12 RX ADMIN — LOSARTAN POTASSIUM 100 MG: 50 TABLET, FILM COATED ORAL at 10:33

## 2021-02-12 RX ADMIN — CEFTRIAXONE 1000 MG: 1 INJECTION, SOLUTION INTRAVENOUS at 22:46

## 2021-02-12 RX ADMIN — ASPIRIN 81 MG: 81 TABLET, COATED ORAL at 10:32

## 2021-02-12 RX ADMIN — METRONIDAZOLE 500 MG: 500 INJECTION, SOLUTION INTRAVENOUS at 18:52

## 2021-02-12 NOTE — TELEPHONE ENCOUNTER
Please discuss plan with hospitalist, GI and or surgeon  Yes, she is at a higher risk due to her age and medical history

## 2021-02-12 NOTE — PLAN OF CARE
Problem: Potential for Falls  Goal: Patient will remain free of falls  Description: INTERVENTIONS:  - Assess patient frequently for physical needs  -  Identify cognitive and physical deficits and behaviors that affect risk of falls    -  [place] fall precautions as indicated by assessment   - Educate patient/family on patient safety including physical limitations  - Instruct patient to call for assistance with activity based on assessment  - Modify environment to reduce risk of injury  - Consider OT/PT consult to assist with strengthening/mobility  Outcome: Progressing     Problem: PAIN - ADULT  Goal: Verbalizes/displays adequate comfort level or baseline comfort level  Description: Interventions:  - Encourage patient to monitor pain and request assistance  - Assess pain using appropriate pain scale  - Administer analgesics based on type and severity of pain and evaluate response  - Implement non-pharmacological measures as appropriate and evaluate response  - Consider cultural and social influences on pain and pain management  - Notify physician/advanced practitioner if interventions unsuccessful or patient reports new pain  Outcome: Progressing     Problem: Prexisting or High Potential for Compromised Skin Integrity  Goal: Skin integrity is maintained or improved  Description: INTERVENTIONS:  - Identify patients at risk for skin breakdown  - Assess and monitor skin integrity  - Assess and monitor nutrition and hydration status  - Monitor labs   - Assess for incontinence   - Turn and reposition patient  - Assist with mobility/ambulation  - Relieve pressure over bony prominences  - Avoid friction and shearing  - Provide appropriate hygiene as needed including keeping skin clean and dry  - Evaluate need for skin moisturizer/barrier cream  - Collaborate with interdisciplinary team   - Patient/family teaching  - Consider wound care consult   Outcome: Progressing

## 2021-02-12 NOTE — PROGRESS NOTES
Progress Note - General Surgery   Mendel Boozer 80 y o  female MRN: [id number]  Unit/Bed#: [address] [address] A Encounter: [id number]    Assessment:  1) Transaminitis - improved and normalized with exception of ALP, T bili normal  2) Leukocytosis - leukocytosis improving 10 27, HIDA does not seem to be suggestive of acute cholecystitis, patient has no observable abdominal pain, did pain is referred somewhat to the right area of the ribs and right back  3) Hypertension - persistently hypertensive with pressures of 193/81    Plan:  1-3)   -reviewed HIDA  - reviewed MRCP  - do not necessarily suspect that patient requires antibiotics for gallbladder perspective  - can likely discontinue IV antibiotics  - can discuss with Mumtaz Bonilla today or tomorrow  - no abdominal pain currently  - p r n  antihypertensives per medicine team  - advance diet as tolerated  -  DVT prophylaxis  - discontinue IV fluids  - surgery team will continue to follow peripherally if anything changes clinically    Subjective/Objective   Chief Complaint:  Patient is somewhat disoriented and confused, she is concerned that her mom/sister/other family members are not doing very well and that she needs to leave     Subjective:  Patient was seen examined at bedside  Patient is alert and able to respond to certain questions but is somewhat confused  Patient is talking a lot about how she is concerned about her mom as well as her sister and they were well-being  Patient signed out how she has had a lot of her family members that have  unfortunately recently  Patient is not complaining of any abdominal pain  Patient denies any sort of nausea or vomiting  Patient denies any sort of tenderness  Patient does report that she has some sort of pain in the back region  Objective:     Blood pressure 128/81, pulse 61, temperature 97 8 °F (36 6 °C), resp  rate 17, height 5' (1 524 m), weight 53 4 kg (117 lb 11 6 oz), SpO2 96 %  ,Body mass index is 22 99 kg/m²  Intake/Output Summary (Last 24 hours) at 2/12/2021 1207  Last data filed at 2/12/2021 0701  Gross per 24 hour   Intake 1076 25 ml   Output --   Net 1076 25 ml       Invasive Devices     Peripheral Intravenous Line            Peripheral IV 02/09/21 Left Forearm 2 days                Physical Exam: /81   Pulse 61   Temp 97 8 °F (36 6 °C)   Resp 17   Ht 5' (1 524 m)   Wt 53 4 kg (117 lb 11 6 oz)   SpO2 96%   BMI 22 99 kg/m²   General appearance: disoriented slightly on time, alert and responsive  Head: Normocephalic, without obvious abnormality, atraumatic  Lungs: clear to auscultation bilaterally  Heart: regular rate and rhythm, S1, S2 normal, no murmur, click, rub or gallop  Abdomen: Soft, no right upper quadrant tenderness, active bowel sounds, no tympany to percussion  Skin: Skin color, texture, turgor normal  No rashes or lesions    Lab, Imaging and other studies:  I have personally reviewed pertinent lab results    , CBC:   Lab Results   Component Value Date    WBC 10 27 (H) 02/12/2021    HGB 13 2 02/12/2021    HCT 38 1 02/12/2021     (H) 02/12/2021     02/12/2021    MCH 36 3 (H) 02/12/2021    MCHC 34 6 02/12/2021    RDW 13 0 02/12/2021    MPV 9 6 02/12/2021   , CMP:   Lab Results   Component Value Date    SODIUM 133 (L) 02/12/2021    K 3 4 (L) 02/12/2021    CL 97 (L) 02/12/2021    CO2 21 02/12/2021    BUN 8 02/12/2021    CREATININE 0 44 (L) 02/12/2021    CALCIUM 8 5 02/12/2021    AST 53 (H) 02/12/2021    ALT 77 02/12/2021    ALKPHOS 221 (H) 02/12/2021    EGFR 87 02/12/2021     VTE Pharmacologic Prophylaxis: Enoxaparin (Lovenox)  VTE Mechanical Prophylaxis: sequential compression device

## 2021-02-12 NOTE — PROGRESS NOTES
[address] Internal Medicine Progress Note  Patient: Wood Argueta 80 y o  female   MRN: [id number]  PCP: Raissa Watts MD  Unit/Bed#: [address] [place] [address] Encounter: [id number]  Date Of Visit: 02/12/21    Problem List:    Principal Problem:    Elevated LFTs  Active Problems:    Paroxysmal atrial fibrillation ([address] )    Hyponatremia    Essential hypertension    Other hyperlipidemia      Assessment & Plan:    * Elevated LFTs  Assessment & Plan  Patient was admitted to [facility] with atypical chest pain/epigastric pain  Troponins negative  Patient noted to have elevated LFTs on lab work and right upper quadrant ultrasound showed gallbladder sludge with gallbladder wall thickening and possible tiny stones  Patient also with leukocytosis on lab work which is improving  Patient was evaluated by surgery there who recommended transfer here for GI evaluation  Patient being seen by surgery here as well  Patient on IV Rocephin and Flagyl for now  LFTs normalized except for alk-phos and AST  MRI/MRCP showed slightly dilated CBD but no choledocholithiasis  Gallbladder sludge was seen and right adnexal cyst  HIDA scan showed patent cystic duct  No further intervention per GI and surgery  Advanced diet as tolerated  Repeat lab work in a m  Other hyperlipidemia  Assessment & Plan  Holding statin due to elevated LFTs    Essential hypertension  Assessment & Plan  Continue Cozaar, Coreg  Blood pressures are not well controlled  Increased hydralazine to t i d  Dosing    Hyponatremia  Assessment & Plan  Patient with history of chronic hyponatremia  Continue sodium chloride tablets  Discontinue IVF  Repeat lab work in a m  Paroxysmal atrial fibrillation (HCC)  Assessment & Plan  Continue Coreg, amiodarone    Patient not on any anticoagulation at home        VTE Pharmacologic Prophylaxis:   Pharmacologic: Enoxaparin (Lovenox)  Mechanical VTE Prophylaxis in Place: Yes    Patient Centered Rounds: I have performed bedside rounds with nursing staff today  Discussions with Specialists or Other Care Team Provider: yes    Education and Discussions with Family / Patient: yes - Veronica Raw    Time Spent for Care: 25 min  More than 50% of total time spent on counseling and coordination of care as described above  Current Length of Stay: 2 day(s)    Current Patient Status: Inpatient   Certification Statement: The patient will continue to require additional inpatient hospital stay due to elevated LFTs requiring initiation of diet and monitoring of lab work    Discharge Plan: Pending    Code Status: Level 3 - DNAR and DNI      Subjective:     Denies any abdominal pain, nausea, vomiting  Reports that she feels cold    Objective:     Vitals:   Temp (24hrs), Av °F (36 7 °C), Min:97 8 °F (36 6 °C), Max:98 4 °F (36 9 °C)    Temp:  [97 8 °F (36 6 °C)-98 4 °F (36 9 °C)] 97 8 °F (36 6 °C)  HR:  [60-71] 61  Resp:  [17-24] 17  BP: (128-195)/(70-86) 128/81  SpO2:  [94 %-97 %] 96 %  Body mass index is 22 99 kg/m²  Input and Output Summary (last 24 hours): Intake/Output Summary (Last 24 hours) at 2021 1347  Last data filed at 2021 0701  Gross per 24 hour   Intake 1076 25 ml   Output --   Net 1076 25 ml       Physical Exam:     Physical Exam  Constitutional:       General: She is not in acute distress  Appearance: She is not diaphoretic  HENT:      Head: Normocephalic and atraumatic  Eyes:      General:         Right eye: No discharge  Left eye: No discharge  Cardiovascular:      Rate and Rhythm: Normal rate and regular rhythm  Pulmonary:      Effort: Pulmonary effort is normal  No respiratory distress  Breath sounds: No wheezing or rales  Comments: Decreased breath sounds bilaterally  Abdominal:      General: Bowel sounds are normal  There is no distension  Palpations: Abdomen is soft  Tenderness: There is no abdominal tenderness  Neurological:      Mental Status: She is alert  Comments: Oriented to self and place   Psychiatric:         Attention and Perception: She is inattentive  Additional Data:     Labs:    Results from last 7 days   Lab Units 02/12/21  0442  02/10/21  0705  02/09/21  1814   WBC Thousand/uL 10 27*   < > 22 94*  --  10 22*   HEMOGLOBIN g/dL 13 2   < > 12 8  --  13 4   HEMATOCRIT % 38 1   < > 37 7  --  39 5   PLATELETS Thousands/uL 246   < > 256   < > 281   NEUTROS PCT %  --   --   --   --  78*   LYMPHS PCT %  --   --   --   --  11*   LYMPHO PCT %  --   --  1*  --   --    MONOS PCT %  --   --   --   --  9   MONO PCT %  --   --  6  --   --    EOS PCT %  --   --  0  --  2    < > = values in this interval not displayed  Results from last 7 days   Lab Units 02/12/21  0442   POTASSIUM mmol/L 3 4*   CHLORIDE mmol/L 97*   CO2 mmol/L 21   BUN mg/dL 8   CREATININE mg/dL 0 44*   CALCIUM mg/dL 8 5   ALK PHOS U/L 221*   ALT U/L 77   AST U/L 53*           * I Have Reviewed All Lab Data Listed Above  * Additional Pertinent Lab Tests Reviewed: [address] [facility] Admission Reviewed      Imaging:  Imaging Reports Reviewed Today Include: MRI/MRCP, HIDA scan  Imaging Personally Reviewed by Myself Includes:  N/A    Recent Cultures (last 7 days):     Results from last 7 days   Lab Units 02/10/21  0156 02/10/21  0144   BLOOD CULTURE  No Growth at 24 hrs  No Growth at 24 hrs         Last 24 Hours Medication List:   Current Facility-Administered Medications   Medication Dose Route Frequency Provider Last Rate    acetaminophen  650 mg Oral Q6H PRN Agustina Revankar, DO      amiodarone  100 mg Oral BID With Meals Agustina Revankar, DO      aspirin  81 mg Oral Daily Agustina Revankar, DO      carvedilol  6 25 mg Oral BID With Meals Agustina Revankar, DO      cefTRIAXone  1,000 mg Intravenous Q24H Agustina Revankar, DO Stopped (02/11/21 9191)    cholecalciferol  1,000 Units Oral Daily Agustina Revankar, DO      vitamin B-12  500 mcg Oral Daily Agustina Revankar, DO      enoxaparin  40 mg Subcutaneous Daily Agustina Revankar, DO      hydrALAZINE  25 mg Oral Q8H [address] Agustina Revankar, DO      losartan  100 mg Oral Daily Agustina Revankar, DO      metoprolol  2 5 mg Intravenous Q6H PRN Agustina Revankar, DO      metroNIDAZOLE  500 mg Intravenous Q8H Agustina Revankar,  mg (02/12/21 1041)    polyethylene glycol  17 g Oral Daily Agustina Revankar, DO      potassium chloride  20 mEq Intravenous Once Agustina Revankar, DO 20 mEq (02/12/21 1304)    Followed by   Angelita Slipper potassium chloride  20 mEq Intravenous Once Agustina Revankar, DO      sodium chloride  1 g Oral TID With Meals Agustina Revankar, DO            Today, Patient Was Seen By: Lex Gaspar DO    ** Please Note: "This note has been constructed using a voice recognition system  Therefore there may be syntax, spelling, and/or grammatical errors   Please call if you have any questions  "**

## 2021-02-12 NOTE — CASE MANAGEMENT
CM also discussed [facility] and [facility] with patients niece Aleksandra Fry and she states she does not want her aunt to go to a SNF  She is agreeable;e to [facility]  They have used [facility] and [facility] in the pasr  She was provided choice and she would like to go with [facility] and a referral was sent  She also states she will hire private aids if she needs more help

## 2021-02-12 NOTE — OCCUPATIONAL THERAPY NOTE
Occupational Therapy Screen       02/12/21 1457   Note Type   Note type Screen   Cancel Reasons   (Patient at baseline)   Assessment   Assessment Orders received  Chart reviewed  per review, patient is home with 24 hour care from family, mostly bedbound, sometimes transfers to wheelchair, does not transfer to toilet, uses bedpan and has total assist for ADLs from family  Patient is currently at baseline functional status for ADLs  No skilled inpatient OT services indicated at this time  Spoke with REBEL Real and notified her of the same  Recommend patient return home with previous level of assist from family   OT orders to be discharged   Licensure   [address] Number  [place], [place] [id number]

## 2021-02-12 NOTE — PHYSICAL THERAPY NOTE
PT EVALUATION       02/12/21 1344   Note Type   Note type Evaluation   Pain Assessment   Pain Assessment Tool FLACC   Pain Rating: FLACC (Rest) - Face 0   Pain Rating: FLACC (Rest) - Legs 0   Pain Rating: FLACC (Rest) - Activity 0   Pain Rating: FLACC (Rest) - Cry 0   Pain Rating: FLACC (Rest) - Consolability 0   Score: FLACC (Rest) 0   Pain Rating: FLACC (Activity) - Face 1   Pain Rating: FLACC (Activity) - Legs 1   Pain Rating: FLACC (Activity) - Activity 1   Pain Rating: FLACC (Activity) - Cry 1   Pain Rating: FLACC (Activity) - Consolability 1   Score: FLACC (Activity) 5   Home Living   Type of [address]   (has elevator)   Home Equipment Walker;Cane;Wheelchair-manual  (bedpan, elevator)   Prior Function   Level of [place]   (pivots with assist to [facility], uses bedpan)   Lives With   (niece)   Receives Help From Family   ADL Assistance Needs assistance   Restrictions/Precautions   Other Precautions Chair Alarm; Bed Alarm;Cognitive; Fall Risk   General   Additional Pertinent History Pt admitted with abdominal pain  Cognition   Overall Cognitive Status Impaired   Arousal/Participation Cooperative   Orientation Level Oriented to person;Disoriented to place; Disoriented to time;Disoriented to situation   Following Commands Follows one step commands inconsistently   RLE Assessment   RLE Assessment   (limited flexion knees and hips, AROM noted in LEs however pt unable to follow MMT commands)   LLE Assessment   LLE Assessment   (limited flexion knees and hips, AROM noted in LEs however pt unable to follow MMT)   Bed Mobility   Supine to Sit 2  Maximal assistance   Transfers   Sit to Stand 2  Maximal assistance   Stand to Sit 2  Maximal assistance   Stand pivot 2  Maximal assistance   Additional items   (with walker)   Ambulation/Elevation   Gait Assistance 1  Dependent   Balance   Static Sitting Poor   Dynamic Sitting Poor   Static Standing Poor   Activity Tolerance   Activity Tolerance Patient limited by pain; Patient limited by fatigue   Assessment   Prognosis Good   Problem List Decreased strength;Decreased range of motion;Decreased endurance; Impaired balance;Decreased mobility; Decreased cognition;Pain   Assessment Patient seen for Physical Therapy evaluation  Patient admitted with Elevated LFTs  Comorbidities affecting patient's physical performance include: gait dysfunction, [place], vertigo, afib, back fx  Personal factors affecting patient at time of initial evaluation include: inability to ambulate household distances and decreased cognition  Prior to admission, patient was requiring assist for functional mobility with walker to [facility], non ambulatory   Please find objective findings from Physical Therapy assessment regarding body systems outlined above with impairments and limitations including weakness, impaired balance, pain, decreased activity tolerance, decreased functional mobility tolerance, fall risk and decreased cognition  The Barthel Index was used as a functional outcome tool presenting with a score of 5 today indicating marked limitations of functional mobility and ADLS  Patient's clinical presentation is currently unstable/unpredictable as seen in patient's presentation of changing level of pain, varying levels of cognitive performance, increased fall risk, new onset of impairment of functional mobility and new onset of weakness  Pt would benefit from continued Physical Therapy treatment to address deficits as defined above and maximize level of functional mobility  As demonstrated by objective findings, the assigned level of complexity for this evaluation is high  The patient's AM-[facility] Basic Mobility Inpatient Short Form Raw Score is 6, Standardized Score is     A standardized score less than 42 9 suggests the patient may benefit from discharge to post-acute rehabilitation services, however pt is near baseline level of function as pt was non ambulatory PTA and her family cares for her so recommend home with family upon DC with home PT  Goals   Patient Goals unable to state due to dementia   STG Expiration Date 02/19/21   Short Term Goal #1 improve bed mobility to mod assist, pt will transfer bed to chair with mod assist   LTG Expiration Date 02/26/21   Long Term Goal #1 improve transfers bed to chair to min assist, improve sitting balance to at least fair + so pt can safely sit at the edge of the bed   Plan   Treatment/Interventions Functional transfer training;LE strengthening/ROM; Therapeutic exercise; Endurance training;Bed mobility; Equipment eval/education;Patient/family training   PT Frequency 5x/wk   Recommendation   PT Discharge Recommendation Home with skilled therapy; Other (Comment)  (24 hour assist by family)   Equipment Recommended   (pt has all DME needed)   AM-PAC Basic Mobility Inpatient   Turning in Bed Without Bedrails 1   Lying on Back to Sitting on Edge of Flat Bed 1   Moving Bed to Chair 1   Standing Up From Chair 1   Walk in Room 1   Climb 3-5 Stairs 1   Basic Mobility Inpatient Raw Score 6   Turning Head Towards Sound 2   Follow Simple Instructions 2   Low Function Basic Mobility Raw Score 10   Low Function Basic Mobility Standardized Score 14 65   Barthel Index   Feeding 0   Bathing 0   Grooming Score 0   Dressing Score 0   Bladder Score 0   Bowels Score 0   Toilet Use Score 0   Transfers (Bed/Chair) Score 5   Mobility (Level Surface) Score 0   Stairs Score 0   Barthel Index Score 5   Licensure   [place] License Number  Lady Zeng PT [id number]       Time In:1330  Time NZW:2923  Total Time: 13      S:  "My back is sore a lot"  O:  Max assist and increased time to stand from an armchair  Pt stood with mod assist x 1 5 minutes with UE support on walker  Pt encouraged to try to walk but declined  Pt returned to chair with alarm on, RN aware pt OOB  A:  Pt is generally weak and also confused    Anticipate pt will improve at least some functionally with return to previous activity level and home environment    P:  Continue PT, recommend home PT    Gm Nichols PT

## 2021-02-12 NOTE — CASE MANAGEMENT
LOS: 1 DAY  UNPLANNED RA RISK SCORE: 12  RA: NO  BUNDLE: NO    CM called patients niece/POA Lexis Jaime [phone] and discussed discharge planning and the role of CM  She states patient lives in a house with her and her son whom are home with her FT  She is never home alone  There is a ramp to enter the home and a total of 16 steps to get to her bedroom  Patient is pretty much bed bound and wc bound  Patient needs assistance with all ADL's and needs to use a bed pan  She does not use the toilet  Her niece is her FT care giver  She states patient has a walker and a cane but has not been able to use them due to back problems  Patient has not driven in a car in a few months because of back pain  They transport her via [place] van to appointments  Patient has medication coverage and uses the [facility] on [address] [facility]  Her PCP is Dr L[facility]pez [facility]  Patient has a h/o depression and anxiety but has not been treated for it  No h/o D&A issues  Patient has a POA and a LW which her niece states she recently brought to the hospital  Patients steven De Anda is her POA  IMM discussed and she verbally states understanding  CM will need to set up BLS transport at [place]  CM to follow

## 2021-02-13 VITALS
HEIGHT: 60 IN | DIASTOLIC BLOOD PRESSURE: 64 MMHG | BODY MASS INDEX: 23.11 KG/M2 | WEIGHT: 117.73 LBS | HEART RATE: 61 BPM | SYSTOLIC BLOOD PRESSURE: 159 MMHG | TEMPERATURE: 97.8 F | OXYGEN SATURATION: 95 % | RESPIRATION RATE: 15 BRPM

## 2021-02-13 LAB
ALBUMIN SERPL BCP-MCNC: 3 G/DL (ref 3.5–5)
ALP SERPL-CCNC: 208 U/L (ref 46–116)
ALT SERPL W P-5'-P-CCNC: 66 U/L (ref 12–78)
ANION GAP SERPL CALCULATED.3IONS-SCNC: 11 MMOL/L (ref 4–13)
AST SERPL W P-5'-P-CCNC: 59 U/L (ref 5–45)
BILIRUB SERPL-MCNC: 0.5 MG/DL (ref 0.2–1)
BUN SERPL-MCNC: 14 MG/DL (ref 5–25)
CALCIUM ALBUM COR SERPL-MCNC: 9.5 MG/DL (ref 8.3–10.1)
CALCIUM SERPL-MCNC: 8.7 MG/DL (ref 8.3–10.1)
CHLORIDE SERPL-SCNC: 100 MMOL/L (ref 100–108)
CO2 SERPL-SCNC: 24 MMOL/L (ref 21–32)
CREAT SERPL-MCNC: 0.72 MG/DL (ref 0.6–1.3)
ERYTHROCYTE [DISTWIDTH] IN BLOOD BY AUTOMATED COUNT: 12.9 % (ref 11.6–15.1)
GFR SERPL CREATININE-BSD FRML MDRD: 72 ML/MIN/1.73SQ M
GLUCOSE SERPL-MCNC: 102 MG/DL (ref 65–140)
HCT VFR BLD AUTO: 40.5 % (ref 34.8–46.1)
HGB BLD-MCNC: 13.3 G/DL (ref 11.5–15.4)
MCH RBC QN AUTO: 34.3 PG (ref 26.8–34.3)
MCHC RBC AUTO-ENTMCNC: 32.8 G/DL (ref 31.4–37.4)
MCV RBC AUTO: 104 FL (ref 82–98)
PLATELET # BLD AUTO: 266 THOUSANDS/UL (ref 149–390)
PMV BLD AUTO: 9.3 FL (ref 8.9–12.7)
POTASSIUM SERPL-SCNC: 3.9 MMOL/L (ref 3.5–5.3)
PROT SERPL-MCNC: 6.6 G/DL (ref 6.4–8.2)
RBC # BLD AUTO: 3.88 MILLION/UL (ref 3.81–5.12)
SODIUM SERPL-SCNC: 135 MMOL/L (ref 136–145)
WBC # BLD AUTO: 9.39 THOUSAND/UL (ref 4.31–10.16)

## 2021-02-13 PROCEDURE — 80053 COMPREHEN METABOLIC PANEL: CPT | Performed by: FAMILY MEDICINE

## 2021-02-13 PROCEDURE — 99222 1ST HOSP IP/OBS MODERATE 55: CPT | Performed by: SURGERY

## 2021-02-13 PROCEDURE — 85027 COMPLETE CBC AUTOMATED: CPT | Performed by: FAMILY MEDICINE

## 2021-02-13 PROCEDURE — 99239 HOSP IP/OBS DSCHRG MGMT >30: CPT | Performed by: FAMILY MEDICINE

## 2021-02-13 RX ORDER — HYDRALAZINE HYDROCHLORIDE 25 MG/1
25 TABLET, FILM COATED ORAL 3 TIMES DAILY
Qty: 60 TABLET | Refills: 0 | Status: CANCELLED
Start: 2021-02-13

## 2021-02-13 RX ORDER — POLYETHYLENE GLYCOL 3350 17 G/17G
8.5 POWDER, FOR SOLUTION ORAL DAILY
Start: 2021-02-13

## 2021-02-13 RX ORDER — ACETAMINOPHEN 325 MG/1
650 TABLET ORAL EVERY 6 HOURS PRN
Start: 2021-02-13

## 2021-02-13 RX ORDER — HYDRALAZINE HYDROCHLORIDE 25 MG/1
25 TABLET, FILM COATED ORAL 3 TIMES DAILY
Qty: 60 TABLET | Refills: 0
Start: 2021-02-13 | End: 2021-03-04

## 2021-02-13 RX ADMIN — HYDRALAZINE HYDROCHLORIDE 25 MG: 25 TABLET, FILM COATED ORAL at 14:42

## 2021-02-13 RX ADMIN — HYDRALAZINE HYDROCHLORIDE 25 MG: 25 TABLET, FILM COATED ORAL at 05:47

## 2021-02-13 RX ADMIN — AMIODARONE HYDROCHLORIDE 100 MG: 200 TABLET ORAL at 08:47

## 2021-02-13 RX ADMIN — Medication 1000 UNITS: at 08:47

## 2021-02-13 RX ADMIN — CARVEDILOL 6.25 MG: 6.25 TABLET, FILM COATED ORAL at 08:48

## 2021-02-13 RX ADMIN — Medication 1 G: at 08:48

## 2021-02-13 RX ADMIN — CARVEDILOL 6.25 MG: 6.25 TABLET, FILM COATED ORAL at 16:42

## 2021-02-13 RX ADMIN — ENOXAPARIN SODIUM 40 MG: 40 INJECTION SUBCUTANEOUS at 08:47

## 2021-02-13 RX ADMIN — LOSARTAN POTASSIUM 100 MG: 50 TABLET, FILM COATED ORAL at 08:47

## 2021-02-13 RX ADMIN — AMIODARONE HYDROCHLORIDE 100 MG: 200 TABLET ORAL at 16:42

## 2021-02-13 RX ADMIN — METRONIDAZOLE 500 MG: 500 INJECTION, SOLUTION INTRAVENOUS at 12:24

## 2021-02-13 RX ADMIN — POLYETHYLENE GLYCOL 3350 17 G: 17 POWDER, FOR SOLUTION ORAL at 08:45

## 2021-02-13 RX ADMIN — ASPIRIN 81 MG: 81 TABLET, COATED ORAL at 08:47

## 2021-02-13 RX ADMIN — METRONIDAZOLE 500 MG: 500 INJECTION, SOLUTION INTRAVENOUS at 02:59

## 2021-02-13 RX ADMIN — CYANOCOBALAMIN TAB 500 MCG 500 MCG: 500 TAB at 08:48

## 2021-02-13 RX ADMIN — Medication 1 G: at 16:42

## 2021-02-13 RX ADMIN — Medication 1 G: at 12:23

## 2021-02-13 NOTE — NURSING NOTE
Patient discharged via transport with [facility] to home in [place]  IV taken out, all belongings sent with patient  Script for home health aid sent with patient  Patient's medication from pharmacy sent home as well  AVS reviewed with steven  All questions and concerns answered

## 2021-02-13 NOTE — DISCHARGE SUMMARY
Discharge Summary - [facility] Internal Medicine    Patient Information: Sabrina Williamson 80 y o  female MRN: [id number]  Unit/Bed#: [address] [facility] A Encounter: [id number]    Discharging Physician / Practitioner: Debra Frey DO  PCP: John Roberts MD  Admission Date: 2/10/2021  Discharge Date: 02/13/21    Reason for Admission: No chief complaint on file  Discharge Diagnoses:     Principal Problem:    Elevated LFTs  Active Problems:    Paroxysmal atrial fibrillation (HCC)    Hyponatremia    Essential hypertension    Other hyperlipidemia  Resolved Problems:    * No resolved hospital problems  *        * Elevated LFTs  Assessment & Plan  Patient was admitted to [facility] with atypical chest pain/epigastric pain  Troponins negative  Patient noted to have elevated LFTs on lab work and right upper quadrant ultrasound showed gallbladder sludge with gallbladder wall thickening and possible tiny stones  Patient also with leukocytosis on lab work which has normalized  UA was ordered but not obtained however patient has received 4 days of Rocephin already  Patient was evaluated by surgery there who recommended transfer here for GI evaluation  Patient was on IV Rocephin and Flagyl while here but no indication for antibiotics on discharge per GI and surgery  LFTs normalized except for alk-phos and AST  MRI/MRCP showed slightly dilated CBD but no choledocholithiasis  Gallbladder sludge was seen and right adnexal cyst  HIDA scan showed patent cystic duct  No further intervention per GI and surgery  Advanced diet patient tolerating without difficulty    Patient with some dental issues for which she needs to see her dentist and after speaking with niece, recommended to be on dental soft  Repeat lab work after discharge and follow up with PCP    Other hyperlipidemia  Assessment & Plan  Holding statin due to elevated LFTs  Repeat lab work as outpatient and follow up with PCP to see if statin can be restarted    Essential hypertension  Assessment & Plan  Continue Avapro Coreg  Blood pressures were not well controlled  Increased hydralazine to t i d  Dosing  These reported that patient has blood pressures also tend to run high at times at home  Discussed with niece that if blood pressures are low while at home, she can be decreased down to her b i d  Hydralazine dosing as she was using    Hyponatremia  Assessment & Plan  Patient with history of chronic hyponatremia  Continue sodium chloride tablets  Off IVF  Paroxysmal atrial fibrillation [facility])  Assessment & Plan  Continue Coreg, Pacerone as she uses at home  Patient not on any anticoagulation at home      Consultations During Hospital Stay:  [facility] [address] TO GASTROENTEROLOGY    Procedures Performed:     · None    Significant Findings:     · Refer to hospital course and above listed diagnosis related plan for details    Imaging while in hospital:    Xr Chest 1 View Portable    Result Date: 2/9/2021  Narrative: CHEST INDICATION:  Abdominal pain, hypertension  COMPARISON:  11/30/2018, CT chest, abdomen and pelvis 7/13/2019 EXAM PERFORMED/VIEWS:  XR CHEST PORTABLE FINDINGS: Cardiomediastinal silhouette appears unremarkable  The lungs are clear  Calcified granuloma right upper lung zone  No pneumothorax or pleural effusion  Osseous structures appear within normal limits for patient age  Impression: No acute cardiopulmonary disease  Workstation performed: [id number]     Nm Hepatobiliary    Result Date: 2/11/2021  Narrative: HEPATOBILIARY SCAN INDICATION: Transaminitis, RUQ pain, leukocytosis  Right upper quadrant pain COMPARISON:  MRI abdomen 2/11/2021, ultrasound 2/9/2021 TECHNIQUE:   Following the intravenous administration of 5 0 mCi Tc-99m labeled mebrofenin, dynamic abdominal imaging was obtained in the anterior projection over a 60 minute time period initially   FINDINGS:  There is prompt, uniform accumulation with normal clearance of the radiopharmaceutical by the liver  There is normal filling of the biliary ducts with excretion into the small bowel  Gallbladder is not seen within the first hour  Additional 1 0 mCi technetium 99 Choletec given at 2 hours after initial injection  Additional images acquired at 2 hours and 4 hours  Gallbladder is beginning to fill at 2 hours more fully seen at 4 hours  Impression: 1  Gallbladder visualization compatible with a patent cystic duct  Workstation performed: [id number]     Us Gallbladder    Result Date: 2/9/2021  Narrative: RIGHT UPPER QUADRANT ULTRASOUND INDICATION:     Chest and back pain  COMPARISON: 2/9/2021 TECHNIQUE:   Real-time ultrasound of the right upper quadrant was performed with a curvilinear transducer with both volumetric sweeps and still imaging techniques  FINDINGS: PANCREAS:  Visualized portions of the pancreas demonstrate normal echogenicity  AORTA AND IVC:  Visualized portions are normal for patient age  LIVER: Size:  Within normal range  The liver measures 13 cm in the midclavicular line  Contour:  Surface contour is smooth  Parenchyma:  Normal echogenicity and echotexture  No evidence of mass  Limited imaging of the main portal vein shows it to be patent and hepatopetal   BILIARY: Partially distended gallbladder  Sludge and possible tiny stones in the dependent portion of the fundus  Gallbladder wall thickening of 7 mm  Absent White's sign  No intrahepatic biliary dilatation  Common bile duct measures 7 mm at the mary hepatis  No choledocholithiasis  KIDNEY: Right kidney measures 10 4 cm  Normal cortical thickness and echogenicity  Subcentimeter simple cyst  No hydronephrosis  ASCITES:   None  Impression: Gallbladder sludge and possible tiny stones with evidence of gallbladder wall thickening though absent White's sign  If there is persistent concern for acute cholecystitis, HIDA scan may be helpful   Workstation performed: [id number]     Mri Abdomen Wo Contrast And Mrcp    Result Date: 2/11/2021  Narrative: MRI OF THE ABDOMEN WITHOUT CONTRAST WITH MRCP INDICATION:  Elevated LFTs, abnormal gallbladder on ultrasound COMPARISON:  5/30/2020, 2/9/2021 TECHNIQUE:  MRI of the abdomen was performed without the administration of contrast using the following  using sequences: Axial T1 weighted in/out of phase images, multiplanar T2 weighted images, diffusion weighted and fat suppressed T1 weighted images  3D MRCP images were obtained with radial thick slabs and projections  3D rendering was performed from the acquisition scanner  Imaging performed on 1 5T MRI FINDINGS: LOWER CHEST:   Unremarkable  LIVER:  Normal in size and configuration  No suspicious mass  BILE DUCTS:  No intrahepatic or extrahepatic bile duct dilation  The common bile duct is slightly dilated measuring 8 mm  There is tortuosity distally  No choledocholithiasis, biliary stricture or suspicious mass  GALLBLADDER:  There is layering gallbladder sludge and mild wall thickening  PANCREAS:  Normal  No main pancreatic ductal dilation  ADRENAL GLANDS:  Normal  SPLEEN:  Normal  KIDNEYS/PROXIMAL URETERS:  No hydroureteronephrosis  No suspicious renal mass  There are multiple small left peripelvic cysts  There is a small cortical cyst on the right  BOWEL:  No dilated loops of bowel  PERITONEAL CAVITY/RETROPERITONEUM:  No ascites  No mass  LYMPH NODES:  No abdominal lymphadenopathy  VASCULAR STRUCTURES:  Unremarkable  No aneurysm  ABDOMINAL WALL:  Unremarkable  OSSEOUS STRUCTURES:  No suspicious osseous lesion  VISUALIZED PELVIS: There is a 5 4 cm adnexal cyst partially imaged  This measured 7 4 cm in total diameter on the previous CT  Impression: 1  Slightly dilated common bile duct with mild tortuosity distally though no evidence of choledocholithiasis or obstructing lesion  2   Gallbladder sludge and wall thickening is seen on ultrasound   3   Partially imaged right adnexal cyst  Workstation performed: [id number]     Cta Ed Chest Pe Study    Result Date: 2/9/2021  Narrative: CTA - CHEST WITH IV CONTRAST - PULMONARY ANGIOGRAM INDICATION:   PE suspected, intermediate prob, positive D-dimer right sided cp, sob  COMPARISON: X-ray from earlier today and CT dated 7/13/2019  TECHNIQUE: CTA examination of the chest was performed using angiographic technique according to a protocol specifically tailored to evaluate for pulmonary embolism  Axial, sagittal, and coronal 2D reformatted images were created from the source data and  submitted for interpretation  In addition, coronal 3D MIP postprocessing was performed on the acquisition scanner  Radiation dose length product (DLP) for this visit:  188 4 mGy-cm   This examination, like all CT scans performed in the [facility], was performed utilizing techniques to minimize radiation dose exposure, including the use of iterative reconstruction and automated exposure control  IV Contrast:  82 mL of iohexol (OMNIPAQUE)  FINDINGS: PULMONARY ARTERIAL TREE:  No pulmonary embolus is seen  Stable enlarged main pulmonary artery suggesting pulmonary hypertension  LUNGS:  Stable bilateral upper lobe scarring and bronchiectasis  Mild bibasilar atelectasis or scarring is again seen  Stable calcified granulomas  No suspicious lung nodule, mass or consolidation  There is no tracheal or endobronchial lesion  PLEURA:  Unremarkable  HEART/GREAT VESSELS:  Atherosclerotic changes are noted in thoracic aorta and coronary arteries  Heart is enlarged  No pericardial effusion  No thoracic aneurysm  MEDIASTINUM AND JONES:  Small hiatal hernia noted  No mediastinal or hilar lymphadenopathy  CHEST WALL AND LOWER NECK:   Unremarkable  VISUALIZED STRUCTURES IN THE UPPER ABDOMEN:  Unremarkable  OSSEOUS STRUCTURES:  No acute osseous abnormality  Degenerative changes  Thoracic kyphosis  Mild chronic compression deformity at T11 is stable compared with x-ray dated 9/17/2020  Osteopenia  Impression: No acute pulmonary embolism  Cardiomegaly  Enlarged main pulmonary artery suggesting pulmonary hypertension  Workstation performed: [id number]       Incidental Findings:   · right adnexal cyst  · Enlarged pulmonary artery    Test Results Pending at Discharge (will require follow up):   · As per After Visit Summary     Outpatient Tests Requested:  · CMP    Complications:  Refer to hospital course and above listed diagnosis related plan, if any    Hospital Course:     Justo Dunbar is a 80 y o  female patient who originally presented to the hospital on 2/10/2021 as a transfer from [facility] in hospital due to elevated LFTs along with fever, leukocytosis  She was initially admitted there with midsternal/epigastric area pain radiating to the right upper quadrant  troponins were negative  patient was seen by surgery there and transferred here for further evaluation/management  Patient was admitted and seen by both GI and surgery while here  She underwent workup which was essentially negative  Abdominal pain improved and patient cleared by all consultants involved in her care prior to discharge  Discharge plan discussed with patient and her niece Keke Crawford over the phone    Please see above list of diagnoses and related plan for additional information  Condition at Discharge: stable     Discharge Day Visit / Exam:     Subjective:  Denies any abdominal pain, nausea, vomiting  Looking forward to going home    Vitals: Blood Pressure: 155/63 (02/13/21 0803)  Pulse: 68 (02/13/21 0803)  Temperature: 98 4 °F (36 9 °C) (02/13/21 0803)  Temp Source: Oral (02/11/21 0750)  Respirations: 15 (02/12/21 2231)  Height: 5' (152 4 cm) (02/10/21 2147)  Weight - Scale: 53 4 kg (117 lb 11 6 oz) (02/10/21 2147)  SpO2: 97 % (02/13/21 0803)  Exam:   Physical Exam  Constitutional:       General: She is not in acute distress  Appearance: She is not diaphoretic     HENT:      Head: Normocephalic and atraumatic  Eyes:      General:         Right eye: No discharge  Left eye: No discharge  Cardiovascular:      Rate and Rhythm: Normal rate and regular rhythm  Pulmonary:      Effort: Pulmonary effort is normal  No respiratory distress  Breath sounds: Normal breath sounds  No wheezing or rales  Abdominal:      General: Bowel sounds are normal  There is no distension  Palpations: Abdomen is soft  Tenderness: There is no abdominal tenderness  Musculoskeletal:      Right lower leg: No edema  Left lower leg: No edema  Neurological:      Mental Status: She is alert  Comments: Oriented to self and place, tangential   Psychiatric:         Mood and Affect: Mood is anxious  Discharge instructions/Information to patient and family:(Discharge Medications and Follow up):   See after visit summary for information provided to patient and family  Provisions for Follow-Up Care:  See after visit summary for information related to follow-up care and any pertinent home health orders  Disposition: Home with [facility]    Planned Readmission:  No     Discharge Statement:  I spent > 30 minutes discharging the patient  This time was spent on the day of discharge  I had direct contact with the patient on the day of discharge  Greater than 50% of the total time was spent examining patient, answering all patient questions, arranging and discussing plan of care with patient as well as directly providing post-discharge instructions  Additional time then spent on discharge activities  Discharge Medications:  See after visit summary for reconciled discharge medications provided to patient and family  ** Please Note: "This note has been constructed using a voice recognition system  Therefore there may be syntax, spelling, and/or grammatical errors   Please call if you have any questions  "**

## 2021-02-13 NOTE — CASE MANAGEMENT
Patient is for tentative dc today  CM spoke with patients steven Ramos and confirmed patient will return home with her and KRISTI  CM will set up BLS transport once dc'd

## 2021-02-13 NOTE — PLAN OF CARE
Problem: Potential for Falls  Goal: Patient will remain free of falls  Description: INTERVENTIONS:  - Assess patient frequently for physical needs  -  Identify cognitive and physical deficits and behaviors that affect risk of falls    -  [place] fall precautions as indicated by assessment   - Educate patient/family on patient safety including physical limitations  - Instruct patient to call for assistance with activity based on assessment  - Modify environment to reduce risk of injury  - Consider OT/PT consult to assist with strengthening/mobility  2/12/2021 2017 by Gabby Hager RN  Outcome: Progressing  2/12/2021 0709 by Gabby Hager RN  Outcome: Progressing     Problem: PAIN - ADULT  Goal: Verbalizes/displays adequate comfort level or baseline comfort level  Description: Interventions:  - Encourage patient to monitor pain and request assistance  - Assess pain using appropriate pain scale  - Administer analgesics based on type and severity of pain and evaluate response  - Implement non-pharmacological measures as appropriate and evaluate response  - Consider cultural and social influences on pain and pain management  - Notify physician/advanced practitioner if interventions unsuccessful or patient reports new pain  2/12/2021 2017 by Gabby Hager RN  Outcome: Progressing  2/12/2021 0709 by Gabby Hager RN  Outcome: Progressing     Problem: Prexisting or High Potential for Compromised Skin Integrity  Goal: Skin integrity is maintained or improved  Description: INTERVENTIONS:  - Identify patients at risk for skin breakdown  - Assess and monitor skin integrity  - Assess and monitor nutrition and hydration status  - Monitor labs   - Assess for incontinence   - Turn and reposition patient  - Assist with mobility/ambulation  - Relieve pressure over bony prominences  - Avoid friction and shearing  - Provide appropriate hygiene as needed including keeping skin clean and dry  - Evaluate need for skin moisturizer/barrier cream  - Collaborate with interdisciplinary team   - Patient/family teaching  - Consider wound care consult   2/12/2021 2017 by Jefferson Ruby, RN  Outcome: Progressing  2/12/2021 0709 by Jefferson Ruby, RN  Outcome: Progressing

## 2021-02-13 NOTE — PLAN OF CARE
Problem: Potential for Falls  Goal: Patient will remain free of falls  Description: INTERVENTIONS:  - Assess patient frequently for physical needs  -  Identify cognitive and physical deficits and behaviors that affect risk of falls    -  [place] fall precautions as indicated by assessment   - Educate patient/family on patient safety including physical limitations  - Instruct patient to call for assistance with activity based on assessment  - Modify environment to reduce risk of injury  - Consider OT/PT consult to assist with strengthening/mobility  Outcome: Progressing     Problem: PAIN - ADULT  Goal: Verbalizes/displays adequate comfort level or baseline comfort level  Description: Interventions:  - Encourage patient to monitor pain and request assistance  - Assess pain using appropriate pain scale  - Administer analgesics based on type and severity of pain and evaluate response  - Implement non-pharmacological measures as appropriate and evaluate response  - Consider cultural and social influences on pain and pain management  - Notify physician/advanced practitioner if interventions unsuccessful or patient reports new pain  Outcome: Progressing     Problem: Prexisting or High Potential for Compromised Skin Integrity  Goal: Skin integrity is maintained or improved  Description: INTERVENTIONS:  - Identify patients at risk for skin breakdown  - Assess and monitor skin integrity  - Assess and monitor nutrition and hydration status  - Monitor labs   - Assess for incontinence   - Turn and reposition patient  - Assist with mobility/ambulation  - Relieve pressure over bony prominences  - Avoid friction and shearing  - Provide appropriate hygiene as needed including keeping skin clean and dry  - Evaluate need for skin moisturizer/barrier cream  - Collaborate with interdisciplinary team   - Patient/family teaching  - Consider wound care consult   Outcome: Progressing

## 2021-02-13 NOTE — DISCHARGE INSTRUCTIONS
Hold your cholesterol medication pravastatin (pravachol) for now due to your elevated liver enzymes    Get repeat lab work and follow-up with your primary care doctor to see if it is okay to restart the medication

## 2021-02-13 NOTE — CASE MANAGEMENT
REBEL still has not heard back from [facility]  REBEL called and spoke with [facility]  Requested they call patients nurse with the transport time: Maame EXT: [id number]  REBEL spoke with Maame aware that they will call her with time

## 2021-02-13 NOTE — CASE MANAGEMENT
Patient is discharged  CM called [facility] and spoke with [facility]  Requested BLS transport for 5:30 PM to patients residence tonight  Awaiting cb

## 2021-02-13 NOTE — INCIDENTAL FINDINGS
The following findings require follow up:  Radiographic finding  ·  Finding: right adnexal cyst  Enlarged pulmonary artery    Please notify the following clinician to assist with the follow up:   Your primary care doctor

## 2021-02-15 ENCOUNTER — TELEPHONE (OUTPATIENT)
Dept: LAB | Facility: HOSPITAL | Age: 86
End: 2021-02-15

## 2021-02-15 ENCOUNTER — TRANSITIONAL CARE MANAGEMENT (OUTPATIENT)
Dept: INTERNAL MEDICINE CLINIC | Facility: CLINIC | Age: 86
End: 2021-02-15

## 2021-02-15 DIAGNOSIS — R39.9 URINARY SYMPTOM OR SIGN: Primary | ICD-10-CM

## 2021-02-15 LAB
BACTERIA BLD CULT: NORMAL
BACTERIA BLD CULT: NORMAL

## 2021-02-15 NOTE — PROGRESS NOTES
TCM note completed  Yesterday urine was dark, this morning urine is getting back to normal   She had a temp yesterday of 100 5 and she felt shaky, she gave her 2 doses of tylenol and it came down to 97 9  This morning it was 97 9 again    Her BP has been all over    Yesterday  192/84 afternoon  156/84 400pm  151/69 800pm  147/61 12am  200/99 this morning  197/92 noon

## 2021-02-15 NOTE — PROGRESS NOTES
Urine test ordered, can give sample any time  Make sure she is drinking enough water  Continue to check BP twice a day, about 1-2 hrs after she takes her pills

## 2021-02-16 ENCOUNTER — TRANSCRIBE ORDERS (OUTPATIENT)
Dept: LAB | Facility: CLINIC | Age: 86
End: 2021-02-16

## 2021-02-16 DIAGNOSIS — R39.9 URINARY SYMPTOM OR SIGN: Primary | ICD-10-CM

## 2021-02-19 ENCOUNTER — LAB (OUTPATIENT)
Dept: LAB | Facility: CLINIC | Age: 86
End: 2021-02-19
Payer: MEDICARE

## 2021-02-19 LAB
BILIRUB UR QL STRIP: NEGATIVE
CLARITY UR: NORMAL
COLOR UR: YELLOW
GLUCOSE UR STRIP-MCNC: NEGATIVE MG/DL
HGB UR QL STRIP.AUTO: NEGATIVE
KETONES UR STRIP-MCNC: NEGATIVE MG/DL
LEUKOCYTE ESTERASE UR QL STRIP: NEGATIVE
NITRITE UR QL STRIP: NEGATIVE
PH UR STRIP.AUTO: 7 [PH]
PROT UR STRIP-MCNC: NEGATIVE MG/DL
SP GR UR STRIP.AUTO: 1.01 (ref 1–1.03)
UROBILINOGEN UR QL STRIP.AUTO: 0.2 E.U./DL

## 2021-02-19 PROCEDURE — 81003 URINALYSIS AUTO W/O SCOPE: CPT

## 2021-02-20 ENCOUNTER — APPOINTMENT (OUTPATIENT)
Dept: LAB | Facility: HOSPITAL | Age: 86
End: 2021-02-20
Payer: MEDICARE

## 2021-02-20 DIAGNOSIS — R79.89 ELEVATED LFTS: ICD-10-CM

## 2021-02-20 LAB
ALBUMIN SERPL BCP-MCNC: 3.3 G/DL (ref 3.5–5)
ALP SERPL-CCNC: 174 U/L (ref 46–116)
ALT SERPL W P-5'-P-CCNC: 54 U/L (ref 12–78)
ANION GAP SERPL CALCULATED.3IONS-SCNC: 7 MMOL/L (ref 4–13)
AST SERPL W P-5'-P-CCNC: 47 U/L (ref 5–45)
BILIRUB SERPL-MCNC: 0.56 MG/DL (ref 0.2–1)
BUN SERPL-MCNC: 5 MG/DL (ref 5–25)
CALCIUM ALBUM COR SERPL-MCNC: 9.5 MG/DL (ref 8.3–10.1)
CALCIUM SERPL-MCNC: 8.9 MG/DL (ref 8.3–10.1)
CHLORIDE SERPL-SCNC: 105 MMOL/L (ref 100–108)
CO2 SERPL-SCNC: 28 MMOL/L (ref 21–32)
CREAT SERPL-MCNC: 0.41 MG/DL (ref 0.6–1.3)
GFR SERPL CREATININE-BSD FRML MDRD: 89 ML/MIN/1.73SQ M
GLUCOSE SERPL-MCNC: 91 MG/DL (ref 65–140)
POTASSIUM SERPL-SCNC: 3.2 MMOL/L (ref 3.5–5.3)
PROT SERPL-MCNC: 6.5 G/DL (ref 6.4–8.2)
SODIUM SERPL-SCNC: 140 MMOL/L (ref 136–145)

## 2021-02-20 PROCEDURE — 80053 COMPREHEN METABOLIC PANEL: CPT

## 2021-02-20 PROCEDURE — 36415 COLL VENOUS BLD VENIPUNCTURE: CPT

## 2021-02-23 ENCOUNTER — TELEMEDICINE (OUTPATIENT)
Dept: INTERNAL MEDICINE CLINIC | Facility: CLINIC | Age: 86
End: 2021-02-23
Payer: MEDICARE

## 2021-02-23 DIAGNOSIS — E87.1 HYPONATREMIA: Chronic | ICD-10-CM

## 2021-02-23 DIAGNOSIS — I10 ESSENTIAL HYPERTENSION: Primary | ICD-10-CM

## 2021-02-23 DIAGNOSIS — S32.030A CLOSED COMPRESSION FRACTURE OF L3 LUMBAR VERTEBRA, INITIAL ENCOUNTER (HCC): ICD-10-CM

## 2021-02-23 DIAGNOSIS — R07.89 OTHER CHEST PAIN: ICD-10-CM

## 2021-02-23 DIAGNOSIS — R79.89 ELEVATED LFTS: ICD-10-CM

## 2021-02-23 DIAGNOSIS — I48.0 PAROXYSMAL ATRIAL FIBRILLATION (HCC): ICD-10-CM

## 2021-02-23 DIAGNOSIS — R26.2 AMBULATORY DYSFUNCTION: ICD-10-CM

## 2021-02-23 DIAGNOSIS — E78.49 OTHER HYPERLIPIDEMIA: ICD-10-CM

## 2021-02-23 PROCEDURE — 99495 TRANSJ CARE MGMT MOD F2F 14D: CPT | Performed by: INTERNAL MEDICINE

## 2021-02-23 NOTE — PROGRESS NOTES
Assessment/Plan:        Problem List Items Addressed This Visit        Cardiovascular and Mediastinum    Essential hypertension - Primary     Labile readings  No medication change for now  On irbesartan, carvedilol bid, hydralazine tid  Relevant Orders    Comprehensive metabolic panel    Paroxysmal atrial fibrillation (HCC)     On amiodarone, ASA, carvedilol  Currently asymptomatic  Musculoskeletal and Integument    Closed compression fracture of L3 lumbar vertebra, initial encounter ([facility] [facility] )     Continue using the brace daily, may try to wean off next week  Given Tyelnol prn  Relevant Orders    Ambulatory Referral to [address] [place] [address] Irving Ramirez       Other    Ambulatory dysfunction     Start home PT  Relevant Orders    Ambulatory Referral to  Braulio Ramirez    Atypical chest pain acute coronary syndrome versus hepatobiliary disease on the differential     Resolved  U/A normal          Relevant Orders    Ambulatory Referral to  Braulio Ramirez    Elevated LFTs     Repeat labs in 2 weeks, LFTs trending down  Good appetite  Relevant Orders    Ambulatory Referral to Home Health    Hyponatremia (Chronic)     Normal Na 140  Continue NaCl tid  Other hyperlipidemia     LFTs trending down  Restart pravastatin once normal               Discussed palliative care referral since last ER/hospital stay was very stressful for her  Follow up as scheduled or as needed  Reason for visit is hospital f/u  Encounter provider Shaniqua Mix MD       Provider located at [address] [place] [id number]      Recent Visits  No visits were found meeting these conditions     Showing recent visits within past 7 days and meeting all other requirements     Today's Visits  Date Type Provider Dept   02/23/21 Telemedicine Shaniqua Mix, [facility] Internal Med   Showing today's visits and meeting all other requirements     Future Appointments  No visits were found meeting these conditions  Showing future appointments within next 150 days and meeting all other requirements        After connecting through [employer], the patient was identified by name and date of birth  Ludivina Hatch was informed that this is a telemedicine visit and that the visit is being conducted through [employer] and patient was informed that this is a secure, HIPAA-compliant platform  She agrees to proceed     My office door was closed  No one else was in the room  She acknowledged consent and understanding of privacy and security of the video platform  The patient has agreed to participate and understands they can discontinue the visit at any time  Patient is aware this is a billable service  Subjective:     Patient ID: Ludivina Htach is a 80 y o  female here for a hospital f/u  Most of the history from [facility]  She reports Saeed Dominguez has been doing well since coming home from the hospital  She is eating much better, no nausea, vomiting, abdominal pain or chest pain  She says that she was really traumatized with her last ER and hospital visit, needed to be transferred  Previously, she was allowed to stay with her during her stay  Due to the pandemic, this was not possible and she was very confused  She was also upset that she did not get adequate updates  She is not sure if she would like to do that again  Saeed Dominguez has not needed Tylenol, she is using the brace  She has been moving her bowels alright  Her blood pressure remains labile, systolic would range from 94 to 205  Visiting nurse has not been to the house, [facility] is worried about her weakness and mobility  Review of Systems   Constitutional: Positive for appetite change  Negative for fatigue  HENT: Negative for congestion  Eyes: Negative for visual disturbance  Respiratory: Negative for cough and shortness of breath      Cardiovascular: Negative for chest pain  Gastrointestinal: Negative for abdominal pain, constipation, diarrhea, nausea and vomiting  Genitourinary: Negative for dysuria  Musculoskeletal: Positive for arthralgias, back pain and gait problem  Neurological: Negative for dizziness and headaches  Psychiatric/Behavioral: Positive for confusion  Negative for agitation  Objective: There were no vitals filed for this visit  Physical Exam  Constitutional:       General: She is not in acute distress  Appearance: Normal appearance  She is not ill-appearing  HENT:      Head: Normocephalic and atraumatic  Mouth/Throat:      Mouth: Mucous membranes are moist    Eyes:      Pupils: Pupils are equal, round, and reactive to light  Pulmonary:      Effort: Pulmonary effort is normal  No respiratory distress  Neurological:      Mental Status: She is alert  Cranial Nerves: No cranial nerve deficit  Psychiatric:         Mood and Affect: Mood normal          Behavior: Behavior normal              Transitional Care Management Review:  Justo Dunbar is a 80 y o  female here for TCM follow up  During the TCM phone call patient stated:    TCM Call (since 1/23/2021)     Date and time call was made  2/15/2021 12:44 PM    Hospital care reviewed  Records reviewed    Patient was hospitialized at  [facility]        Date of Admission  02/10/21    Date of discharge  02/13/21    Diagnosis  Elevated LFTs    Disposition  Home    Were the patients medications reviewed and updated  Yes    Current Symptoms  None      TCM Call (since 1/23/2021)     Post hospital issues  None    Should patient be enrolled in anticoag monitoring? No    Scheduled for follow up?   Yes    Did you obtain your prescribed medications  Yes    Do you need help managing your prescriptions or medications  No    Is transportation to your appointment needed  No    I have advised the patient to call PCP with any new or worsening symptoms  Dsah Nice I spent 20 minutes with the patient during this visit  Kristi Larsen MD    Labs & imaging results reviewed with patient  Reviewed hospital records

## 2021-02-25 ENCOUNTER — DOCUMENTATION (OUTPATIENT)
Dept: SOCIAL WORK | Facility: HOSPITAL | Age: 86
End: 2021-02-25

## 2021-02-25 NOTE — CONSULTS
Consultation - [address] [facility] 80 y o  female MRN: [id number]  Unit/Bed#: [address] [place] [address] A Encounter: [id number]    Assessment/Plan     Split/Shared Statement (No Critical Care Time)     I saw/examined the patient  I agree with the Advanced Practitioner's note with the following additions/exceptions:     I saw patient and agree with below note       Patient is a pleasant 81 yo female  She is a poor historian given dementia  Majority of history comes from her niece and chart       Patient noted over the weekend to have shortness of breath  CTA performed at that time given symptoms with no sign of PE  Subsequently reported to tell family member yesterday that "they should probably call the doctor"though upon direct questioning today patient cannot state that she did that or why       She denies any nausea or abdominal pain currently  No fever/chills       US with thickened GB wall and 7mm CBD  Noted today to have leukocytosis WBC 22       Physical exam:   Physical Exam:  Vital Signs: BP (!) 175/60   Pulse 65   Temp 98 9 °F (37 2 °C)   SpO2 97%   Gen: No acute distress    Eyes: Extraocular motion intact, conjunctiva normal    Neck: Trachea midline, no thyromegaly, No JVD  No cervical tenderness  Pulm: No increased work of breathing    Card:Regular rate    Abd: Soft, non-distended, non-tender, no guarding, no rebound  noted at one point during exam to have some suprapubic tenderness  This was not appreciated on second exam moments later  Ext: No cyanosis or edema bilateral    Neuro: Patient oriented to time and place   Psych: Appropriate affect, no obvious psychosis Baseline confusion and dementia noted  Skin: No rashes Sacral skin examined  No skin breakdown or cause of leukocytosis identified  Rectal: deferred   Spine palpated   Noted to have kyphosis and scoliosis     81 yo F with atypical chest pain, questionable history of abdominal pain, and leukocytosis     - Check UA   - agree with Gi consultation, and MRCP   - continue antibiotics   - follow labs and WBC tomorrow   - will continue to follow closely         Griselda Hassan MD    Consults    REVIEW OF SYSTEMS  Constitutional:  Denies fever or chills   Eyes:  Denies change in visual acuity   HENT:  Denies nasal congestion or sore throat   Respiratory:  + shortness of breath   Cardiovascular:  Denies chest pain or edema history of hypertension  GI:  Denies abdominal pain, nausea, vomiting, bloody stools or diarrhea   :  Denies dysuria, frequency, difficulty in micturition and nocturia  Musculoskeletal:  + BACK PAIN    Neurologic:  Denies headache, focal weakness or sensory changes   Endocrine:  History of diabetes mellitus  Lymphatic:  Denies swollen glands   Psychiatric:  Denies depression or anxiety     Historical Information   Past Medical History:   Diagnosis Date    [facility]     Anxiety     Cheilosis     Diverticulosis     1970s    [facility] of [facility]ac wing [facility] 2016    Fracture, ribs     Gallstone     Hiatal hernia     Hyperlipidemia     Hypertension     Hyponatremia     Inguinal hernia     [facility]     Macrocytosis     Macular degeneration     Osteoarthritis     Ovarian cyst     stable    Renal cyst     Rib fractures 2017    Simple renal cyst     Syncope     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    Umbilical hernia     Vertigo      Past Surgical History:   Procedure Laterality Date    ABDOMINAL SURGERY      HIP SURGERY      At  young age 26's   [facility] KNEE CARTILAGE SURGERY       Social History   Social History     Substance and Sexual Activity   Alcohol Use Not Currently     Social History     Substance and Sexual Activity   Drug Use No     E-Cigarette/Vaping    E-Cigarette Use Never User      E-Cigarette/Vaping Substances    Nicotine No     THC No     CBD No     Flavoring No     Other No     Unknown No      Social History     Tobacco Use   Smoking Status Never Smoker   Smokeless Tobacco Never Used     Family History: non-contributory    Meds/Allergies   all current active meds have been reviewed  Allergies   Allergen Reactions    Codeine     Lomotil [Diphenoxylate]     Mirabegron Hypertension    Quinidex [Quinidine]     Adhesive [Medical Tape] Rash    Penicillins Rash       Objective   First Vitals:   Blood Pressure: 169/62 (02/10/21 1049)  Pulse: 65 (02/10/21 1049)  Temperature: 98 °F (36 7 °C) (02/10/21 1049)  Temp Source: Oral (02/11/21 0750)  Respirations: 18 (02/10/21 1900)  Height: 5' (152 4 cm) (02/10/21 2147)  Weight - Scale: 53 4 kg (117 lb 11 6 oz) (02/10/21 2147)  SpO2: 95 % (02/10/21 1049)    Current Vitals:   Blood Pressure: 159/64 (02/13/21 1442)  Pulse: 61 (02/13/21 1442)  Temperature: 97 8 °F (36 6 °C) (02/13/21 1442)  Temp Source: Oral (02/11/21 0750)  Respirations: 15 (02/12/21 2231)  Height: 5' (152 4 cm) (02/10/21 2147)  Weight - Scale: 53 4 kg (117 lb 11 6 oz) (02/10/21 2147)  SpO2: 95 % (02/13/21 1442)    No intake or output data in the 24 hours ending 02/25/21 1513    Invasive Devices     None                 Physical Exam    Lab Results: I have personally reviewed pertinent lab results  , CBC: No results found for: WBC, HGB, HCT, MCV, PLT, ADJUSTEDWBC, MCH, MCHC, RDW, MPV, NRBC, CMP: No results found for: SODIUM, K, CL, CO2, ANIONGAP, BUN, CREATININE, GLUCOSE, CALCIUM, AST, ALT, ALKPHOS, PROT, BILITOT, EGFR  Imaging: I have personally reviewed pertinent reports  EKG, Pathology, and Other Studies: I have personally reviewed pertinent reports  Counseling / Coordination of Care  Total floor / unit time spent today 30  minutes  Greater than 50% of total time was spent with the patient and / or family counseling and / or coordination of care    A description of the counseling / coordination of care: 30

## 2021-02-26 ENCOUNTER — DOCUMENTATION (OUTPATIENT)
Dept: SOCIAL WORK | Facility: HOSPITAL | Age: 86
End: 2021-02-26

## 2021-02-26 NOTE — PROGRESS NOTES
Admission Report at [facility] has admitted your patient to [facility] service with the following disciplines:      PT  Response needed, please respond via tiger text vs call on work cell phone [phone]  Primary focus of home health care musculoskeletal  Patient stated goals of care strength  to stand and push up from a chair  to A caregiver with mobility education and proper body mechanics to help pt move  Anticipated visit pattern starting week fo 02/28/21  2wk3 and 1wk2    Request for additional disciplines home OT    Potential barriers to goal achievement limited mobility and needs Ax1 to 2 for mobility  pain    Thank you for allowing us to participate in the care of your patient        Maximiliano Shearer, PT

## 2021-03-04 DIAGNOSIS — I10 ESSENTIAL HYPERTENSION: ICD-10-CM

## 2021-03-04 RX ORDER — HYDRALAZINE HYDROCHLORIDE 25 MG/1
25 TABLET, FILM COATED ORAL 3 TIMES DAILY
Qty: 90 TABLET | Refills: 5 | Status: SHIPPED | OUTPATIENT
Start: 2021-03-04 | End: 2021-11-02

## 2021-03-17 DIAGNOSIS — E87.1 HYPONATREMIA: Chronic | ICD-10-CM

## 2021-03-17 RX ORDER — SODIUM CHLORIDE 1000 MG
TABLET, SOLUBLE MISCELLANEOUS
Qty: 90 TABLET | Refills: 5 | Status: SHIPPED | OUTPATIENT
Start: 2021-03-17 | End: 2021-10-18

## 2021-03-18 ENCOUNTER — TELEPHONE (OUTPATIENT)
Dept: LAB | Facility: HOSPITAL | Age: 86
End: 2021-03-18

## 2021-04-01 ENCOUNTER — LAB (OUTPATIENT)
Dept: LAB | Facility: HOSPITAL | Age: 86
End: 2021-04-01
Payer: MEDICARE

## 2021-04-01 DIAGNOSIS — I10 ESSENTIAL HYPERTENSION: ICD-10-CM

## 2021-04-01 LAB
ALBUMIN SERPL BCP-MCNC: 3.3 G/DL (ref 3.5–5)
ALP SERPL-CCNC: 156 U/L (ref 46–116)
ALT SERPL W P-5'-P-CCNC: 21 U/L (ref 12–78)
ANION GAP SERPL CALCULATED.3IONS-SCNC: 5 MMOL/L (ref 4–13)
AST SERPL W P-5'-P-CCNC: 21 U/L (ref 5–45)
BILIRUB SERPL-MCNC: 0.58 MG/DL (ref 0.2–1)
BUN SERPL-MCNC: 11 MG/DL (ref 5–25)
CALCIUM ALBUM COR SERPL-MCNC: 9.5 MG/DL (ref 8.3–10.1)
CALCIUM SERPL-MCNC: 8.9 MG/DL (ref 8.3–10.1)
CHLORIDE SERPL-SCNC: 107 MMOL/L (ref 100–108)
CO2 SERPL-SCNC: 26 MMOL/L (ref 21–32)
CREAT SERPL-MCNC: 0.52 MG/DL (ref 0.6–1.3)
GFR SERPL CREATININE-BSD FRML MDRD: 83 ML/MIN/1.73SQ M
GLUCOSE SERPL-MCNC: 85 MG/DL (ref 65–140)
POTASSIUM SERPL-SCNC: 4.1 MMOL/L (ref 3.5–5.3)
PROT SERPL-MCNC: 6.5 G/DL (ref 6.4–8.2)
SODIUM SERPL-SCNC: 138 MMOL/L (ref 136–145)

## 2021-04-01 PROCEDURE — 36415 COLL VENOUS BLD VENIPUNCTURE: CPT

## 2021-04-01 PROCEDURE — 80053 COMPREHEN METABOLIC PANEL: CPT

## 2021-04-22 ENCOUNTER — IMMUNIZATIONS (OUTPATIENT)
Dept: FAMILY MEDICINE CLINIC | Facility: HOSPITAL | Age: 86
End: 2021-04-22

## 2021-04-22 DIAGNOSIS — Z23 ENCOUNTER FOR IMMUNIZATION: Primary | ICD-10-CM

## 2021-04-22 PROCEDURE — 91300 SARS-COV-2 / COVID-19 MRNA VACCINE (PFIZER-BIONTECH) 30 MCG: CPT

## 2021-04-22 PROCEDURE — 0001A SARS-COV-2 / COVID-19 MRNA VACCINE (PFIZER-BIONTECH) 30 MCG: CPT

## 2021-05-11 ENCOUNTER — TELEPHONE (OUTPATIENT)
Dept: INTERNAL MEDICINE CLINIC | Facility: CLINIC | Age: 86
End: 2021-05-11

## 2021-05-11 DIAGNOSIS — E87.1 HYPONATREMIA: Chronic | ICD-10-CM

## 2021-05-11 DIAGNOSIS — I48.0 PAROXYSMAL ATRIAL FIBRILLATION (HCC): ICD-10-CM

## 2021-05-11 DIAGNOSIS — S32.030A CLOSED COMPRESSION FRACTURE OF L3 LUMBAR VERTEBRA, INITIAL ENCOUNTER (HCC): ICD-10-CM

## 2021-05-11 DIAGNOSIS — I10 ESSENTIAL HYPERTENSION: Primary | ICD-10-CM

## 2021-05-11 DIAGNOSIS — E78.49 OTHER HYPERLIPIDEMIA: ICD-10-CM

## 2021-05-11 DIAGNOSIS — E53.8 VITAMIN B12 DEFICIENCY: ICD-10-CM

## 2021-05-11 NOTE — TELEPHONE ENCOUNTER
Deidra Villafuerte called back  Pt 's Cardiologist is with [facility]   He changed dosage of sodium chloride to 2 tabs instead of 3 on the basis of last blood test

## 2021-05-12 ENCOUNTER — TELEPHONE (OUTPATIENT)
Dept: LAB | Facility: HOSPITAL | Age: 86
End: 2021-05-12

## 2021-05-13 ENCOUNTER — IMMUNIZATIONS (OUTPATIENT)
Dept: FAMILY MEDICINE CLINIC | Facility: HOSPITAL | Age: 86
End: 2021-05-13

## 2021-05-13 DIAGNOSIS — Z23 ENCOUNTER FOR IMMUNIZATION: Primary | ICD-10-CM

## 2021-05-13 PROCEDURE — 0002A SARS-COV-2 / COVID-19 MRNA VACCINE (PFIZER-BIONTECH) 30 MCG: CPT

## 2021-05-13 PROCEDURE — 91300 SARS-COV-2 / COVID-19 MRNA VACCINE (PFIZER-BIONTECH) 30 MCG: CPT

## 2021-05-24 ENCOUNTER — APPOINTMENT (OUTPATIENT)
Dept: LAB | Facility: HOSPITAL | Age: 86
End: 2021-05-24
Payer: MEDICARE

## 2021-05-24 LAB
ALBUMIN SERPL BCP-MCNC: 3.7 G/DL (ref 3.5–5)
ALP SERPL-CCNC: 144 U/L (ref 46–116)
ALT SERPL W P-5'-P-CCNC: 27 U/L (ref 12–78)
ANION GAP SERPL CALCULATED.3IONS-SCNC: 6 MMOL/L (ref 4–13)
AST SERPL W P-5'-P-CCNC: 27 U/L (ref 5–45)
BASOPHILS # BLD AUTO: 0.04 THOUSANDS/ΜL (ref 0–0.1)
BASOPHILS NFR BLD AUTO: 1 % (ref 0–1)
BILIRUB SERPL-MCNC: 0.76 MG/DL (ref 0.2–1)
BUN SERPL-MCNC: 11 MG/DL (ref 5–25)
CALCIUM SERPL-MCNC: 9.2 MG/DL (ref 8.3–10.1)
CHLORIDE SERPL-SCNC: 104 MMOL/L (ref 100–108)
CHOLEST SERPL-MCNC: 148 MG/DL (ref 50–200)
CO2 SERPL-SCNC: 26 MMOL/L (ref 21–32)
CREAT SERPL-MCNC: 0.57 MG/DL (ref 0.6–1.3)
EOSINOPHIL # BLD AUTO: 0.18 THOUSAND/ΜL (ref 0–0.61)
EOSINOPHIL NFR BLD AUTO: 3 % (ref 0–6)
ERYTHROCYTE [DISTWIDTH] IN BLOOD BY AUTOMATED COUNT: 15 % (ref 11.6–15.1)
GFR SERPL CREATININE-BSD FRML MDRD: 80 ML/MIN/1.73SQ M
GLUCOSE SERPL-MCNC: 73 MG/DL (ref 65–140)
HCT VFR BLD AUTO: 42.9 % (ref 34.8–46.1)
HDLC SERPL-MCNC: 48 MG/DL
HGB BLD-MCNC: 14.7 G/DL (ref 11.5–15.4)
IMM GRANULOCYTES # BLD AUTO: 0.05 THOUSAND/UL (ref 0–0.2)
IMM GRANULOCYTES NFR BLD AUTO: 1 % (ref 0–2)
LDLC SERPL CALC-MCNC: 82 MG/DL (ref 0–100)
LYMPHOCYTES # BLD AUTO: 1.69 THOUSANDS/ΜL (ref 0.6–4.47)
LYMPHOCYTES NFR BLD AUTO: 26 % (ref 14–44)
MCH RBC QN AUTO: 37.4 PG (ref 26.8–34.3)
MCHC RBC AUTO-ENTMCNC: 34.3 G/DL (ref 31.4–37.4)
MCV RBC AUTO: 109 FL (ref 82–98)
MONOCYTES # BLD AUTO: 0.7 THOUSAND/ΜL (ref 0.17–1.22)
MONOCYTES NFR BLD AUTO: 11 % (ref 4–12)
NEUTROPHILS # BLD AUTO: 3.84 THOUSANDS/ΜL (ref 1.85–7.62)
NEUTS SEG NFR BLD AUTO: 58 % (ref 43–75)
NONHDLC SERPL-MCNC: 100 MG/DL
NRBC BLD AUTO-RTO: 0 /100 WBCS
PLATELET # BLD AUTO: 277 THOUSANDS/UL (ref 149–390)
PMV BLD AUTO: 10.4 FL (ref 8.9–12.7)
POTASSIUM SERPL-SCNC: 4.2 MMOL/L (ref 3.5–5.3)
PROT SERPL-MCNC: 7.3 G/DL (ref 6.4–8.2)
RBC # BLD AUTO: 3.93 MILLION/UL (ref 3.81–5.12)
SODIUM SERPL-SCNC: 136 MMOL/L (ref 136–145)
TRIGL SERPL-MCNC: 88 MG/DL
TSH SERPL DL<=0.05 MIU/L-ACNC: 1.84 UIU/ML (ref 0.36–3.74)
VIT B12 SERPL-MCNC: 851 PG/ML (ref 100–900)
WBC # BLD AUTO: 6.5 THOUSAND/UL (ref 4.31–10.16)

## 2021-05-24 PROCEDURE — 80053 COMPREHEN METABOLIC PANEL: CPT | Performed by: INTERNAL MEDICINE

## 2021-05-24 PROCEDURE — 85025 COMPLETE CBC W/AUTO DIFF WBC: CPT | Performed by: INTERNAL MEDICINE

## 2021-05-24 PROCEDURE — 82607 VITAMIN B-12: CPT | Performed by: INTERNAL MEDICINE

## 2021-05-24 PROCEDURE — 84443 ASSAY THYROID STIM HORMONE: CPT | Performed by: INTERNAL MEDICINE

## 2021-05-24 PROCEDURE — 36415 COLL VENOUS BLD VENIPUNCTURE: CPT | Performed by: INTERNAL MEDICINE

## 2021-05-24 PROCEDURE — 80061 LIPID PANEL: CPT | Performed by: INTERNAL MEDICINE

## 2021-05-27 ENCOUNTER — OFFICE VISIT (OUTPATIENT)
Dept: INTERNAL MEDICINE CLINIC | Facility: CLINIC | Age: 86
End: 2021-05-27
Payer: MEDICARE

## 2021-05-27 VITALS
SYSTOLIC BLOOD PRESSURE: 142 MMHG | BODY MASS INDEX: 21.99 KG/M2 | DIASTOLIC BLOOD PRESSURE: 74 MMHG | TEMPERATURE: 97.5 F | HEART RATE: 44 BPM | WEIGHT: 112 LBS | OXYGEN SATURATION: 98 % | HEIGHT: 60 IN

## 2021-05-27 DIAGNOSIS — R79.89 ELEVATED LFTS: ICD-10-CM

## 2021-05-27 DIAGNOSIS — E87.1 HYPONATREMIA: Chronic | ICD-10-CM

## 2021-05-27 DIAGNOSIS — S32.030A CLOSED COMPRESSION FRACTURE OF L3 LUMBAR VERTEBRA, INITIAL ENCOUNTER (HCC): Primary | ICD-10-CM

## 2021-05-27 DIAGNOSIS — R41.3 MEMORY CHANGES: ICD-10-CM

## 2021-05-27 PROCEDURE — 99214 OFFICE O/P EST MOD 30 MIN: CPT | Performed by: INTERNAL MEDICINE

## 2021-05-27 NOTE — PROGRESS NOTES
Assessment/Plan:    Essential hypertension  BP normal today  On carvedilol bid, hydralazine tid, irbesartan daily  Hyponatremia  Na 136, taking 2 NaCl  Closed compression fracture of L3 lumbar vertebra, initial encounter ([facility])  Stable, no recent issues  Memory changes  Symptoms worse since last visit, more difficulty with word finding  Chronic constipation  Stable, on Miralax  Macular degeneration  With dry eye,using eye gtts  Other hyperlipidemia  Lipids at goal, on statin  Elevated LFTs  Alk phos remains slightly elevated  Paroxysmal atrial fibrillation (HCC)  On amiodarone, ASA, carvedilol  See cardiology recently  Diagnoses and all orders for this visit:    Closed compression fracture of L3 lumbar vertebra, initial encounter ([facility] [address] )    Hyponatremia  -     Basic metabolic panel; Future    Memory changes    Elevated LFTs      Follow up in 5 months or as needed  Subjective:      Patient ID: Prisca Keys is a 80 y o  female here with Josselyn Traylor for a follow up  Most of the history from low end  She has been doing well, no new issues  She is more frustrated about her memory, has more difficulty finding the words and saying it  This is frustrating her very much  She had some jaw pain a few weeks ago, did see a dentist   She was then referred [facility] for extraction  She worries about this, feels she needs to do it  She has mild pain, does not affect her appetite  she takes almost full dose of MiraLax daily, does move her bowels regularly  She has had no issues with her back, has not needed Tylenol or the back brace  She complains that her left eye with tear frequently, did see the eye doctor  She does use eye drops regularly  She saw her cardiologist recently, was told to decrease her salt tablets      The following portions of the patient's history were reviewed and updated as appropriate: allergies, current medications, past medical history, past social history and problem list     Review of Systems   Constitutional: Negative for activity change, appetite change and fatigue  HENT: Negative for congestion and trouble swallowing  Eyes: Negative for visual disturbance  Respiratory: Negative for cough and shortness of breath  Cardiovascular: Negative for chest pain  Gastrointestinal: Negative for abdominal pain, constipation and diarrhea  Genitourinary: Negative for dysuria, frequency and urgency  Musculoskeletal: Negative for arthralgias and myalgias  Skin: Negative for rash and wound  Neurological: Negative for dizziness, numbness and headaches  Hematological: Does not bruise/bleed easily  Psychiatric/Behavioral: Positive for confusion and decreased concentration  Negative for agitation, behavioral problems and sleep disturbance  The patient is not nervous/anxious  Objective:      /74   Pulse (!) 44   Temp 97 5 °F (36 4 °C)   Ht 5' (1 524 m)   Wt 50 8 kg (112 lb)   SpO2 98%   BMI 21 87 kg/m²          Physical Exam  Vitals signs and nursing note reviewed  Constitutional:       General: She is not in acute distress  Appearance: She is well-developed  HENT:      Head: Normocephalic and atraumatic  Eyes:      Pupils: Pupils are equal, round, and reactive to light  Cardiovascular:      Rate and Rhythm: Normal rate and regular rhythm  Heart sounds: Normal heart sounds  Pulmonary:      Effort: Pulmonary effort is normal       Breath sounds: Normal breath sounds  No wheezing  Abdominal:      General: Bowel sounds are normal       Palpations: Abdomen is soft  Skin:     General: Skin is warm  Findings: No rash  Neurological:      Mental Status: She is alert  Gait: Gait abnormal    Psychiatric:         Mood and Affect: Mood normal          Behavior: Behavior normal            Labs & imaging results reviewed with patient

## 2021-06-22 ENCOUNTER — TELEPHONE (OUTPATIENT)
Dept: LAB | Facility: HOSPITAL | Age: 86
End: 2021-06-22

## 2021-07-01 ENCOUNTER — LAB (OUTPATIENT)
Dept: LAB | Facility: HOSPITAL | Age: 86
End: 2021-07-01
Payer: MEDICARE

## 2021-07-01 DIAGNOSIS — E87.1 HYPONATREMIA: Chronic | ICD-10-CM

## 2021-07-01 LAB
ANION GAP SERPL CALCULATED.3IONS-SCNC: 5 MMOL/L (ref 4–13)
BUN SERPL-MCNC: 11 MG/DL (ref 5–25)
CALCIUM SERPL-MCNC: 8.6 MG/DL (ref 8.3–10.1)
CHLORIDE SERPL-SCNC: 105 MMOL/L (ref 100–108)
CO2 SERPL-SCNC: 26 MMOL/L (ref 21–32)
CREAT SERPL-MCNC: 0.56 MG/DL (ref 0.6–1.3)
GFR SERPL CREATININE-BSD FRML MDRD: 81 ML/MIN/1.73SQ M
GLUCOSE SERPL-MCNC: 86 MG/DL (ref 65–140)
POTASSIUM SERPL-SCNC: 4.5 MMOL/L (ref 3.5–5.3)
SODIUM SERPL-SCNC: 136 MMOL/L (ref 136–145)

## 2021-07-01 PROCEDURE — 80048 BASIC METABOLIC PNL TOTAL CA: CPT

## 2021-07-01 PROCEDURE — 36415 COLL VENOUS BLD VENIPUNCTURE: CPT

## 2021-07-01 NOTE — RESULT ENCOUNTER NOTE
Sodium remains within normal  Continue current dosing of salt tablets    Electrolytes, kidney function normal

## 2021-08-09 ENCOUNTER — TELEPHONE (OUTPATIENT)
Dept: INTERNAL MEDICINE CLINIC | Facility: CLINIC | Age: 86
End: 2021-08-09

## 2021-08-09 NOTE — TELEPHONE ENCOUNTER
Right ear soreness that started yesterday  The outer ear is tender to touch  She says its not really pain, but tender  She does not have a fever, headache, sore throat  She is not complaining of any other symptoms  Hasn't taken any OTC medication  Did offer the care taker an appointment today, but she said that would be difficult to schedule transportation so soon

## 2021-08-09 NOTE — TELEPHONE ENCOUNTER
States not red just painful to touch   No visible insect or mosquito bites   They are not using the hearing aids  Will just monitor  Should she use a warm compress or anything ?

## 2021-08-09 NOTE — TELEPHONE ENCOUNTER
Is the ear red or just painful to touch? Any insect /mosquito bites? Offer virtual appointment or just monitor for now

## 2021-10-06 ENCOUNTER — TELEPHONE (OUTPATIENT)
Dept: INTERNAL MEDICINE CLINIC | Facility: CLINIC | Age: 86
End: 2021-10-06

## 2021-10-06 DIAGNOSIS — I10 ESSENTIAL HYPERTENSION: Primary | ICD-10-CM

## 2021-10-06 DIAGNOSIS — E53.8 VITAMIN B12 DEFICIENCY: ICD-10-CM

## 2021-10-06 DIAGNOSIS — R35.0 URINARY FREQUENCY: ICD-10-CM

## 2021-10-14 ENCOUNTER — TELEPHONE (OUTPATIENT)
Dept: LAB | Facility: HOSPITAL | Age: 86
End: 2021-10-14

## 2021-10-16 DIAGNOSIS — E87.1 HYPONATREMIA: Chronic | ICD-10-CM

## 2021-10-18 ENCOUNTER — TELEPHONE (OUTPATIENT)
Dept: INTERNAL MEDICINE CLINIC | Facility: CLINIC | Age: 86
End: 2021-10-18

## 2021-10-18 DIAGNOSIS — F41.9 ANXIETY: Primary | ICD-10-CM

## 2021-10-18 RX ORDER — SODIUM CHLORIDE 1000 MG
TABLET, SOLUBLE MISCELLANEOUS
Qty: 90 TABLET | Refills: 5 | Status: SHIPPED | OUTPATIENT
Start: 2021-10-18 | End: 2021-10-27

## 2021-10-18 RX ORDER — BUSPIRONE HYDROCHLORIDE 5 MG/1
5 TABLET ORAL 3 TIMES DAILY PRN
Qty: 90 TABLET | Refills: 0 | Status: SHIPPED | OUTPATIENT
Start: 2021-10-18 | End: 2021-11-15

## 2021-10-19 ENCOUNTER — APPOINTMENT (OUTPATIENT)
Dept: LAB | Facility: CLINIC | Age: 86
End: 2021-10-19
Payer: MEDICARE

## 2021-10-19 LAB
BILIRUB UR QL STRIP: NEGATIVE
CLARITY UR: NORMAL
COLOR UR: YELLOW
GLUCOSE UR STRIP-MCNC: NEGATIVE MG/DL
HGB UR QL STRIP.AUTO: NEGATIVE
KETONES UR STRIP-MCNC: NEGATIVE MG/DL
LEUKOCYTE ESTERASE UR QL STRIP: NEGATIVE
NITRITE UR QL STRIP: NEGATIVE
PH UR STRIP.AUTO: 7.5 [PH]
PROT UR STRIP-MCNC: NEGATIVE MG/DL
SP GR UR STRIP.AUTO: 1.01 (ref 1–1.03)
UROBILINOGEN UR QL STRIP.AUTO: 1 E.U./DL

## 2021-10-19 PROCEDURE — 81003 URINALYSIS AUTO W/O SCOPE: CPT

## 2021-10-22 ENCOUNTER — APPOINTMENT (OUTPATIENT)
Dept: LAB | Facility: HOSPITAL | Age: 86
End: 2021-10-22
Payer: MEDICARE

## 2021-10-22 LAB
ALBUMIN SERPL BCP-MCNC: 3 G/DL (ref 3.5–5)
ALP SERPL-CCNC: 124 U/L (ref 46–116)
ALT SERPL W P-5'-P-CCNC: 36 U/L (ref 12–78)
ANION GAP SERPL CALCULATED.3IONS-SCNC: 5 MMOL/L (ref 4–13)
AST SERPL W P-5'-P-CCNC: 30 U/L (ref 5–45)
BASOPHILS # BLD AUTO: 0.03 THOUSANDS/ΜL (ref 0–0.1)
BASOPHILS NFR BLD AUTO: 1 % (ref 0–1)
BILIRUB SERPL-MCNC: 0.68 MG/DL (ref 0.2–1)
BUN SERPL-MCNC: 17 MG/DL (ref 5–25)
CALCIUM ALBUM COR SERPL-MCNC: 9.4 MG/DL (ref 8.3–10.1)
CALCIUM SERPL-MCNC: 8.6 MG/DL (ref 8.3–10.1)
CHLORIDE SERPL-SCNC: 98 MMOL/L (ref 100–108)
CO2 SERPL-SCNC: 26 MMOL/L (ref 21–32)
CREAT SERPL-MCNC: 0.66 MG/DL (ref 0.6–1.3)
EOSINOPHIL # BLD AUTO: 0.08 THOUSAND/ΜL (ref 0–0.61)
EOSINOPHIL NFR BLD AUTO: 1 % (ref 0–6)
ERYTHROCYTE [DISTWIDTH] IN BLOOD BY AUTOMATED COUNT: 15.1 % (ref 11.6–15.1)
GFR SERPL CREATININE-BSD FRML MDRD: 76 ML/MIN/1.73SQ M
GLUCOSE SERPL-MCNC: 81 MG/DL (ref 65–140)
HCT VFR BLD AUTO: 37.8 % (ref 34.8–46.1)
HGB BLD-MCNC: 13.3 G/DL (ref 11.5–15.4)
IMM GRANULOCYTES # BLD AUTO: 0.03 THOUSAND/UL (ref 0–0.2)
IMM GRANULOCYTES NFR BLD AUTO: 1 % (ref 0–2)
LYMPHOCYTES # BLD AUTO: 1.37 THOUSANDS/ΜL (ref 0.6–4.47)
LYMPHOCYTES NFR BLD AUTO: 23 % (ref 14–44)
MCH RBC QN AUTO: 39.2 PG (ref 26.8–34.3)
MCHC RBC AUTO-ENTMCNC: 35.2 G/DL (ref 31.4–37.4)
MCV RBC AUTO: 112 FL (ref 82–98)
MONOCYTES # BLD AUTO: 0.85 THOUSAND/ΜL (ref 0.17–1.22)
MONOCYTES NFR BLD AUTO: 14 % (ref 4–12)
NEUTROPHILS # BLD AUTO: 3.6 THOUSANDS/ΜL (ref 1.85–7.62)
NEUTS SEG NFR BLD AUTO: 60 % (ref 43–75)
NRBC BLD AUTO-RTO: 0 /100 WBCS
PLATELET # BLD AUTO: 261 THOUSANDS/UL (ref 149–390)
PMV BLD AUTO: 9.9 FL (ref 8.9–12.7)
POTASSIUM SERPL-SCNC: 4.3 MMOL/L (ref 3.5–5.3)
PROT SERPL-MCNC: 6.8 G/DL (ref 6.4–8.2)
RBC # BLD AUTO: 3.39 MILLION/UL (ref 3.81–5.12)
SODIUM SERPL-SCNC: 129 MMOL/L (ref 136–145)
VIT B12 SERPL-MCNC: 948 PG/ML (ref 100–900)
WBC # BLD AUTO: 5.96 THOUSAND/UL (ref 4.31–10.16)

## 2021-10-22 PROCEDURE — 80053 COMPREHEN METABOLIC PANEL: CPT

## 2021-10-22 PROCEDURE — 85025 COMPLETE CBC W/AUTO DIFF WBC: CPT

## 2021-10-22 PROCEDURE — 36415 COLL VENOUS BLD VENIPUNCTURE: CPT

## 2021-10-22 PROCEDURE — 82607 VITAMIN B-12: CPT

## 2021-10-27 ENCOUNTER — OFFICE VISIT (OUTPATIENT)
Dept: INTERNAL MEDICINE CLINIC | Facility: CLINIC | Age: 86
End: 2021-10-27
Payer: MEDICARE

## 2021-10-27 VITALS
HEART RATE: 51 BPM | TEMPERATURE: 97.3 F | HEIGHT: 60 IN | OXYGEN SATURATION: 98 % | DIASTOLIC BLOOD PRESSURE: 96 MMHG | BODY MASS INDEX: 21.87 KG/M2 | SYSTOLIC BLOOD PRESSURE: 180 MMHG

## 2021-10-27 DIAGNOSIS — Z00.00 HEALTH MAINTENANCE EXAMINATION: ICD-10-CM

## 2021-10-27 DIAGNOSIS — E87.1 HYPONATREMIA: Chronic | ICD-10-CM

## 2021-10-27 DIAGNOSIS — S32.030A CLOSED COMPRESSION FRACTURE OF L3 LUMBAR VERTEBRA, INITIAL ENCOUNTER (HCC): ICD-10-CM

## 2021-10-27 DIAGNOSIS — E53.8 VITAMIN B12 DEFICIENCY: ICD-10-CM

## 2021-10-27 DIAGNOSIS — Z23 NEED FOR IMMUNIZATION AGAINST INFLUENZA: ICD-10-CM

## 2021-10-27 DIAGNOSIS — I48.0 PAROXYSMAL ATRIAL FIBRILLATION (HCC): ICD-10-CM

## 2021-10-27 DIAGNOSIS — I10 ESSENTIAL HYPERTENSION: Primary | ICD-10-CM

## 2021-10-27 PROBLEM — F41.9 ANXIETY: Status: ACTIVE | Noted: 2021-10-27

## 2021-10-27 PROCEDURE — 99214 OFFICE O/P EST MOD 30 MIN: CPT | Performed by: INTERNAL MEDICINE

## 2021-10-27 PROCEDURE — G0439 PPPS, SUBSEQ VISIT: HCPCS | Performed by: INTERNAL MEDICINE

## 2021-10-27 PROCEDURE — 90662 IIV NO PRSV INCREASED AG IM: CPT | Performed by: INTERNAL MEDICINE

## 2021-10-27 PROCEDURE — 1123F ACP DISCUSS/DSCN MKR DOCD: CPT | Performed by: INTERNAL MEDICINE

## 2021-10-27 PROCEDURE — G0008 ADMIN INFLUENZA VIRUS VAC: HCPCS | Performed by: INTERNAL MEDICINE

## 2021-10-27 RX ORDER — SODIUM CHLORIDE 1000 MG
1 TABLET, SOLUBLE MISCELLANEOUS 3 TIMES DAILY
Qty: 90 TABLET | Refills: 5
Start: 2021-10-27 | End: 2021-11-15

## 2021-10-27 RX ORDER — CLONIDINE HYDROCHLORIDE 0.1 MG/1
0.1 TABLET ORAL ONCE
Status: COMPLETED | OUTPATIENT
Start: 2021-10-27 | End: 2021-10-27

## 2021-10-27 RX ADMIN — CLONIDINE HYDROCHLORIDE 0.1 MG: 0.1 TABLET ORAL at 15:22

## 2021-11-02 DIAGNOSIS — I10 ESSENTIAL HYPERTENSION: ICD-10-CM

## 2021-11-02 RX ORDER — HYDRALAZINE HYDROCHLORIDE 25 MG/1
TABLET, FILM COATED ORAL
Qty: 90 TABLET | Refills: 5 | Status: SHIPPED | OUTPATIENT
Start: 2021-11-02 | End: 2022-01-27

## 2021-11-12 ENCOUNTER — APPOINTMENT (OUTPATIENT)
Dept: LAB | Facility: HOSPITAL | Age: 86
End: 2021-11-12
Payer: MEDICARE

## 2021-11-12 DIAGNOSIS — E87.1 HYPONATREMIA: Chronic | ICD-10-CM

## 2021-11-12 LAB
ANION GAP SERPL CALCULATED.3IONS-SCNC: 10 MMOL/L (ref 4–13)
BUN SERPL-MCNC: 13 MG/DL (ref 5–25)
CALCIUM SERPL-MCNC: 9.6 MG/DL (ref 8.3–10.1)
CHLORIDE SERPL-SCNC: 99 MMOL/L (ref 100–108)
CO2 SERPL-SCNC: 23 MMOL/L (ref 21–32)
CREAT SERPL-MCNC: 0.62 MG/DL (ref 0.6–1.3)
GFR SERPL CREATININE-BSD FRML MDRD: 77 ML/MIN/1.73SQ M
GLUCOSE SERPL-MCNC: 86 MG/DL (ref 65–140)
POTASSIUM SERPL-SCNC: 3.9 MMOL/L (ref 3.5–5.3)
SODIUM SERPL-SCNC: 132 MMOL/L (ref 136–145)

## 2021-11-12 PROCEDURE — 80048 BASIC METABOLIC PNL TOTAL CA: CPT

## 2021-11-12 PROCEDURE — 36415 COLL VENOUS BLD VENIPUNCTURE: CPT

## 2021-11-15 ENCOUNTER — TELEPHONE (OUTPATIENT)
Dept: INTERNAL MEDICINE CLINIC | Facility: CLINIC | Age: 86
End: 2021-11-15

## 2022-01-17 ENCOUNTER — TELEPHONE (OUTPATIENT)
Dept: INTERNAL MEDICINE CLINIC | Facility: CLINIC | Age: 87
End: 2022-01-17

## 2022-01-17 DIAGNOSIS — I48.0 PAROXYSMAL ATRIAL FIBRILLATION (HCC): Primary | ICD-10-CM

## 2022-01-17 DIAGNOSIS — I10 ESSENTIAL HYPERTENSION: ICD-10-CM

## 2022-01-17 DIAGNOSIS — E87.1 HYPONATREMIA: Chronic | ICD-10-CM

## 2022-01-17 DIAGNOSIS — F41.9 ANXIETY: ICD-10-CM

## 2022-01-17 RX ORDER — BUSPIRONE HYDROCHLORIDE 5 MG/1
TABLET ORAL
Qty: 90 TABLET | Refills: 1 | Status: SHIPPED | OUTPATIENT
Start: 2022-01-17 | End: 2022-03-17

## 2022-01-20 ENCOUNTER — TELEPHONE (OUTPATIENT)
Dept: LAB | Facility: HOSPITAL | Age: 87
End: 2022-01-20

## 2022-01-27 ENCOUNTER — TELEMEDICINE (OUTPATIENT)
Dept: INTERNAL MEDICINE CLINIC | Facility: CLINIC | Age: 87
End: 2022-01-27
Payer: MEDICARE

## 2022-01-27 ENCOUNTER — APPOINTMENT (OUTPATIENT)
Dept: LAB | Facility: HOSPITAL | Age: 87
End: 2022-01-27
Payer: MEDICARE

## 2022-01-27 ENCOUNTER — TELEPHONE (OUTPATIENT)
Dept: INTERNAL MEDICINE CLINIC | Facility: CLINIC | Age: 87
End: 2022-01-27

## 2022-01-27 DIAGNOSIS — Z71.9 HEALTH COUNSELING: ICD-10-CM

## 2022-01-27 DIAGNOSIS — F41.9 ANXIETY: ICD-10-CM

## 2022-01-27 DIAGNOSIS — S32.038S OTHER CLOSED FRACTURE OF THIRD LUMBAR VERTEBRA, SEQUELA: ICD-10-CM

## 2022-01-27 DIAGNOSIS — E53.8 VITAMIN B12 DEFICIENCY: ICD-10-CM

## 2022-01-27 DIAGNOSIS — I48.0 PAROXYSMAL ATRIAL FIBRILLATION (HCC): ICD-10-CM

## 2022-01-27 DIAGNOSIS — H35.30 MACULAR DEGENERATION OF BOTH EYES, UNSPECIFIED TYPE: ICD-10-CM

## 2022-01-27 DIAGNOSIS — I10 ESSENTIAL HYPERTENSION: ICD-10-CM

## 2022-01-27 DIAGNOSIS — E87.1 HYPONATREMIA: Chronic | ICD-10-CM

## 2022-01-27 DIAGNOSIS — K59.09 CHRONIC CONSTIPATION: ICD-10-CM

## 2022-01-27 DIAGNOSIS — R41.3 MEMORY CHANGES: Primary | ICD-10-CM

## 2022-01-27 DIAGNOSIS — R35.0 URINARY FREQUENCY: ICD-10-CM

## 2022-01-27 LAB
ALBUMIN SERPL BCP-MCNC: 3.3 G/DL (ref 3.5–5)
ALP SERPL-CCNC: 138 U/L (ref 46–116)
ALT SERPL W P-5'-P-CCNC: 21 U/L (ref 12–78)
ANION GAP SERPL CALCULATED.3IONS-SCNC: 6 MMOL/L (ref 4–13)
AST SERPL W P-5'-P-CCNC: 22 U/L (ref 5–45)
BASOPHILS # BLD AUTO: 0.04 THOUSANDS/ΜL (ref 0–0.1)
BASOPHILS NFR BLD AUTO: 1 % (ref 0–1)
BILIRUB SERPL-MCNC: 0.95 MG/DL (ref 0.2–1)
BUN SERPL-MCNC: 16 MG/DL (ref 5–25)
CALCIUM ALBUM COR SERPL-MCNC: 9.8 MG/DL (ref 8.3–10.1)
CALCIUM SERPL-MCNC: 9.2 MG/DL (ref 8.3–10.1)
CHLORIDE SERPL-SCNC: 104 MMOL/L (ref 100–108)
CO2 SERPL-SCNC: 24 MMOL/L (ref 21–32)
CREAT SERPL-MCNC: 0.75 MG/DL (ref 0.6–1.3)
EOSINOPHIL # BLD AUTO: 0.13 THOUSAND/ΜL (ref 0–0.61)
EOSINOPHIL NFR BLD AUTO: 2 % (ref 0–6)
ERYTHROCYTE [DISTWIDTH] IN BLOOD BY AUTOMATED COUNT: 15 % (ref 11.6–15.1)
GFR SERPL CREATININE-BSD FRML MDRD: 68 ML/MIN/1.73SQ M
GLUCOSE SERPL-MCNC: 111 MG/DL (ref 65–140)
HCT VFR BLD AUTO: 37 % (ref 34.8–46.1)
HGB BLD-MCNC: 13 G/DL (ref 11.5–15.4)
IMM GRANULOCYTES # BLD AUTO: 0.04 THOUSAND/UL (ref 0–0.2)
IMM GRANULOCYTES NFR BLD AUTO: 1 % (ref 0–2)
LYMPHOCYTES # BLD AUTO: 1.23 THOUSANDS/ΜL (ref 0.6–4.47)
LYMPHOCYTES NFR BLD AUTO: 19 % (ref 14–44)
MCH RBC QN AUTO: 38.6 PG (ref 26.8–34.3)
MCHC RBC AUTO-ENTMCNC: 35.1 G/DL (ref 31.4–37.4)
MCV RBC AUTO: 110 FL (ref 82–98)
MONOCYTES # BLD AUTO: 0.86 THOUSAND/ΜL (ref 0.17–1.22)
MONOCYTES NFR BLD AUTO: 13 % (ref 4–12)
NEUTROPHILS # BLD AUTO: 4.33 THOUSANDS/ΜL (ref 1.85–7.62)
NEUTS SEG NFR BLD AUTO: 64 % (ref 43–75)
NRBC BLD AUTO-RTO: 0 /100 WBCS
PLATELET # BLD AUTO: 349 THOUSANDS/UL (ref 149–390)
PMV BLD AUTO: 9.5 FL (ref 8.9–12.7)
POTASSIUM SERPL-SCNC: 4.3 MMOL/L (ref 3.5–5.3)
PROT SERPL-MCNC: 7.3 G/DL (ref 6.4–8.2)
RBC # BLD AUTO: 3.37 MILLION/UL (ref 3.81–5.12)
SODIUM SERPL-SCNC: 134 MMOL/L (ref 136–145)
TSH SERPL DL<=0.05 MIU/L-ACNC: 2.09 UIU/ML (ref 0.36–3.74)
WBC # BLD AUTO: 6.63 THOUSAND/UL (ref 4.31–10.16)

## 2022-01-27 PROCEDURE — 84443 ASSAY THYROID STIM HORMONE: CPT | Performed by: INTERNAL MEDICINE

## 2022-01-27 PROCEDURE — 36415 COLL VENOUS BLD VENIPUNCTURE: CPT | Performed by: INTERNAL MEDICINE

## 2022-01-27 PROCEDURE — 80053 COMPREHEN METABOLIC PANEL: CPT | Performed by: INTERNAL MEDICINE

## 2022-01-27 PROCEDURE — 99214 OFFICE O/P EST MOD 30 MIN: CPT | Performed by: INTERNAL MEDICINE

## 2022-01-27 PROCEDURE — 85025 COMPLETE CBC W/AUTO DIFF WBC: CPT

## 2022-01-27 RX ORDER — HYDRALAZINE HYDROCHLORIDE 50 MG/1
50 TABLET, FILM COATED ORAL 3 TIMES DAILY
Qty: 90 TABLET | Refills: 5 | Status: SHIPPED | OUTPATIENT
Start: 2022-01-27 | End: 2022-08-01

## 2022-01-27 NOTE — PROGRESS NOTES
Virtual Regular Visit    Verification of patient location:    Patient is located in the following state in which I hold an active license [place]      Assessment/Plan:    Problem List Items Addressed This Visit        Digestive    Chronic constipation     Taking Miralax a few days a week  Cardiovascular and Mediastinum    Essential hypertension     Labile BP  Increase hydralazine to 50 mg tid  Continue carvedilol bid and irbesartan in PM          Relevant Medications    hydrALAZINE (APRESOLINE) 50 mg tablet    Paroxysmal atrial fibrillation (HCC)     No change, denies any symptoms  On amiodarone, ASA and carvedilol  Per cardiology  Musculoskeletal and Integument    Closed lumbar vertebral fracture (HCC)     Recurrence of pain a few months ago, did not use brace just a girdle  Continue Tylenol prn  Other    Anxiety     Slightly worse, minimal improvement with buspirone  Discussed adding low dose SSRI  Hyponatremia (Chronic)     Labs pending  Taking NaCl tid  Macular degeneration     Stopped injections  Memory changes - Primary     Gradually worse, gets frustrated when unable to express herself  Taking buspirone tid  Urinary frequency     Stable  Vitamin B12 deficiency     Continue daily B12  Other Visit Diagnoses     Health counseling        Vaccinations updated  Follow up as scheduled or as needed  Reason for visit is   Chief Complaint   Patient presents with    Follow-up    Back Pain     refuses to use the brace, was using tylenol   Virtual Regular Visit        Encounter provider Aubrie Fatima MD    Provider located at [address] [address]      Recent Visits  No visits were found meeting these conditions    Showing recent visits within past 7 days and meeting all other requirements  Today's Visits  Date Type Provider Dept   01/27/22 Telemedicine Cheyenne Benton, [facility]   Showing today's visits and meeting all other requirements  Future Appointments  No visits were found meeting these conditions  Showing future appointments within next 150 days and meeting all other requirements       The patient was identified by name and date of birth  Wilfredo Dorsey was informed that this is a telemedicine visit and that the visit is being conducted through [address] and patient was informed this is a secure, HIPAA-complaint platform  She agrees to proceed     My office door was closed  No one else was in the room  She acknowledged consent and understanding of privacy and security of the video platform  The patient has agreed to participate and understands they can discontinue the visit at any time  Patient is aware this is a billable service  Subjective  Wilfredo Dorsey is a 80 y o  female f/u   Most of the history is from [facility]  She reports her blood pressure has been all over the place  Blood pressure would range between 105-190 3/60 3-87  She had 1 reading which was 213, she had no symptoms  She has been giving her medications regularly, did increase the hydralazine since her last visit  After Thanksgiving, she had issues with her back  She would they were not sure if it was her gallbladder again  She did not want to wear brace, where of girdle instead  Symptoms lasted for about 3 weeks, was taking Tylenol regularly  Her appetite has significantly decreased, now eating 1-2 small meals a day and a protein shake later in the day  Decrease her MiraLax to a few days a week since the stools were more watery  She reports that she appears more confused and frustrated since she is unable to see and hear  She has trouble getting the words out in this her straits her visibly      She also reports intermittent right upper arm pain, she would just move her arm around and symptoms would resolve  It would last for a few minutes only  She reports no chest pain or palpitations          Past Medical History:   Diagnosis Date    A-fib [facility])     Anxiety     Cheilosis     Diverticulosis     1970s    Fracture of iliac wing ([address] ) 2016    Fracture, ribs     Gallstone     Hiatal hernia     Hyperlipidemia     Hypertension     Hyponatremia     Inguinal hernia     Lacunar infarction (HCC)     Macrocytosis     Macular degeneration     Osteoarthritis     Ovarian cyst     stable    Renal cyst     Rib fractures 2017    Simple renal cyst     Syncope     Tuberculosis 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    Umbilical hernia     Vertigo        Past Surgical History:   Procedure Laterality Date    ABDOMINAL SURGERY      HIP SURGERY      At  young age 26's   Lazaro Gurdeep KNEE CARTILAGE SURGERY      MULTIPLE TOOTH EXTRACTIONS Bilateral     [facility]       Current Outpatient Medications   Medication Sig Dispense Refill    busPIRone (BUSPAR) 5 mg tablet TAKE 1 TABLET(5 MG) BY MOUTH THREE TIMES DAILY AS NEEDED FOR ANXIETY (Patient taking differently: Take 5 mg by mouth 3 (three) times a day  ) 90 tablet 1    acetaminophen (TYLENOL) 325 mg tablet Take 2 tablets (650 mg total) by mouth every 6 (six) hours as needed for mild pain, headaches or fever      aspirin 81 MG tablet Take 81 mg by mouth daily      carvedilol (COREG) 6 25 mg tablet Take 1 tablet (6 25 mg total) by mouth 2 (two) times a day with meals 60 tablet 0    Cholecalciferol (VITAMIN D3) 1000 units CAPS Take by mouth      Coenzyme Q10 (COQ-10) 50 MG CAPS Take by mouth daily      hydrALAZINE (APRESOLINE) 50 mg tablet Take 1 tablet (50 mg total) by mouth 3 (three) times a day 90 tablet 5    irbesartan (AVAPRO) 300 mg tablet Take 300 mg by mouth daily at bedtime       meclizine (ANTIVERT) 25 mg tablet Take 1 tablet (25 mg total) by mouth 3 (three) times a day as needed for dizziness 30 tablet 0    nystatin-triamcinolone (MYCOLOG-II) cream Apply topically 2 (two) times a day as needed (rash) 60 g 1    PACERONE 100 MG tablet Take 200 mg by mouth daily at bedtime       polyethylene glycol (MIRALAX) 17 g packet Take 9 g by mouth daily      sodium chloride 1 g tablet TAKE 1 TABLET(1 GRAM) BY MOUTH THREE TIMES DAILY WITH MEALS 90 tablet 5    vitamin B-12 (VITAMIN B-12) 500 mcg tablet Take 500 mcg by mouth daily       No current facility-administered medications for this visit  Allergies   Allergen Reactions    Codeine     Lomotil [Diphenoxylate]     Mirabegron Hypertension    Quinidex [Quinidine]     Adhesive [Medical Tape] Rash    Penicillins Rash       Review of Systems   Constitutional: Positive for activity change and appetite change  Negative for fatigue  HENT: Positive for hearing loss  Negative for ear pain and postnasal drip  Eyes: Negative for visual disturbance  Respiratory: Negative for shortness of breath  Cardiovascular: Negative for chest pain and palpitations  Gastrointestinal: Negative for abdominal pain, constipation and diarrhea  Genitourinary: Negative for dysuria and frequency  Musculoskeletal: Positive for arthralgias and gait problem  Skin: Negative for rash  Neurological: Negative for dizziness and headaches  Psychiatric/Behavioral: Positive for confusion  The patient is nervous/anxious  Video Exam    There were no vitals filed for this visit  Physical Exam  Constitutional:       General: She is not in acute distress  Appearance: Normal appearance  She is not ill-appearing  HENT:      Head: Normocephalic and atraumatic  Mouth/Throat:      Mouth: Mucous membranes are moist    Eyes:      Pupils: Pupils are equal, round, and reactive to light  Pulmonary:      Effort: Pulmonary effort is normal  No respiratory distress  Neurological:      General: No focal deficit present  Mental Status: She is alert     Psychiatric:         Mood and Affect: Mood normal          Behavior: Behavior normal  I spent 19 minutes directly with the patient during this visit    VIRTUAL VISIT [address] verbally agrees to participate in [employer]  Pt is aware that [employer] could be limited without vital signs or the ability to perform a full hands-on physical exam  Gladis Royal understands she or the provider may request at any time to terminate the video visit and request the patient to seek care or treatment in person

## 2022-02-08 ENCOUNTER — TELEPHONE (OUTPATIENT)
Dept: INTERNAL MEDICINE CLINIC | Facility: CLINIC | Age: 87
End: 2022-02-08

## 2022-02-08 NOTE — TELEPHONE ENCOUNTER
Any headaches or breathing problems? Is there wax that you can see in her right ear? Continue hydralazine dosing  I can order shoulder and chest x-ray

## 2022-02-08 NOTE — TELEPHONE ENCOUNTER
Brigitte Leroy called back  She said the "whirling sensation" has stopped  Also, her bp's have been erratic since increasing Hydralazine  Her bp this am was 120/61  Pt  has not had the whirling sensation when her bp's were lower

## 2022-02-08 NOTE — TELEPHONE ENCOUNTER
Patient woke up at 230 am stating she was dizzy no other symptoms   BP was 120/61 pulse 52 oxygen 97    This morning around 730 BP was 189/78 pulse 52 oxygen 99     Fluid level was low yesterday   Also states right ear is sore she has not put hearing aid in in 3 days      She did mention to PCP last visit about a stabbing pain in the right arm that is becoming more frequent and more severe at night     Patient states she will not go back to ER     Aware PCP not in til later , routing to both providers

## 2022-02-08 NOTE — TELEPHONE ENCOUNTER
Noe Rodriguez aware, states no headaches, oxygen at 97% but she notices they do have to remind pt to close her mouth and breathe through her nose  Pt has been weak but walking about 72ft at a time and does have a little difficulty breathing  Pt's right ear is sensitive to touch on the outside, son looked in ear with flash light and sees something dark inside not sure what it is  Noe Rodriguez wants to hold off on x-ray for now, she does state pt's anxiety has been up and has been asking if she is dying

## 2022-02-10 ENCOUNTER — TELEPHONE (OUTPATIENT)
Dept: INTERNAL MEDICINE CLINIC | Facility: CLINIC | Age: 87
End: 2022-02-10

## 2022-02-10 DIAGNOSIS — M79.601 ARM PAIN, RIGHT: Primary | ICD-10-CM

## 2022-02-10 NOTE — TELEPHONE ENCOUNTER
X-rays ordered, you can have it done as out patient anytime  If x-rays normal, we can consider home physical therapy for the shoulder

## 2022-02-10 NOTE — TELEPHONE ENCOUNTER
Pt  still has rt  arm pain mostly at night  She didn't have her xray yet  Asking if she should to to the ER-adv'sanford Luevano that it would  Be better to go thru outpt  And have xray done  Lizy Luevano will try to do that

## 2022-02-23 ENCOUNTER — HOSPITAL ENCOUNTER (OUTPATIENT)
Dept: RADIOLOGY | Facility: HOSPITAL | Age: 87
Discharge: HOME | End: 2022-02-23
Payer: MEDICARE

## 2022-02-23 DIAGNOSIS — M79.601 ARM PAIN, RIGHT: ICD-10-CM

## 2022-02-23 PROCEDURE — 72050 X-RAY EXAM NECK SPINE 4/5VWS: CPT

## 2022-02-23 PROCEDURE — 73030 X-RAY EXAM OF SHOULDER: CPT

## 2022-02-24 ENCOUNTER — TELEPHONE (OUTPATIENT)
Dept: INTERNAL MEDICINE CLINIC | Facility: CLINIC | Age: 87
End: 2022-02-24

## 2022-02-27 ENCOUNTER — TELEPHONE (OUTPATIENT)
Dept: INTERNAL MEDICINE CLINIC | Facility: CLINIC | Age: 87
End: 2022-02-27

## 2022-02-27 DIAGNOSIS — M15.9 PRIMARY OSTEOARTHRITIS INVOLVING MULTIPLE JOINTS: Primary | ICD-10-CM

## 2022-02-27 NOTE — TELEPHONE ENCOUNTER
X-ray results, can speak to [address]  Shoulder x-ray showed mild arthritis only  Neck x-ray showed arthritis in the area that may be pinching a nerve causing the shoulder pain  We can try gabapentin to help with the pain  Let me know

## 2022-03-01 RX ORDER — GABAPENTIN 100 MG/1
100 CAPSULE ORAL 2 TIMES DAILY
Qty: 60 CAPSULE | Refills: 0 | Status: ON HOLD | OUTPATIENT
Start: 2022-03-01 | End: 2022-07-28

## 2022-03-01 NOTE — TELEPHONE ENCOUNTER
Noe Rodriguez aware, wants to know will the gabapentin be PRN or how often will she be taking it  Would like to know the sig  she would be on  Has dental appt later Glenna's phone is not working okay to call home # [phone] okay to leave detailed voice message

## 2022-03-01 NOTE — TELEPHONE ENCOUNTER
Start gabapentin 100 mg  Take twice a day for 1 week, then can take it as needed only  If pain not better, after a week, let me know, we can increase it to 3x a day

## 2022-03-02 ENCOUNTER — TELEPHONE (OUTPATIENT)
Dept: INTERNAL MEDICINE CLINIC | Facility: CLINIC | Age: 87
End: 2022-03-02

## 2022-03-02 DIAGNOSIS — M19.019 SHOULDER ARTHRITIS: ICD-10-CM

## 2022-03-02 DIAGNOSIS — M47.812 CERVICAL SPONDYLOSIS: Primary | ICD-10-CM

## 2022-03-07 NOTE — TELEPHONE ENCOUNTER
You can ask Glenna to get it with GoodRx card, it will be cheaper  I can send prescription to pharmacy or print it

## 2022-03-10 NOTE — TELEPHONE ENCOUNTER
Pt took gabapentin at 1:30 this morning  Can she take Tylenol in addition? She is still having a lot of pain in her  Pain is worse in the morning  She also applied Bengay

## 2022-03-11 NOTE — TELEPHONE ENCOUNTER
Arm / shoulder pain may be coming from the neck  We can start home physical therapy, can be taught what exercises and positions to do  Let me know

## 2022-03-11 NOTE — TELEPHONE ENCOUNTER
Brianna Martinez wants to know if there is anything she can be doing to stop the arm pain before it begins such as position changes or is there anything that she may be doing to aggravate the pain  When its painful, Evan Prather moves her arm to make it feel better - Brianna Area wants to know if this is the right thing to do

## 2022-03-17 DIAGNOSIS — F41.9 ANXIETY: ICD-10-CM

## 2022-03-17 RX ORDER — BUSPIRONE HYDROCHLORIDE 5 MG/1
TABLET ORAL
Qty: 90 TABLET | Refills: 1 | Status: SHIPPED | OUTPATIENT
Start: 2022-03-17 | End: 2022-05-16

## 2022-03-17 NOTE — TELEPHONE ENCOUNTER
Niece is giving pt gabapentin 2x daily along with using Bengay and tylenol in between  She feels her pain is getting worse, lasting longer, and more often and radiating down arm and into hand  Another concern is she seems very groggy sometimes and can't get a BP reading (cuff won't register) until she has her breakfast and coffee and then her BP is 112-123/58-64  BP this am while in bed, in pain, and before medication was 193/84  Pulse 56  Home PT won't be contacting them until 4/5

## 2022-03-17 NOTE — TELEPHONE ENCOUNTER
We can try another agency for physical therapy, is the scheduled one in April [facility]? I have no suggestions for the pain, if we increase the gabapentin then she might be too groggy

## 2022-03-18 NOTE — TELEPHONE ENCOUNTER
Duy Medel states that she is supposed to get a call back from [facility] on April 5th she's guessing it will be regarding scheduling  Gave pt on gabapentin at 6am then 2 at noon and one at 145 am this morning, wants to only give to pt as needed yesterday was worse, today not as bad has been rubbing bengay on area       3/17/22  8:45 am, BP before meds 193/84 p 56     4:45pm 113/67 p 54     8 pm 98/44 p50,     9:45 pm before bed 97/52 p51    3/18/22  9:30 am before meds 159/76 p50

## 2022-04-20 ENCOUNTER — TELEPHONE (OUTPATIENT)
Dept: INTERNAL MEDICINE CLINIC | Facility: CLINIC | Age: 87
End: 2022-04-20

## 2022-04-22 ENCOUNTER — TELEPHONE (OUTPATIENT)
Dept: LAB | Facility: HOSPITAL | Age: 87
End: 2022-04-22

## 2022-04-25 NOTE — TELEPHONE ENCOUNTER
4/25 - spoke to pt daughter - she said she spoke to Dr office after she called us - and she does not need bloodwork done at this time

## 2022-04-28 ENCOUNTER — OFFICE VISIT (OUTPATIENT)
Dept: INTERNAL MEDICINE CLINIC | Facility: CLINIC | Age: 87
End: 2022-04-28
Payer: MEDICARE

## 2022-04-28 VITALS
HEIGHT: 60 IN | SYSTOLIC BLOOD PRESSURE: 110 MMHG | BODY MASS INDEX: 21.87 KG/M2 | OXYGEN SATURATION: 98 % | HEART RATE: 56 BPM | DIASTOLIC BLOOD PRESSURE: 76 MMHG | TEMPERATURE: 97.1 F

## 2022-04-28 DIAGNOSIS — R09.89 LABILE HYPERTENSION: ICD-10-CM

## 2022-04-28 DIAGNOSIS — F41.9 ANXIETY: ICD-10-CM

## 2022-04-28 DIAGNOSIS — H35.30 MACULAR DEGENERATION OF BOTH EYES, UNSPECIFIED TYPE: Primary | ICD-10-CM

## 2022-04-28 DIAGNOSIS — R41.3 MEMORY CHANGES: ICD-10-CM

## 2022-04-28 DIAGNOSIS — I48.0 PAROXYSMAL ATRIAL FIBRILLATION (HCC): ICD-10-CM

## 2022-04-28 DIAGNOSIS — K59.09 CHRONIC CONSTIPATION: ICD-10-CM

## 2022-04-28 DIAGNOSIS — S32.038S OTHER CLOSED FRACTURE OF THIRD LUMBAR VERTEBRA, SEQUELA: ICD-10-CM

## 2022-04-28 DIAGNOSIS — E87.1 HYPONATREMIA: Chronic | ICD-10-CM

## 2022-04-28 DIAGNOSIS — I10 ESSENTIAL HYPERTENSION: ICD-10-CM

## 2022-04-28 PROCEDURE — 99214 OFFICE O/P EST MOD 30 MIN: CPT | Performed by: INTERNAL MEDICINE

## 2022-04-28 NOTE — ASSESSMENT & PLAN NOTE
Currently on hydralazine tid, carvedilol bid and irbesartan qHS  Still with erratic high readings at home    Discussed nephrology referral, ABPM

## 2022-04-28 NOTE — PROGRESS NOTES
Assessment/Plan:    Labile hypertension  Currently on hydralazine tid, carvedilol bid and irbesartan qHS  Still with erratic high readings at home  Discussed nephrology referral, ABPM     Paroxysmal atrial fibrillation (HCC)  On ASA, carvedilol and amiodarone recently adjusted  Sees cardiology  Chronic constipation  Stable, takes Miralax a few days a week  Anxiety  May increase buspirone dose to 10 mg tid, prn  She takes 5 mg tid regularly  Closed lumbar vertebral fracture (HCC)  Intermittent pain  Takes Tylenol prn  Macular degeneration  Restarted injections for both eyes  Hyponatremia  Continue NaCl tid  Cervical spondylosis  Explained shoulder pain probably due to this  Reviewed x-ray  Discussed MRI, pain referral   Unable to tolerate higher doses of gabapentin, may give it prn  Continue physical therapy  Memory changes  Slightly worse, tangential in today's visit  Diagnoses and all orders for this visit:    Macular degeneration of both eyes, unspecified type    Essential hypertension  -     CBC and differential; Future  -     Comprehensive metabolic panel; Future    Other closed fracture of third lumbar vertebra, sequela    Hyponatremia  -     Comprehensive metabolic panel; Future    Chronic constipation    Anxiety    Paroxysmal atrial fibrillation (HCC)    Memory changes    Labile hypertension      Follow up in 4 months or as needed  Subjective:      Patient ID: Brinda Darden is a 80 y o  female here with Marguerite Cabrera, for a follow up  She had a bad night last night, had shots for both her eyes  She was being given eye drops for the pain but was not really helping  She is doing better right now  She started physical therapy for her shoulder about 3 weeks ago, had stopped giving her gabapentin at the start of the month  Pain intermittent, not as bad as before  She feels the massage helps the most  She is considering steroid shots for the pain      Marguerite Cabrera reports that she is unable to completely evacuate her stool  She gives her Miralax 1/2 or 1/4 dose every few days  She remains very anxious frequently, especially with doctor visit  She is taking the buspirone 3 times a day, regularly  When asked a question about her anxiety and eyes, she would revert back to the pain about her arm  The following portions of the patient's history were reviewed and updated as appropriate: allergies, current medications, past medical history, past social history and problem list     Review of Systems   Constitutional: Negative for activity change, appetite change and fatigue  HENT: Negative for congestion, ear pain and postnasal drip  Eyes: Negative for visual disturbance  Respiratory: Negative for cough and shortness of breath  Cardiovascular: Negative for chest pain and leg swelling  Gastrointestinal: Positive for constipation  Negative for abdominal pain and diarrhea  Genitourinary: Negative for dysuria, frequency and urgency  Musculoskeletal: Positive for arthralgias and gait problem  Negative for back pain and myalgias  Skin: Negative for rash and wound  Neurological: Negative for dizziness, numbness and headaches  Psychiatric/Behavioral: Positive for confusion and decreased concentration  The patient is not nervous/anxious  Objective:      /76   Pulse 56   Temp (!) 97 1 °F (36 2 °C)   Ht 5' (1 524 m)   SpO2 98%   BMI 21 87 kg/m²          Physical Exam  Vitals and nursing note reviewed  Constitutional:       General: She is not in acute distress  HENT:      Head: Normocephalic and atraumatic  Eyes:      Pupils: Pupils are equal, round, and reactive to light  Cardiovascular:      Rate and Rhythm: Normal rate and regular rhythm  Heart sounds: Normal heart sounds  Pulmonary:      Effort: Pulmonary effort is normal       Breath sounds: Normal breath sounds  No wheezing     Abdominal:      General: Bowel sounds are normal  Palpations: Abdomen is soft  Musculoskeletal:         General: No swelling  Right shoulder: No tenderness or bony tenderness  Decreased range of motion  Left shoulder: No tenderness or bony tenderness  Right lower leg: No edema  Left lower leg: No edema  Skin:     General: Skin is warm  Findings: No rash  Neurological:      General: No focal deficit present  Mental Status: She is alert  Psychiatric:         Mood and Affect: Mood and affect normal  Mood is not anxious or depressed  Behavior: Behavior normal  Behavior is cooperative

## 2022-04-28 NOTE — ASSESSMENT & PLAN NOTE
Explained shoulder pain probably due to this  Reviewed x-ray  Discussed MRI, pain referral   Unable to tolerate higher doses of gabapentin, may give it prn  Continue physical therapy

## 2022-05-09 ENCOUNTER — TELEPHONE (OUTPATIENT)
Dept: INTERNAL MEDICINE CLINIC | Facility: CLINIC | Age: 87
End: 2022-05-09

## 2022-05-09 NOTE — TELEPHONE ENCOUNTER
She may be holding her urine because she still has not completely emptied her bowels / still with hard stool  Continue Miralax, half or full dose daily  Increase daily water intake

## 2022-05-09 NOTE — TELEPHONE ENCOUNTER
Pt  Is holding in her urine for a variety of reasons  Also, she has not been eating as much-no appetite  Estevan Griffiths has been adjusting her miralax  Sometimes pt  can't push out her bm  Last Tues  She had a small one, next day none, then the next day had severe diarrhea so Estevan Griffiths only gave her a half a cap of Miralax and last night  Pt  Is distended this morning and can't seem to urinate  Estevan Griffiths wants to know if there is anything she can do to help pt  urinate

## 2022-05-15 DIAGNOSIS — F41.9 ANXIETY: ICD-10-CM

## 2022-05-15 DIAGNOSIS — E87.1 HYPONATREMIA: Chronic | ICD-10-CM

## 2022-05-16 ENCOUNTER — TELEPHONE (OUTPATIENT)
Dept: INTERNAL MEDICINE CLINIC | Facility: CLINIC | Age: 87
End: 2022-05-16

## 2022-05-16 DIAGNOSIS — F41.9 ANXIETY: ICD-10-CM

## 2022-05-16 RX ORDER — SODIUM CHLORIDE 1000 MG
TABLET, SOLUBLE MISCELLANEOUS
Qty: 90 TABLET | Refills: 5 | Status: SHIPPED | OUTPATIENT
Start: 2022-05-16

## 2022-05-16 RX ORDER — BUSPIRONE HYDROCHLORIDE 5 MG/1
TABLET ORAL
Qty: 90 TABLET | Refills: 1 | Status: SHIPPED | OUTPATIENT
Start: 2022-05-16 | End: 2022-06-15 | Stop reason: SDUPTHER

## 2022-05-16 NOTE — TELEPHONE ENCOUNTER
You can take 1/2 dose of Miralax daily, instead of full dose  No need to increase fluids  Continue to monitor BP      If she develops severe abdominal pain, vomiting, unable to keep fluids/food in, need to go to the ER

## 2022-05-16 NOTE — TELEPHONE ENCOUNTER
García Will called: Tung Padilla took message (computers were down, transferred to message)    Patient has been taking Miralax for 7 days  Has had some good BM's  Very soft- muddy, no control over BM's   Stomach still distended  Meclizine prescribed in 202-  Pt didn't need it   No fever Bp high 202/91, pulse 58 oxygen 98  She is on fluid restriction, should she increase fluids?

## 2022-06-15 RX ORDER — BUSPIRONE HYDROCHLORIDE 5 MG/1
5 TABLET ORAL 3 TIMES DAILY
Qty: 90 TABLET | Refills: 3 | Status: SHIPPED | OUTPATIENT
Start: 2022-06-15 | End: 2022-06-16 | Stop reason: SDUPTHER

## 2022-06-15 NOTE — TELEPHONE ENCOUNTER
Glenna called - 1/2 dose Miralax is helping  BM's every 2-3 three days  Still has distended stomach most of the time  When pt has large BM she feels pt withholds her urine  She's not sure if its intentional   When BM's get runny she skips a dose of Miralax  Has increased buspirone to 2 tablets 3 x daily which is helping  Pharmacy needs new script with new directions    Pt is getting agitated, angry, and frustrated more easily periodically

## 2022-06-16 RX ORDER — BUSPIRONE HYDROCHLORIDE 10 MG/1
10 TABLET ORAL 3 TIMES DAILY
Qty: 90 TABLET | Refills: 3 | Status: SHIPPED | OUTPATIENT
Start: 2022-06-16

## 2022-06-16 NOTE — TELEPHONE ENCOUNTER
Should she continue the buspirone two tablets 3 x daily? If so, please send new directions to pharmacy

## 2022-07-27 ENCOUNTER — HOSPITAL ENCOUNTER (OUTPATIENT)
Facility: HOSPITAL | Age: 87
Setting detail: OBSERVATION
Discharge: HOME | End: 2022-07-28
Attending: EMERGENCY MEDICINE | Admitting: INTERNAL MEDICINE
Payer: MEDICARE

## 2022-07-27 ENCOUNTER — APPOINTMENT (EMERGENCY)
Dept: RADIOLOGY | Facility: HOSPITAL | Age: 87
End: 2022-07-27
Payer: MEDICARE

## 2022-07-27 ENCOUNTER — APPOINTMENT (EMERGENCY)
Dept: CT IMAGING | Facility: HOSPITAL | Age: 87
End: 2022-07-27
Payer: MEDICARE

## 2022-07-27 DIAGNOSIS — I16.0 HYPERTENSIVE URGENCY: Primary | ICD-10-CM

## 2022-07-27 DIAGNOSIS — S32.010A CLOSED COMPRESSION FRACTURE OF L1 VERTEBRA, INITIAL ENCOUNTER (HCC): ICD-10-CM

## 2022-07-27 DIAGNOSIS — R07.9 CHEST PAIN: ICD-10-CM

## 2022-07-27 DIAGNOSIS — R33.9 URINARY RETENTION: ICD-10-CM

## 2022-07-27 DIAGNOSIS — F03.90 DEMENTIA (HCC): ICD-10-CM

## 2022-07-27 LAB
ALBUMIN SERPL BCP-MCNC: 4 G/DL (ref 3.5–5)
ALP SERPL-CCNC: 109 U/L (ref 34–104)
ALT SERPL W P-5'-P-CCNC: 14 U/L (ref 7–52)
ANION GAP SERPL CALCULATED.3IONS-SCNC: 7 MMOL/L (ref 4–13)
AST SERPL W P-5'-P-CCNC: 21 U/L (ref 13–39)
BASOPHILS # BLD AUTO: 0.04 THOUSANDS/ΜL (ref 0–0.1)
BASOPHILS NFR BLD AUTO: 1 % (ref 0–1)
BILIRUB SERPL-MCNC: 0.69 MG/DL (ref 0.2–1)
BUN SERPL-MCNC: 7 MG/DL (ref 5–25)
CALCIUM SERPL-MCNC: 9 MG/DL (ref 8.4–10.2)
CARDIAC TROPONIN I PNL SERPL HS: 8 NG/L
CHLORIDE SERPL-SCNC: 98 MMOL/L (ref 96–108)
CO2 SERPL-SCNC: 25 MMOL/L (ref 21–32)
CREAT SERPL-MCNC: 0.63 MG/DL (ref 0.6–1.3)
EOSINOPHIL # BLD AUTO: 0.16 THOUSAND/ΜL (ref 0–0.61)
EOSINOPHIL NFR BLD AUTO: 2 % (ref 0–6)
ERYTHROCYTE [DISTWIDTH] IN BLOOD BY AUTOMATED COUNT: 13.2 % (ref 11.6–15.1)
GFR SERPL CREATININE-BSD FRML MDRD: 77 ML/MIN/1.73SQ M
GLUCOSE SERPL-MCNC: 94 MG/DL (ref 65–140)
HCT VFR BLD AUTO: 40.9 % (ref 34.8–46.1)
HGB BLD-MCNC: 14.6 G/DL (ref 11.5–15.4)
IMM GRANULOCYTES # BLD AUTO: 0.04 THOUSAND/UL (ref 0–0.2)
IMM GRANULOCYTES NFR BLD AUTO: 1 % (ref 0–2)
LYMPHOCYTES # BLD AUTO: 1.66 THOUSANDS/ΜL (ref 0.6–4.47)
LYMPHOCYTES NFR BLD AUTO: 23 % (ref 14–44)
MCH RBC QN AUTO: 35 PG (ref 26.8–34.3)
MCHC RBC AUTO-ENTMCNC: 35.7 G/DL (ref 31.4–37.4)
MCV RBC AUTO: 98 FL (ref 82–98)
MONOCYTES # BLD AUTO: 1.11 THOUSAND/ΜL (ref 0.17–1.22)
MONOCYTES NFR BLD AUTO: 15 % (ref 4–12)
NEUTROPHILS # BLD AUTO: 4.23 THOUSANDS/ΜL (ref 1.85–7.62)
NEUTS SEG NFR BLD AUTO: 58 % (ref 43–75)
NRBC BLD AUTO-RTO: 0 /100 WBCS
PLATELET # BLD AUTO: 306 THOUSANDS/UL (ref 149–390)
PMV BLD AUTO: 9 FL (ref 8.9–12.7)
POTASSIUM SERPL-SCNC: 3.9 MMOL/L (ref 3.5–5.3)
PROT SERPL-MCNC: 7.1 G/DL (ref 6.4–8.4)
RBC # BLD AUTO: 4.17 MILLION/UL (ref 3.81–5.12)
SODIUM SERPL-SCNC: 130 MMOL/L (ref 135–147)
WBC # BLD AUTO: 7.24 THOUSAND/UL (ref 4.31–10.16)

## 2022-07-27 PROCEDURE — 93005 ELECTROCARDIOGRAM TRACING: CPT

## 2022-07-27 PROCEDURE — 36415 COLL VENOUS BLD VENIPUNCTURE: CPT

## 2022-07-27 PROCEDURE — 71045 X-RAY EXAM CHEST 1 VIEW: CPT

## 2022-07-27 PROCEDURE — 71275 CT ANGIOGRAPHY CHEST: CPT

## 2022-07-27 PROCEDURE — 80053 COMPREHEN METABOLIC PANEL: CPT | Performed by: EMERGENCY MEDICINE

## 2022-07-27 PROCEDURE — 84484 ASSAY OF TROPONIN QUANT: CPT | Performed by: EMERGENCY MEDICINE

## 2022-07-27 PROCEDURE — 99285 EMERGENCY DEPT VISIT HI MDM: CPT

## 2022-07-27 PROCEDURE — 74174 CTA ABD&PLVS W/CONTRAST: CPT

## 2022-07-27 PROCEDURE — 85025 COMPLETE CBC W/AUTO DIFF WBC: CPT | Performed by: EMERGENCY MEDICINE

## 2022-07-27 PROCEDURE — 99285 EMERGENCY DEPT VISIT HI MDM: CPT | Performed by: EMERGENCY MEDICINE

## 2022-07-27 PROCEDURE — G1004 CDSM NDSC: HCPCS

## 2022-07-28 VITALS
WEIGHT: 115.08 LBS | HEART RATE: 78 BPM | OXYGEN SATURATION: 97 % | RESPIRATION RATE: 19 BRPM | BODY MASS INDEX: 22.59 KG/M2 | DIASTOLIC BLOOD PRESSURE: 103 MMHG | TEMPERATURE: 97.9 F | HEIGHT: 60 IN | SYSTOLIC BLOOD PRESSURE: 170 MMHG

## 2022-07-28 PROBLEM — F03.90 DEMENTIA (HCC): Status: ACTIVE | Noted: 2022-07-28

## 2022-07-28 PROBLEM — R07.9 CHEST PAIN OF UNCERTAIN ETIOLOGY: Status: ACTIVE | Noted: 2022-07-28

## 2022-07-28 PROBLEM — R33.9 URINARY RETENTION: Status: ACTIVE | Noted: 2022-07-28

## 2022-07-28 LAB
2HR DELTA HS TROPONIN: 2 NG/L
4HR DELTA HS TROPONIN: 2 NG/L
ANION GAP SERPL CALCULATED.3IONS-SCNC: 6 MMOL/L (ref 4–13)
ATRIAL RATE: 61 BPM
BILIRUB UR QL STRIP: NEGATIVE
BUN SERPL-MCNC: 6 MG/DL (ref 5–25)
CALCIUM SERPL-MCNC: 8.9 MG/DL (ref 8.4–10.2)
CARDIAC TROPONIN I PNL SERPL HS: 10 NG/L
CARDIAC TROPONIN I PNL SERPL HS: 10 NG/L
CHLORIDE SERPL-SCNC: 99 MMOL/L (ref 96–108)
CLARITY UR: NORMAL
CO2 SERPL-SCNC: 24 MMOL/L (ref 21–32)
COLOR UR: YELLOW
CREAT SERPL-MCNC: 0.59 MG/DL (ref 0.6–1.3)
GFR SERPL CREATININE-BSD FRML MDRD: 78 ML/MIN/1.73SQ M
GLUCOSE SERPL-MCNC: 98 MG/DL (ref 65–140)
GLUCOSE UR STRIP-MCNC: NEGATIVE MG/DL
HGB UR QL STRIP.AUTO: NEGATIVE
KETONES UR STRIP-MCNC: NEGATIVE MG/DL
LEUKOCYTE ESTERASE UR QL STRIP: NEGATIVE
NITRITE UR QL STRIP: NEGATIVE
PH UR STRIP.AUTO: 7.5 [PH] (ref 4.5–8)
PLATELET # BLD AUTO: 299 THOUSANDS/UL (ref 149–390)
PMV BLD AUTO: 8.8 FL (ref 8.9–12.7)
POTASSIUM SERPL-SCNC: 4 MMOL/L (ref 3.5–5.3)
PROT UR STRIP-MCNC: NEGATIVE MG/DL
QRS AXIS: 63 DEGREES
QRSD INTERVAL: 96 MS
QT INTERVAL: 438 MS
QTC INTERVAL: 410 MS
SODIUM SERPL-SCNC: 129 MMOL/L (ref 135–147)
SP GR UR STRIP.AUTO: 1.02 (ref 1–1.03)
T WAVE AXIS: 55 DEGREES
TSH SERPL DL<=0.05 MIU/L-ACNC: 1.36 UIU/ML (ref 0.45–4.5)
UROBILINOGEN UR QL STRIP.AUTO: 1 E.U./DL
VENTRICULAR RATE: 53 BPM

## 2022-07-28 PROCEDURE — 81003 URINALYSIS AUTO W/O SCOPE: CPT

## 2022-07-28 PROCEDURE — 99236 HOSP IP/OBS SAME DATE HI 85: CPT | Performed by: INTERNAL MEDICINE

## 2022-07-28 PROCEDURE — 85049 AUTOMATED PLATELET COUNT: CPT | Performed by: CHIROPRACTOR

## 2022-07-28 PROCEDURE — 80048 BASIC METABOLIC PNL TOTAL CA: CPT | Performed by: CHIROPRACTOR

## 2022-07-28 PROCEDURE — 84484 ASSAY OF TROPONIN QUANT: CPT | Performed by: EMERGENCY MEDICINE

## 2022-07-28 PROCEDURE — 36415 COLL VENOUS BLD VENIPUNCTURE: CPT | Performed by: EMERGENCY MEDICINE

## 2022-07-28 PROCEDURE — 84443 ASSAY THYROID STIM HORMONE: CPT | Performed by: CHIROPRACTOR

## 2022-07-28 PROCEDURE — 93010 ELECTROCARDIOGRAM REPORT: CPT | Performed by: INTERNAL MEDICINE

## 2022-07-28 PROCEDURE — 92610 EVALUATE SWALLOWING FUNCTION: CPT

## 2022-07-28 RX ORDER — POLYETHYLENE GLYCOL 3350 17 G/17G
8.5 POWDER, FOR SOLUTION ORAL DAILY
Status: DISCONTINUED | OUTPATIENT
Start: 2022-07-28 | End: 2022-07-29 | Stop reason: HOSPADM

## 2022-07-28 RX ORDER — ASPIRIN 81 MG/1
81 TABLET, CHEWABLE ORAL ONCE
Status: COMPLETED | OUTPATIENT
Start: 2022-07-28 | End: 2022-07-28

## 2022-07-28 RX ORDER — HYDRALAZINE HYDROCHLORIDE 20 MG/ML
5 INJECTION INTRAMUSCULAR; INTRAVENOUS ONCE
Status: COMPLETED | OUTPATIENT
Start: 2022-07-28 | End: 2022-07-28

## 2022-07-28 RX ORDER — HEPARIN SODIUM 5000 [USP'U]/ML
5000 INJECTION, SOLUTION INTRAVENOUS; SUBCUTANEOUS EVERY 8 HOURS SCHEDULED
Status: DISCONTINUED | OUTPATIENT
Start: 2022-07-28 | End: 2022-07-29 | Stop reason: HOSPADM

## 2022-07-28 RX ORDER — MELATONIN
1000 DAILY
Status: DISCONTINUED | OUTPATIENT
Start: 2022-07-28 | End: 2022-07-29 | Stop reason: HOSPADM

## 2022-07-28 RX ORDER — SODIUM CHLORIDE, SODIUM GLUCONATE, SODIUM ACETATE, POTASSIUM CHLORIDE, MAGNESIUM CHLORIDE, SODIUM PHOSPHATE, DIBASIC, AND POTASSIUM PHOSPHATE .53; .5; .37; .037; .03; .012; .00082 G/100ML; G/100ML; G/100ML; G/100ML; G/100ML; G/100ML; G/100ML
75 INJECTION, SOLUTION INTRAVENOUS CONTINUOUS
Status: DISCONTINUED | OUTPATIENT
Start: 2022-07-28 | End: 2022-07-28

## 2022-07-28 RX ORDER — SODIUM CHLORIDE 1000 MG
1 TABLET, SOLUBLE MISCELLANEOUS
Status: DISCONTINUED | OUTPATIENT
Start: 2022-07-28 | End: 2022-07-29 | Stop reason: HOSPADM

## 2022-07-28 RX ORDER — BUSPIRONE HYDROCHLORIDE 5 MG/1
10 TABLET ORAL 3 TIMES DAILY
Status: DISCONTINUED | OUTPATIENT
Start: 2022-07-28 | End: 2022-07-29 | Stop reason: HOSPADM

## 2022-07-28 RX ADMIN — ASPIRIN 81 MG CHEWABLE TABLET 81 MG: 81 TABLET CHEWABLE at 10:05

## 2022-07-28 RX ADMIN — HEPARIN SODIUM 5000 UNITS: 5000 INJECTION INTRAVENOUS; SUBCUTANEOUS at 05:24

## 2022-07-28 RX ADMIN — IOHEXOL 70 ML: 350 INJECTION, SOLUTION INTRAVENOUS at 00:05

## 2022-07-28 RX ADMIN — Medication 1000 UNITS: at 10:04

## 2022-07-28 RX ADMIN — CYANOCOBALAMIN TAB 500 MCG 500 MCG: 500 TAB at 10:05

## 2022-07-28 RX ADMIN — SODIUM CHLORIDE 1 G: 1 TABLET ORAL at 11:44

## 2022-07-28 RX ADMIN — BUSPIRONE HYDROCHLORIDE 10 MG: 5 TABLET ORAL at 10:04

## 2022-07-28 RX ADMIN — SODIUM CHLORIDE, SODIUM GLUCONATE, SODIUM ACETATE, POTASSIUM CHLORIDE, MAGNESIUM CHLORIDE, SODIUM PHOSPHATE, DIBASIC, AND POTASSIUM PHOSPHATE 75 ML/HR: .53; .5; .37; .037; .03; .012; .00082 INJECTION, SOLUTION INTRAVENOUS at 04:00

## 2022-07-28 RX ADMIN — SODIUM CHLORIDE 1 G: 1 TABLET ORAL at 18:05

## 2022-07-28 RX ADMIN — HYDRALAZINE HYDROCHLORIDE 5 MG: 20 INJECTION, SOLUTION INTRAMUSCULAR; INTRAVENOUS at 02:15

## 2022-07-28 RX ADMIN — POLYETHYLENE GLYCOL 3350 8.5 G: 17 POWDER, FOR SOLUTION ORAL at 10:05

## 2022-07-28 RX ADMIN — BUSPIRONE HYDROCHLORIDE 10 MG: 5 TABLET ORAL at 18:04

## 2022-07-28 NOTE — ASSESSMENT & PLAN NOTE
Patient presented with significantly elevated BPs which normalized by time patient was admitted to her room  Records review indicates this is not the patient's 1st episode of hypertensive urgency  Patient had a significant drop in BP over time  only antihypertensive this evening 5 of hydralazine administered in the ED    Currently holding all home meds and sodium tablets  Maintain gentle IVF 75 isolyte as the patient has not had any significant p o  Intake since about 2:00 p m  Yesterday  Repeat BMP, encourage a m  P O  Intake, resume home meds as indicated    --------------------------------    Home meds per niece include    Amiodarone 100 alternating days at 200  Hydralazine 50 t i d  Ibersartan 300 at bedtime  Coreg 6 25 b i d

## 2022-07-28 NOTE — H&P
[facility]  H&P- Pooja Koehler 10/24/1927, 80 y o  female MRN: [id number]  Unit/Bed#: S -36 Encounter: [id number]  Primary Care Provider: Noel Paniagua MD   Date and time admitted to hospital: 7/27/2022  9:52 PM    * Chest pain of uncertain etiology  Assessment & Plan  Patient presented with chest wall pain as well as left axillary pain  Symptoms had subsided at time of bedside examination at approximately 3:00 a m  Aggravated slightly at 4:00 a m  When patient was moved to room subsequently reduced again with patient repositioning and head of bed elevation  No chest wall or humeral pain upon palpation during physical examination  No murmurs rales or wheezes on auscultation of the chest    Differential includes cardiac ischemia versus more likely pain of musculoskeletal origin, especially in light of negative tropes and telemetry, significant osteoporosis, multiple compression fractures, patient's stature      Troponins negative  Patient on tele  Will continue to monitor and follow    Hypertensive urgency  Assessment & Plan  Patient presented with significantly elevated BPs which normalized by time patient was admitted to her room  Records review indicates this is not the patient's 1st episode of hypertensive urgency  Patient had a significant drop in BP over time  only antihypertensive this evening 5 of hydralazine administered in the ED    Currently holding all home meds and sodium tablets  Maintain gentle IVF 75 isolyte as the patient has not had any significant p o  Intake since about 2:00 p m  Yesterday  Repeat BMP, encourage a m  P O  Intake, resume home meds as indicated    --------------------------------    Home meds per niece include    Amiodarone 100 alternating days at 200  Hydralazine 50 t i d  Ibersartan 300 at bedtime  Coreg 6 25 b i d  Dementia [facility])  Assessment & Plan  Per family, patient has history of dementia  Niece states this is progressive  States most difficulty seems to center around aphasia however patient's understanding seems to remain largely intact  Consider gerontology evaluation    Ambulatory dysfunction  Assessment & Plan  Patient has long history of significant ambulatory dysfunction secondary to hip fusion early in life subsequent development of arthritis in the knees and now compression fracture of the lumbar spine    Patient needs considerable assistance and is unable to ambulate by herself  Urinary retention  Assessment & Plan  Patient found his significant urinary retention in the ED  Per niece, patient had been complaining some generalized abdominal tenderness with seem to improve after patient received Lopez  States urinary retention is becoming more of a problem at home  Lopez was placed in the ED  UC pending  Will continue to monitor and follow    Closed lumbar vertebral fracture [facility])  Assessment & Plan  Documented on CT multiple, most noted involving L1 with 33% loss of vertebral height    Patient obtains significant relief simply with bed repositioning for now  Consider pain meds, lidocaine patch if becomes more bothersome during stay    Hyponatremia  Assessment & Plan  Patient has noted history of hyponatremia, is prescribed daily salt tabs    Recent Labs     07/27/22  2221   SODIUM 130*     Patient currently on isolyte gentle fluids  Reassess with a jack WEST  Reinstitute p o  Sodium tabs when appropriate      VTE Prophylaxis: Heparin  / sequential compression device   Code Status:  DNR/DNI  POLST: POLST form is not discussed and not completed at this time  Anticipated Length of Stay:  Patient will be admitted on an Observation basis with an anticipated length of stay of  less than 2 midnights     Justification for Hospital Stay:  Observation and medical management    Chief Complaint:  Left-sided chest wall and axillary pain POA    History of Present Illness:    Juventino Cassidy is a 80 y o  female PMH significant for dementia, hypertension, urinary retention, ambulatory dysfunction, spinal compression fractures who presents with left-sided chest wall and axillary pain bothersome enough that the patient requested to be brought to the hospital   The history was provided by the patient's niece who lives with the patient and cares for her along with family  At the time of physical examination, patient had no chest wall pain no axillary pain and no tenderness of either those locations upon physical examination  Patient's niece feels the pain may be related to helping the patient in out of her chair at home  Niece also states progressively more difficult to assist patient into chair at home although she is up every day  This patient's mobility is historically very limited due to hip fusion in early years and subsequent development of osteoarthritis of the knees and now multiple spinal compression fractures  Review of Systems:    Review of Systems   Constitutional: Negative for chills, diaphoresis and fever  Respiratory: Negative for cough, shortness of breath and wheezing  Gastrointestinal: Negative for diarrhea, nausea and vomiting  Musculoskeletal: Positive for back pain and gait problem  Skin: Negative for rash  Neurological: Negative for dizziness, light-headedness and headaches         Past Medical and Surgical History:     Past Medical History:   Diagnosis Date    A-fib [facility])     Anxiety     Cheilosis     Diverticulosis     1970s    Fracture of iliac wing ([facility] [address] ) 2016    Fracture, ribs     Gallstone     Hiatal hernia     Hyperlipidemia     Hypertension     Hyponatremia     Inguinal hernia     Lacunar infarction (HCC)     Macrocytosis     Macular degeneration     Osteoarthritis     Ovarian cyst     stable    Renal cyst     Rib fractures 2017    Simple renal cyst     Syncope     Tuberculosis 8009    Umbilical hernia     Vertigo        Past Surgical History:   Procedure Laterality Date    ABDOMINAL SURGERY      HIP SURGERY      At  young age 26's    KNEE CARTILAGE SURGERY      MULTIPLE TOOTH EXTRACTIONS Bilateral     [place]       Meds/Allergies:    Prior to Admission medications    Medication Sig Start Date End Date Taking?  Authorizing Provider   acetaminophen (TYLENOL) 325 mg tablet Take 2 tablets (650 mg total) by mouth every 6 (six) hours as needed for mild pain, headaches or fever 2/13/21  Yes Noreene Ran, DO   aspirin 81 MG tablet Take 81 mg by mouth daily   Yes Historical Provider, MD   busPIRone (BUSPAR) 10 mg tablet Take 1 tablet (10 mg total) by mouth 3 (three) times a day 6/16/22  Yes Viry Herrera MD   carvedilol (COREG) 6 25 mg tablet Take 1 tablet (6 25 mg total) by mouth 2 (two) times a day with meals 4/30/20  Yes Viry Herrera MD   Cholecalciferol (VITAMIN D3) 1000 units CAPS Take by mouth   Yes Historical Provider, MD   Coenzyme Q10 (COQ-10) 50 MG CAPS Take by mouth daily   Yes Historical Provider, MD   gabapentin (Neurontin) 100 mg capsule Take 1 capsule (100 mg total) by mouth 2 (two) times a day 3/1/22  Yes Viry Herrera MD   hydrALAZINE (APRESOLINE) 50 mg tablet Take 1 tablet (50 mg total) by mouth 3 (three) times a day 1/27/22  Yes Viry Herrera MD   irbesartan (AVAPRO) 300 mg tablet Take 300 mg by mouth daily at bedtime    Yes Historical Provider, MD   meclizine (ANTIVERT) 25 mg tablet Take 1 tablet (25 mg total) by mouth 3 (three) times a day as needed for dizziness 9/11/20  Yes Viry Herrera MD   PACERONE 100 MG tablet Take 200 mg by mouth daily at bedtime  5/14/20  Yes Historical Provider, MD   polyethylene glycol (MIRALAX) 17 g packet Take 9 g by mouth daily 2/13/21  Yes Agustina Roger,    sodium chloride 1 g tablet TAKE 1 TABLET(1 GRAM) BY MOUTH THREE TIMES DAILY WITH MEALS 5/16/22  Yes Viry Herrera MD   vitamin B-12 (VITAMIN B-12) 500 mcg tablet Take 500 mcg by mouth daily   Yes Historical Provider, MD nystatin-triamcinolone (MYCOLOG-II) cream Apply topically 2 (two) times a day as needed (rash) 8/27/20   Ad Mcintosh MD     I have reviewed home medications with patient family member  Allergies: Allergies   Allergen Reactions    Codeine     Lomotil [Diphenoxylate]     Mirabegron Hypertension    Quinidex [Quinidine]     Adhesive [Medical Tape] Rash    Penicillins Rash       Social History:     Marital Status:    Occupation:  Retired  Patient Pre-hospital Living Situation:  Home with family  Patient Pre-hospital Level of Mobility:  Progressively more limited needs assistance uses walker  Patient Pre-hospital Diet Restrictions:  Soft foods  Substance Use History:  None  Social History     Substance and Sexual Activity   Alcohol Use Not Currently     Social History     Tobacco Use   Smoking Status Never Smoker   Smokeless Tobacco Never Used     Social History     Substance and Sexual Activity   Drug Use No       Family History:    Family History   Problem Relation Age of Onset    Leukemia Sister     Coronary artery disease Sister     Diabetes Sister     Hypertension Sister     Coronary artery disease Sister     Aortic aneurysm Sister     Coronary artery disease Mother     Coronary artery disease Brother     Substance Abuse Neg Hx     Alcohol abuse Neg Hx     Depression Neg Hx     Mental illness Neg Hx        Physical Exam:     Vitals:   Blood Pressure: 154/80 (07/28/22 0344)  Pulse: 63 (07/28/22 0344)  Temperature: 97 8 °F (36 6 °C) (07/28/22 0321)  Temp Source: Oral (07/28/22 0321)  Respirations: 18 (07/28/22 0321)  Height: 5' (152 4 cm) (07/27/22 2159)  Weight - Scale: 52 2 kg (115 lb 1 3 oz) (07/28/22 0321)  SpO2: 94 % (07/28/22 0321)    Physical Exam  Constitutional:       General: She is not in acute distress  HENT:      Head: Normocephalic  Nose: No rhinorrhea  Eyes:      General: No scleral icterus  Extraocular Movements: Extraocular movements intact  Cardiovascular:      Rate and Rhythm: Normal rate and regular rhythm  Heart sounds: No murmur heard  Pulmonary:      Breath sounds: No wheezing or rales  Abdominal:      Palpations: There is no mass  Tenderness: There is abdominal tenderness (Generalized superficial)  There is no guarding  Musculoskeletal:      Right lower leg: No edema  Left lower leg: No edema  Skin:     General: Skin is warm and dry  Findings: No lesion or rash  Neurological:      General: No focal deficit present  Mental Status: She is alert  Comments: Alert, conversant             Additional Data:     Lab Results: I have personally reviewed pertinent reports  Results from last 7 days   Lab Units 07/27/22  2221   WBC Thousand/uL 7 24   HEMOGLOBIN g/dL 14 6   HEMATOCRIT % 40 9   PLATELETS Thousands/uL 306   NEUTROS PCT % 58   LYMPHS PCT % 23   MONOS PCT % 15*   EOS PCT % 2     Results from last 7 days   Lab Units 07/27/22  2221   POTASSIUM mmol/L 3 9   CHLORIDE mmol/L 98   CO2 mmol/L 25   BUN mg/dL 7   CREATININE mg/dL 0 63   CALCIUM mg/dL 9 0   ALK PHOS U/L 109*   ALT U/L 14   AST U/L 21           Imaging: I have personally reviewed pertinent reports  CTA dissection protocol chest abdomen pelvis w wo contrast    Result Date: 7/28/2022  Narrative: CTA - CHEST, ABDOMEN AND PELVIS - WITHOUT AND WITH IV CONTRAST INDICATION:   pain started in flank, going up chest, now in shoulder  elevated bp  COMPARISON:  CT abdomen pelvis dated 5/30/2020 TECHNIQUE: CT examination of the chest, abdomen and pelvis was performed both prior to and after the administration of intravenous contrast   The noncontrast portion of this examination was performed utilizing low radiation dose technique  Thin section  angiographic arterial phase post contrast technique was used in order to evaluate for aortic dissection  3D reformatted images and volume rendering were performed on an independent workstation    Additionally, axial, sagittal, and coronal 2D reformatted  images were created from the source data and submitted for interpretation  Radiation dose length product (DLP) for this visit:  603 mGy-cm   This examination, like all CT scans performed in the [facility], was performed utilizing techniques to minimize radiation dose exposure, including the use of iterative reconstruction and automated exposure control  IV Contrast:  70 mL of iohexol (OMNIPAQUE) Enteric Contrast:  Enteric contrast was not administered  FINDINGS: AORTA: Mild to moderate atherosclerosis  There is no aortic dissection or intramural hematoma  There is no aortic aneurysm  CHEST LUNGS:  Bronchiectasis and scarring in the bilateral upper lobes  Several subcentimeter calcified granulomas in the right lung  Bilateral lungs otherwise appear grossly clear  PLEURA:  Unremarkable  HEART/PULMONARY ARTERIAL TREE:  No central pulmonary embolism is seen  Mild coronary atherosclerosis  Heart appears mildly enlarged  MEDIASTINUM AND JONES:  Unremarkable  CHEST WALL AND LOWER NECK:   Generalized osteopenia  ABDOMEN LIVER/BILIARY TREE:  Unremarkable  GALLBLADDER:  There are gallstone(s) within the gallbladder, without pericholecystic inflammatory changes  SPLEEN:  Unremarkable  PANCREAS:  Unremarkable  ADRENAL GLANDS:  Unremarkable  KIDNEYS/URETERS:  Parapelvic left renal cysts  Bilateral kidneys otherwise appear grossly unremarkable  STOMACH AND BOWEL:  Scattered colonic diverticulosis  No discrete evidence of acute diverticulitis  Small hiatal hernia  No evidence of bowel obstruction  Otherwise grossly unremarkable  APPENDIX:  No findings to suggest appendicitis  ABDOMINOPELVIC CAVITY:  No ascites or free intraperitoneal air  No lymphadenopathy   PELVIS REPRODUCTIVE ORGANS:  7 cm cystic lesion in the posterior right pelvis, could represent peritoneal inclusion cyst, adnexal cyst or ovarian cystic lesion, nonemergent pelvic ultrasound recommended for further evaluation when clinically feasible URINARY BLADDER:  Grossly unremarkable ABDOMINAL WALL/INGUINAL REGIONS:  Laxity of the anterior abdominal wall OSSEOUS STRUCTURES:  Generalized osteopenia  Osseous fusion of the right femoral head and acetabulum  Several osteoporotic compression fractures are seen in the spine, most prominently involving the L1 vertebral body where there is approximately 33% loss of height  No significant retropulsion  Multilevel degenerative changes  Impression: Atherosclerosis but no aortic aneurysm, dissection, or acute aortic process is seen  Bronchiectasis and scarring in the bilateral upper lobes, no acute pulmonary process identified  Mild cardiomegaly and mild coronary atherosclerosis  Cholelithiasis  7 cm cystic lesion in the posterior right pelvis, could represent peritoneal inclusion cyst, adnexal cyst or ovarian cystic lesion, nonemergent pelvic ultrasound recommended for further evaluation when clinically feasible  Generalized osteopenia with osteoporotic compression fractures in the spine, most prominently involving the L1 vertebral body with there is approximately 33% loss of height  No significant retropulsion  Correlation with the patient's symptoms recommended  Renal cysts, colonic diverticulosis without evidence of acute diverticulitis, and other findings as above  Workstation performed: [id number]       EKG, Pathology, and Other Studies Reviewed on Admission:     [facility] Everywhere Records Reviewed: No     ** Please Note: This note has been constructed using a voice recognition system   **

## 2022-07-28 NOTE — ASSESSMENT & PLAN NOTE
Patient has noted history of hyponatremia, is prescribed daily salt tabs    Recent Labs     07/27/22  2221   SODIUM 130*     Patient currently on isolyte gentle fluids  Reassess with a jack WEST  Reinstitute p o   Sodium tabs when appropriate

## 2022-07-28 NOTE — DISCHARGE INSTR - AVS FIRST PAGE
Dear Jim Baca,     [facility]  It is our hope that we were always able to exceed the expected standards for your care during your stay  You were hospitalized due to musculoskeletal chest pain  You were cared for on the 3rd floor by Sherie Ang MD under the service of Joss Little MD with the [facility] who covers for your primary care physician (PCP), Wing Mayur MD, while you were hospitalized  If you have any questions or concerns related to this hospitalization, you may contact us at [phone]  For follow up as well as any medication refills, we recommend that you follow up with your primary care physician  A registered nurse will reach out to you by phone within a few days after your discharge to answer any additional questions that you may have after going home  However, at this time we provide for you here, the most important instructions / recommendations at discharge:     Notable Medication Adjustments -   Not changing her medications  Testing Required after Discharge -   Please have lab work - a basic metabolic panel - performed prior to seeing your primary care doctor  Important follow up information -   Please follow-up with your primary care doctor in a week   Please follow-up with the urologist referral as an outpatient  Other Instructions -   None  Please review this entire after visit summary as additional general instructions including medication list, appointments, activity, diet, any pertinent wound care, and other additional recommendations from your care team that may be provided for you        Sincerely,     Sherie Ang MD

## 2022-07-28 NOTE — ASSESSMENT & PLAN NOTE
Documented on CT multiple, most noted involving L1 with 33% loss of vertebral height    Patient obtains significant relief simply with bed repositioning for now  Consider pain meds, lidocaine patch if becomes more bothersome during stay

## 2022-07-28 NOTE — ASSESSMENT & PLAN NOTE
Per family, patient has history of dementia  Niece states this is progressive  States most difficulty seems to center around aphasia however patient's understanding seems to remain largely intact      Consider gerontology evaluation

## 2022-07-28 NOTE — CASE MANAGEMENT
Case Management Discharge Planning Note    Patient name Sandra Yang /S -70 MRN [id number]  : 10/24/1927 Date 2022       Current Admission Date: 2022  Current Admission Diagnosis:Chest pain of uncertain etiology   Patient Active Problem List    Diagnosis Date Noted    Urinary retention 2022    Chest pain of uncertain etiology     Dementia ([facility] [address] ) 2022    Anxiety 10/27/2021    Elevated LFTs 02/10/2021    Vitamin B12 deficiency 2021    Macular degeneration 2020    Cervical spondylosis 2020    Hiatal hernia 2020    Vertigo 2020    Closed lumbar vertebral fracture ([facility] [address] ) 2020    Osteoarthritis 2020    Memory changes 2020    Hearing loss 2020    Urinary frequency 2020    Other hyperlipidemia 2020    History of vertebral fracture 2019    Chronic constipation 2019    Arthritis 2019    Closed compression fracture of L3 lumbar vertebra, initial encounter ([facility] [address] ) 2019    Labile hypertension 2019    Hypertensive urgency 2018    Atypical chest pain acute coronary syndrome versus hepatobiliary disease on the differential 2018    Paroxysmal atrial fibrillation ([facility] [address] ) 2018    Hyponatremia 2018    Ambulatory dysfunction 2018    Mitral valve regurgitation 2018      LOS (days): 0  Geometric Mean LOS (GMLOS) (days):   Days to GMLOS:     OBJECTIVE:            Current admission status: Observation   Preferred Pharmacy:   [facility] [address] [facility] [address] [place] [id number]  Phone: [phone] Fax: [phone]    Primary Care Provider: Marva Hill MD    Primary Insurance: MEDICARE  Secondary Insurance: [facility]    DISCHARGE DETAILS:    Contacts  Patient Contacts: Delilah Resides (niece)  Relationship to Patient[de-identified] Family  Contact Method: In Person  Reason/Outcome: Discharge Planning, Continuity of Care    Other Referral/Resources/Interventions Provided:  Interventions: Transportation  Referral Comments: Cm informed pt stable for DC home  CM requested BLS transport per request of family  CM informed niece at bedside that there is no guarantee of coverage through Medicare  Niece confirmed understanding  CM requested BLS transport for 6pm via [facility] one call, awaiting confirmation of time    CM informed RN-Tc    Treatment Team Recommendation: Home  Discharge Destination Plan[de-identified] Home

## 2022-07-28 NOTE — ASSESSMENT & PLAN NOTE
Patient found his significant urinary retention in the ED  Per niece, patient had been complaining some generalized abdominal tenderness with seem to improve after patient received Lopez  States urinary retention is becoming more of a problem at home      Lopez was placed in the ED  UC pending  Will continue to monitor and follow

## 2022-07-28 NOTE — PLAN OF CARE
Problem: Nutrition/Hydration-ADULT  Goal: Nutrient/Hydration intake appropriate for improving, restoring or maintaining nutritional needs  Description: Monitor and assess patient's nutrition/hydration status for malnutrition  Collaborate with interdisciplinary team and initiate plan and interventions as ordered  Monitor patient's weight and dietary intake as ordered or per policy  Utilize nutrition screening tool and intervene as necessary  Determine patient's food preferences and provide high-protein, high-caloric foods as appropriate       INTERVENTIONS:  - Monitor oral intake, urinary output, labs, and treatment plans  - Assess nutrition and hydration status and recommend course of action  - Evaluate amount of meals eaten  - Assist patient with eating if necessary   - Allow adequate time for meals  - Recommend/ encourage appropriate diets, oral nutritional supplements, and vitamin/mineral supplements  - Order, calculate, and assess calorie counts as needed  - Assess need for intravenous fluids  - Provide nutrition/hydration education as appropriate  - Include patient/family/caregiver in decisions related to nutrition  Outcome: Progressing     Problem: MOBILITY - ADULT  Goal: Maintain or return to baseline ADL function  Description: INTERVENTIONS:  -  Assess patient's ability to carry out ADLs; assess patient's baseline for ADL function and identify physical deficits which impact ability to perform ADLs (bathing, care of mouth/teeth, toileting, grooming, dressing, etc )  - Assess/evaluate cause of self-care deficits   - Assess range of motion  - Assess patient's mobility; develop plan if impaired  - Assess patient's need for assistive devices and provide as appropriate  - Encourage maximum independence but intervene and supervise when necessary  - Involve family in performance of ADLs  - Assess for home care needs following discharge   - Consider OT consult to assist with ADL evaluation and planning for discharge  - Provide patient education as appropriate  Outcome: Progressing  Goal: Maintains/Returns to pre admission functional level  Description: INTERVENTIONS:  - Perform BMAT or MOVE assessment daily    - Set and communicate daily mobility goal to care team and patient/family/caregiver  - Collaborate with rehabilitation services on mobility goals if consulted  - Perform Range of Motion 3 times a day  - Reposition patient every 2 hours    - Dangle patient 3 times a day  - Stand patient 3 times a day  - Ambulate patient 3 times a day  - Out of bed to chair 3 times a day   - Out of bed for meals 3 times a day  - Out of bed for toileting  - Record patient progress and toleration of activity level   Outcome: Progressing     Problem: Potential for Falls  Goal: Patient will remain free of falls  Description: INTERVENTIONS:  - Educate patient/family on patient safety including physical limitations  - Instruct patient to call for assistance with activity   - Consult OT/PT to assist with strengthening/mobility   - Keep Call bell within reach  - Keep bed low and locked with side rails adjusted as appropriate  - Keep care items and personal belongings within reach  - Initiate and maintain comfort rounds  - Make Fall Risk Sign visible to staff  - Offer Toileting every 2 Hours, in advance of need  - Initiate/Maintain bed alarm  - Apply yellow socks and bracelet for high fall risk patients  - Consider moving patient to room near nurses station  Outcome: Progressing     Problem: Prexisting or High Potential for Compromised Skin Integrity  Goal: Skin integrity is maintained or improved  Description: INTERVENTIONS:  - Identify patients at risk for skin breakdown  - Assess and monitor skin integrity  - Assess and monitor nutrition and hydration status  - Monitor labs   - Assess for incontinence   - Turn and reposition patient  - Assist with mobility/ambulation  - Relieve pressure over bony prominences  - Avoid friction and shearing  - Provide appropriate hygiene as needed including keeping skin clean and dry  - Evaluate need for skin moisturizer/barrier cream  - Collaborate with interdisciplinary team   - Patient/family teaching  - Consider wound care consult   Outcome: Progressing

## 2022-07-28 NOTE — ASSESSMENT & PLAN NOTE
Patient presented with chest wall pain as well as left axillary pain  Symptoms had subsided at time of bedside examination at approximately 3:00 a m  Aggravated slightly at 4:00 a m   When patient was moved to room subsequently reduced again with patient repositioning and head of bed elevation  No chest wall or humeral pain upon palpation during physical examination  No murmurs rales or wheezes on auscultation of the chest    Differential includes cardiac ischemia versus more likely pain of musculoskeletal origin, especially in light of negative tropes and telemetry, significant osteoporosis, multiple compression fractures, patient's stature      Troponins negative  Patient on tele  Will continue to monitor and follow

## 2022-07-28 NOTE — ASSESSMENT & PLAN NOTE
Patient has long history of significant ambulatory dysfunction secondary to hip fusion early in life subsequent development of arthritis in the knees and now compression fracture of the lumbar spine    Patient needs considerable assistance and is unable to ambulate by herself

## 2022-07-28 NOTE — SPEECH THERAPY NOTE
Speech-Language Pathology Bedside Swallow Evaluation        Patient Name: Jake Regan    [id number] Date: 7/28/2022     Problem List  Principal Problem:    Chest pain of uncertain etiology  Active Problems:    Hypertensive urgency    Hyponatremia    Ambulatory dysfunction    Closed lumbar vertebral fracture (HCC)    Urinary retention    Dementia [facility])         Past Medical History  Past Medical History:   Diagnosis Date    A-fib ([facility] [address] )     Anxiety     Cheilosis     Diverticulosis     1970s    Fracture of iliac wing ([facility] [address] ) 2016    Fracture, ribs     Gallstone     Hiatal hernia     Hyperlipidemia     Hypertension     Hyponatremia     Inguinal hernia     Lacunar infarction (HCC)     Macrocytosis     Macular degeneration     Osteoarthritis     Ovarian cyst     stable    Renal cyst     Rib fractures 2017    Simple renal cyst     Syncope     Tuberculosis 9079    Umbilical hernia     Vertigo        Past Surgical History  Past Surgical History:   Procedure Laterality Date    ABDOMINAL SURGERY      HIP SURGERY      At  young age 26's   Miguel A Kingston KNEE CARTILAGE SURGERY      MULTIPLE TOOTH EXTRACTIONS Bilateral     [place]       Summary    Pt presents with functional to mild oral dysphagia- pt w/ excessive chewing, especially w/ crust of bread  Pt appears w/ low risk for aspiration  Recommendations:   Diet: regular diet and thin liquids -family to choose softer foods and cut up as appropriate  Meds: as tolerated   Frequent Oral care: 2x/day  Aspiration precautions -feed slowly; small portions  Other Recommendations/ considerations: will follow up x 1-2 as needed- pt tent for discharge today       Current Medical Status  Pt is a 80 y o  female who presented to [facility]  with chest pain   Pt presents with left-sided chest wall and axillary pain bothersome enough that the patient requested to be brought to the hospital   The history was provided by the patient's niece who lives with the patient and cares for her along with family  At the time of physical examination, patient had no chest wall pain no axillary pain and no tenderness of either those locations upon physical examination  Patient's niece feels the pain may be related to helping the patient in out of her chair at home  Niece also states progressively more difficult to assist patient into chair at home although she is up every day  This patient's mobility is historically very limited due to hip fusion in early years and subsequent development of osteoarthritis of the knees and now multiple spinal compression fractures  Past medical history:   Please see H&P for details    Special Studies:  CTA chest/abd/pelvis: 7/28/22 LUNGS:  Bronchiectasis and scarring in the bilateral upper lobes  Several subcentimeter calcified granulomas in the right lung  Bilateral lungs otherwise appear grossly clear  Social/Education/Vocational Hx:  Pt lives with family    Swallow Information   Current Risks for Dysphagia & Aspiration: AMS  Current Symptoms/Concerns: usually eats soft foods  Current Diet: mechanically altered/level 2 diet and nectar thick liquids   Baseline Diet: soft/level 3 diet and thin liquids  Takes pills- whole w/ water, larger ones cut or crushed in applesauce  Baseline Assessment   Behavior/Cognition: alert  Speech/Language Status: able to participate in basic conversation and able to follow commands  Patient Positioning: upright in bed     Swallow Mechanism Exam   Facial: symmetrical  Labial: WFL  Lingual: WFL  Velum: unable to visualize  Mandible: adequate ROM  Dentition: adequate and upper dentures  Vocal quality:clear/adequate   Volitional Cough: strong/productive   Respiratory: RA    Consistencies Assessed and Performance   Consistencies Administered: thin liquids, nectar thick, puree and soft solids (butter bread 1/4 pc)    Oral Stage: pt was able to bite butter bread, feed self pudding, and drink from cup and straw   Somewhat decreased oral control w/ thin liquids by straw w/ food still in her mouth, better w/ cup and clear oral cavity  Adequate mastication noted, prolonged w/ crust of bread; adequate manipulation and transfer of all consistencies  Pharyngeal Stage: swallows were timely and complete  Coughing noted w/ sips of water by straw w/ bread crust in still in mouth  No coughing noted w/ cup sips of thin liquids and thin by straw w/ mouth clear         Esophageal Concerns: none reported      Results Reviewed with: patient, RN and MD   Dysphagia Goals: pt will tolerate regular diet  with thin liquids without s/s of aspiration x72 hours      April Ronni Claude, MA CCC-SLP  Speech Pathologist  [place] license # [id number] [facility] [id number]  [place] license # [id number]  Available via [employer]

## 2022-07-29 ENCOUNTER — TELEPHONE (OUTPATIENT)
Dept: INTERNAL MEDICINE CLINIC | Facility: CLINIC | Age: 87
End: 2022-07-29

## 2022-07-29 ENCOUNTER — TRANSITIONAL CARE MANAGEMENT (OUTPATIENT)
Dept: INTERNAL MEDICINE CLINIC | Facility: CLINIC | Age: 87
End: 2022-07-29

## 2022-07-29 NOTE — ASSESSMENT & PLAN NOTE
Patient presented with chest wall pain as well as left axillary pain  Symptoms had subsided at time of bedside examination at approximately 3:00 a m  Aggravated slightly at 4:00 a m  When patient was moved to room subsequently reduced again with patient repositioning and head of bed elevation  No chest wall or humeral pain upon palpation during physical examination  No murmurs rales or wheezes on auscultation of the chest    Differential includes cardiac ischemia versus more likely pain of musculoskeletal origin, especially in light of negative tropes and telemetry, significant osteoporosis, multiple compression fractures, patient's stature    On the morning of 7/28, the patient did not have any chest pain  Plan:  Discontinue tele  Monitored for additional chest pain

## 2022-07-29 NOTE — TELEPHONE ENCOUNTER
Nothing to add  Staff: please schedule TCM if she qualifies   If not, please schedule regular f/u appt

## 2022-07-29 NOTE — ASSESSMENT & PLAN NOTE
Documented on CT multiple, most noted involving L1 with 33% loss of vertebral height    Patient is not complaining of pain on the morning of 7/28      Plan:  -monitoring

## 2022-07-29 NOTE — ASSESSMENT & PLAN NOTE
Per family, patient has history of dementia  Niece states this is progressive  States most difficulty seems to center around aphasia however patient's understanding seems to remain largely intact  On the morning of 7/28, the patient's niece noticed that the patient was less oriented than she usually is, when she is at home  She was demonstrating mild concern, but no agitation at that time

## 2022-07-29 NOTE — ASSESSMENT & PLAN NOTE
Patient presented with significantly elevated BPs which normalized by time patient was admitted to her room  Records review indicates this is not the patient's 1st episode of hypertensive urgency    Patient had a significant drop in BP over time  only antihypertensive this evening 5 of hydralazine administered in the ED    This had resolved by 7/28    Plan  Currently resuming home meds  Stopped IV fluids

## 2022-07-29 NOTE — ASSESSMENT & PLAN NOTE
Patient found his significant urinary retention in the ED  Per niece, patient had been complaining some generalized abdominal tenderness with seem to improve after patient received Lopez  States urinary retention is becoming more of a problem at home      Plan:  -Fully removed on 07/28, with urinary retention evaluated, showing only approximately 50 cc of residual urine after several hours post Lopez removal   -planning to follow up with the patient's primary care provider and Urology as an outpatient to assess for treatable cause of her urinary retention

## 2022-07-29 NOTE — TELEPHONE ENCOUNTER
Khoi Balbuena wants to know if pcp would like to add on any extra lab work, she has [facility] lab coming to draw [address]

## 2022-07-29 NOTE — DISCHARGE SUMMARY
[facility]  Discharge- Elle Washington 10/24/1927, 80 y o  female MRN: [id number]  Unit/Bed#: S -28 Encounter: [id number]  Primary Care Provider: Srinivas Espinosa MD   Date and time admitted to hospital: 7/27/2022  9:52 PM    Hypertensive urgency  Assessment & Plan  Patient presented with significantly elevated BPs which normalized by time patient was admitted to her room  Records review indicates this is not the patient's 1st episode of hypertensive urgency  Patient had a significant drop in BP over time  only antihypertensive this evening 5 of hydralazine administered in the ED    This had resolved by 7/28    Plan  Currently resuming home meds  Stopped IV fluids          Hyponatremia  Assessment & Plan  Patient has noted history of hyponatremia, is prescribed daily salt tabs    Recent Labs     07/27/22  2221 07/28/22  0437   SODIUM 130* 129*     Stop fluids  Restarted home sodium tablets  Recommend follow-up BMP as an outpatient and follow-up with PCP    Dementia [facility])  Assessment & Plan  Per family, patient has history of dementia  Niece states this is progressive  States most difficulty seems to center around aphasia however patient's understanding seems to remain largely intact  On the morning of 7/28, the patient's niece noticed that the patient was less oriented than she usually is, when she is at home  She was demonstrating mild concern, but no agitation at that time  Urinary retention  Assessment & Plan  Patient found his significant urinary retention in the ED  Per niece, patient had been complaining some generalized abdominal tenderness with seem to improve after patient received Lopez  States urinary retention is becoming more of a problem at home      Plan:  -Fully removed on 07/28, with urinary retention evaluated, showing only approximately 50 cc of residual urine after several hours post Lopez removal   -planning to follow up with the patient's primary care provider and Urology as an outpatient to assess for treatable cause of her urinary retention    Closed lumbar vertebral fracture [facility])  Assessment & Plan  Documented on CT multiple, most noted involving L1 with 33% loss of vertebral height    Patient is not complaining of pain on the morning of 7/28  Plan:  -monitoring    Ambulatory dysfunction  Assessment & Plan  Patient has long history of significant ambulatory dysfunction secondary to hip fusion early in life subsequent development of arthritis in the knees and now compression fracture of the lumbar spine    Patient needs considerable assistance and is unable to ambulate by herself  * Chest pain of uncertain etiology  Assessment & Plan  Patient presented with chest wall pain as well as left axillary pain  Symptoms had subsided at time of bedside examination at approximately 3:00 a m  Aggravated slightly at 4:00 a m  When patient was moved to room subsequently reduced again with patient repositioning and head of bed elevation  No chest wall or humeral pain upon palpation during physical examination  No murmurs rales or wheezes on auscultation of the chest    Differential includes cardiac ischemia versus more likely pain of musculoskeletal origin, especially in light of negative tropes and telemetry, significant osteoporosis, multiple compression fractures, patient's stature    On the morning of 7/28, the patient did not have any chest pain  Plan:  Discontinue tele  Monitored for additional chest pain          Medical Problems             Resolved Problems  Date Reviewed: 4/28/2022   None               Discharging Resident: Viki Arroyo MD  Discharging Attending: Denae Aguila MD  PCP: Mendel Chase, MD  Admission Date:   Admission Orders (From admission, onward)     Ordered        07/28/22 0211  Place in Observation  Once                      Discharge Date: 07/28/22    [address] Stay:  · None    Procedures Performed:   · Lopez catheter placement and removal    Significant Findings / Test Results:   · None    Incidental Findings:   From CTA chest abd pelvis on 7/28:  1  Atherosclerosis but no aortic aneurysm, dissection, or acute aortic process is seen  2  Bronchiectasis and scarring in the bilateral upper lobes, no acute pulmonary process identified  3  Mild cardiomegaly and mild coronary atherosclerosis  4  Cholelithiasis  5  7 cm cystic lesion in the posterior right pelvis, could represent peritoneal inclusion cyst, adnexal cyst or ovarian cystic lesion, nonemergent pelvic ultrasound recommended for further evaluation when clinically feasible  6  Generalized osteopenia with osteoporotic compression fractures in the spine, most prominently involving the L1 vertebral body with there is approximately 33% loss of height   No significant retropulsion   Correlation with the patient's symptoms   recommended  7  Renal cysts, colonic diverticulosis without evidence of acute diverticulitis, and other findings as above  Test Results Pending at Discharge (will require follow up): · None     Outpatient Tests Requested:  · BMP before going to your primary care doctor    Complications:  None    Reason for Admission:  Left chest wall and axillary pain    Hospital Course:   Wilfredo Dorsey is a 80 y o  female patient with a past medical history of dementia, hypertension, urinary retention, ambulatory dysfunction, osteoporosis, hip fusion, and spinal compression fracture who originally presented to the hospital on 7/27/2022 due to chest pain  The niece also was present to provide most of the history, as the patient was disoriented  She initially presented because of worsening left-sided axillary and chest wall pain, which is congruent with the area under pressure when transferring her from bed to chair    However, because the patient continued to complain of worsening pain, her family member brought her to the hospital   Her cardiac workup, as well as imaging and EKG were found to be negative for acute abnormality  She was resuscitated with IV fluids  Her laboratory evaluation revealed hypernatremia, which she has recently had as an outpatient  She was resumed on her home dose of sodium chloride tablets  She also had a Lopez placed for concern of urinary retention  On 07/28/22, the Lopez was successfully discontinued, without evidence of urinary retention on bladder scan  She was also transitioned off of IV fluids, and tolerating oral feeds and medications, including her home sodium tablets  She was resumed on all of her home medications for discharge  The patient, initially admitted to the hospital as inpatient, was discharged earlier than expected given the following: Resolution of her chief complaint of chest pain on the following morning       Please see above list of diagnoses and related plan for additional information  Condition at Discharge: fair    Discharge Day Visit / Exam:   Subjective:  Patient had no acute events this morning  She was resting in bed with her knees by her bedside  When speaking with her, she was very polite, however confused and repeatedly asked who I was and where she was and stated that she was concerned about this  She did not remember how she got to the hospital, that she was in the hospital, or what time of day, or year it was  She reported feeling well overall, without any pain, shortness of breath, headache, chest pain, abdominal pain, nausea, or any other discomfort  Speaking with her niece, she summarized the patient's past day including her reason for coming in for this chest pain  The patient had apparently had worsening distress regarding left axillary and chest wall pain, which the patient then requested to be brought to the hospital for    Her mental status had become more confused the longer she was in the hospital, but does not case when she was at home yesterday morning on 07/27  Vitals: Blood Pressure: (!) 170/103 (07/28/22 1456)  Pulse: 78 (07/28/22 1456)  Temperature: 97 9 °F (36 6 °C) (07/28/22 1456)  Temp Source: Oral (07/28/22 1456)  Respirations: 19 (07/28/22 1456)  Height: 5' (152 4 cm) (07/27/22 4089)  Weight - Scale: 52 2 kg (115 lb 1 3 oz) (07/28/22 0321)  SpO2: 97 % (07/28/22 1456)  Exam:   Physical Exam  Vitals reviewed  Constitutional:       General: She is not in acute distress  Appearance: She is not toxic-appearing  Comments: Thin appearing   HENT:      Head: Normocephalic and atraumatic  Nose: Nose normal  No congestion or rhinorrhea  Mouth/Throat:      Mouth: Mucous membranes are moist    Eyes:      General: No scleral icterus  Extraocular Movements: Extraocular movements intact  Conjunctiva/sclera: Conjunctivae normal       Pupils: Pupils are equal, round, and reactive to light  Cardiovascular:      Rate and Rhythm: Normal rate and regular rhythm  Pulses: Normal pulses  Heart sounds: Normal heart sounds  Pulmonary:      Effort: Pulmonary effort is normal       Breath sounds: Normal breath sounds  No wheezing or rales  Abdominal:      General: Abdomen is flat  Palpations: Abdomen is soft  Tenderness: There is no abdominal tenderness  There is no guarding  Musculoskeletal:      Cervical back: No rigidity or tenderness  Right lower leg: No edema  Left lower leg: No edema  Skin:     General: Skin is warm and dry  Capillary Refill: Capillary refill takes less than 2 seconds  Neurological:      Mental Status: She is alert  Comments: The patient was oriented to person only  She was able to identify her niece  She continued to repeat that she was confused, and did not know who we were, though she did not seem significantly distressed about this    She was easily consolable by reminding her where she was and who the healthcare providers were           Discussion with Family: Updated  (niece) at bedside  Discharge instructions/Information to patient and family:   See after visit summary for information provided to patient and family  Provisions for Follow-Up Care:  See after visit summary for information related to follow-up care and any pertinent home health orders  Disposition:   Home    Planned Readmission:  None    Discharge Medications:  See after visit summary for reconciled discharge medications provided to patient and/or family        **Please Note: This note may have been constructed using a voice recognition system**

## 2022-07-29 NOTE — ASSESSMENT & PLAN NOTE
Patient has noted history of hyponatremia, is prescribed daily salt tabs    Recent Labs     07/27/22  2221 07/28/22  0437   SODIUM 130* 129*     Stop fluids  Restarted home sodium tablets    Recommend follow-up BMP as an outpatient and follow-up with PCP

## 2022-07-30 NOTE — ED PROVIDER NOTES
History  Chief Complaint   Patient presents with    Chest Pain     Patient arrived by EMS  Pt reports chest pain under left breast and her whole left side  Pt has a hx of dementia  Pt denies fall  History provided by:  Patient   used: No    Chest Pain    80year-old presenting with reported left-sided chest pain  Patient dementia  Daughter here explaining patient's symptoms  It is today multiple times complained of pain  Hypertensive  Also having difficulty voiding  Chronic  Chronic lower back pain  Chronic compression fractures  History of CAD, daughter unsure how severe  MDM will dissection study, cardiac evaluation    Patient with urinary tension, will place Lopez    With 80year-old left-sided chest pain and elevated pressures will bring in for observation      Prior to Admission Medications   Prescriptions Last Dose Informant Patient Reported? Taking?    Cholecalciferol (VITAMIN D3) 1000 units CAPS 7/27/2022 at Unknown time Family Member Yes Yes   Sig: Take by mouth   PACERONE 100 MG tablet 7/26/2022 at Unknown time Family Member Yes Yes   Sig: Take 200 mg by mouth daily at bedtime    acetaminophen (TYLENOL) 325 mg tablet Past Week at Unknown time  No Yes   Sig: Take 2 tablets (650 mg total) by mouth every 6 (six) hours as needed for mild pain, headaches or fever   aspirin 81 MG tablet 7/27/2022 at Unknown time Family Member Yes Yes   Sig: Take 81 mg by mouth daily   busPIRone (BUSPAR) 10 mg tablet 7/27/2022 at Unknown time  No Yes   Sig: Take 1 tablet (10 mg total) by mouth 3 (three) times a day   carvedilol (COREG) 6 25 mg tablet 7/27/2022 at Unknown time Family Member No Yes   Sig: Take 1 tablet (6 25 mg total) by mouth 2 (two) times a day with meals   hydrALAZINE (APRESOLINE) 50 mg tablet 7/27/2022 at Unknown time  No Yes   Sig: Take 1 tablet (50 mg total) by mouth 3 (three) times a day   irbesartan (AVAPRO) 300 mg tablet 7/26/2022 at Unknown time Family Member Yes Yes Sig: Take 300 mg by mouth daily at bedtime    polyethylene glycol (MIRALAX) 17 g packet 7/27/2022 at Unknown time  No Yes   Sig: Take 9 g by mouth daily   sodium chloride 1 g tablet 7/27/2022 at Unknown time  No Yes   Sig: TAKE 1 TABLET(1 GRAM) BY MOUTH THREE TIMES DAILY WITH MEALS   vitamin B-12 (VITAMIN B-12) 500 mcg tablet 7/27/2022 at Unknown time  Yes Yes   Sig: Take 500 mcg by mouth daily      Facility-Administered Medications: None       Past Medical History:   Diagnosis Date    A-fib ([facility] [address] )     Anxiety     Cheilosis     Diverticulosis     1970s    Fracture of iliac wing ([facility] [address] ) 2016    Fracture, ribs     Gallstone     Hiatal hernia     Hyperlipidemia     Hypertension     Hyponatremia     Inguinal hernia     Lacunar infarction (HCC)     Macrocytosis     Macular degeneration     Osteoarthritis     Ovarian cyst     stable    Renal cyst     Rib fractures 2017    Simple renal cyst     Syncope     Tuberculosis 9592    Umbilical hernia     Vertigo        Past Surgical History:   Procedure Laterality Date    ABDOMINAL SURGERY      HIP SURGERY      At  young age 26's   Alexi Kearnskelvin KNEE CARTILAGE SURGERY      MULTIPLE TOOTH EXTRACTIONS Bilateral     [facility]       Family History   Problem Relation Age of Onset    Leukemia Sister     Coronary artery disease Sister     Diabetes Sister     Hypertension Sister     Coronary artery disease Sister     Aortic aneurysm Sister     Coronary artery disease Mother     Coronary artery disease Brother     Substance Abuse Neg Hx     Alcohol abuse Neg Hx     Depression Neg Hx     Mental illness Neg Hx      I have reviewed and agree with the history as documented      E-Cigarette/Vaping    E-Cigarette Use Never User      E-Cigarette/Vaping Substances    Nicotine No     THC No     CBD No     Flavoring No     Other No     Unknown No      Social History     Tobacco Use    Smoking status: Never Smoker    Smokeless tobacco: Never Used   Vaping Use    Vaping Use: Never used   Substance Use Topics    Alcohol use: Not Currently    Drug use: No       Review of Systems   Unable to perform ROS: Dementia   Cardiovascular: Positive for chest pain  All other systems reviewed and are negative  Physical Exam  Physical Exam  Vitals reviewed  Constitutional:       Appearance: She is well-developed  HENT:      Head: Normocephalic and atraumatic  Eyes:      Pupils: Pupils are equal, round, and reactive to light  Cardiovascular:      Rate and Rhythm: Normal rate and regular rhythm  Heart sounds: Normal heart sounds  Pulmonary:      Effort: Pulmonary effort is normal  No respiratory distress  Breath sounds: Normal breath sounds  Abdominal:      General: Bowel sounds are normal       Palpations: Abdomen is soft  Tenderness: There is no abdominal tenderness  Musculoskeletal:      Cervical back: Neck supple  Right lower leg: No tenderness  No edema  Left lower leg: No tenderness  No edema  Skin:     General: Skin is warm and dry  Capillary Refill: Capillary refill takes less than 2 seconds  Neurological:      General: No focal deficit present  Mental Status: She is alert  She is disoriented           Vital Signs  ED Triage Vitals   Temperature Pulse Respirations Blood Pressure SpO2   07/27/22 2159 07/27/22 2159 07/27/22 2159 07/27/22 2159 07/27/22 2215   (!) 97 4 °F (36 3 °C) 63 18 (!) 248/117 100 %      Temp Source Heart Rate Source Patient Position - Orthostatic VS BP Location FiO2 (%)   07/27/22 2159 07/27/22 2159 07/27/22 2159 07/27/22 2159 --   Oral Monitor Lying Left arm       Pain Score       07/28/22 0317       No Pain           Vitals:    07/28/22 0321 07/28/22 0344 07/28/22 0748 07/28/22 1456   BP: (!) 179/91 154/80 155/77 (!) 170/103   Pulse: 66 63 64 78   Patient Position - Orthostatic VS: Lying Lying           Visual Acuity  Visual Acuity    Flowsheet Row Most Recent Value   L Pupil Size (mm) 3   R Pupil Size (mm) 4   L Pupil Shape Round   R Pupil Shape Round          ED Medications  Medications   iohexol (OMNIPAQUE) 350 MG/ML injection (MULTI-DOSE) 70 mL (70 mL Intravenous Given 7/28/22 0005)   hydrALAZINE (APRESOLINE) injection 5 mg (5 mg Intravenous Given 7/28/22 0215)   aspirin chewable tablet 81 mg (81 mg Oral Given 7/28/22 1005)       Diagnostic Studies  Results Reviewed     Procedure Component Value Units Date/Time    HS Troponin I 4hr [[id number]]  (Normal) Collected: 07/28/22 0216    Lab Status: Final result Specimen: Blood from Arm, Left Updated: 07/28/22 0309     hs TnI 4hr 10 ng/L      Delta 4hr hsTnI 2 ng/L     Urine Macroscopic, POC [[id number]] Collected: 07/28/22 0224    Lab Status: Final result Specimen: Urine Updated: 07/28/22 0226     Color, UA Yellow     Clarity, UA Slightly Cloudy     pH, UA 7 5     Leukocytes, UA Negative     Nitrite, UA Negative     Protein, UA Negative mg/dl      Glucose, UA Negative mg/dl      Ketones, UA Negative mg/dl      Urobilinogen, UA 1 0 E U /dl      Bilirubin, UA Negative     Occult Blood, UA Negative     Specific Gravity, UA 1 020    Narrative:      CLINITEK RESULT    HS Troponin I 2hr [[id number]]  (Normal) Collected: 07/28/22 0030    Lab Status: Final result Specimen: Blood from Arm, Left Updated: 07/28/22 0151     hs TnI 2hr 10 ng/L      Delta 2hr hsTnI 2 ng/L     HS Troponin 0hr (reflex protocol) [[id number]]  (Normal) Collected: 07/27/22 2221    Lab Status: Final result Specimen: Blood from Arm, Left Updated: 07/27/22 2304     hs TnI 0hr 8 ng/L     Comprehensive metabolic panel [[id number]]  (Abnormal) Collected: 07/27/22 2221    Lab Status: Final result Specimen: Blood from Arm, Left Updated: 07/27/22 2258     Sodium 130 mmol/L      Potassium 3 9 mmol/L      Chloride 98 mmol/L      CO2 25 mmol/L      ANION GAP 7 mmol/L      BUN 7 mg/dL      Creatinine 0 63 mg/dL      Glucose 94 mg/dL      Calcium 9 0 mg/dL      AST 21 U/L      ALT 14 U/L      Alkaline Phosphatase 109 U/L      Total Protein 7 1 g/dL      Albumin 4 0 g/dL      Total Bilirubin 0 69 mg/dL      eGFR 77 ml/min/1 73sq m     Narrative:      [place] guidelines for Chronic Kidney Disease (CKD):     Stage 1 with normal or high GFR (GFR > 90 mL/min/1 73 square meters)    Stage 2 Mild CKD (GFR = 60-89 mL/min/1 73 square meters)    Stage 3A Moderate CKD (GFR = 45-59 mL/min/1 73 square meters)    Stage 3B Moderate CKD (GFR = 30-44 mL/min/1 73 square meters)    Stage 4 Severe CKD (GFR = 15-29 mL/min/1 73 square meters)    Stage 5 End Stage CKD (GFR <15 mL/min/1 73 square meters)  Note: GFR calculation is accurate only with a steady state creatinine    CBC and differential [[id number]]  (Abnormal) Collected: 07/27/22 2221    Lab Status: Final result Specimen: Blood from Arm, Left Updated: 07/27/22 2245     WBC 7 24 Thousand/uL      RBC 4 17 Million/uL      Hemoglobin 14 6 g/dL      Hematocrit 40 9 %      MCV 98 fL      MCH 35 0 pg      MCHC 35 7 g/dL      RDW 13 2 %      MPV 9 0 fL      Platelets 282 Thousands/uL      nRBC 0 /100 WBCs      Neutrophils Relative 58 %      Immat GRANS % 1 %      Lymphocytes Relative 23 %      Monocytes Relative 15 %      Eosinophils Relative 2 %      Basophils Relative 1 %      Neutrophils Absolute 4 23 Thousands/µL      Immature Grans Absolute 0 04 Thousand/uL      Lymphocytes Absolute 1 66 Thousands/µL      Monocytes Absolute 1 11 Thousand/µL      Eosinophils Absolute 0 16 Thousand/µL      Basophils Absolute 0 04 Thousands/µL                  CTA dissection protocol chest abdomen pelvis w wo contrast   Final Result by Linda Watson DO (07/28 0124)      Atherosclerosis but no aortic aneurysm, dissection, or acute aortic process is seen  Bronchiectasis and scarring in the bilateral upper lobes, no acute pulmonary process identified  Mild cardiomegaly and mild coronary atherosclerosis  Cholelithiasis        7 cm cystic lesion in the posterior right pelvis, could represent peritoneal inclusion cyst, adnexal cyst or ovarian cystic lesion, nonemergent pelvic ultrasound recommended for further evaluation when clinically feasible  Generalized osteopenia with osteoporotic compression fractures in the spine, most prominently involving the L1 vertebral body with there is approximately 33% loss of height  No significant retropulsion  Correlation with the patient's symptoms    recommended  Renal cysts, colonic diverticulosis without evidence of acute diverticulitis, and other findings as above  Workstation performed: [id number]         XR chest 1 view portable   Final Result by Alvaro Victoria MD (07/28 8625)      No acute cardiopulmonary disease                    Workstation performed: [id number]                    Procedures  Procedures         ED Course  ED Course as of 07/30/22 1039   Thu Jul 28, 2022 0212 ECG shows rate of 53, sinus, PVC noted, nonspecific ST and T-waves, normal intervals, independently interpreted by me             HEART Risk Score    Flowsheet Row Most Recent Value   Heart Score Risk Calculator    History 1 Filed at: 07/28/2022 0211   ECG 1 Filed at: 07/28/2022 0211   Age 2 Filed at: 07/28/2022 0211   Risk Factors 2 Filed at: 07/28/2022 0211   Troponin 0 Filed at: 07/28/2022 0211   HEART Score 6 Filed at: 07/28/2022 0211                                      MDM    Disposition  Final diagnoses:   Hypertensive urgency   Chest pain   Urinary retention   Closed compression fracture of L1 vertebra, initial encounter ([facility] [address] )     Time reflects when diagnosis was documented in both MDM as applicable and the Disposition within this note     Time User Action Codes Description Comment    7/28/2022  2:10 AM Tadevosyan, Jessie Add [I16 0] Hypertensive urgency     7/28/2022  2:10 AM Tadevosyan, Jessie Add [R07 9] Chest pain     7/28/2022  2:10 AM Tadevosyan, Jessie Add [R33 9] Acute on chronic urinary retention     7/28/2022  2:10 AM Jessie Solitario Remove [R33 9] Acute on chronic urinary retention     7/28/2022  2:10 AM EloiseosJessie ornelas Add [R33 9] Urinary retention     7/28/2022  2:11 AM Jessie Solitario Add [S32 010A] Closed compression fracture of L1 vertebra, initial encounter ([facility] [address] )     7/28/2022  3:32 AM Johny Cerrato Add [F03 90] Dementia [facility])       ED Disposition     ED Disposition   Admit    Condition   Stable    Date/Time   Thu Jul 28, 2022 0209    Comment   Case was discussed with iliana and the patient's admission status was agreed to be Admission Status: observation status to the service of Dr Serene Vincent              Follow-up Information     Follow up With Specialties Details Why Contact Info Additional [address] [facility] Follow up in 1 week(s)  [address] [facility] [address] [facility] [address] [address], [address]    Ilsa Álvarez MD Internal Medicine Follow up in 1 week(s)  [address] [place] [address],[address]  [address] [place] [address]  [phone]             Discharge Medication List as of 7/28/2022  6:57 PM      CONTINUE these medications which have NOT CHANGED    Details   acetaminophen (TYLENOL) 325 mg tablet Take 2 tablets (650 mg total) by mouth every 6 (six) hours as needed for mild pain, headaches or fever, Starting Sat 2/13/2021, No Print      aspirin 81 MG tablet Take 81 mg by mouth daily, Historical Med      busPIRone (BUSPAR) 10 mg tablet Take 1 tablet (10 mg total) by mouth 3 (three) times a day, Starting Thu 6/16/2022, Normal      carvedilol (COREG) 6 25 mg tablet Take 1 tablet (6 25 mg total) by mouth 2 (two) times a day with meals, Starting Thu 4/30/2020, No Print      Cholecalciferol (VITAMIN D3) 1000 units CAPS Take by mouth, Historical Med      hydrALAZINE (APRESOLINE) 50 mg tablet Take 1 tablet (50 mg total) by mouth 3 (three) times a day, Starting Thu 1/27/2022, Normal irbesartan (AVAPRO) 300 mg tablet Take 300 mg by mouth daily at bedtime , Historical Med      PACERONE 100 MG tablet Take 200 mg by mouth daily at bedtime , Starting Thu 5/14/2020, Historical Med      polyethylene glycol (MIRALAX) 17 g packet Take 9 g by mouth daily, Starting Sat 2/13/2021, No Print      sodium chloride 1 g tablet TAKE 1 TABLET(1 GRAM) BY MOUTH THREE TIMES DAILY WITH MEALS, Normal      vitamin B-12 (VITAMIN B-12) 500 mcg tablet Take 500 mcg by mouth daily, Historical Med             Outpatient Discharge Orders   Basic metabolic panel   Standing Status: Future Standing Exp   Date: 08/28/22       PDMP Review     None          ED Provider  Electronically Signed by           Betito Abebe MD  07/30/22 2751

## 2022-07-31 DIAGNOSIS — I10 ESSENTIAL HYPERTENSION: ICD-10-CM

## 2022-08-01 ENCOUNTER — TELEPHONE (OUTPATIENT)
Dept: LAB | Facility: HOSPITAL | Age: 87
End: 2022-08-01

## 2022-08-01 RX ORDER — HYDRALAZINE HYDROCHLORIDE 50 MG/1
TABLET, FILM COATED ORAL
Qty: 90 TABLET | Refills: 5 | Status: SHIPPED | OUTPATIENT
Start: 2022-08-01

## 2022-08-03 ENCOUNTER — TELEPHONE (OUTPATIENT)
Dept: INTERNAL MEDICINE CLINIC | Facility: CLINIC | Age: 87
End: 2022-08-03

## 2022-08-03 ENCOUNTER — APPOINTMENT (OUTPATIENT)
Dept: LAB | Facility: HOSPITAL | Age: 87
End: 2022-08-03
Payer: MEDICARE

## 2022-08-03 DIAGNOSIS — E87.1 HYPONATREMIA: Chronic | ICD-10-CM

## 2022-08-03 DIAGNOSIS — R33.9 URINARY RETENTION: ICD-10-CM

## 2022-08-03 DIAGNOSIS — I10 ESSENTIAL HYPERTENSION: ICD-10-CM

## 2022-08-03 LAB
ALBUMIN SERPL BCP-MCNC: 3.5 G/DL (ref 3.5–5)
ALP SERPL-CCNC: 91 U/L (ref 34–104)
ALT SERPL W P-5'-P-CCNC: 12 U/L (ref 7–52)
ANION GAP SERPL CALCULATED.3IONS-SCNC: 9 MMOL/L (ref 4–13)
AST SERPL W P-5'-P-CCNC: 20 U/L (ref 13–39)
BASOPHILS # BLD AUTO: 0.03 THOUSANDS/ΜL (ref 0–0.1)
BASOPHILS NFR BLD AUTO: 1 % (ref 0–1)
BILIRUB SERPL-MCNC: 0.44 MG/DL (ref 0.2–1)
BUN SERPL-MCNC: 13 MG/DL (ref 5–25)
CALCIUM SERPL-MCNC: 9 MG/DL (ref 8.4–10.2)
CHLORIDE SERPL-SCNC: 98 MMOL/L (ref 96–108)
CO2 SERPL-SCNC: 22 MMOL/L (ref 21–32)
CREAT SERPL-MCNC: 0.61 MG/DL (ref 0.6–1.3)
EOSINOPHIL # BLD AUTO: 0.17 THOUSAND/ΜL (ref 0–0.61)
EOSINOPHIL NFR BLD AUTO: 3 % (ref 0–6)
ERYTHROCYTE [DISTWIDTH] IN BLOOD BY AUTOMATED COUNT: 13.2 % (ref 11.6–15.1)
GFR SERPL CREATININE-BSD FRML MDRD: 77 ML/MIN/1.73SQ M
GLUCOSE P FAST SERPL-MCNC: 82 MG/DL (ref 65–99)
HCT VFR BLD AUTO: 37.4 % (ref 34.8–46.1)
HGB BLD-MCNC: 12.6 G/DL (ref 11.5–15.4)
IMM GRANULOCYTES # BLD AUTO: 0.03 THOUSAND/UL (ref 0–0.2)
IMM GRANULOCYTES NFR BLD AUTO: 1 % (ref 0–2)
LYMPHOCYTES # BLD AUTO: 1.44 THOUSANDS/ΜL (ref 0.6–4.47)
LYMPHOCYTES NFR BLD AUTO: 25 % (ref 14–44)
MCH RBC QN AUTO: 34.6 PG (ref 26.8–34.3)
MCHC RBC AUTO-ENTMCNC: 33.7 G/DL (ref 31.4–37.4)
MCV RBC AUTO: 103 FL (ref 82–98)
MONOCYTES # BLD AUTO: 0.98 THOUSAND/ΜL (ref 0.17–1.22)
MONOCYTES NFR BLD AUTO: 17 % (ref 4–12)
NEUTROPHILS # BLD AUTO: 3.11 THOUSANDS/ΜL (ref 1.85–7.62)
NEUTS SEG NFR BLD AUTO: 53 % (ref 43–75)
NRBC BLD AUTO-RTO: 0 /100 WBCS
PLATELET # BLD AUTO: 254 THOUSANDS/UL (ref 149–390)
PMV BLD AUTO: 9.4 FL (ref 8.9–12.7)
POTASSIUM SERPL-SCNC: 4.4 MMOL/L (ref 3.5–5.3)
PROT SERPL-MCNC: 6.2 G/DL (ref 6.4–8.4)
RBC # BLD AUTO: 3.64 MILLION/UL (ref 3.81–5.12)
SODIUM SERPL-SCNC: 129 MMOL/L (ref 135–147)
WBC # BLD AUTO: 5.76 THOUSAND/UL (ref 4.31–10.16)

## 2022-08-03 PROCEDURE — 80053 COMPREHEN METABOLIC PANEL: CPT

## 2022-08-03 PROCEDURE — 85025 COMPLETE CBC W/AUTO DIFF WBC: CPT

## 2022-08-03 PROCEDURE — 36415 COLL VENOUS BLD VENIPUNCTURE: CPT

## 2022-08-03 NOTE — TELEPHONE ENCOUNTER
Lab results: speak to Glenna    Sodium is still low at 129  Is she taking the salt tablets regularly 3 times a day?     Aside from her protein being a bit low, the rest of her labs were within normal

## 2022-08-09 ENCOUNTER — TELEMEDICINE (OUTPATIENT)
Dept: INTERNAL MEDICINE CLINIC | Facility: CLINIC | Age: 87
End: 2022-08-09
Payer: MEDICARE

## 2022-08-09 VITALS — BODY MASS INDEX: 22.48 KG/M2 | HEIGHT: 60 IN

## 2022-08-09 DIAGNOSIS — R09.89 LABILE HYPERTENSION: ICD-10-CM

## 2022-08-09 DIAGNOSIS — S32.030A CLOSED COMPRESSION FRACTURE OF L3 LUMBAR VERTEBRA, INITIAL ENCOUNTER (HCC): ICD-10-CM

## 2022-08-09 DIAGNOSIS — I48.0 PAROXYSMAL ATRIAL FIBRILLATION (HCC): ICD-10-CM

## 2022-08-09 DIAGNOSIS — M79.9 SOFT TISSUE LESION OF PELVIC REGION: ICD-10-CM

## 2022-08-09 DIAGNOSIS — F41.9 ANXIETY: ICD-10-CM

## 2022-08-09 DIAGNOSIS — F03.90 DEMENTIA WITHOUT BEHAVIORAL DISTURBANCE, UNSPECIFIED DEMENTIA TYPE: Primary | ICD-10-CM

## 2022-08-09 DIAGNOSIS — K59.09 CHRONIC CONSTIPATION: ICD-10-CM

## 2022-08-09 DIAGNOSIS — R26.2 AMBULATORY DYSFUNCTION: ICD-10-CM

## 2022-08-09 DIAGNOSIS — M47.812 CERVICAL SPONDYLOSIS: ICD-10-CM

## 2022-08-09 DIAGNOSIS — E87.1 HYPONATREMIA: Chronic | ICD-10-CM

## 2022-08-09 DIAGNOSIS — R33.9 URINARY RETENTION: ICD-10-CM

## 2022-08-09 DIAGNOSIS — H35.30 MACULAR DEGENERATION OF BOTH EYES, UNSPECIFIED TYPE: ICD-10-CM

## 2022-08-09 PROBLEM — R07.9 CHEST PAIN OF UNCERTAIN ETIOLOGY: Status: RESOLVED | Noted: 2022-07-28 | Resolved: 2022-08-09

## 2022-08-09 PROBLEM — R07.9 CHEST PAIN: Status: RESOLVED | Noted: 2018-11-30 | Resolved: 2022-08-09

## 2022-08-09 PROCEDURE — 99495 TRANSJ CARE MGMT MOD F2F 14D: CPT | Performed by: INTERNAL MEDICINE

## 2022-08-09 NOTE — ASSESSMENT & PLAN NOTE
Na low again at 129  Taking NaCl tid  Repeat labs in a few weeks  Limits fluid intake below 1800 ml

## 2022-08-09 NOTE — PROGRESS NOTES
Virtual TCM Visit:    Verification of patient location:    Patient is located in the following state in which I hold an active license [place]        Assessment/Plan:        Problem List Items Addressed This Visit        Digestive    Chronic constipation     Given Miralax a few days a week  Cardiovascular and Mediastinum    Labile hypertension     BP remains labile  Taking hydralazine tid, carvedilol bid and irbesartan qHS  Paroxysmal atrial fibrillation (HCC)     On amiodarone, carvedilol and ASA  Sees cardiology  Nervous and Auditory    Dementia ([facility] [address] ) - Primary     Symptoms have worsened, limited agitation  Musculoskeletal and Integument    Cervical spondylosis     Intermittent pain  Completed physical therapy  Closed compression fracture of L3 lumbar vertebra, initial encounter ([facility])     Intermittent pain  Tylenol prn  Genitourinary    Urinary retention     Recommend to see Urology  Other    Hyponatremia (Chronic)     Na low again at 129  Taking NaCl tid  Repeat labs in a few weeks  Limits fluid intake below 1800 ml  Relevant Orders    Basic metabolic panel    Ambulatory dysfunction     Needs assist at all times  Anxiety     Taking buspirone 10 mg tid  Macular degeneration     Stopped injections due to transportation  Soft tissue lesion of pelvic region     Seen on CT  ? Pelvic ultrasound  Reason for visit is hospital f/u    Encounter provider Ad Mcintosh MD       Provider located at [address] [address] [address] [address]      Recent Visits  Date Type Provider Dept   08/09/22 Telemedicine Ad Mcintosh MD [facility]   Showing recent visits within past 7 days and meeting all other requirements  Future Appointments  No visits were found meeting these conditions    Showing future appointments within next 150 days and meeting all other requirements       After connecting through [employer], the patient was identified by name and date of birth  Jean-Pierre Hughes was informed that this is a telemedicine visit and that the visit is being conducted through [address] and patient was informed this is a secure, HIPAA-complaint platform  She agrees to proceed     My office door was closed  No one else was in the room  She acknowledged consent and understanding of privacy and security of the video platform  The patient has agreed to participate and understands they can discontinue the visit at any time  Patient is aware this is a billable service  Subjective:     Patient ID: Jean-Pierre Hughes is a 80 y o  female for hospital follow up  Most of the history is from [facility]  Gladis unable to verify her birthday  She has not had her normal sleep cycle yet since her hospital discharge  Her blood pressure was low sometimes, 88 or 515 systolic  Yesterday, it was back to 442 systolic  She is moving her bowels mostly daily, she adjusts her Miralax accordingly  She is able to urinate on her own, very frustrated that she is not independent  No recent issues with neck or shoulder  Anxiety has been fairly alright, less often attacks  She had stopped eye injections due to transport issues  Review of Systems   Constitutional: Negative for activity change, appetite change and fatigue  Eyes: Positive for visual disturbance  Respiratory: Negative for cough and shortness of breath  Cardiovascular: Negative for chest pain and leg swelling  Gastrointestinal: Negative for abdominal pain, constipation and diarrhea  Genitourinary: Negative for dysuria and frequency  Musculoskeletal: Positive for gait problem  Negative for arthralgias and myalgias  Skin: Negative for rash and wound  Neurological: Negative for dizziness and headaches     Psychiatric/Behavioral: Positive for confusion and decreased concentration  The patient is not nervous/anxious  Objective:    Vitals:    08/09/22 1351   Height: 5' (1 524 m)       Physical Exam  Constitutional:       General: She is not in acute distress  Appearance: Normal appearance  She is not ill-appearing  HENT:      Head: Normocephalic and atraumatic  Mouth/Throat:      Mouth: Mucous membranes are moist    Eyes:      Pupils: Pupils are equal, round, and reactive to light  Pulmonary:      Effort: Pulmonary effort is normal  No respiratory distress  Neurological:      General: No focal deficit present  Mental Status: She is alert  Psychiatric:         Mood and Affect: Mood normal          Behavior: Behavior normal              Transitional Care Management Review:  Wilfredo Dorsey is a 80 y o  female here for TCM follow up  During the TCM phone call patient stated:    TCM Call     Date and time call was made  7/29/2022 11:06 AM    Hospital care reviewed  Records reviewed    Patient was hospitialized at  [address] [place] [address]    Date of Admission  07/27/22    Date of discharge  07/28/22    Diagnosis  Chest pain of uncertain etiology    Disposition  Home    Were the patients medications reviewed and updated  Yes    Current Symptoms  None      TCM Call     Post hospital issues  None    Should patient be enrolled in anticoag monitoring? No    Scheduled for follow up? Yes    Did you obtain your prescribed medications  Yes    Do you need help managing your prescriptions or medications  No    Is transportation to your appointment needed  No    I have advised the patient to call PCP with any new or worsening symptoms  Willie Gusman CMA          I spent 22 minutes with the patient during this visit  Cheyenne Benton MD      VIRTUAL VISIT DISCLAIMER    Juan Pablo Royal verbally agrees to participate in [employer]   Pt is aware that Virtual Care Services could be limited without vital signs or the ability to perform a full hands-on physical exam  Gladis Royal understands she or the provider may request at any time to terminate the video visit and request the patient to seek care or treatment in person  Reviewed hospital records

## 2022-08-10 ENCOUNTER — TELEPHONE (OUTPATIENT)
Dept: UROLOGY | Facility: AMBULATORY SURGERY CENTER | Age: 87
End: 2022-08-10

## 2022-08-10 NOTE — TELEPHONE ENCOUNTER
Spoke with Irasema Osborn, patient's niece, who wanted to know more about what we would do if patient was seen  Patient does not have a Lopez catheter right now  Explained to Irasema Osborn patient would be evaluated to see if she is not completely emptying bladder  If not, she will need Lopez catheter and then void trial   Harriett verbalized understanding and would like to hold off on scheduling appt right now  She may call back if patient's status changes

## 2022-08-10 NOTE — TELEPHONE ENCOUNTER
Npt referral-urinary retention, I spoke with Delilah Gomez regarding referral and scheduling hospital follow she has clinical questions prior to scheduling due to patient's age and mobility

## 2022-08-10 NOTE — TELEPHONE ENCOUNTER
Complaint/Diagnosis:UrinaryRetention    Insurance:[facility]/[employer]    History of cancer:no    Previous urologist:no    Outside Testing/where:epic    If yes,what kind:epic    Records requested/where:epic    Preferred location:[facility]

## 2022-08-11 PROBLEM — M79.9 SOFT TISSUE LESION OF PELVIC REGION: Status: ACTIVE | Noted: 2022-08-11

## 2022-08-22 ENCOUNTER — TELEPHONE (OUTPATIENT)
Dept: LAB | Facility: HOSPITAL | Age: 87
End: 2022-08-22

## 2022-08-25 ENCOUNTER — APPOINTMENT (OUTPATIENT)
Dept: LAB | Facility: HOSPITAL | Age: 87
End: 2022-08-25
Payer: MEDICARE

## 2022-08-25 DIAGNOSIS — E87.1 HYPONATREMIA: Chronic | ICD-10-CM

## 2022-08-25 LAB
ANION GAP SERPL CALCULATED.3IONS-SCNC: 7 MMOL/L (ref 4–13)
BUN SERPL-MCNC: 8 MG/DL (ref 5–25)
CALCIUM SERPL-MCNC: 9 MG/DL (ref 8.3–10.1)
CHLORIDE SERPL-SCNC: 100 MMOL/L (ref 96–108)
CO2 SERPL-SCNC: 23 MMOL/L (ref 21–32)
CREAT SERPL-MCNC: 0.63 MG/DL (ref 0.6–1.3)
GFR SERPL CREATININE-BSD FRML MDRD: 77 ML/MIN/1.73SQ M
GLUCOSE SERPL-MCNC: 80 MG/DL (ref 65–140)
POTASSIUM SERPL-SCNC: 4.1 MMOL/L (ref 3.5–5.3)
SODIUM SERPL-SCNC: 130 MMOL/L (ref 135–147)

## 2022-08-25 PROCEDURE — 80048 BASIC METABOLIC PNL TOTAL CA: CPT

## 2022-08-25 PROCEDURE — 36415 COLL VENOUS BLD VENIPUNCTURE: CPT

## 2022-08-28 ENCOUNTER — TELEPHONE (OUTPATIENT)
Dept: INTERNAL MEDICINE CLINIC | Facility: CLINIC | Age: 87
End: 2022-08-28

## 2022-08-28 NOTE — TELEPHONE ENCOUNTER
May speak to [facility]  Sodium was 130  Continue salt tablets as she is taking it

## 2022-08-29 NOTE — TELEPHONE ENCOUNTER
Muna Lunsford notified   Wants to know if they can have a consult with OT, because patient is very weak and it is difficult to move her anymore

## 2022-08-29 NOTE — TELEPHONE ENCOUNTER
I do not know what OT can offer, I can send a referral for evaluation  Would you be interested to speak with palliative care?

## 2022-09-21 ENCOUNTER — TELEPHONE (OUTPATIENT)
Dept: INTERNAL MEDICINE CLINIC | Facility: CLINIC | Age: 87
End: 2022-09-21

## 2022-09-21 DIAGNOSIS — R09.89 LABILE HYPERTENSION: ICD-10-CM

## 2022-09-21 DIAGNOSIS — F03.90 DEMENTIA WITHOUT BEHAVIORAL DISTURBANCE, UNSPECIFIED DEMENTIA TYPE: Primary | ICD-10-CM

## 2022-09-21 DIAGNOSIS — I48.0 PAROXYSMAL ATRIAL FIBRILLATION (HCC): ICD-10-CM

## 2022-09-21 DIAGNOSIS — S32.030A CLOSED COMPRESSION FRACTURE OF L3 LUMBAR VERTEBRA, INITIAL ENCOUNTER (HCC): ICD-10-CM

## 2022-09-21 DIAGNOSIS — I16.0 HYPERTENSIVE URGENCY: ICD-10-CM

## 2022-09-21 NOTE — TELEPHONE ENCOUNTER
John Hilliard notice a spot at the top of buttock crack -a pencil eraser sized pink spot  Looks like the top layer of the skin is gone  She said last week it was a purple, now it is pink in the center  She put Neosporin on it and is keeping it dry  She said it is hard to keep dry because of the moisture in that area  Pt  is not complaining about the spot at all-didn't even know it was there  She is using the bedpan now because she can't make to to the bathroom  Also, is thinking she wants to try Palliative care for pt

## 2022-09-21 NOTE — TELEPHONE ENCOUNTER
Stop putting Neopsorin  Recommend to put A &D or a cream with zinc to help protect the skin  I can send referral for palliative care, let me know

## 2022-09-21 NOTE — TELEPHONE ENCOUNTER
We could use a special foam for the chair / wheelchair so the skin can breath better  I  can sen to medical supply store, [employer]?

## 2022-09-30 NOTE — TELEPHONE ENCOUNTER
Mickey Thompson called  She does want to proceed with palliative care  Can you please place the order and she would like the contact number to schedule

## 2022-10-15 DIAGNOSIS — F41.9 ANXIETY: ICD-10-CM

## 2022-10-18 RX ORDER — BUSPIRONE HYDROCHLORIDE 10 MG/1
TABLET ORAL
Qty: 90 TABLET | Refills: 3 | Status: SHIPPED | OUTPATIENT
Start: 2022-10-18

## 2022-11-11 DIAGNOSIS — E87.1 HYPONATREMIA: Chronic | ICD-10-CM

## 2022-11-11 RX ORDER — SODIUM CHLORIDE 1000 MG
1 TABLET, SOLUBLE MISCELLANEOUS 3 TIMES DAILY
Qty: 90 TABLET | Refills: 1 | Status: SHIPPED | OUTPATIENT
Start: 2022-11-11

## 2022-11-11 NOTE — TELEPHONE ENCOUNTER
Her records are on [facility] which they will see  I can send referral to them but her cardiologist sent it already    You can check with him on Monday if [facility] received his referral

## 2022-11-11 NOTE — TELEPHONE ENCOUNTER
[facility] does not provide home bound palliative care  Her cardiologist,  Dr Cherelle Tiwari is with [facility] and will send a referral to [address] [place] [address] needs records supporting palliative care  Phone # [phone]  Could not provide fax #  Jacobo Keshawn states pt breaths very heavy through her mouth almost like panting  Not sure from fluid retention? Dementia is worsening and mood swings are drastic

## 2022-11-30 PROBLEM — R41.3 MEMORY CHANGES: Status: RESOLVED | Noted: 2020-04-30 | Resolved: 2022-11-30

## 2022-12-05 ENCOUNTER — TELEMEDICINE (OUTPATIENT)
Dept: INTERNAL MEDICINE CLINIC | Facility: CLINIC | Age: 87
End: 2022-12-05

## 2022-12-05 DIAGNOSIS — R09.89 LABILE HYPERTENSION: ICD-10-CM

## 2022-12-05 DIAGNOSIS — F41.9 ANXIETY: Primary | ICD-10-CM

## 2022-12-05 DIAGNOSIS — R33.9 URINARY RETENTION: ICD-10-CM

## 2022-12-05 DIAGNOSIS — H35.30 MACULAR DEGENERATION OF BOTH EYES, UNSPECIFIED TYPE: ICD-10-CM

## 2022-12-05 DIAGNOSIS — Z00.00 HEALTH MAINTENANCE EXAMINATION: ICD-10-CM

## 2022-12-05 DIAGNOSIS — I48.0 PAROXYSMAL ATRIAL FIBRILLATION (HCC): ICD-10-CM

## 2022-12-05 DIAGNOSIS — K59.09 CHRONIC CONSTIPATION: ICD-10-CM

## 2022-12-05 DIAGNOSIS — F03.B0 MODERATE DEMENTIA, UNSPECIFIED DEMENTIA TYPE, UNSPECIFIED WHETHER BEHAVIORAL, PSYCHOTIC, OR MOOD DISTURBANCE OR ANXIETY: ICD-10-CM

## 2022-12-05 DIAGNOSIS — E87.1 HYPONATREMIA: Chronic | ICD-10-CM

## 2022-12-05 DIAGNOSIS — E78.49 OTHER HYPERLIPIDEMIA: ICD-10-CM

## 2022-12-05 DIAGNOSIS — M79.9 SOFT TISSUE LESION OF PELVIC REGION: ICD-10-CM

## 2022-12-05 DIAGNOSIS — S32.030A CLOSED COMPRESSION FRACTURE OF L3 LUMBAR VERTEBRA, INITIAL ENCOUNTER (HCC): ICD-10-CM

## 2022-12-05 RX ORDER — ESCITALOPRAM OXALATE 5 MG/1
5 TABLET ORAL DAILY
Qty: 30 TABLET | Refills: 0 | Status: SHIPPED | OUTPATIENT
Start: 2022-12-05

## 2022-12-05 NOTE — PROGRESS NOTES
Assessment and Plan:     Problem List Items Addressed This Visit        Digestive    Chronic constipation     Given Miralax a few days a week  Cardiovascular and Mediastinum    Labile hypertension     Still with fluctuating BP readings  On hydralazine tid, carvedilol bid and irbesartan in PM          Paroxysmal atrial fibrillation (HCC)     Taking amiodarone, carvedilol and ASA  Nervous and Auditory    Dementia [facility])     Palliative care home nurse schedule on 12/13  Relevant Medications    escitalopram (LEXAPRO) 5 mg tablet       Musculoskeletal and Integument    Closed compression fracture of L3 lumbar vertebra, initial encounter ([facility])     Intermittent pain, takes Tylenol prn  Genitourinary    Urinary retention     Unknown, ?leakage  Other    Hyponatremia (Chronic)     Taking NaCl tid  Anxiety - Primary     Symptoms worse recently, add low dose Lexapro  Still taking buspirone 10 mg tid, may wean off in 2-3 weeks  Relevant Medications    escitalopram (LEXAPRO) 5 mg tablet    Macular degeneration     ? Hallucinations  Had stopped injections several months ago  Other hyperlipidemia    Soft tissue lesion of pelvic region     Reviewed CT  Difficult to leave the house for any imaging  Other Visit Diagnoses     Health maintenance examination        Given # to call to schedule flu vaccine  Preventive health issues were discussed with patient, and age appropriate screening tests were ordered as noted in patient's After Visit Summary  Personalized health advice and appropriate referrals for health education or preventive services given if needed, as noted in patient's After Visit Summary  History of Present Illness:     Patient presents for a Medicare Wellness Visit    History taken from [place]  She reports she is much weaker, has not been walking as much    She reports increased anxiety and fear, unable to leave her alone for prolonged periods  She also reports visual hallucinations  She had stopped her eye injections several months ago, she stopped giving eye vitamins as well since it was too big  She has been cutting up all her pills since it is too big for her to swallow  BP remains elevated, between 832 to 587 systolic  She noticed occasional heavy breathing, no cough  She is moving her bowels almost daily  She is not sure if with bladder issues  Denies any abdominal or pelvic discomfort  She does not have a hospital bed, no bed sores  She has palliative care scheduled to visit next week  Patient Care Team:  Sadiq Leyva MD as PCP - General (Internal Medicine)     Review of Systems:     Review of Systems   Constitutional: Positive for activity change and appetite change  Negative for fatigue  HENT: Negative for congestion and ear pain  Eyes: Negative for visual disturbance  Respiratory: Negative for cough and shortness of breath  Cardiovascular: Negative for chest pain and leg swelling  Gastrointestinal: Negative for abdominal pain, constipation and diarrhea  Genitourinary: Negative for difficulty urinating, dysuria and frequency  Musculoskeletal: Positive for gait problem  Negative for arthralgias and myalgias  Skin: Negative for rash and wound  Neurological: Negative for dizziness and headaches  Psychiatric/Behavioral: Positive for confusion  The patient is nervous/anxious           Problem List:     Patient Active Problem List   Diagnosis   • Hypertensive urgency   • Paroxysmal atrial fibrillation (HCC)   • Hyponatremia   • Ambulatory dysfunction   • Mitral valve regurgitation   • Closed compression fracture of L3 lumbar vertebra, initial encounter ([facility] [address] )   • Labile hypertension   • Chronic constipation   • Arthritis   • History of vertebral fracture   • Hearing loss   • Urinary frequency   • Other hyperlipidemia   • Osteoarthritis   • Closed lumbar vertebral fracture (HCC)   • Macular degeneration   • Cervical spondylosis   • Hiatal hernia   • Vertigo   • Vitamin B12 deficiency   • Elevated LFTs   • Anxiety   • Urinary retention   • Dementia (HCC)   • Soft tissue lesion of pelvic region      Past Medical and Surgical History:     Past Medical History:   Diagnosis Date   • A-fib ([facility] [address] )    • Anxiety    • Cheilosis    • Diverticulosis     1970s   • Fracture of iliac wing ([facility] [address] ) 2016   • Fracture, ribs    • Gallstone    • Hiatal hernia    • Hyperlipidemia    • Hypertension    • Hyponatremia    • Inguinal hernia    • Lacunar infarction (HCC)    • Macrocytosis    • Macular degeneration    • Osteoarthritis    • Ovarian cyst     stable   • Renal cyst    • Rib fractures 2017   • Simple renal cyst    • Syncope    • Tuberculosis 5829   • Umbilical hernia    • Vertigo      Past Surgical History:   Procedure Laterality Date   • ABDOMINAL SURGERY     • HIP SURGERY      At  young age 26's   • KNEE CARTILAGE SURGERY     • MULTIPLE TOOTH EXTRACTIONS Bilateral     [place]      Family History:     Family History   Problem Relation Age of Onset   • Leukemia Sister    • Coronary artery disease Sister    • Diabetes Sister    • Hypertension Sister    • Coronary artery disease Sister    • Aortic aneurysm Sister    • Coronary artery disease Mother    • Coronary artery disease Brother    • Substance Abuse Neg Hx    • Alcohol abuse Neg Hx    • Depression Neg Hx    • Mental illness Neg Hx       Social History:     Social History     Socioeconomic History   • Marital status:       Spouse name: Not on file   • Number of children: Not on file   • Years of education: Not on file   • Highest education level: Not on file   Occupational History   • Not on file   Tobacco Use   • Smoking status: Never   • Smokeless tobacco: Never   Vaping Use   • Vaping Use: Never used   Substance and Sexual Activity   • Alcohol use: Not Currently   • Drug use: No   • Sexual activity: Not Currently   Other Topics Concern   • Not on file Social History Narrative    Lives with niece Darwin Pierre and her son Anastacio Montoya     Social Determinants of Health     Financial Resource Strain: Low Risk    • Difficulty of Paying Living Expenses: Not very hard   Food Insecurity: Not on file   Transportation Needs: No Transportation Needs   • Lack of Transportation (Medical): No   • Lack of Transportation (Non-Medical): No   Physical Activity: Not on file   Stress: Not on file   Social Connections: Not on file   Intimate Partner Violence: Not on file   Housing Stability: Not on file      Medications and Allergies:     Current Outpatient Medications   Medication Sig Dispense Refill   • escitalopram (LEXAPRO) 5 mg tablet Take 1 tablet (5 mg total) by mouth daily 30 tablet 0   • acetaminophen (TYLENOL) 325 mg tablet Take 2 tablets (650 mg total) by mouth every 6 (six) hours as needed for mild pain, headaches or fever     • aspirin 81 MG tablet Take 81 mg by mouth daily     • busPIRone (BUSPAR) 10 mg tablet TAKE 1 TABLET(10 MG) BY MOUTH THREE TIMES DAILY 90 tablet 3   • carvedilol (COREG) 6 25 mg tablet Take 1 tablet (6 25 mg total) by mouth 2 (two) times a day with meals 60 tablet 0   • Cholecalciferol (VITAMIN D3) 1000 units CAPS Take by mouth     • hydrALAZINE (APRESOLINE) 50 mg tablet TAKE 1 TABLET(50 MG) BY MOUTH THREE TIMES DAILY 90 tablet 5   • irbesartan (AVAPRO) 300 mg tablet Take 300 mg by mouth daily at bedtime      • PACERONE 100 MG tablet Take 200 mg by mouth daily at bedtime      • polyethylene glycol (MIRALAX) 17 g packet Take 9 g by mouth daily     • sodium chloride 1 g tablet Take 1 tablet (1 g total) by mouth 3 (three) times a day 90 tablet 1   • vitamin B-12 (VITAMIN B-12) 500 mcg tablet Take 500 mcg by mouth daily       No current facility-administered medications for this visit       Allergies   Allergen Reactions   • Codeine    • Lomotil [Diphenoxylate]    • Mirabegron Hypertension   • Quinidex [Quinidine]    • Adhesive [Medical Tape] Rash   • Penicillins Rash      Immunizations:     Immunization History   Administered Date(s) Administered   • COVID-19 PFIZER VACCINE 0 3 ML IM 04/22/2021, 05/13/2021, 11/09/2021   • INFLUENZA 10/29/2013, 10/30/2014, 10/05/2015, 10/06/2015, 10/06/2016, 10/20/2017, 10/17/2018, 10/30/2019   • Influenza Split High Dose Preservative Free IM 10/06/2016, 10/20/2017, 10/17/2018   • Influenza, high dose seasonal 0 7 mL 10/30/2019, 10/28/2020, 10/27/2021   • Pneumococcal Conjugate 13-Valent 02/01/2016   • Pneumococcal Polysaccharide PPV23 02/11/2021      Health Maintenance: There are no preventive care reminders to display for this patient  Topic Date Due   • Hepatitis B Vaccine (1 of 3 - 3-dose series) Never done   • COVID-19 Vaccine (4 - Booster for Pfizer series) 03/09/2022   • Influenza Vaccine (1) 09/01/2022      Medicare Screening Tests and Risk Assessments:     Allen Davila is here for her Subsequent Wellness visit  Last Medicare Wellness visit information reviewed, patient interviewed and updates made to the record today  Health Risk Assessment:   Patient is satisfied with their life  Eyesight was rated as slightly worse  Hearing was rated as slightly worse  Patient feels that their emotional and mental health rating is same  Patients states they are never, rarely angry  Patient states they are never, rarely unusually tired/fatigued  Pain experienced in the last 7 days has been none  Patient states that she has experienced no weight loss or gain in last 6 months  Fall Risk Screening: In the past year, patient has experienced: no history of falling in past year      Urinary Incontinence Screening:   Patient has leaked urine accidently in the last six months  Home Safety:  Patient has trouble with stairs inside or outside of their home  Patient has working smoke alarms and has working carbon monoxide detector  Home safety hazards include: none  Nutrition:   Current diet is Regular       Medications:   Patient is currently taking over-the-counter supplements  OTC medications include: see medication list  Patient is able to manage medications  Activities of Daily Living (ADLs)/Instrumental Activities of Daily Living (IADLs):   Walk and transfer into and out of bed and chair?: No  Dress and groom yourself?: No    Bathe or shower yourself?: No    Feed yourself? Yes  Do your laundry/housekeeping?: Yes  Manage your money, pay your bills and track your expenses?: Yes  Make your own meals?: Yes    Do your own shopping?: Yes    Previous Hospitalizations:   Any hospitalizations or ED visits within the last 12 months?: Yes    How many hospitalizations have you had in the last year?: 1-2    Advance Care Planning:   Living will: Yes    Durable POA for healthcare: Yes    Advanced directive: Yes    End of Life Decisions reviewed with patient: Yes      Cognitive Screening:   Provider or family/friend/caregiver concerned regarding cognition?: Yes    PREVENTIVE SCREENINGS      Cardiovascular Screening:    General: Screening Not Indicated and History Lipid Disorder      Diabetes Screening:     General: Screening Current      Colorectal Cancer Screening:     General: Screening Not Indicated      Breast Cancer Screening:     General: Screening Not Indicated      Cervical Cancer Screening:    General: Screening Not Indicated      Osteoporosis Screening:    General: Screening Not Indicated      Abdominal Aortic Aneurysm (AAA) Screening:        General: Screening Not Indicated      Lung Cancer Screening:     General: Screening Not Indicated      Hepatitis C Screening:    General: Screening Not Indicated    Screening, Brief Intervention, and Referral to Treatment (SBIRT)    Screening  Typical number of drinks in a day: 0  Typical number of drinks in a week: 0  Interpretation: Low risk drinking behavior      Single Item Drug Screening:  How often have you used an illegal drug (including marijuana) or a prescription medication for non-medical reasons in the past year? never    Single Item Drug Screen Score: 0  Interpretation: Negative screen for possible drug use disorder    Other Counseling Topics:   Regular weightbearing exercise  No results found  Physical Exam:     There were no vitals taken for this visit  Physical Exam  Constitutional:       General: She is awake  She is not in acute distress  Appearance: She is not ill-appearing  HENT:      Head: Normocephalic and atraumatic  Right Ear: Decreased hearing noted  Left Ear: Decreased hearing noted  Mouth/Throat:      Mouth: Mucous membranes are moist    Eyes:      Pupils: Pupils are equal, round, and reactive to light  Pulmonary:      Effort: Pulmonary effort is normal  No respiratory distress  Neurological:      General: No focal deficit present  Mental Status: She is alert  Psychiatric:         Mood and Affect: Mood normal          Behavior: Behavior normal           Essence Sawyer MD  Virtual AWV Consent    Verification of patient location:    Patient is located in the following state in which I hold an active license [place]    Reason for visit is follow up visit and AWV    Encounter provider Essence Sawyer MD    Provider located at [address] [address]      Recent Visits  No visits were found meeting these conditions  Showing recent visits within past 7 days and meeting all other requirements  Today's Visits  Date Type Provider Dept   12/05/22 Telemedicine Essence Sawyer, [address] [facility] Internal Med   Showing today's visits and meeting all other requirements  Future Appointments  No visits were found meeting these conditions  Showing future appointments within next 150 days and meeting all other requirements       After connecting through [facility], the patient was identified by name and date of birth   Kleber Newby was informed that this is a telemedicine visit and that the visit is being conducted through the [employer]  She agrees to proceed  My office door was closed  No one else was in the room  She acknowledged consent and understanding of privacy and security of the video platform  Caryle Figures Rinek verbally agrees to participate in [employer]  Pt is aware that [employer] could be limited without vital signs or the ability to perform a full hands-on physical exam  Gladis Royal understands she or the provider may request at any time to terminate the video visit and request the patient to seek care or treatment in person  Patient is aware this is a billable service  Time spent directly with patient: 18 minutes

## 2022-12-05 NOTE — ASSESSMENT & PLAN NOTE
Symptoms worse recently, add low dose Lexapro  Still taking buspirone 10 mg tid, may wean off in 2-3 weeks

## 2022-12-12 ENCOUNTER — TELEPHONE (OUTPATIENT)
Dept: INTERNAL MEDICINE CLINIC | Facility: CLINIC | Age: 87
End: 2022-12-12

## 2022-12-12 DIAGNOSIS — F41.9 ANXIETY: ICD-10-CM

## 2022-12-12 RX ORDER — BUSPIRONE HYDROCHLORIDE 15 MG/1
15 TABLET ORAL 3 TIMES DAILY
Qty: 90 TABLET | Refills: 1 | Status: SHIPPED | OUTPATIENT
Start: 2022-12-12

## 2022-12-12 NOTE — TELEPHONE ENCOUNTER
Brigitte Leroy said one of the precautions on the label for Lexapro is that it can cause low sodium levels  She said pt  Already has low sodium levels  She didn't start med yet  Do you want her to take it?

## 2022-12-12 NOTE — TELEPHONE ENCOUNTER
Do not start the Lexapro, I apologize  We are limited what medications we can give her because of her low sodium  We can adjust her buspirone instead, if you'd like, to 15 mg 3x a day  Let me know

## 2022-12-22 ENCOUNTER — TELEPHONE (OUTPATIENT)
Dept: INTERNAL MEDICINE CLINIC | Facility: CLINIC | Age: 87
End: 2022-12-22

## 2022-12-22 NOTE — TELEPHONE ENCOUNTER
Pt  increased Buspirone to 15mg tid on 12/14 she was okay for the first two days after that but pt  has been very sleepy and didn't eat much on Sat  Did drink two ensure on Sun  Monday she was more awake and sat in chair  Tues  more awake-didn't get out of bed and same yesterday-was awake all day and constantly talking  Did do her self care routine  Was awake most of last night  Is dozing now  Is the same with her fears and anxiety  Does have periods of talking a lot and not really "with it " Was like that all day yesterday   BP, temp and O2 level are all normal

## 2022-12-28 ENCOUNTER — TELEPHONE (OUTPATIENT)
Dept: INTERNAL MEDICINE CLINIC | Facility: CLINIC | Age: 87
End: 2022-12-28

## 2022-12-28 NOTE — TELEPHONE ENCOUNTER
If she's refusing medications, she can try giving them in something she likes-pudding, ice cream, applesauce  Try and distract her from focusing on the medications if possible

## 2022-12-28 NOTE — TELEPHONE ENCOUNTER
Last night pt  wouldn't take her meds at dinner  At midnight she took her midnight medsand the ones that she wouldn't take at dinner  So she missed one Hydralazine, one salt tab and one Buspirone  She didn't take those at all yesterday  Pt  Has been agreeable today with taking her pills  Pedro Pablo Romero wants to know what she can do if pt  refuses to take her pills in the future  The NP from palliative care said that anything that is drug related she is to call pcp

## 2023-01-01 ENCOUNTER — HOME CARE VISIT (OUTPATIENT)
Dept: HOME HEALTH SERVICES | Facility: HOME HEALTHCARE | Age: 88
End: 2023-01-01

## 2023-01-01 ENCOUNTER — HOSPICE ADMISSION (OUTPATIENT)
Dept: HOME HOSPICE | Facility: HOSPICE | Age: 88
End: 2023-01-01

## 2023-01-01 ENCOUNTER — TRANSITIONAL CARE MANAGEMENT (OUTPATIENT)
Dept: INTERNAL MEDICINE CLINIC | Facility: CLINIC | Age: 88
End: 2023-01-01

## 2023-01-01 ENCOUNTER — HOME CARE VISIT (OUTPATIENT)
Dept: HOME HOSPICE | Facility: HOSPICE | Age: 88
End: 2023-01-01

## 2023-01-01 ENCOUNTER — HOSPITAL ENCOUNTER (INPATIENT)
Facility: HOSPITAL | Age: 88
LOS: 3 days | Discharge: HOSPICE | End: 2023-02-08
Attending: EMERGENCY MEDICINE | Admitting: INTERNAL MEDICINE

## 2023-01-01 ENCOUNTER — APPOINTMENT (EMERGENCY)
Dept: RADIOLOGY | Facility: HOSPITAL | Age: 88
End: 2023-01-01

## 2023-01-01 ENCOUNTER — TELEPHONE (OUTPATIENT)
Dept: INTERNAL MEDICINE CLINIC | Facility: CLINIC | Age: 88
End: 2023-01-01

## 2023-01-01 ENCOUNTER — TRANSCRIBE ORDERS (OUTPATIENT)
Dept: HOME HEALTH SERVICES | Facility: HOME HEALTHCARE | Age: 88
End: 2023-01-01

## 2023-01-01 ENCOUNTER — APPOINTMENT (EMERGENCY)
Dept: CT IMAGING | Facility: HOSPITAL | Age: 88
End: 2023-01-01

## 2023-01-01 VITALS
HEIGHT: 60 IN | TEMPERATURE: 98.4 F | DIASTOLIC BLOOD PRESSURE: 78 MMHG | BODY MASS INDEX: 22.58 KG/M2 | SYSTOLIC BLOOD PRESSURE: 108 MMHG | RESPIRATION RATE: 16 BRPM | WEIGHT: 115 LBS | HEART RATE: 62 BPM

## 2023-01-01 VITALS
TEMPERATURE: 97.7 F | BODY MASS INDEX: 22.52 KG/M2 | SYSTOLIC BLOOD PRESSURE: 132 MMHG | OXYGEN SATURATION: 98 % | WEIGHT: 115.3 LBS | DIASTOLIC BLOOD PRESSURE: 102 MMHG | HEART RATE: 98 BPM | RESPIRATION RATE: 18 BRPM

## 2023-01-01 VITALS — HEART RATE: 86 BPM | RESPIRATION RATE: 18 BRPM

## 2023-01-01 DIAGNOSIS — K56.609 SMALL BOWEL OBSTRUCTION (HCC): Primary | ICD-10-CM

## 2023-01-01 DIAGNOSIS — E87.1 HYPONATREMIA: ICD-10-CM

## 2023-01-01 DIAGNOSIS — F41.9 ANXIETY: ICD-10-CM

## 2023-01-01 DIAGNOSIS — K56.609 SBO (SMALL BOWEL OBSTRUCTION) (HCC): Primary | ICD-10-CM

## 2023-01-01 DIAGNOSIS — R52 PAIN: ICD-10-CM

## 2023-01-01 DIAGNOSIS — F03.C0 SEVERE DEMENTIA, UNSPECIFIED DEMENTIA TYPE, UNSPECIFIED WHETHER BEHAVIORAL, PSYCHOTIC, OR MOOD DISTURBANCE OR ANXIETY: ICD-10-CM

## 2023-01-01 DIAGNOSIS — J69.0 ASPIRATION PNEUMONIA (HCC): ICD-10-CM

## 2023-01-01 DIAGNOSIS — R26.2 AMBULATORY DYSFUNCTION: ICD-10-CM

## 2023-01-01 DIAGNOSIS — E87.20 LACTIC ACIDOSIS: ICD-10-CM

## 2023-01-01 DIAGNOSIS — K56.609 SBO (SMALL BOWEL OBSTRUCTION) (HCC): ICD-10-CM

## 2023-01-01 DIAGNOSIS — F03.B0 MODERATE DEMENTIA, UNSPECIFIED DEMENTIA TYPE, UNSPECIFIED WHETHER BEHAVIORAL, PSYCHOTIC, OR MOOD DISTURBANCE OR ANXIETY: ICD-10-CM

## 2023-01-01 LAB
ALBUMIN SERPL BCP-MCNC: 3.5 G/DL (ref 3.5–5)
ALP SERPL-CCNC: 82 U/L (ref 34–104)
ALT SERPL W P-5'-P-CCNC: 18 U/L (ref 7–52)
ANION GAP SERPL CALCULATED.3IONS-SCNC: 11 MMOL/L (ref 4–13)
ANION GAP SERPL CALCULATED.3IONS-SCNC: 14 MMOL/L (ref 4–13)
ANION GAP SERPL CALCULATED.3IONS-SCNC: 7 MMOL/L (ref 4–13)
APTT PPP: 28 SECONDS (ref 23–37)
AST SERPL W P-5'-P-CCNC: 25 U/L (ref 13–39)
ATRIAL RATE: 92 BPM
BASOPHILS # BLD AUTO: 0.04 THOUSANDS/ÂΜL (ref 0–0.1)
BASOPHILS # BLD AUTO: 0.07 THOUSANDS/ÂΜL (ref 0–0.1)
BASOPHILS # BLD AUTO: 0.17 THOUSANDS/ÂΜL (ref 0–0.1)
BASOPHILS NFR BLD AUTO: 0 % (ref 0–1)
BASOPHILS NFR BLD AUTO: 0 % (ref 0–1)
BASOPHILS NFR BLD AUTO: 1 % (ref 0–1)
BILIRUB SERPL-MCNC: 1.16 MG/DL (ref 0.2–1)
BUN SERPL-MCNC: 42 MG/DL (ref 5–25)
BUN SERPL-MCNC: 52 MG/DL (ref 5–25)
BUN SERPL-MCNC: 63 MG/DL (ref 5–25)
CALCIUM SERPL-MCNC: 8.7 MG/DL (ref 8.4–10.2)
CALCIUM SERPL-MCNC: 8.7 MG/DL (ref 8.4–10.2)
CALCIUM SERPL-MCNC: 9.3 MG/DL (ref 8.4–10.2)
CHLORIDE SERPL-SCNC: 101 MMOL/L (ref 96–108)
CHLORIDE SERPL-SCNC: 110 MMOL/L (ref 96–108)
CHLORIDE SERPL-SCNC: 98 MMOL/L (ref 96–108)
CO2 SERPL-SCNC: 19 MMOL/L (ref 21–32)
CO2 SERPL-SCNC: 19 MMOL/L (ref 21–32)
CO2 SERPL-SCNC: 21 MMOL/L (ref 21–32)
CREAT SERPL-MCNC: 0.84 MG/DL (ref 0.6–1.3)
CREAT SERPL-MCNC: 1.56 MG/DL (ref 0.6–1.3)
CREAT SERPL-MCNC: 2.03 MG/DL (ref 0.6–1.3)
EOSINOPHIL # BLD AUTO: 0 THOUSAND/ÂΜL (ref 0–0.61)
EOSINOPHIL # BLD AUTO: 0.12 THOUSAND/ÂΜL (ref 0–0.61)
EOSINOPHIL # BLD AUTO: 0.23 THOUSAND/ÂΜL (ref 0–0.61)
EOSINOPHIL NFR BLD AUTO: 0 % (ref 0–6)
EOSINOPHIL NFR BLD AUTO: 1 % (ref 0–6)
EOSINOPHIL NFR BLD AUTO: 1 % (ref 0–6)
ERYTHROCYTE [DISTWIDTH] IN BLOOD BY AUTOMATED COUNT: 13.2 % (ref 11.6–15.1)
ERYTHROCYTE [DISTWIDTH] IN BLOOD BY AUTOMATED COUNT: 13.2 % (ref 11.6–15.1)
ERYTHROCYTE [DISTWIDTH] IN BLOOD BY AUTOMATED COUNT: 13.3 % (ref 11.6–15.1)
FLUAV RNA RESP QL NAA+PROBE: NEGATIVE
FLUBV RNA RESP QL NAA+PROBE: NEGATIVE
GFR SERPL CREATININE-BSD FRML MDRD: 20 ML/MIN/1.73SQ M
GFR SERPL CREATININE-BSD FRML MDRD: 28 ML/MIN/1.73SQ M
GFR SERPL CREATININE-BSD FRML MDRD: 59 ML/MIN/1.73SQ M
GLUCOSE SERPL-MCNC: 121 MG/DL (ref 65–140)
GLUCOSE SERPL-MCNC: 123 MG/DL (ref 65–140)
GLUCOSE SERPL-MCNC: 144 MG/DL (ref 65–140)
HCT VFR BLD AUTO: 42.7 % (ref 34.8–46.1)
HCT VFR BLD AUTO: 43 % (ref 34.8–46.1)
HCT VFR BLD AUTO: 48.6 % (ref 34.8–46.1)
HGB BLD-MCNC: 14.6 G/DL (ref 11.5–15.4)
HGB BLD-MCNC: 14.7 G/DL (ref 11.5–15.4)
HGB BLD-MCNC: 16.5 G/DL (ref 11.5–15.4)
IMM GRANULOCYTES # BLD AUTO: 0.07 THOUSAND/UL (ref 0–0.2)
IMM GRANULOCYTES # BLD AUTO: 0.08 THOUSAND/UL (ref 0–0.2)
IMM GRANULOCYTES # BLD AUTO: 0.09 THOUSAND/UL (ref 0–0.2)
IMM GRANULOCYTES NFR BLD AUTO: 0 % (ref 0–2)
IMM GRANULOCYTES NFR BLD AUTO: 0 % (ref 0–2)
IMM GRANULOCYTES NFR BLD AUTO: 1 % (ref 0–2)
INR PPP: 1.09 (ref 0.84–1.19)
LACTATE SERPL-SCNC: 1.3 MMOL/L (ref 0.5–2)
LACTATE SERPL-SCNC: 2.2 MMOL/L (ref 0.5–2)
LACTATE SERPL-SCNC: 3.2 MMOL/L (ref 0.5–2)
LIPASE SERPL-CCNC: 12 U/L (ref 11–82)
LYMPHOCYTES # BLD AUTO: 0.6 THOUSANDS/ÂΜL (ref 0.6–4.47)
LYMPHOCYTES # BLD AUTO: 0.9 THOUSANDS/ÂΜL (ref 0.6–4.47)
LYMPHOCYTES # BLD AUTO: 1.2 THOUSANDS/ÂΜL (ref 0.6–4.47)
LYMPHOCYTES NFR BLD AUTO: 5 % (ref 14–44)
MCH RBC QN AUTO: 33.6 PG (ref 26.8–34.3)
MCH RBC QN AUTO: 33.7 PG (ref 26.8–34.3)
MCH RBC QN AUTO: 34.2 PG (ref 26.8–34.3)
MCHC RBC AUTO-ENTMCNC: 34 G/DL (ref 31.4–37.4)
MCHC RBC AUTO-ENTMCNC: 34.2 G/DL (ref 31.4–37.4)
MCHC RBC AUTO-ENTMCNC: 34.2 G/DL (ref 31.4–37.4)
MCV RBC AUTO: 100 FL (ref 82–98)
MCV RBC AUTO: 98 FL (ref 82–98)
MCV RBC AUTO: 99 FL (ref 82–98)
MONOCYTES # BLD AUTO: 0.97 THOUSAND/ÂΜL (ref 0.17–1.22)
MONOCYTES # BLD AUTO: 1.28 THOUSAND/ÂΜL (ref 0.17–1.22)
MONOCYTES # BLD AUTO: 1.67 THOUSAND/ÂΜL (ref 0.17–1.22)
MONOCYTES NFR BLD AUTO: 7 % (ref 4–12)
MONOCYTES NFR BLD AUTO: 7 % (ref 4–12)
MONOCYTES NFR BLD AUTO: 8 % (ref 4–12)
NEUTROPHILS # BLD AUTO: 10.84 THOUSANDS/ÂΜL (ref 1.85–7.62)
NEUTROPHILS # BLD AUTO: 16.97 THOUSANDS/ÂΜL (ref 1.85–7.62)
NEUTROPHILS # BLD AUTO: 19.08 THOUSANDS/ÂΜL (ref 1.85–7.62)
NEUTS SEG NFR BLD AUTO: 85 % (ref 43–75)
NEUTS SEG NFR BLD AUTO: 86 % (ref 43–75)
NEUTS SEG NFR BLD AUTO: 88 % (ref 43–75)
NRBC BLD AUTO-RTO: 0 /100 WBCS
PLATELET # BLD AUTO: 289 THOUSANDS/UL (ref 149–390)
PLATELET # BLD AUTO: 294 THOUSANDS/UL (ref 149–390)
PLATELET # BLD AUTO: 326 THOUSANDS/UL (ref 149–390)
PMV BLD AUTO: 8.9 FL (ref 8.9–12.7)
PMV BLD AUTO: 9.1 FL (ref 8.9–12.7)
PMV BLD AUTO: 9.3 FL (ref 8.9–12.7)
POTASSIUM SERPL-SCNC: 3.6 MMOL/L (ref 3.5–5.3)
POTASSIUM SERPL-SCNC: 4.1 MMOL/L (ref 3.5–5.3)
POTASSIUM SERPL-SCNC: 5 MMOL/L (ref 3.5–5.3)
PROCALCITONIN SERPL-MCNC: 11.43 NG/ML
PROCALCITONIN SERPL-MCNC: 8.86 NG/ML
PROT SERPL-MCNC: 7 G/DL (ref 6.4–8.4)
PROTHROMBIN TIME: 14.4 SECONDS (ref 11.6–14.5)
QRS AXIS: 47 DEGREES
QRSD INTERVAL: 100 MS
QT INTERVAL: 422 MS
QTC INTERVAL: 471 MS
RBC # BLD AUTO: 4.3 MILLION/UL (ref 3.81–5.12)
RBC # BLD AUTO: 4.34 MILLION/UL (ref 3.81–5.12)
RBC # BLD AUTO: 4.89 MILLION/UL (ref 3.81–5.12)
RSV RNA RESP QL NAA+PROBE: NEGATIVE
SARS-COV-2 RNA RESP QL NAA+PROBE: NEGATIVE
SODIUM SERPL-SCNC: 131 MMOL/L (ref 135–147)
SODIUM SERPL-SCNC: 131 MMOL/L (ref 135–147)
SODIUM SERPL-SCNC: 138 MMOL/L (ref 135–147)
T WAVE AXIS: 82 DEGREES
VENTRICULAR RATE: 75 BPM
WBC # BLD AUTO: 12.64 THOUSAND/UL (ref 4.31–10.16)
WBC # BLD AUTO: 19.3 THOUSAND/UL (ref 4.31–10.16)
WBC # BLD AUTO: 22.44 THOUSAND/UL (ref 4.31–10.16)

## 2023-01-01 RX ORDER — OXYCODONE HYDROCHLORIDE 5 MG/1
2.5 TABLET ORAL EVERY 4 HOURS PRN
Status: DISCONTINUED | OUTPATIENT
Start: 2023-01-01 | End: 2023-01-01

## 2023-01-01 RX ORDER — OXYCODONE HCL 5 MG/5 ML
2.5 SOLUTION, ORAL ORAL EVERY 4 HOURS PRN
Status: DISCONTINUED | OUTPATIENT
Start: 2023-01-01 | End: 2023-01-01

## 2023-01-01 RX ORDER — HYDRALAZINE HYDROCHLORIDE 25 MG/1
50 TABLET, FILM COATED ORAL 3 TIMES DAILY
Status: DISCONTINUED | OUTPATIENT
Start: 2023-01-01 | End: 2023-01-01

## 2023-01-01 RX ORDER — HEPARIN SODIUM 5000 [USP'U]/ML
5000 INJECTION, SOLUTION INTRAVENOUS; SUBCUTANEOUS EVERY 8 HOURS SCHEDULED
Status: DISCONTINUED | OUTPATIENT
Start: 2023-01-01 | End: 2023-01-01

## 2023-01-01 RX ORDER — DEXTROSE AND SODIUM CHLORIDE 5; .9 G/100ML; G/100ML
100 INJECTION, SOLUTION INTRAVENOUS CONTINUOUS
Status: DISCONTINUED | OUTPATIENT
Start: 2023-01-01 | End: 2023-01-01

## 2023-01-01 RX ORDER — DEXTROSE MONOHYDRATE 50 MG/ML
50 INJECTION, SOLUTION INTRAVENOUS CONTINUOUS
Status: DISCONTINUED | OUTPATIENT
Start: 2023-01-01 | End: 2023-01-01

## 2023-01-01 RX ORDER — ONDANSETRON 2 MG/ML
4 INJECTION INTRAMUSCULAR; INTRAVENOUS EVERY 4 HOURS PRN
Status: DISCONTINUED | OUTPATIENT
Start: 2023-01-01 | End: 2023-01-01

## 2023-01-01 RX ORDER — LORAZEPAM 0.5 MG/1
0.25 TABLET ORAL EVERY 4 HOURS PRN
Status: DISCONTINUED | OUTPATIENT
Start: 2023-01-01 | End: 2023-01-01 | Stop reason: HOSPADM

## 2023-01-01 RX ORDER — MORPHINE SULFATE 100 MG/5ML
2.5 SOLUTION, CONCENTRATE ORAL EVERY 4 HOURS PRN
Qty: 15 ML | Refills: 0 | Status: SHIPPED | OUTPATIENT
Start: 2023-01-01 | End: 2023-02-16

## 2023-01-01 RX ORDER — HYDRALAZINE HYDROCHLORIDE 20 MG/ML
5 INJECTION INTRAMUSCULAR; INTRAVENOUS EVERY 6 HOURS PRN
Status: DISCONTINUED | OUTPATIENT
Start: 2023-01-01 | End: 2023-01-01

## 2023-01-01 RX ORDER — OXYCODONE HYDROCHLORIDE 5 MG/1
5 TABLET ORAL EVERY 4 HOURS PRN
Status: DISCONTINUED | OUTPATIENT
Start: 2023-01-01 | End: 2023-01-01

## 2023-01-01 RX ORDER — LOSARTAN POTASSIUM 50 MG/1
100 TABLET ORAL DAILY
Status: DISCONTINUED | OUTPATIENT
Start: 2023-01-01 | End: 2023-01-01

## 2023-01-01 RX ORDER — ONDANSETRON 4 MG/1
4 TABLET, ORALLY DISINTEGRATING ORAL ONCE
Status: DISCONTINUED | OUTPATIENT
Start: 2023-01-01 | End: 2023-01-01

## 2023-01-01 RX ORDER — METOPROLOL TARTRATE 5 MG/5ML
5 INJECTION INTRAVENOUS EVERY 8 HOURS
Status: DISCONTINUED | OUTPATIENT
Start: 2023-01-01 | End: 2023-01-01

## 2023-01-01 RX ORDER — BUSPIRONE HYDROCHLORIDE 5 MG/1
10 TABLET ORAL 3 TIMES DAILY
Status: DISCONTINUED | OUTPATIENT
Start: 2023-01-01 | End: 2023-01-01

## 2023-01-01 RX ORDER — AMIODARONE HYDROCHLORIDE 200 MG/1
200 TABLET ORAL
Status: DISCONTINUED | OUTPATIENT
Start: 2023-01-01 | End: 2023-01-01

## 2023-01-01 RX ORDER — LORAZEPAM 0.5 MG/1
0.25 TABLET ORAL EVERY 4 HOURS PRN
Qty: 12 TABLET | Refills: 0 | Status: SHIPPED | OUTPATIENT
Start: 2023-01-01 | End: 2023-02-16

## 2023-01-01 RX ORDER — HYDROMORPHONE HCL IN WATER/PF 6 MG/30 ML
0.2 PATIENT CONTROLLED ANALGESIA SYRINGE INTRAVENOUS EVERY 4 HOURS PRN
Status: DISCONTINUED | OUTPATIENT
Start: 2023-01-01 | End: 2023-01-01 | Stop reason: HOSPADM

## 2023-01-01 RX ORDER — CHOLECALCIFEROL (VITAMIN D3) 10(400)/ML
1000 DROPS ORAL DAILY
Status: DISCONTINUED | OUTPATIENT
Start: 2023-01-01 | End: 2023-01-01

## 2023-01-01 RX ORDER — SODIUM CHLORIDE 9 MG/ML
100 INJECTION, SOLUTION INTRAVENOUS CONTINUOUS
Status: DISCONTINUED | OUTPATIENT
Start: 2023-01-01 | End: 2023-01-01

## 2023-01-01 RX ORDER — HYDROMORPHONE HCL IN WATER/PF 6 MG/30 ML
0.2 PATIENT CONTROLLED ANALGESIA SYRINGE INTRAVENOUS EVERY 4 HOURS PRN
Status: DISCONTINUED | OUTPATIENT
Start: 2023-01-01 | End: 2023-01-01

## 2023-01-01 RX ORDER — ONDANSETRON 4 MG/1
4 TABLET, ORALLY DISINTEGRATING ORAL EVERY 6 HOURS PRN
Status: DISCONTINUED | OUTPATIENT
Start: 2023-01-01 | End: 2023-01-01 | Stop reason: HOSPADM

## 2023-01-01 RX ORDER — ASPIRIN 81 MG/1
81 TABLET, CHEWABLE ORAL DAILY
Status: DISCONTINUED | OUTPATIENT
Start: 2023-01-01 | End: 2023-01-01

## 2023-01-01 RX ORDER — ACETAMINOPHEN 650 MG/1
650 SUPPOSITORY RECTAL EVERY 4 HOURS PRN
Status: DISCONTINUED | OUTPATIENT
Start: 2023-01-01 | End: 2023-01-01

## 2023-01-01 RX ORDER — MORPHINE SULFATE 100 MG/5ML
2.5 SOLUTION, CONCENTRATE ORAL EVERY 4 HOURS PRN
Status: DISCONTINUED | OUTPATIENT
Start: 2023-01-01 | End: 2023-01-01 | Stop reason: HOSPADM

## 2023-01-01 RX ORDER — LORAZEPAM 2 MG/ML
0.25 INJECTION INTRAMUSCULAR
Status: DISCONTINUED | OUTPATIENT
Start: 2023-01-01 | End: 2023-01-01 | Stop reason: HOSPADM

## 2023-01-01 RX ORDER — CARVEDILOL 6.25 MG/1
6.25 TABLET ORAL 2 TIMES DAILY WITH MEALS
Status: DISCONTINUED | OUTPATIENT
Start: 2023-01-01 | End: 2023-01-01

## 2023-01-01 RX ORDER — ACETAMINOPHEN 325 MG/1
975 TABLET ORAL EVERY 8 HOURS SCHEDULED
Status: DISCONTINUED | OUTPATIENT
Start: 2023-01-01 | End: 2023-01-01

## 2023-01-01 RX ADMIN — METOROPROLOL TARTRATE 5 MG: 5 INJECTION, SOLUTION INTRAVENOUS at 12:21

## 2023-01-01 RX ADMIN — SODIUM CHLORIDE 75 ML/HR: 0.9 INJECTION, SOLUTION INTRAVENOUS at 16:35

## 2023-01-01 RX ADMIN — DEXTROSE AND SODIUM CHLORIDE 100 ML/HR: 5; .9 INJECTION, SOLUTION INTRAVENOUS at 02:12

## 2023-01-01 RX ADMIN — AMIODARONE HYDROCHLORIDE 200 MG: 200 TABLET ORAL at 23:56

## 2023-01-01 RX ADMIN — HEPARIN SODIUM 5000 UNITS: 5000 INJECTION INTRAVENOUS; SUBCUTANEOUS at 10:20

## 2023-01-01 RX ADMIN — METOROPROLOL TARTRATE 5 MG: 5 INJECTION, SOLUTION INTRAVENOUS at 21:00

## 2023-01-01 RX ADMIN — BUSPIRONE HYDROCHLORIDE 10 MG: 5 TABLET ORAL at 23:56

## 2023-01-01 RX ADMIN — ONDANSETRON 4 MG: 2 INJECTION INTRAMUSCULAR; INTRAVENOUS at 14:31

## 2023-01-01 RX ADMIN — CEFTRIAXONE SODIUM 1000 MG: 10 INJECTION, POWDER, FOR SOLUTION INTRAVENOUS at 16:06

## 2023-01-01 RX ADMIN — OXYCODONE HYDROCHLORIDE 2.5 MG: 5 SOLUTION ORAL at 16:06

## 2023-01-01 RX ADMIN — HEPARIN SODIUM 5000 UNITS: 5000 INJECTION INTRAVENOUS; SUBCUTANEOUS at 00:12

## 2023-01-01 RX ADMIN — DEXTROSE AND SODIUM CHLORIDE 100 ML/HR: 5; .9 INJECTION, SOLUTION INTRAVENOUS at 21:01

## 2023-01-01 RX ADMIN — ONDANSETRON 4 MG: 4 TABLET, ORALLY DISINTEGRATING ORAL at 11:39

## 2023-01-01 RX ADMIN — SODIUM CHLORIDE 100 ML/HR: 0.9 INJECTION, SOLUTION INTRAVENOUS at 06:47

## 2023-01-01 RX ADMIN — DEXTROSE AND SODIUM CHLORIDE 100 ML/HR: 5; .9 INJECTION, SOLUTION INTRAVENOUS at 12:17

## 2023-01-01 RX ADMIN — ONDANSETRON 4 MG: 2 INJECTION INTRAMUSCULAR; INTRAVENOUS at 23:54

## 2023-01-01 RX ADMIN — HYDRALAZINE HYDROCHLORIDE 50 MG: 25 TABLET ORAL at 23:56

## 2023-01-01 RX ADMIN — HEPARIN SODIUM 5000 UNITS: 5000 INJECTION INTRAVENOUS; SUBCUTANEOUS at 14:29

## 2023-01-01 RX ADMIN — CEFTRIAXONE SODIUM 1000 MG: 10 INJECTION, POWDER, FOR SOLUTION INTRAVENOUS at 16:47

## 2023-01-13 DIAGNOSIS — E87.1 HYPONATREMIA: Chronic | ICD-10-CM

## 2023-01-13 RX ORDER — SODIUM CHLORIDE 1000 MG
TABLET, SOLUBLE MISCELLANEOUS
Qty: 90 TABLET | Refills: 1 | Status: SHIPPED | OUTPATIENT
Start: 2023-01-13

## 2023-01-13 NOTE — TELEPHONE ENCOUNTER
[facility]  Please forward to your office staff for further review as this medication was reviewed by a [facility] RN

## 2023-01-25 ENCOUNTER — TELEPHONE (OUTPATIENT)
Dept: INTERNAL MEDICINE CLINIC | Facility: CLINIC | Age: 88
End: 2023-01-25

## 2023-01-25 NOTE — TELEPHONE ENCOUNTER
Mary Morales called back  Jose Samuel did wake up but refused to take medication  Fell back asleep  How long can she go without giving Jose Samuel her medication?

## 2023-01-25 NOTE — TELEPHONE ENCOUNTER
Pt  was hallucinating and felt down yesterday  Stayed in bed  Up until then she had been getting up and walking to chair and to bathroom  Is sleeping more than usual  Today she only flutters her eyes but not really waking up  Is responding coherently-saying "please let me alone " She can't get pt  to take any  meds or get her awake to do anything  Her temp today at 10:45am  was 97 8, O2 was 97 with a pulse of 55, bp was 154/67, pulse of 57  At 12;45pm-O2 same with a pulse of 61  Bp was 168/71, pulse of 51  The NP from palliative care said pt  was going to the bathroom too much and to cut back on the Miralax which Geeta Hale was trying to do but she didn't have a bm in a few days so she started giving Miralax to her again because she hasn't been eating well

## 2023-01-25 NOTE — TELEPHONE ENCOUNTER
No need to give her medications if she does not want to, she is on palliative care  Do not give her Miralax for constipation  If she is not eating as much, we do not expect regular bowel movements  Please have palliative care send me notes for my records/ update

## 2023-01-26 DIAGNOSIS — I10 ESSENTIAL HYPERTENSION: ICD-10-CM

## 2023-01-26 RX ORDER — HYDRALAZINE HYDROCHLORIDE 50 MG/1
TABLET, FILM COATED ORAL
Qty: 90 TABLET | Refills: 0 | Status: SHIPPED | OUTPATIENT
Start: 2023-01-26 | End: 2023-02-08

## 2023-02-05 PROBLEM — K56.609 SBO (SMALL BOWEL OBSTRUCTION) (HCC): Status: ACTIVE | Noted: 2023-01-01

## 2023-02-05 PROBLEM — J69.0 ASPIRATION PNEUMONIA (HCC): Status: ACTIVE | Noted: 2023-01-01

## 2023-02-05 PROBLEM — N17.9 AKI (ACUTE KIDNEY INJURY) (HCC): Status: ACTIVE | Noted: 2023-02-05

## 2023-02-05 PROBLEM — R10.9 ABDOMINAL PAIN: Status: ACTIVE | Noted: 2023-01-01

## 2023-02-05 NOTE — ED PROVIDER NOTES
History  Chief Complaint   Patient presents with   • Vomiting     Vomiting since last night  No active vomiting  Hx dementia, baseline confusion  77-year-old female presented for evaluation of vomiting  Patient is accompanied by her niece who reported patient has vomited multiple times last night  She also vomited about 5 times yesterday morning  The vomitus was yellow/brownish in color and about a cup in quantity every time she vomited  At 9:00pm last night, patient vomited again which was more in quantity than in the morning  Patient has a history of dementia at  baseline with confusion  There is no history of fever, diarrhea or urinary symptoms however has been complaining of mild lower abdominal pain  Patient is seen at bedside in no acute distress  Her niece who is her power of  is at bedside  Prior to Admission Medications   Prescriptions Last Dose Informant Patient Reported? Taking?    Cholecalciferol (VITAMIN D3) 1000 units CAPS   Yes No   Sig: Take by mouth   PACERONE 100 MG tablet   Yes No   Sig: Take 200 mg by mouth daily at bedtime    acetaminophen (TYLENOL) 325 mg tablet   No No   Sig: Take 2 tablets (650 mg total) by mouth every 6 (six) hours as needed for mild pain, headaches or fever   aspirin 81 MG tablet   Yes No   Sig: Take 81 mg by mouth daily   busPIRone (BUSPAR) 15 mg tablet   No No   Sig: Take 1 tablet (15 mg total) by mouth 3 (three) times a day   carvedilol (COREG) 6 25 mg tablet   No No   Sig: Take 1 tablet (6 25 mg total) by mouth 2 (two) times a day with meals   escitalopram (LEXAPRO) 5 mg tablet   No No   Sig: Take 1 tablet (5 mg total) by mouth daily   hydrALAZINE (APRESOLINE) 50 mg tablet   No No   Sig: TAKE 1 TABLET(50 MG) BY MOUTH THREE TIMES DAILY   irbesartan (AVAPRO) 300 mg tablet   Yes No   Sig: Take 300 mg by mouth daily at bedtime    polyethylene glycol (MIRALAX) 17 g packet   No No   Sig: Take 9 g by mouth daily   sodium chloride 1 g tablet   No No Sig: TAKE 1 TABLET(1 GRAM) BY MOUTH THREE TIMES DAILY   vitamin B-12 (VITAMIN B-12) 500 mcg tablet   Yes No   Sig: Take 500 mcg by mouth daily      Facility-Administered Medications: None       Past Medical History:   Diagnosis Date   • A-fib (HCC)    • Anxiety    • Cheilosis    • Diverticulosis     1970s   • Fracture of iliac wing ([facility] [address] ) 2016   • Fracture, ribs    • Gallstone    • Hiatal hernia    • Hyperlipidemia    • Hypertension    • Hyponatremia    • Inguinal hernia    • Lacunar infarction (HCC)    • Macrocytosis    • Macular degeneration    • Osteoarthritis    • Ovarian cyst     stable   • Renal cyst    • Rib fractures 2017   • Simple renal cyst    • Syncope    • Tuberculosis 4584   • Umbilical hernia    • Vertigo        Past Surgical History:   Procedure Laterality Date   • ABDOMINAL SURGERY     • HIP SURGERY      At  young age 26's   • KNEE CARTILAGE SURGERY     • MULTIPLE TOOTH EXTRACTIONS Bilateral     Molers       Family History   Problem Relation Age of Onset   • Leukemia Sister    • Coronary artery disease Sister    • Diabetes Sister    • Hypertension Sister    • Coronary artery disease Sister    • Aortic aneurysm Sister    • Coronary artery disease Mother    • Coronary artery disease Brother    • Substance Abuse Neg Hx    • Alcohol abuse Neg Hx    • Depression Neg Hx    • Mental illness Neg Hx      I have reviewed and agree with the history as documented      E-Cigarette/Vaping   • E-Cigarette Use Never User      E-Cigarette/Vaping Substances   • Nicotine No    • THC No    • CBD No    • Flavoring No    • Other No    • Unknown No      Social History     Tobacco Use   • Smoking status: Never   • Smokeless tobacco: Never   Vaping Use   • Vaping Use: Never used   Substance Use Topics   • Alcohol use: Not Currently   • Drug use: No        Review of Systems   Unable to perform ROS: Dementia       Physical Exam  ED Triage Vitals   Temperature Pulse Respirations Blood Pressure SpO2   02/05/23 1458 02/05/23 1458 02/05/23 1458 02/05/23 1458 02/05/23 1458   97 9 °F (36 6 °C) 74 19 105/54 90 %      Temp Source Heart Rate Source Patient Position - Orthostatic VS BP Location FiO2 (%)   02/05/23 1458 02/05/23 1704 02/05/23 1704 02/05/23 1704 --   Axillary Monitor Lying Right arm       Pain Score       --                    Orthostatic Vital Signs  Vitals:    02/05/23 1458 02/05/23 1704 02/05/23 1916   BP: 105/54 103/55 127/59   Pulse: 74 75 75   Patient Position - Orthostatic VS:  Lying Lying       Physical Exam  Vitals and nursing note reviewed  Constitutional:       General: She is not in acute distress  Appearance: She is well-developed  HENT:      Head: Normocephalic and atraumatic  Eyes:      Conjunctiva/sclera: Conjunctivae normal    Cardiovascular:      Rate and Rhythm: Normal rate and regular rhythm  Heart sounds: No murmur heard  Pulmonary:      Effort: Pulmonary effort is normal  No respiratory distress  Breath sounds: Normal breath sounds  Abdominal:      Palpations: Abdomen is soft  Tenderness: There is abdominal tenderness (Lower abdominal)  Comments: Tenderness present on palpation  Musculoskeletal:         General: No swelling  Cervical back: Neck supple  Skin:     General: Skin is warm and dry  Capillary Refill: Capillary refill takes less than 2 seconds  Neurological:      General: No focal deficit present  Mental Status: She is alert  Mental status is at baseline     Psychiatric:         Mood and Affect: Mood normal          ED Medications  Medications   sodium chloride 0 9 % infusion (75 mL/hr Intravenous New Bag 2/5/23 1635)   ondansetron (ZOFRAN) injection 4 mg (has no administration in time range)   ceftriaxone (ROCEPHIN) 1 g/50 mL in dextrose IVPB (0 mg Intravenous Stopped 2/5/23 7247)       Diagnostic Studies  Results Reviewed     Procedure Component Value Units Date/Time    Lactic acid 2 Hours [[id number]]  (Abnormal) Collected: 02/05/23 1918 Lab Status: Final result Specimen: Blood from Arm, Right Updated: 02/05/23 1949     LACTIC ACID 2 2 mmol/L     Narrative:      Result may be elevated if tourniquet was used during collection  FLU/RSV/COVID - if FLU/RSV clinically relevant [[id number]]  (Normal) Collected: 02/05/23 1635    Lab Status: Final result Specimen: Nares from Nose Updated: 02/05/23 1746     SARS-CoV-2 Negative     INFLUENZA A PCR Negative     INFLUENZA B PCR Negative     RSV PCR Negative    Narrative:      FOR PEDIATRIC PATIENTS - copy/paste COVID Guidelines URL to browser: [email or web address]    SARS-CoV-2 assay is a Nucleic Acid Amplification assay intended for the  qualitative detection of nucleic acid from SARS-CoV-2 in nasopharyngeal  swabs  Results are for the presumptive identification of SARS-CoV-2 RNA  Positive results are indicative of infection with SARS-CoV-2, the virus  causing COVID-19, but do not rule out bacterial infection or co-infection  with other viruses  Laboratories within the [place] and its  territories are required to report all positive results to the appropriate  public health authorities  Negative results do not preclude SARS-CoV-2  infection and should not be used as the sole basis for treatment or other  patient management decisions  Negative results must be combined with  clinical observations, patient history, and epidemiological information  This test has not been FDA cleared or approved  This test has been authorized by FDA under an Emergency Use Authorization  (EUA)  This test is only authorized for the duration of time the  declaration that circumstances exist justifying the authorization of the  emergency use of an in vitro diagnostic tests for detection of SARS-CoV-2  virus and/or diagnosis of COVID-19 infection under section 564(b)(1) of  the Act, 21 U  S C  487PGZ-8(T)(4), unless the authorization is terminated  or revoked sooner  The test has been validated but independent review by FDA  and CLIA is pending  Test performed using [employer] GeneXpert: This RT-PCR assay targets N2,  a region unique to SARS-CoV-2  A conserved region in the E-gene was chosen  for pan-Sarbecovirus detection which includes SARS-CoV-2  According to CMS-2020-01-R, this platform meets the definition of high-throughput technology  Lactic acid [[id number]]  (Abnormal) Collected: 02/05/23 1610    Lab Status: Final result Specimen: Blood from Arm, Left Updated: 02/05/23 1653     LACTIC ACID 3 2 mmol/L     Narrative:      Result may be elevated if tourniquet was used during collection  Procalcitonin [[id number]]  (Abnormal) Collected: 02/05/23 1610    Lab Status: Final result Specimen: Blood from Arm, Left Updated: 02/05/23 1650     Procalcitonin 11 43 ng/ml     Protime-INR [[id number]]  (Normal) Collected: 02/05/23 1610    Lab Status: Final result Specimen: Blood from Arm, Left Updated: 02/05/23 1636     Protime 14 4 seconds      INR 1 09    APTT [[id number]]  (Normal) Collected: 02/05/23 1610    Lab Status: Final result Specimen: Blood from Arm, Left Updated: 02/05/23 1636     PTT 28 seconds     Blood culture #1 [[id number]] Collected: 02/05/23 1612    Lab Status: In process Specimen: Blood from Arm, Right Updated: 02/05/23 1618    Blood culture #2 [[id number]] Collected: 02/05/23 1610    Lab Status:  In process Specimen: Blood from Arm, Left Updated: 02/05/23 1617    Lipase [[id number]]  (Normal) Collected: 02/05/23 1526    Lab Status: Final result Specimen: Blood from Arm, Right Updated: 02/05/23 1555     Lipase 12 u/L     Comprehensive metabolic panel [[id number]]  (Abnormal) Collected: 02/05/23 1526    Lab Status: Final result Specimen: Blood from Arm, Right Updated: 02/05/23 1555     Sodium 131 mmol/L      Potassium 5 0 mmol/L      Chloride 98 mmol/L      CO2 19 mmol/L      ANION GAP 14 mmol/L      BUN 52 mg/dL      Creatinine 2 03 mg/dL      Glucose 144 mg/dL Calcium 9 3 mg/dL      AST 25 U/L      ALT 18 U/L      Alkaline Phosphatase 82 U/L      Total Protein 7 0 g/dL      Albumin 3 5 g/dL      Total Bilirubin 1 16 mg/dL      eGFR 20 ml/min/1 73sq m     Narrative:      National Kidney Disease Foundation guidelines for Chronic Kidney Disease (CKD):   •  Stage 1 with normal or high GFR (GFR > 90 mL/min/1 73 square meters)  •  Stage 2 Mild CKD (GFR = 60-89 mL/min/1 73 square meters)  •  Stage 3A Moderate CKD (GFR = 45-59 mL/min/1 73 square meters)  •  Stage 3B Moderate CKD (GFR = 30-44 mL/min/1 73 square meters)  •  Stage 4 Severe CKD (GFR = 15-29 mL/min/1 73 square meters)  •  Stage 5 End Stage CKD (GFR <15 mL/min/1 73 square meters)  Note: GFR calculation is accurate only with a steady state creatinine    UA w Reflex to Microscopic w Reflex to Culture [[id number]]     Lab Status: No result Specimen: Urine     CBC and differential [[id number]]  (Abnormal) Collected: 02/05/23 1526    Lab Status: Final result Specimen: Blood from Arm, Right Updated: 02/05/23 1537     WBC 22 44 Thousand/uL      RBC 4 89 Million/uL      Hemoglobin 16 5 g/dL      Hematocrit 48 6 %      MCV 99 fL      MCH 33 7 pg      MCHC 34 0 g/dL      RDW 13 2 %      MPV 8 9 fL      Platelets 038 Thousands/uL      nRBC 0 /100 WBCs      Neutrophils Relative 86 %      Immat GRANS % 0 %      Lymphocytes Relative 5 %      Monocytes Relative 7 %      Eosinophils Relative 1 %      Basophils Relative 1 %      Neutrophils Absolute 19 08 Thousands/µL      Immature Grans Absolute 0 09 Thousand/uL      Lymphocytes Absolute 1 20 Thousands/µL      Monocytes Absolute 1 67 Thousand/µL      Eosinophils Absolute 0 23 Thousand/µL      Basophils Absolute 0 17 Thousands/µL                  CT abdomen pelvis wo contrast   Final Result by Aubrie Vanessa MD (02/05 1742)      Small bowel obstruction with small bowel caliber is up to 4 cm in diameter    Abrupt transition seen within the midline abdomen at the level of the umbilicus  Appearance suggesting adhesion  Patchy infiltrates in the left lower lobe  Correlate with clinical findings for infectious etiology, given the fluid within the esophagus and degree of bowel distention, also consider possibility of aspiration  Enlarging unilocular appearing right pelvic cyst of indeterminate etiology as discussed above  Cholelithiasis without evidence for cholecystitis  The study was marked in [facility] for immediate notification  Workstation performed: [id number]         XR chest 1 view portable    (Results Pending)         Procedures  ECG 12 Lead Documentation Only    Date/Time: 2/5/2023 4:29 PM  Performed by: Richar Keller MD  Authorized by: Richar Keller MD     Indications / Diagnosis:  Vomiting  ECG reviewed by me, the ED Provider: yes    Patient location:  ED  Previous ECG:     Previous ECG:  Compared to current    Comparison ECG info:  July 27, 2022    Similarity:  No change  Interpretation:     Interpretation: abnormal    Rate:     ECG rate:  53bpm    ECG rate assessment: normal    Rhythm:     Rhythm: junctional    QRS:     QRS axis:  Normal  ST segments:     ST segments:  Non-specific  T waves:     T waves: non-specific    Comments:      Ventricular rate 75 bpm,  QRS duration 100 ms  ms QTC 471ms  Nonspecific ST segment and T wave abnormality  No ST segment elevation or depression noted          ED Course  ED Course as of 02/05/23 2000   Sun Feb 05, 2023   120 80-year-old female presented to the ED accompanied by her niece for evaluation of brown vomitus and anorexia since yesterday  Patient had vomited 5 times yesterday and since has not been eating    We will order sepsis labs as well as CT scan of abdomen pelvis   1611 WBC(!): 22 44  Gave 1 g of Rocephin    1612 Comprehensive metabolic panel(!)  IV fluid given at 75 mL/h    1714 Lactic acid(!!)   1802 Chest X-ray shows evidence of patchy infiltrate in the left lower lobe consistent with  aspiration pneumonia  CT abdomen remarkable for small bowel obstruction  65 Patinet niece at bedside who is patient's ambriz of  was made aware of the laboratory finding  Surgery was consulted awaiting recommendation  1939 On further reevaluation, patient's niece who is her power of  wanted patient be admitted for comfort care  Patient care discussed with Dr Kodak Dudley and patient was admitted to inpatient medicine for further management  Medical Decision Making  14-year-old female presented to the ED accompanied by her niece for evaluation of brown vomitus and anorexia since yesterday  Patient had vomited 5 times yesterday and since has not been eating  We will order sepsis labs as well as CT scan of abdomen pelvis  WBC(!): 22 44  Gave 1 g of Rocephin  Comprehensive metabolic panel markable for sodium of 131  Lactic acid of 3 2 gave IV fluid given at 75 mL/h  Chest X-ray shows evidence of patchy infiltrate in the left lower lobe consistent with  aspiration pneumonia  CT abdomen remarkable for small bowel obstruction  Patient niece at bedside who is patient's ambriz of  was made aware of the laboratory finding  Surgery was consulted awaiting recommendation  On further reevaluation, patient's niece who is her power of  wanted patient be admitted for comfort care  Patient care discussed with Dr Kodak Dudley and patient was admitted to inpatient medicine for further management  Aspiration pneumonia [facility]): acute illness or injury  Hyponatremia: self-limited or minor problem  Lactic acidosis: acute illness or injury  Small bowel obstruction ([address] ): acute illness or injury  Amount and/or Complexity of Data Reviewed  Labs: ordered  Decision-making details documented in ED Course  Radiology: ordered  Risk  Prescription drug management    Decision regarding hospitalization  Disposition  Final diagnoses:   Small bowel obstruction (HCC)   Aspiration pneumonia ([facility] [address] )   Lactic acidosis   Hyponatremia     Time reflects when diagnosis was documented in both MDM as applicable and the Disposition within this note     Time User Action Codes Description Comment    2/5/2023  7:42 PM ShiMaeev llamas Add [K56 609] Small bowel obstruction ([facility] [address] )     2/5/2023  7:42 PM Shittou, Manisha-Aziz Braxton Add [J69 0] Aspiration pneumonia ([facility] [address] )     2/5/2023  7:43 PM Shittou, Manisha-Aziz Braxton Add [E87 20] Lactic acidosis     2/5/2023  7:45 PM Shittou, Manisha-Aziz Braxton Add [E87 1] Hyponatremia       ED Disposition     ED Disposition   Admit    Condition   Stable    Date/Time   Sun Feb 5, 2023  7:41 PM    Comment   Case was discussed with Jae and the patient's admission status was agreed to be Admission Status: inpatient status to the service of Dr Yudy Isbell  Follow-up Information    None         Patient's Medications   Discharge Prescriptions    No medications on file     No discharge procedures on file  PDMP Review     None           ED Provider  Attending physically available and evaluated Evita Pizarro I managed the patient along with the ED Attending      Electronically Signed by         Carmelina Ascencio MD  02/05/23 2000

## 2023-02-06 NOTE — OCCUPATIONAL THERAPY NOTE
Occupational Therapy DC note       02/06/23 1507   Note Type   Note type Cancelled Session   Cancel Reasons Other   Additional Comments OT orders received and chart reviewed performed  Attempted to see pt for OT eval however spoke with PT Alysia who reports pt's niece (POA) stated that pt will be transistioning to hospice later this week and that PT/OT services are not appropriate at this time  PT Alysia also reached out to Dr Osman Reyes, DO to discontinue PT/OT orders in the chart  Per pt's niece (POA) request, OT will be DC at this time       Stefano Mann, LETICIAR/L, DOT

## 2023-02-06 NOTE — ASSESSMENT & PLAN NOTE
-Pt has experienced 2 days of abdominal pain accompanied by nausea and bilious vomiting   -Last BM was morning 2/5  -CT shows "small bowel obstruction with small bowel caliber is up to 4 cm in diameter  Abrupt transition seen within the midline abdomen at the level of the umbilicus  Appearance suggesting adhesion  "  -Given pt's advanced dementia, multiple comorbidities, Niece who is her POA has stated desire for conservative treatment    -Currently NPO  -POA declined NG tube  -Zofran ordered for nausea control  -Pain regimen ordered  -Surgery is following

## 2023-02-06 NOTE — H&P
[facility]  [facility]&P- Richar Urbina 10/24/1927, 80 y o  female MRN: [id number]  Unit/Bed#: W -01 Encounter: [id number]  Primary Care Provider: Niraj Beltran MD   Date and time admitted to hospital: 2/5/2023  2:44 PM    * Abdominal pain  Assessment & Plan  -Pt has experienced 2 days of abdominal pain accompanied by nausea and bilious vomiting   -Last BM was morning 2/5  -CT shows "small bowel obstruction with small bowel caliber is up to 4 cm in diameter  Abrupt transition seen within the midline abdomen at the level of the umbilicus  Appearance suggesting adhesion  "  -Given pt's advanced dementia, multiple comorbidities, Niece who is her POA has stated desire for conservative treatment    -Currently NPO  -POA declined NG tube  -Zofran ordered for nausea control  -Pain regimen ordered  -Surgery is following    SBO (small bowel obstruction) ([facility] [address] )  Assessment & Plan  -2d abdominal pain, N/V  -CT showed "small bowel obstruction with small bowel caliber is up to 4 cm in diameter  Abrupt transition seen within the midline abdomen at the level of the umbilicus  Appearance suggesting adhesion  "    -management as above    Aspiration pneumonia ([address] )  Assessment & Plan  -CT showed patchy infiltrates L lower lobe, possibly representing aspiration PNA  -On arrival WBC 22 4, procal 11 4, lactic acid 3 2 down to 2 2  -Afebrile  -Given 1g ceftriaxone in ED    -Continue ceftriaxone  -Monitor vitals, WBC, procal, lactic acid    Dementia (HCC)  Assessment & Plan  -Pt has advanced dementia, per her niece she is occasionally oriented to self but not time or place   -She frequently experiences hallucinations, including seeing people in her room       Isabela Daley and palliative consults ordered    Chronic constipation  Assessment & Plan  -Pt takes miralax at home  -Hold until resolution of SBO    ESME (acute kidney injury) ([address] )  Assessment & Plan  -Cr 2 03 vs baseline 0 6, BUN 52  -BUN:Cr ration suggests pre-renal in etiology  -On exam, pt appears dry    -NS @ 100/hr, trend BMP    Urinary retention  Assessment & Plan  -Pt's niece notes that she frequently retains urine at home  -Urinary retention protocol    Ambulatory dysfunction  Assessment & Plan  -Ambulatory dysfunction at baseline, per niece she can walk 6-8 steps at a time with her walker if she is assisted  -PT/OT eval/treat    Hyponatremia  Assessment & Plan  -Pt has chronic hyponatremia, takes salt tabs at home  -Currently NS @ 100 mL/hr  -Trend BMP    VTE Pharmacologic Prophylaxis: VTE Score: 11 Moderate Risk (Score 3-4) - Pharmacological DVT Prophylaxis Ordered: heparin  Code Status: Level 3 - DNAR and DNI   Discussion with family: Updated  (niece) at bedside  Anticipated Length of Stay: Patient will be admitted on an inpatient basis with an anticipated length of stay of greater than 2 midnights secondary to abdominal pain/SBO  Chief Complaint: Abdominal pain with vomiting    History of Present Illness:  Lily Bourgeois is a 80 y o  female with a PMH of advanced dementia, afib, HTN, HLD, who presents with 2 days of abdominal pain, nausea, and multiple episodes of vomiting which is sometimes bilious  Per her niece, last BM was the morning of presentation, 2/5  Workup in the ED was notable for WBC 22, lactic acid 3 2 trended to 2 2, procal 11, and Cr 2 03 vs a baseline of 0 6  CT noted evidence of SBO as well as L sided pneumonia  When examined, pt was not oriented to person, place, or time, which her niece states is her baseline, although she is occasionally oriented to person  Exam was notable for abdominal distension and diffuse tenderness       Review of Systems:  Review of Systems   Unable to perform ROS: Dementia       Past Medical and Surgical History:   Past Medical History:   Diagnosis Date   • A-fib [facility])    • Anxiety    • Cheilosis    • Diverticulosis     1970s   • Fracture of iliac wing ([facility] [address] ) 2016   • Fracture, ribs    • Gallstone    • Hiatal hernia    • Hyperlipidemia    • Hypertension    • Hyponatremia    • Inguinal hernia    • Lacunar infarction [facility])    • Macrocytosis    • Macular degeneration    • Osteoarthritis    • Ovarian cyst     stable   • Renal cyst    • Rib fractures 2017   • Simple renal cyst    • Syncope    • Tuberculosis 0497   • Umbilical hernia    • Vertigo        Past Surgical History:   Procedure Laterality Date   • ABDOMINAL SURGERY     • HIP SURGERY      At  young age 26's   • KNEE CARTILAGE SURGERY     • MULTIPLE TOOTH EXTRACTIONS Bilateral     [facility]       Meds/Allergies:  Prior to Admission medications    Medication Sig Start Date End Date Taking?  Authorizing Provider   aspirin 81 MG tablet Take 81 mg by mouth daily   Yes Historical Provider, MD   busPIRone (BUSPAR) 15 mg tablet Take 1 tablet (15 mg total) by mouth 3 (three) times a day  Patient taking differently: Take 10 mg by mouth 3 (three) times a day 12/12/22  Yes Reena Campbell MD   carvedilol (COREG) 6 25 mg tablet Take 1 tablet (6 25 mg total) by mouth 2 (two) times a day with meals 4/30/20  Yes Reena Campbell MD   Cholecalciferol (VITAMIN D3) 1000 units CAPS Take by mouth   Yes Historical Provider, MD   hydrALAZINE (APRESOLINE) 50 mg tablet TAKE 1 TABLET(50 MG) BY MOUTH THREE TIMES DAILY 1/26/23  Yes Reena Campbell MD   irbesartan (AVAPRO) 300 mg tablet Take 300 mg by mouth daily at bedtime    Yes Historical Provider, MD   PACERONE 100 MG tablet Take 200 mg by mouth daily at bedtime Takes 2 tablets one day, one tablet the next, alternating 5/14/20  Yes Historical Provider, MD   polyethylene glycol (MIRALAX) 17 g packet Take 9 g by mouth daily 2/13/21  Yes Kaci Wan DO   sodium chloride 1 g tablet TAKE 1 TABLET(1 GRAM) BY MOUTH THREE TIMES DAILY 1/13/23  Yes Reena Campbell MD   vitamin B-12 (VITAMIN B-12) 500 mcg tablet Take 500 mcg by mouth daily   Yes Historical Provider, MD   acetaminophen (TYLENOL) 325 mg tablet Take 2 tablets (650 mg total) by mouth every 6 (six) hours as needed for mild pain, headaches or fever 2/13/21   Agustina Roger DO   gabapentin (Neurontin) 100 mg capsule Take 1 capsule (100 mg total) by mouth 2 (two) times a day 3/1/22 7/28/22  Ellis Grewal MD   meclizine (ANTIVERT) 25 mg tablet Take 1 tablet (25 mg total) by mouth 3 (three) times a day as needed for dizziness 9/11/20 7/28/22  Ellis Grewal MD   nystatin-triamcinolone [facility]) cream Apply topically 2 (two) times a day as needed (rash) 8/27/20 7/28/22  Ellis Grewal MD     I have reviewed home medications with patient family member  Allergies: Allergies   Allergen Reactions   • Codeine    • Lomotil [Diphenoxylate]    • Mirabegron Hypertension   • Quinidex [Quinidine]    • Adhesive [Medical Tape] Rash   • Penicillins Rash       Social History:  Marital Status:     Occupation: none  Patient Pre-hospital Living Situation: Home  Patient Pre-hospital Level of Mobility: walks with walker (needs assistance)  Patient Pre-hospital Diet Restrictions: had been on fluid restriction 2/2 hyponatremia  Substance Use History:   Social History     Substance and Sexual Activity   Alcohol Use Not Currently     Social History     Tobacco Use   Smoking Status Never   Smokeless Tobacco Never     Social History     Substance and Sexual Activity   Drug Use No       Family History:  Family History   Problem Relation Age of Onset   • Leukemia Sister    • Coronary artery disease Sister    • Diabetes Sister    • Hypertension Sister    • Coronary artery disease Sister    • Aortic aneurysm Sister    • Coronary artery disease Mother    • Coronary artery disease Brother    • Substance Abuse Neg Hx    • Alcohol abuse Neg Hx    • Depression Neg Hx    • Mental illness Neg Hx        Physical Exam:     Vitals:   Blood Pressure: 117/95 (02/05/23 2354)  Pulse: 72 (02/05/23 2354)  Temperature: 97 8 °F (36 6 °C) (02/05/23 2122)  Temp Source: Axillary (02/05/23 2122)  Respirations: 16 (02/05/23 2122)  Weight - Scale: 52 3 kg (115 lb 4 8 oz) (02/05/23 2122)  SpO2: 92 % (02/05/23 2354)    Physical Exam  Constitutional:       General: She is not in acute distress  Appearance: She is ill-appearing  HENT:      Head: Normocephalic and atraumatic  Nose: Nose normal       Mouth/Throat:      Mouth: Mucous membranes are dry  Pharynx: Oropharynx is clear  Eyes:      Conjunctiva/sclera: Conjunctivae normal       Pupils: Pupils are equal, round, and reactive to light  Cardiovascular:      Rate and Rhythm: Normal rate and regular rhythm  Pulses: Normal pulses  Heart sounds: Normal heart sounds  Pulmonary:      Effort: Pulmonary effort is normal       Breath sounds: Normal breath sounds  Abdominal:      General: There is distension  Tenderness: There is abdominal tenderness (diffuse, more prominent lower)  There is no guarding  Comments: Bowel sounds present   Musculoskeletal:      Right lower leg: No edema  Left lower leg: No edema  Neurological:      Mental Status: Mental status is at baseline  She is disoriented            Additional Data:     Lab Results:  Results from last 7 days   Lab Units 02/05/23  1526   WBC Thousand/uL 22 44*   HEMOGLOBIN g/dL 16 5*   HEMATOCRIT % 48 6*   PLATELETS Thousands/uL 326   NEUTROS PCT % 86*   LYMPHS PCT % 5*   MONOS PCT % 7   EOS PCT % 1     Results from last 7 days   Lab Units 02/05/23  1526   SODIUM mmol/L 131*   POTASSIUM mmol/L 5 0   CHLORIDE mmol/L 98   CO2 mmol/L 19*   BUN mg/dL 52*   CREATININE mg/dL 2 03*   ANION GAP mmol/L 14*   CALCIUM mg/dL 9 3   ALBUMIN g/dL 3 5   TOTAL BILIRUBIN mg/dL 1 16*   ALK PHOS U/L 82   ALT U/L 18   AST U/L 25   GLUCOSE RANDOM mg/dL 144*     Results from last 7 days   Lab Units 02/05/23  1610   INR  1 09             Results from last 7 days   Lab Units 02/05/23  1918 02/05/23  1610   LACTIC ACID mmol/L 2 2* 3 2*   PROCALCITONIN ng/ml  --  11 43* Lines/Drains:  Invasive Devices     Peripheral Intravenous Line  Duration           Peripheral IV 07/27/22 Left Antecubital 193 days    Peripheral IV 02/05/23 Right;Ventral (anterior) Forearm <1 day          Drain  Duration           External Urinary Catheter 192 days                    Imaging: Reviewed radiology reports from this admission including: chest xray and chest CT scan  CT abdomen pelvis wo contrast   Final Result by Stacy Rose MD (02/05 1742)      Small bowel obstruction with small bowel caliber is up to 4 cm in diameter  Abrupt transition seen within the midline abdomen at the level of the umbilicus  Appearance suggesting adhesion  Patchy infiltrates in the left lower lobe  Correlate with clinical findings for infectious etiology, given the fluid within the esophagus and degree of bowel distention, also consider possibility of aspiration  Enlarging unilocular appearing right pelvic cyst of indeterminate etiology as discussed above  Cholelithiasis without evidence for cholecystitis  The study was marked in [facility] for immediate notification  Workstation performed: [id number]         XR chest 1 view portable    (Results Pending)       EKG and Other Studies Reviewed on Admission:   · EKG: accelerated junctional rhythm  ** Please Note: This note has been constructed using a voice recognition system   **

## 2023-02-06 NOTE — ASSESSMENT & PLAN NOTE
-Ambulatory dysfunction at baseline, per niece she can walk 6-8 steps at a time with her walker if she is assisted    -PT/OT eval/treat

## 2023-02-06 NOTE — HOSPICE NOTE
Hospice Referral received  Pt seen at bedside  JEAN PAUL Franco present  Pt approved for hospice care by Dr Inez Beltre for home hospice  Reviewed hospice care and services per medicare guidelines and she is in agreement with the same  Consents signed, OOH DNR to CM for signature  Ernesto does not want a hospital bed at this time  OBT to be delivered Wednesday  Palliative is sending scripts for her comfort meds and family will   For early am dc on Wednesday with same day hospice open

## 2023-02-06 NOTE — ASSESSMENT & PLAN NOTE
-CT showed patchy infiltrates L lower lobe, possibly representing aspiration PNA  -On arrival WBC 22 4, procal 11 4, lactic acid 3 2 down to 2 2  -Afebrile  -Given 1g ceftriaxone in ED    -Continue ceftriaxone  -Monitor vitals, WBC, procal, lactic acid

## 2023-02-06 NOTE — ASSESSMENT & PLAN NOTE
-Cr 2 03 vs baseline 0 6, BUN 52  -BUN:Cr ration suggests pre-renal in etiology  -On exam, pt appears dry    -NS @ 100/hr, trend BMP Emergency Department SIGN OUT NOTE     Patient: Sisi Marcus Age: 14 year old Sex: female   MRN: [id number] : 2008 Encounter Date: 2022     Received Sign-Out From: Mendez Morgan PA-c  Dispo: Home stable condition  History & Course: 14 year old is a female presented for right ankle pain after taking a misstep while walking and rolling right ankle.  This occurred about 9 PM last night, has not been able to bear weight since that time.  Patient was seen initially by RENEE Morgan, and had appropriate work-up including x-ray right ankle which is pending.  Pending items: Xray Ankle Right    ED Course as of 22 0138   Sat 8 XR ANKLE MIN 3 VIEWS RIGHT [WS]      ED Course User Index  [WS] RENEE Thurman participated in the care of this patient and this note provides additional information regarding their visit. The patient's evaluation and treatment has been initiated or completed by the previous team. Please refer to their documentation for further details regarding their care.     Visit Vitals  /76   Pulse 94   Temp 99.8 °F (37.7 °C)   Resp 20   Wt 45.1 kg (99 lb 6.8 oz)   SpO2 96%       Labs: No results found for this visit on 22.  Imaging:   XR ANKLE MIN 3 VIEWS RIGHT    (Results Pending)             EKG: No results found for this or any previous visit (from the past 4464 hour(s)).     Medications received in the department:   ED Medication Orders (From admission, onward)    Ordered Start     Status Ordering Provider    22 0012 22 0015  acetaminophen (TYLENOL) tablet 650 mg  ONCE         Last MAR action: Given JAVIER MORGAN          X-ray right ankle with no acute fracture or dislocation,  Patient likely with right ankle sprain  Offered Ace wrap, postop shoe crutches in the ED, declined  Patient states she has a walking boot at home and has crutches at home  Encourage RICE  Encouraged follow-up with Ortho in 2 to 3 days for reevaluation,  patient has established relationship with Ortho  All questions answered, patient and mother agree with plan of care  Return precautions discussed.    Nate Stubbs PA-C   6/18/2022 1:37 AM      Nate Stubbs PA-C  06/18/22 [id number]

## 2023-02-06 NOTE — ASSESSMENT & PLAN NOTE
-2d abdominal pain, N/V  -CT showed "small bowel obstruction with small bowel caliber is up to 4 cm in diameter  Abrupt transition seen within the midline abdomen at the level of the umbilicus  Appearance suggesting adhesion  "    -management as above

## 2023-02-06 NOTE — PHYSICAL THERAPY NOTE
02/06/23 1430   Note Type   Note type Cancelled Session   Cancel Reasons Other   Additional Comments PT orders received and chart reviewed  Attempted to see pt for PT eval  This PT spoke with pt's niece (POA) who stated that pt will be transistioning to hospice later this week and that PT/OT services are not appropriate at this time  PT also reached out to Dr Rhina Dominguez DO to discontinue PT/OT orders in the chart  Per pt's niece (POA) request, PT will be dc at this time     Licensure   [place] License Number  Peyton [address] [place], [place] [id number]

## 2023-02-06 NOTE — ASSESSMENT & PLAN NOTE
-Pt has advanced dementia, per her niece she is occasionally oriented to self but not time or place   -She frequently experiences hallucinations, including seeing people in her room       Mary Serrano and palliative consults ordered

## 2023-02-06 NOTE — CONSULTS
Consultation - General Surgery   Shanthi Ng 80 y o  female MRN: [id number]  Unit/Bed#: ED-26 Encounter: [id number]    Assessment/Plan     Assessment:  Shanthi Ng is a 80 y o  female w/ multiple medical comorbidities including dementia presenting with SBO, concern for aspiration PNA, and fecal impaction    Plan:  · Goals of care discussion held at bedside with patient's niece  Affirmed DNR/DNI status  Outlined conservative management for SBO as well as maximal medical management for suspected pneumonia and fecal impaction  · Niece would like to minimize time in hospital and avoid aggressive measures  She is declining NGT placement and understands patient is at risk for recurrent aspiration given underlying pathology  · Family offered SLIM admission and palliative care consult for which she is agreeable  History of Present Illness     HPI:  Shanthi Ng is a 80 y o  female who presents with abdominal pain and n/v  Patient is accompanied by her niece with report of 1 day of intermittent abdominal pain accompanied by emesis  Niece states she recently met with palliative care to draft POLST form and that patient is DNR/DNI and would not want aggressive surgical or interventional measures  Given patient's baseline dementia and QoL, her preference is to minimize time in the hospital and that patient pass in the comfort of her own home  Workup in the ED notable for leukocytosis, lactic acidosis, and CT imaging concerning for aspiration PNA, SBO, and fecal impaction  On ASA, no AC  +Previous abdominal surgery (family uncertain of details)      Consults    Review of Systems   Unable to perform ROS: Dementia       Historical Information   Past Medical History:   Diagnosis Date   • A-fib [facility])    • Anxiety    • Cheilosis    • Diverticulosis     1970s   • Fracture of iliac wing ([facility] [address] ) 2016   • Fracture, ribs    • Gallstone    • Hiatal hernia    • Hyperlipidemia    • Hypertension    • Hyponatremia    • Inguinal hernia    • Lacunar infarction [facility])    • Macrocytosis    • Macular degeneration    • Osteoarthritis    • Ovarian cyst     stable   • Renal cyst    • Rib fractures 2017   • Simple renal cyst    • Syncope    • Tuberculosis 0763   • Umbilical hernia    • Vertigo      Past Surgical History:   Procedure Laterality Date   • ABDOMINAL SURGERY     • HIP SURGERY      At  young age 26's   • KNEE CARTILAGE SURGERY     • MULTIPLE TOOTH EXTRACTIONS Bilateral     [facility]     Social History   Social History     Substance and Sexual Activity   Alcohol Use Not Currently     Social History     Substance and Sexual Activity   Drug Use No     E-Cigarette/Vaping   • E-Cigarette Use Never User      E-Cigarette/Vaping Substances   • Nicotine No    • THC No    • CBD No    • Flavoring No    • Other No    • Unknown No      Social History     Tobacco Use   Smoking Status Never   Smokeless Tobacco Never     Family History:   Family History   Problem Relation Age of Onset   • Leukemia Sister    • Coronary artery disease Sister    • Diabetes Sister    • Hypertension Sister    • Coronary artery disease Sister    • Aortic aneurysm Sister    • Coronary artery disease Mother    • Coronary artery disease Brother    • Substance Abuse Neg Hx    • Alcohol abuse Neg Hx    • Depression Neg Hx    • Mental illness Neg Hx        Meds/Allergies   current meds:   Current Facility-Administered Medications   Medication Dose Route Frequency   • ondansetron (ZOFRAN) injection 4 mg  4 mg Intravenous Q4H PRN   • sodium chloride 0 9 % infusion  75 mL/hr Intravenous Continuous     Allergies   Allergen Reactions   • Codeine    • Lomotil [Diphenoxylate]    • Mirabegron Hypertension   • Quinidex [Quinidine]    • Adhesive [Medical Tape] Rash   • Penicillins Rash       Objective   First Vitals:   Blood Pressure: 105/54 (02/05/23 1458)  Pulse: 74 (02/05/23 1458)  Temperature: 97 9 °F (36 6 °C) (02/05/23 1458)  Temp Source: Axillary (02/05/23 1458)  Respirations: 19 (02/05/23 1458)  SpO2: 90 % (02/05/23 1458)    Current Vitals:   Blood Pressure: 127/59 (02/05/23 1916)  Pulse: 75 (02/05/23 1916)  Temperature: 97 9 °F (36 6 °C) (02/05/23 1458)  Temp Source: Axillary (02/05/23 1458)  Respirations: 19 (02/05/23 1458)  SpO2: 93 % (02/05/23 1916)      Intake/Output Summary (Last 24 hours) at 2/5/2023 1932  Last data filed at 2/5/2023 1759  Gross per 24 hour   Intake 50 ml   Output --   Net 50 ml       Invasive Devices     Peripheral Intravenous Line  Duration           Peripheral IV 07/27/22 Left Antecubital 192 days    Peripheral IV 02/05/23 Right;Ventral (anterior) Forearm <1 day          Drain  Duration           External Urinary Catheter 192 days                Physical Exam  GEN: Elderly, ill-appearing  HEENT: dry mucous membranes  CV: warm/well perfused  Lung: normal effort  Ab: soft, minimally tender, +distended and tympanic  Extrem: No CCE  Neuro: somnolent, incomprehensible speech, not following commands    Lab Results:   CBC:   Lab Results   Component Value Date    WBC 22 44 (H) 02/05/2023    HGB 16 5 (H) 02/05/2023    HCT 48 6 (H) 02/05/2023    MCV 99 (H) 02/05/2023     02/05/2023    MCH 33 7 02/05/2023    MCHC 34 0 02/05/2023    RDW 13 2 02/05/2023    MPV 8 9 02/05/2023    NRBC 0 02/05/2023   , CMP:   Lab Results   Component Value Date    SODIUM 131 (L) 02/05/2023    K 5 0 02/05/2023    CL 98 02/05/2023    CO2 19 (L) 02/05/2023    BUN 52 (H) 02/05/2023    CREATININE 2 03 (H) 02/05/2023    CALCIUM 9 3 02/05/2023    AST 25 02/05/2023    ALT 18 02/05/2023    ALKPHOS 82 02/05/2023    EGFR 20 02/05/2023   , Coagulation:   Lab Results   Component Value Date    INR 1 09 02/05/2023   , Urinalysis: No results found for: Chary Alpers, SPECGRAV, PHUR, LEUKOCYTESUR, NITRITE, PROTEINUA, GLUCOSEU, KETONESU, BILIRUBINUR, BLOODU, Lipase:   Lab Results   Component Value Date    LIPASE 12 02/05/2023     Imaging: I have personally reviewed pertinent reports     and I have personally reviewed pertinent films in PACS  CT abdomen pelvis wo contrast   Final Result by Brooke Izaguirre MD (02/05 1742)      Small bowel obstruction with small bowel caliber is up to 4 cm in diameter  Abrupt transition seen within the midline abdomen at the level of the umbilicus  Appearance suggesting adhesion  Patchy infiltrates in the left lower lobe  Correlate with clinical findings for infectious etiology, given the fluid within the esophagus and degree of bowel distention, also consider possibility of aspiration  Enlarging unilocular appearing right pelvic cyst of indeterminate etiology as discussed above  Cholelithiasis without evidence for cholecystitis  The study was marked in [facility] for immediate notification           Workstation performed: [id number]         XR chest 1 view portable    (Results Pending)

## 2023-02-06 NOTE — CASE MANAGEMENT
Case Management Assessment & Discharge Planning Note    Patient name Evin Beltran  Location W /W -52 MRN [id number]  : 10/24/1927 Date 2023       Current Admission Date: 2023  Current Admission Diagnosis:Abdominal pain   Patient Active Problem List    Diagnosis Date Noted   • Abdominal pain 2023   • Aspiration pneumonia ([facility] [address] ) 2023   • ESME (acute kidney injury) ([facility] [address] ) 2023   • SBO (small bowel obstruction) ([facility] [address] ) 2023   • Soft tissue lesion of pelvic region 2022   • Urinary retention 2022   • Dementia ([facility] [address] ) 2022   • Anxiety 10/27/2021   • Elevated LFTs 02/10/2021   • Vitamin B12 deficiency 2021   • Macular degeneration 2020   • Cervical spondylosis 2020   • Hiatal hernia 2020   • Vertigo 2020   • Closed lumbar vertebral fracture ([facility] [address] ) 2020   • Osteoarthritis 2020   • Hearing loss 2020   • Urinary frequency 2020   • Other hyperlipidemia 2020   • History of vertebral fracture 2019   • Chronic constipation 2019   • Arthritis 2019   • Closed compression fracture of L3 lumbar vertebra, [facility] ) 2019   • Labile hypertension 2019   • Hypertensive urgency 2018   • Paroxysmal atrial fibrillation ([facility] [address] ) 2018   • Hyponatremia 2018   • Ambulatory dysfunction 2018   • Mitral valve regurgitation 2018      LOS (days): 1  Geometric Mean LOS (GMLOS) (days): 4 60  Days to GMLOS:3 9     OBJECTIVE:    Risk of Unplanned Readmission Score: 13 88         Current admission status: Inpatient       Preferred Pharmacy:   [facility] [address] [facility], [facility] [address] [address]  Phone: [phone] Fax: [phone]    Primary Care Provider: Eladia Kemp MD    Primary Insurance: MEDICARE  Secondary Insurance: Woody Soria 20:  [employer]     Isaías Christie - [place]   Primary Phone: [phone] (Mobile)  Home Phone: [phone]                                                                DISCHARGE DETAILS:    Discharge planning discussed with[de-identified] Niece - Radha Alma of Choice: Yes  Comments - Freedom of Choice: CM called niece by phone to open up a discussion for continuity of care and discharge planning  CM spoke with niece concerning the hospice referral and discussed agencies with the niece  The niece wishes for [facility] hospice for her aunt, in her home  CM contacted family/caregiver?: Yes  Were Treatment Team discharge recommendations reviewed with patient/caregiver?: Yes  Did patient/caregiver verbalize understanding of patient care needs?: Yes  Were patient/caregiver advised of the risks associated with not following Treatment Team discharge recommendations?: Yes    Contacts  Patient Contacts: Gerda Kaba (niece)  Relationship to Patient[de-identified] Family  Contact Method: Phone  Phone Number: [phone] Luh King (IRMA)  Reason/Outcome: Discharge Planning, Continuity of Care, Referral              Other Referral/Resources/Interventions Provided:  Interventions: Other (Specify) (Hospice)  Referral Comments: Celsacaleb Mahanigham reported to CM the pt is currently with [facility] for palliative care  Gerda Kaba prefers for her aunt to come home on hospice  CM sent a referral via [address] for [facility] hospice and spoke with the liaison           Treatment Team Recommendation: Hospice  Discharge Destination Plan[de-identified] Hospice

## 2023-02-06 NOTE — SOCIAL WORK
Palliative LSW saw patient at the bedside today  LSW appreciates the opportunity to provide patient/family with inpatient emotional support and guidance while patient continues to receive medical attention from the medical team     Topics discussed: Met with pt's steven Henning and antony at bedside  LSW spent time providing supportive listening as Sabina Milady reflected on pt's life since she came to live with pt in 2008  Harriett reflected on the gradual decline that pt had experienced, which ended up escalating to more rapidly over the past year  Harriett's goal is to be able to keep pt at home as her home was something very important to pt  Sabina Milady is focused on pt being kept comfortable at this time as she faced many challenges and struggles throughout her life and she does not want her to suffer  Harriett in agreement with home-hospice dcp through [address]  Sabina Milady discussed she has support from family/friends, however she cassandra with things by "compartmentalizing " She is understanding of the importance of self-care  Reviewed hospice team has SW support available and that LSW can also provide therapy resources should she be interested  Harriett appreciative of offer and states she will f/u if needed  She denies any further questions/needs at this time  Areas that need follow-up: Emotional Support  Resources given: None  Others present: Pt's steven Henning and great-niece     I have spent 40 minutes with Family today in which greater than 50% of this time was spent in counseling/coordination of care regarding Emotional Support         LSW will continue to follow as requested by the medical team, patient, or family

## 2023-02-06 NOTE — CONSULTS
Consultation - Palliative and Supportive Care   Carito Alvarado 80 y o  female [id number]    Assessment/Problems Actively Addressed:  Goals of care counseling  Encounter for Palliative Care   Small Bowel Obstruction  Aspiration PNA  Dementia   Hyponatremia   Chronic constipation    Plan:  1  Symptom management:  Patient has only required zofran one dose for symptom control  Current medications:   · Oxycodone IR 2 5 to 5MG Q4H PRN mod -severe pain   · Hydromorphone 0 2 mg IV Q4H PRN breakthrough pain  · Zofran 4 mg IV PRN nausea    Comfort medications sent to pharmacy  · Morphine 2 5 mg oral solution PRN moderate to severe pain  · Ativan 0 25 mg PO PRN anxiety or dyspnea      2  Goals of care:  · Level 3 code status  Steven Soto (medical POA) desires to continue conservative management with IV antibiotics and IVF until discharge home with hospice can be arranged  Does not wish for surgery, NGT, or other invasive procedures  Hospice referral placed  Hospice Liaison will meet with patient and obtain consents  Appropriate for home hospice  CM, SLIM and nurse aware    3  Social support:  • Time spent providing supportive listening  • Steven Soto is close with Ciara Miranda  She moved in with her to help in caring for her  • Patient is  and does not have any children  She has multiple nieces, nephews, and other family members who support her  •  will meet with steven Soto for additional support              I have reviewed the patient's controlled substance dispensing history in the Prescription Drug Monitoring Program in compliance with the [facility] regulations before prescribing any controlled substances  Last refills - PDMP reviewed  No active prescriptions  Decisional apparatus:  Patient is not competent on exam today  If competence is lost, patient's substitute decision maker would default to steven Soto by [place] Act 169    Advance Directive/Living Will/POLST: Ciara Miranda has a Living Will/POA form on file that names Kerri Naylor ([email or web address]) as HCA  We appreciate the invitation to be involved in this patient's care  We will continue to follow throughout this hospitalization  Please do not hesitate to reach our on call provider through our clinic answering service at  should you have acute symptom control concerns  Prosper Perry PA-C  Palliative and Supportive Care  Clinic/Answering Service: [phone]  You can find me on [employer]! IDENTIFICATION:  Inpatient consult to Palliative Care  Consult performed by: Prosper Perry PA-C  Consult ordered by: Osman Reyes DO        Physician Requesting Consult: John Screen*  Reason for Consult / Principal Problem: GOC counseling and SM secondary to small bowel obstruction in patient with advanced dementia    History of Present Illness:  Carito Alvarado is a 80 y o  female who presents with abdominal pain  Ciara Miranda has a past medical history of advanced dementia, A-fib, ambulatory dysfunction, and chronic constipation  She is brought to the hospital after 2 days of nausea, vomiting, and abdominal pain  Per report, steven estimates last BM is 2/5, the morning of presentation  In the ED, Ciara Miranda was found to have small bowel obstruction and left-sided pneumonia  She has been admitted for further work-up and care  Per steven, medical team should proceed with conservative medical care  Patient is DNR  POA did not want NG tube placed  Palliative and supportive care has been consulted for further decisional support  Patient seen and examined at bedside and appears comfortable and in no acute distress  Per chart review, has only required PRN zofran one dose  Met with steven in outside room for meeting       PALLIATIVE AND SUPPORTIVE CARE FAMILY CONFERENCE:     Time of Meeting: 10:00-10:45     Participants: steven Soto and [facility] Team Prosper Perry PA-C     Patient Participation: Patient was not an active participant in the meeting  She is not able to make her own medical decisions due to dementia and disorientation at baseline  Patient Support System: Family     Meeting Location: fourth floor [facility] room        A family meeting was necessary to allow for thorough discussion regarding patient's clinical presentation, expected disease trajectory, and comfort care versus continuing disease directed therapy in the setting of dementia, SBO, aspiration pneumonia  Comfort care and hospice have been thoroughly explained  They had questions related to medical diagnoses, prognosis, comfort care, hospice services and coverage and after all questions were answered she agreed that Hospice is the appropriate care for Gladis at this time in life  They will continue IVF and IV abx until discharge with hospice is arranged  They wish to pursue hospice at home and patient will be supported by niece as well as multiple other family members  Freedom of choice has explained and family wishes to proceed with a referral to [facility] Team      Also discussed in depth the patient's living situation, ability to perform ADL's, and medical condition over the past few years  She lives at home with her niece and niece's son  Prior to admission, patient had outpatient palliative medicine services with [facility]  She is able to feed herself  Requires assistance with cleaning, cooking, medications, bathing, ambulation with walker  Niece reports weight loss over past few months  Patient has expressed to niece wanting to be at home  She is confused at baseline and is most often disoriented to person, place, and time  She is occasionally oriented to person  At times, she does not recognize her family members         Review of Systems:   Review of Systems   Unable to perform ROS: dementia       Past Medical History:   Diagnosis Date   • A-fib [facility])    • Anxiety    • Cheilosis    • Diverticulosis     1970s   • Fracture of iliac wing [facility]) 2016   • Fracture, ribs    • Gallstone    • Hiatal hernia    • Hyperlipidemia    • Hypertension    • Hyponatremia    • Inguinal hernia    • Lacunar infarction [facility])    • Macrocytosis    • Macular degeneration    • Osteoarthritis    • Ovarian cyst     stable   • Renal cyst    • Rib fractures 2017   • Simple renal cyst    • Syncope    • Tuberculosis 9488   • Umbilical hernia    • Vertigo      Past Surgical History:   Procedure Laterality Date   • ABDOMINAL SURGERY     • HIP SURGERY      At  young age 26's   • KNEE CARTILAGE SURGERY     • MULTIPLE TOOTH EXTRACTIONS Bilateral     [place]     Social History     Socioeconomic History   • Marital status:      Spouse name: Not on file   • Number of children: Not on file   • Years of education: Not on file   • Highest education level: Not on file   Occupational History   • Not on file   Tobacco Use   • Smoking status: Never   • Smokeless tobacco: Never   Vaping Use   • Vaping Use: Never used   Substance and Sexual Activity   • Alcohol use: Not Currently   • Drug use: No   • Sexual activity: Not Currently   Other Topics Concern   • Not on file   Social History Narrative    Lives with niece Geeta Hale and her son Paco Cook     Social Determinants of Health     Financial Resource Strain: Low Risk    • Difficulty of Paying Living Expenses: Not very hard   Food Insecurity: Not on file   Transportation Needs: No Transportation Needs   • Lack of Transportation (Medical): No   • Lack of Transportation (Non-Medical):  No   Physical Activity: Not on file   Stress: Not on file   Social Connections: Not on file   Intimate Partner Violence: Not on file   Housing Stability: Not on file     Family History   Problem Relation Age of Onset   • Leukemia Sister    • Coronary artery disease Sister    • Diabetes Sister    • Hypertension Sister    • Coronary artery disease Sister    • Aortic aneurysm Sister    • Coronary artery disease Mother    • Coronary artery disease Brother    • Substance Abuse Neg Hx    • Alcohol abuse Neg Hx    • Depression Neg Hx    • Mental illness Neg Hx        Medications:  all current active meds have been reviewed, current meds:   Current Facility-Administered Medications   Medication Dose Route Frequency   • acetaminophen (TYLENOL) tablet 975 mg  975 mg Oral Q8H [address]   • amiodarone tablet 200 mg  200 mg Oral HS   • aspirin chewable tablet 81 mg  81 mg Oral Daily   • busPIRone (BUSPAR) tablet 10 mg  10 mg Oral TID   • carvedilol (COREG) tablet 6 25 mg  6 25 mg Oral BID With Meals   • ceftriaxone (ROCEPHIN) 1 g/50 mL in dextrose IVPB  1,000 mg Intravenous Q24H   • cholecalciferol (VITAMIN D) oral liquid 1,000 Units  1,000 Units Oral Daily   • cyanocobalamin (VITAMIN B-12) tablet 500 mcg  500 mcg Oral Daily   • heparin (porcine) subcutaneous injection 5,000 Units  5,000 Units Subcutaneous Q8H [address]   • hydrALAZINE (APRESOLINE) tablet 50 mg  50 mg Oral TID   • HYDROmorphone HCl (DILAUDID) injection 0 2 mg  0 2 mg Intravenous Q4H PRN   • losartan (COZAAR) tablet 100 mg  100 mg Oral Daily   • ondansetron (ZOFRAN) injection 4 mg  4 mg Intravenous Q4H PRN   • oxyCODONE (ROXICODONE) IR tablet 2 5 mg  2 5 mg Oral Q4H PRN   • oxyCODONE (ROXICODONE) IR tablet 5 mg  5 mg Oral Q4H PRN   • sodium chloride 0 9 % infusion  100 mL/hr Intravenous Continuous    and PTA meds:   Prior to Admission Medications   Prescriptions Last Dose Informant Patient Reported? Taking?    Cholecalciferol (VITAMIN D3) 1000 units CAPS Past Week  Yes Yes   Sig: Take by mouth   PACERONE 100 MG tablet Past Week  Yes Yes   Sig: Take 200 mg by mouth daily at bedtime Takes 2 tablets one day, one tablet the next, alternating   acetaminophen (TYLENOL) 325 mg tablet Unknown  No No   Sig: Take 2 tablets (650 mg total) by mouth every 6 (six) hours as needed for mild pain, headaches or fever   aspirin 81 MG tablet Past Week  Yes Yes   Sig: Take 81 mg by mouth daily   busPIRone (BUSPAR) 15 mg tablet Past Week  No Yes   Sig: Take 1 tablet (15 mg total) by mouth 3 (three) times a day   Patient taking differently: Take 10 mg by mouth 3 (three) times a day   carvedilol (COREG) 6 25 mg tablet Past Week  No Yes   Sig: Take 1 tablet (6 25 mg total) by mouth 2 (two) times a day with meals   hydrALAZINE (APRESOLINE) 50 mg tablet Past Week  No Yes   Sig: TAKE 1 TABLET(50 MG) BY MOUTH THREE TIMES DAILY   irbesartan (AVAPRO) 300 mg tablet Past Week  Yes Yes   Sig: Take 300 mg by mouth daily at bedtime    polyethylene glycol (MIRALAX) 17 g packet Past Week  No Yes   Sig: Take 9 g by mouth daily   sodium chloride 1 g tablet Past Week  No Yes   Sig: TAKE 1 TABLET(1 GRAM) BY MOUTH THREE TIMES DAILY   vitamin B-12 (VITAMIN B-12) 500 mcg tablet Past Week  Yes Yes   Sig: Take 500 mcg by mouth daily      Facility-Administered Medications: None       Allergies   Allergen Reactions   • Codeine    • Lomotil [Diphenoxylate]    • Mirabegron Hypertension   • Quinidex [Quinidine]    • Adhesive [Medical Tape] Rash   • Penicillins Rash         Medications    Current Facility-Administered Medications:   •  acetaminophen (TYLENOL) tablet 975 mg, 975 mg, Oral, Q8H [facility], Mata Boudreaux DO  •  amiodarone tablet 200 mg, 200 mg, Oral, HS, Mata Boudreaux DO, 200 mg at 02/05/23 2356  •  aspirin chewable tablet 81 mg, 81 mg, Oral, Daily, Mata Boudreaux DO  •  busPIRone (BUSPAR) tablet 10 mg, 10 mg, Oral, TID, Mata Boudreaux DO, 10 mg at 02/05/23 2356  •  carvedilol (COREG) tablet 6 25 mg, 6 25 mg, Oral, BID With Meals, Jenni Watson DO  •  ceftriaxone (ROCEPHIN) 1 g/50 mL in dextrose IVPB, 1,000 mg, Intravenous, Q24H, Mata Boudreaux DO  •  cholecalciferol (VITAMIN D) oral liquid 1,000 Units, 1,000 Units, Oral, Daily, Mata Boudreaux DO  •  cyanocobalamin (VITAMIN B-12) tablet 500 mcg, 500 mcg, Oral, Daily, Mata Boudreuax DO  •  heparin (porcine) subcutaneous injection 5,000 Units, 5,000 Units, Subcutaneous, Q8H [facility], Mata DO Janusz, 5,000 Units at 02/06/23 0012  •  hydrALAZINE (APRESOLINE) tablet 50 mg, 50 mg, Oral, TID, Mata Boudreaux DO, 50 mg at 02/05/23 2356  •  HYDROmorphone HCl (DILAUDID) injection 0 2 mg, 0 2 mg, Intravenous, Q4H PRN, Amrik Henderson DO  •  losartan (COZAAR) tablet 100 mg, 100 mg, Oral, Daily, Mata Boudreaux DO  •  ondansetron (ZOFRAN) injection 4 mg, 4 mg, Intravenous, Q4H PRN, Frank Soriano MD, 4 mg at 02/05/23 2354  •  oxyCODONE (ROXICODONE) IR tablet 2 5 mg, 2 5 mg, Oral, Q4H PRN, Amrik Henderson DO  •  oxyCODONE (ROXICODONE) IR tablet 5 mg, 5 mg, Oral, Q4H PRN, Mata Boudreaux DO  •  sodium chloride 0 9 % infusion, 100 mL/hr, Intravenous, Continuous, Mata Boudreaux DO, Last Rate: 100 mL/hr at 02/06/23 0647, 100 mL/hr at 02/06/23 0647    Objective  /82   Pulse 84   Temp 97 8 °F (36 6 °C) (Axillary)   Resp 16   Wt 52 3 kg (115 lb 4 8 oz)   SpO2 90%   BMI 22 52 kg/m²     Physical Exam:   Physical Exam  Vitals reviewed  Constitutional:       General: She is not in acute distress  Appearance: She is well-developed  Comments: Appears chronically ill   HENT:      Head: Normocephalic and atraumatic  Right Ear: External ear normal       Left Ear: External ear normal    Cardiovascular:      Rate and Rhythm: Normal rate  Pulmonary:      Effort: Pulmonary effort is normal  No respiratory distress  Musculoskeletal:         General: No swelling  Cervical back: Neck supple  Skin:     General: Skin is warm and dry  Capillary Refill: Capillary refill takes less than 2 seconds  Neurological:      Comments: Patient confused and disoriented at baseline  Psychiatric:      Comments: Unable to assess further due to dementia         Lab Results:   I have personally reviewed pertinent labs  , CBC:   Lab Results   Component Value Date    WBC 22 44 (H) 02/05/2023    HGB 16 5 (H) 02/05/2023    HCT 48 6 (H) 02/05/2023    MCV 99 (H) 02/05/2023     02/05/2023    MCH 33 7 02/05/2023    MCHC 34 0 02/05/2023    RDW 13 2 02/05/2023    MPV 8 9 02/05/2023    NRBC 0 02/05/2023   , CMP: Lab Results   Component Value Date    SODIUM 131 (L) 02/06/2023    K 4 1 02/06/2023     02/06/2023    CO2 19 (L) 02/06/2023    BUN 63 (H) 02/06/2023    CREATININE 1 56 (H) 02/06/2023    CALCIUM 8 7 02/06/2023    AST 25 02/05/2023    ALT 18 02/05/2023    ALKPHOS 82 02/05/2023    EGFR 28 02/06/2023   , PT/PTT:  Lab Results   Component Value Date    PTT 28 02/05/2023     Imaging Studies: I have personally reviewed pertinent reports  EKG, Pathology, and Other Studies: I have personally reviewed pertinent reports  Counseling / Coordination of Care  Total floor / unit time spent today 70 minutes  Greater than 50% of total time was spent with the patient and / or family counseling and / or coordination of care  A description of the counseling / coordination of care: reviewed chart, provided medical updates, discussed palliative care and symptom management, discussed hospice care and comfort care, discussed goals of care, discussed code status, discussed advanced directives, assessed POA/HCA, provided supportive listening, provided anticipatory guidance, provided psychosocial and emotional support and assessed competency and decision-making  Reviewed with SLIM, ICU team, RN and CM  This note was not shared with the patient due to privacy exception: note includes other individuals    Portions of this document may have been created using dictation software and as such some "sound alike" terms may have been generated by the system  Do not hesitate to contact me with any questions or clarifications

## 2023-02-06 NOTE — PLAN OF CARE
Problem: MOBILITY - ADULT  Goal: Maintain or return to baseline ADL function  Description: INTERVENTIONS:  -  Assess patient's ability to carry out ADLs; assess patient's baseline for ADL function and identify physical deficits which impact ability to perform ADLs (bathing, care of mouth/teeth, toileting, grooming, dressing, etc )  - Assess/evaluate cause of self-care deficits   - Assess range of motion  - Assess patient's mobility; develop plan if impaired  - Assess patient's need for assistive devices and provide as appropriate  - Encourage maximum independence but intervene and supervise when necessary  - Involve family in performance of ADLs  - Assess for home care needs following discharge   - Consider OT consult to assist with ADL evaluation and planning for discharge  - Provide patient education as appropriate  Outcome: Progressing  Goal: Maintains/Returns to pre admission functional level  Description: INTERVENTIONS:  - Perform BMAT or MOVE assessment daily    - Set and communicate daily mobility goal to care team and patient/family/caregiver  - Collaborate with rehabilitation services on mobility goals if consulted  - Perform Range of Motion 2 times a day  - Reposition patient every 2 hours    - Dangle patient 2 times a day  - Stand patient 2 times a day  - Ambulate patient 2 times a day  - Out of bed to chair 2 times a day   - Out of bed for meals 2 times a day  - Out of bed for toileting  - Record patient progress and toleration of activity level   Outcome: Progressing     Problem: Prexisting or High Potential for Compromised Skin Integrity  Goal: Skin integrity is maintained or improved  Description: INTERVENTIONS:  - Identify patients at risk for skin breakdown  - Assess and monitor skin integrity  - Assess and monitor nutrition and hydration status  - Monitor labs   - Assess for incontinence   - Turn and reposition patient  - Assist with mobility/ambulation  - Relieve pressure over bony prominences  - Avoid friction and shearing  - Provide appropriate hygiene as needed including keeping skin clean and dry  - Evaluate need for skin moisturizer/barrier cream  - Collaborate with interdisciplinary team   - Patient/family teaching  - Consider wound care consult   Outcome: Progressing     Problem: Nutrition/Hydration-ADULT  Goal: Nutrient/Hydration intake appropriate for improving, restoring or maintaining nutritional needs  Description: Monitor and assess patient's nutrition/hydration status for malnutrition  Collaborate with interdisciplinary team and initiate plan and interventions as ordered  Monitor patient's weight and dietary intake as ordered or per policy  Utilize nutrition screening tool and intervene as necessary  Determine patient's food preferences and provide high-protein, high-caloric foods as appropriate       INTERVENTIONS:  - Monitor oral intake, urinary output, labs, and treatment plans  - Assess nutrition and hydration status and recommend course of action  - Evaluate amount of meals eaten  - Assist patient with eating if necessary   - Allow adequate time for meals  - Recommend/ encourage appropriate diets, oral nutritional supplements, and vitamin/mineral supplements  - Order, calculate, and assess calorie counts as needed  - Recommend, monitor, and adjust tube feedings and TPN/PPN based on assessed needs  - Assess need for intravenous fluids  - Provide specific nutrition/hydration education as appropriate  - Include patient/family/caregiver in decisions related to nutrition  Outcome: Progressing

## 2023-02-07 NOTE — ASSESSMENT & PLAN NOTE
-Pt has advanced dementia, per her niece she is occasionally oriented to self but not time or place   -She frequently experiences hallucinations, including seeing people in her room       Patient planning to go home with hospice tomorrow 2/8

## 2023-02-07 NOTE — PLAN OF CARE
Problem: MOBILITY - ADULT  Goal: Maintain or return to baseline ADL function  Description: INTERVENTIONS:  -  Assess patient's ability to carry out ADLs; assess patient's baseline for ADL function and identify physical deficits which impact ability to perform ADLs (bathing, care of mouth/teeth, toileting, grooming, dressing, etc )  - Assess/evaluate cause of self-care deficits   - Assess range of motion  - Assess patient's mobility; develop plan if impaired  - Assess patient's need for assistive devices and provide as appropriate  - Encourage maximum independence but intervene and supervise when necessary  - Involve family in performance of ADLs  - Assess for home care needs following discharge   - Consider OT consult to assist with ADL evaluation and planning for discharge  - Provide patient education as appropriate  2/7/2023 1358 by Sandra Velasco RN  Outcome: Progressing  2/7/2023 1358 by Sandra Velasco RN  Outcome: Progressing  Goal: Maintains/Returns to pre admission functional level  Description: INTERVENTIONS:  - Perform BMAT or MOVE assessment daily    - Set and communicate daily mobility goal to care team and patient/family/caregiver  - Collaborate with rehabilitation services on mobility goals if consulted  - Perform Range of Motion 2 times a day  - Reposition patient every 2 hours    - Dangle patient 2 times a day  - Stand patient 2 times a day  - Ambulate patient 2 times a day  - Out of bed to chair 2 times a day   - Out of bed for meals 2 times a day  - Out of bed for toileting  - Record patient progress and toleration of activity level   2/7/2023 1358 by Sandra Velasco RN  Outcome: Progressing  2/7/2023 1358 by Sandra Velasco RN  Outcome: Progressing     Problem: Prexisting or High Potential for Compromised Skin Integrity  Goal: Skin integrity is maintained or improved  Description: INTERVENTIONS:  - Identify patients at risk for skin breakdown  - Assess and monitor skin integrity  - Assess and monitor nutrition and hydration status  - Monitor labs   - Assess for incontinence   - Turn and reposition patient  - Assist with mobility/ambulation  - Relieve pressure over bony prominences  - Avoid friction and shearing  - Provide appropriate hygiene as needed including keeping skin clean and dry  - Evaluate need for skin moisturizer/barrier cream  - Collaborate with interdisciplinary team   - Patient/family teaching  - Consider wound care consult   2/7/2023 1358 by Mikie Ahumada RN  Outcome: Progressing  2/7/2023 1358 by Mikie Ahumada RN  Outcome: Progressing     Problem: Nutrition/Hydration-ADULT  Goal: Nutrient/Hydration intake appropriate for improving, restoring or maintaining nutritional needs  Description: Monitor and assess patient's nutrition/hydration status for malnutrition  Collaborate with interdisciplinary team and initiate plan and interventions as ordered  Monitor patient's weight and dietary intake as ordered or per policy  Utilize nutrition screening tool and intervene as necessary       INTERVENTIONS:  - Monitor oral intake, urinary output, labs, and treatment plans  - Assess nutrition and hydration status and recommend course of action  - Evaluate amount of meals eaten  - Assist patient with eating if necessary   - Allow adequate time for meals  - Recommend/ encourage appropriate diets, oral nutritional supplements, and vitamin/mineral supplements  - Order, calculate, and assess calorie counts as needed  - Assess need for intravenous fluids  - Provide nutrition/hydration education as appropriate  - Include patient/family/caregiver in decisions related to nutrition  2/7/2023 1358 by Mikie Ahumada RN  Outcome: Progressing  2/7/2023 1358 by Mikie Ahumada RN  Outcome: Progressing

## 2023-02-07 NOTE — ASSESSMENT & PLAN NOTE
-Pt has experienced 2 days of abdominal pain accompanied by nausea and bilious vomiting   -Last BM was morning 2/5  -CT shows "small bowel obstruction with small bowel caliber is up to 4 cm in diameter  Abrupt transition seen within the midline abdomen at the level of the umbilicus  Appearance suggesting adhesion  "  -Given pt's advanced dementia, multiple comorbidities, Niece who is her POA has stated desire for conservative treatment  Had lengthy discussion with POA today  Patient is planning to go home with home hospice tomorrow 2/8  IV fell out over the course of last night and POA does not want any IVs placed today  She understands this means she cannot get treatment for her pneumonia and it may be difficult to give pain medication and nausea medication due to the patient's dysphagia  She will have further with hospice team today regarding IV placement if necessary to help control pain, nausea, other symptoms  Patient was transitioned to [facility] for comfort care only    Plan to return home with home hospice tomorrow    -Currently NPO  -POA declined NG tube  -Zofran ordered for nausea control  -Pain regimen ordered  -Surgery is following

## 2023-02-07 NOTE — PROGRESS NOTES
[facility]  Progress Note Ling Ricostarr 10/24/1927, 80 y o  female MRN: [id number]  Unit/Bed#: W -01 Encounter: [id number]  Primary Care Provider: Joe Vidal MD   Date and time admitted to hospital: 2/5/2023  2:44 PM    SBO (small bowel obstruction) [facility])  Assessment & Plan  -2d abdominal pain, N/V  -CT showed "small bowel obstruction with small bowel caliber is up to 4 cm in diameter  Abrupt transition seen within the midline abdomen at the level of the umbilicus  Appearance suggesting adhesion  "    -management as above    [facility]  Assessment & Plan  -Cr 2 03 vs baseline 0 6, BUN 52  -BUN:Cr ration suggests pre-renal in etiology      -IV fluids stopped  ESME is resolved  Aspiration pneumonia ([facility] )  Assessment & Plan  -CT showed patchy infiltrates L lower lobe, possibly representing aspiration PNA  -On arrival WBC 22 4, procal 11 4, lactic acid 3 2 down to 2 2  -Afebrile  -Given 1g ceftriaxone in ED    -IV came out overnight last night  POA, patient's niece, wants to hold off on replacing IV at this time  She understands this means she cannot get antibiotics for her pneumonia which at this point she only received 2 days of the at least 5 days of treatment  Dementia [facility])  Assessment & Plan  -Pt has advanced dementia, per her niece she is occasionally oriented to self but not time or place   -She frequently experiences hallucinations, including seeing people in her room  Patient planning to go home with hospice tomorrow 2/8    Urinary retention  Assessment & Plan  -Pt's niece notes that she frequently retains urine at home      Chronic constipation  Assessment & Plan  -Pt takes miralax at home  -Hold until resolution of SBO    Ambulatory dysfunction  Assessment & Plan  -Ambulatory dysfunction at baseline, per niece she can walk 6-8 steps at a time with her walker if she is assisted        Hyponatremia  Assessment & Plan  -Pt has chronic hyponatremia, takes salt tabs at home      * Abdominal pain  Assessment & Plan  -Pt has experienced 2 days of abdominal pain accompanied by nausea and bilious vomiting   -Last BM was morning 2/5  -CT shows "small bowel obstruction with small bowel caliber is up to 4 cm in diameter  Abrupt transition seen within the midline abdomen at the level of the umbilicus  Appearance suggesting adhesion  "  -Given pt's advanced dementia, multiple comorbidities, Niece who is her POA has stated desire for conservative treatment  Had lengthy discussion with POA today  Patient is planning to go home with home hospice tomorrow 2/8  IV fell out over the course of last night and POA does not want any IVs placed today  She understands this means she cannot get treatment for her pneumonia and it may be difficult to give pain medication and nausea medication due to the patient's dysphagia  She will have further with hospice team today regarding IV placement if necessary to help control pain, nausea, other symptoms  Patient was transitioned to [facility] for comfort care only  Plan to return home with home hospice tomorrow    -Currently NPO  -POA declined NG tube  -Zofran ordered for nausea control  -Pain regimen ordered  -Surgery is following      VTE Pharmacologic Prophylaxis: VTE Score: 11 POA wanted heparin discontinued    Patient Centered Rounds: I performed bedside rounds with nursing staff today  Discussions with Specialists or Other Care Team Provider:     Education and Discussions with Family / Patient: Updated  (niece) at bedside  Current Length of Stay: 2 day(s)  Current Patient Status: Inpatient   Discharge Plan: Anticipate discharge tomorrow to home with home services  Code Status: Level 4 - Comfort Care    Subjective:   Patient resting comfortably in bed  She appears to be in no acute distress  Had lengthy discussion with Coral Moreira her niece, as patient's IV came out overnight    She understands this means she cannot get antibiotics for pneumonia, and it may be difficult to administer pain and nausea medications as patient has significant dysphagia and is unable to swallow pills  She is going to discuss care further with the hospice team later today  She is okay with liquid and dissolvable medications at this time  Objective:     Vitals:   Temp (24hrs), Av 7 °F (36 5 °C), Min:97 7 °F (36 5 °C), Max:97 7 °F (36 5 °C)    Temp:  [97 7 °F (36 5 °C)] 97 7 °F (36 5 °C)  HR:  [73-87] 78  Resp:  [16-18] 16  BP: (129-136)/() 132/102  SpO2:  [96 %-97 %] 96 %  Body mass index is 22 52 kg/m²  Input and Output Summary (last 24 hours): Intake/Output Summary (Last 24 hours) at 2023 1338  Last data filed at 2023 0200  Gross per 24 hour   Intake 2361 66 ml   Output --   Net 2361 66 ml       Physical Exam:   Physical Exam  Constitutional:       Appearance: She is normal weight  She is ill-appearing  Comments: Sleeping in bed   Cardiovascular:      Rate and Rhythm: Normal rate and regular rhythm  Pulmonary:      Effort: Pulmonary effort is normal  No respiratory distress  Abdominal:      General: There is distension  Musculoskeletal:      Right lower leg: No edema  Left lower leg: No edema  Skin:     General: Skin is warm and dry            Additional Data:     Labs:  Results from last 7 days   Lab Units 23  0523   WBC Thousand/uL 12 64*   HEMOGLOBIN g/dL 14 7   HEMATOCRIT % 43 0   PLATELETS Thousands/uL 289   NEUTROS PCT % 85*   LYMPHS PCT % 5*   MONOS PCT % 8   EOS PCT % 1     Results from last 7 days   Lab Units 23  0523 23  0551 23  1526   SODIUM mmol/L 138   < > 131*   POTASSIUM mmol/L 3 6   < > 5 0   CHLORIDE mmol/L 110*   < > 98   CO2 mmol/L 21   < > 19*   BUN mg/dL 42*   < > 52*   CREATININE mg/dL 0 84   < > 2 03*   ANION GAP mmol/L 7   < > 14*   CALCIUM mg/dL 8 7   < > 9 3   ALBUMIN g/dL  --   --  3 5   TOTAL BILIRUBIN mg/dL  --   -- 1 16*   ALK PHOS U/L  --   --  82   ALT U/L  --   --  18   AST U/L  --   --  25   GLUCOSE RANDOM mg/dL 123   < > 144*    < > = values in this interval not displayed  Results from last 7 days   Lab Units 02/05/23  1610   INR  1 09             Results from last 7 days   Lab Units 02/06/23  0551 02/05/23  1918 02/05/23  1610   LACTIC ACID mmol/L 1 3 2 2* 3 2*   PROCALCITONIN ng/ml 8 86*  --  11 43*       Lines/Drains:  Invasive Devices     Peripheral Intravenous Line  Duration           Peripheral IV 07/27/22 Left Antecubital 194 days    Peripheral IV 02/05/23 Right;Ventral (anterior) Forearm 1 day          Drain  Duration           External Urinary Catheter 194 days                      Imaging: No pertinent imaging reviewed  Recent Cultures (last 7 days):   Results from last 7 days   Lab Units 02/05/23  1612 02/05/23  1610   BLOOD CULTURE  No Growth at 24 hrs  No Growth at 24 hrs  Last 24 Hours Medication List:   Current Facility-Administered Medications   Medication Dose Route Frequency Provider Last Rate   • HYDROmorphone  0 2 mg Intravenous Q4H PRN Marli Rizo PA-C     • LORazepam  0 25 mg Intravenous Q15 Min PRN Marli Rizo PA-C     • LORazepam  0 25 mg Oral Q4H PRN Marli Rizo PA-C     • Morphine Sulfate (Concentrate)  2 6 mg Oral Q4H PRN Marli Rizo PA-C     • ondansetron  4 mg Oral Q6H PRN Murray Moeller DO          Today, Patient Was Seen By: Murray Moeller DO    **Please Note: This note may have been constructed using a voice recognition system  **

## 2023-02-07 NOTE — ASSESSMENT & PLAN NOTE
-CT showed patchy infiltrates L lower lobe, possibly representing aspiration PNA  -On arrival WBC 22 4, procal 11 4, lactic acid 3 2 down to 2 2  -Afebrile  -Given 1g ceftriaxone in ED    -IV came out overnight last night  POA, patient's niece, wants to hold off on replacing IV at this time  She understands this means she cannot get antibiotics for her pneumonia which at this point she only received 2 days of the at least 5 days of treatment

## 2023-02-07 NOTE — ASSESSMENT & PLAN NOTE
-Ambulatory dysfunction at baseline, per niece she can walk 6-8 steps at a time with her walker if she is assisted

## 2023-02-07 NOTE — PROGRESS NOTES
Progress Note - Palliative & Supportive Care  Carmelina Tao  80 y o   female  W /W -01   MRN: [id number]  Encounter: [id number]     Assessment  Small bowel obstruction  Aspiration pneumonia  Dementia   Hyponatremia   Chronic constipation  Goals of care counseling  Palliative care encounter    Plan  1  Symptom Management  · Patient has only required 2 doses of PRN medications: zofran and oxycodone for symptom control  · Comfort medications ordered  Patient tolerating PO medications at this time  Use IV only if patient unable to tolerate PO      • Morphine 2 6 mg PO q4 hrs PRN moderate-severe pain  • Ativan 0 25 mg PO q4 hrs PRN anxiety or dyspnea  • Zofran ODT 4 mg q4 hrs PRN nausea or vomiting    IV Medications (only if unable to tolerate PO)  • Hydromorphone 0 2 mg IV Q4H PRN moderate-severe pain   • Ativan 0 25 mg IV q15 mins PRN anxiety, agitation, dyspnea     Comfort medications sent to pharmacy  • Morphine 2 5 mg oral solution   • Ativan 0 25 mg PO    2  Goals of Care  • Level 4 code status  Niece has elected to focus on comfort at this time  She expresses clear understanding of her medical conditions and does not wish for further blood draws, transfusions, or other forms of medical optimization  She is agreeable to comfort medications  Hospice referral placed  Hospice Liaison already met with patient and obtained consents  Appropriate for home hospice  Discharge planned for tomorrow morning  CM, SLIM and nurse aware    3  Social Support  • Time spent providing supportive listening      • Niparviz Pina is close with Thompson Part  She moved in with her to help in caring for her  • Patient is  and does not have any children  She has multiple nieces, nephews, and other family members who support her  4  Follow-up  • Palliative care will continue to follow and goals of care conversations will be ongoing  Please reach out with any questions or concerns       24 Hour History  Chart reviewed before visit  Patient did pull out her IV overnight and niece did not wish for it to be replaced  IVF and IV abx not resumed over night at niece's request  Patient has used 1 dose of PRN zofran and 1 dose of PRN oxycodone in past 24 hrs  Niece has elected to focus on comfort at this time  She expresses clear understanding of her medical conditions and does not wish for further blood draws, transfusions, or other forms of medical optimization  She is agreeable to comfort medications  Hospice referral placed  Hospice Liaison already met with patient and obtained consents  Appropriate for home hospice  Discharge planned for tomorrow morning  CM, SLIM and nurse aware      Review of Systems   Unable to perform ROS: Dementia       Medications    Current Facility-Administered Medications:   •  ceftriaxone (ROCEPHIN) 1 g/50 mL in dextrose IVPB, 1,000 mg, Intravenous, Q24H, Mata Boudreaux DO, Last Rate: 100 mL/hr at 02/06/23 1606, 1,000 mg at 02/06/23 1606  •  hydrALAZINE (APRESOLINE) injection 5 mg, 5 mg, Intravenous, Q6H PRN, Nick Rodriguez, DO  •  HYDROmorphone HCl (DILAUDID) injection 0 2 mg, 0 2 mg, Intravenous, Q4H PRN, Nick Rodriguez, DO  •  ondansetron (ZOFRAN-ODT) dispersible tablet 4 mg, 4 mg, Oral, Q6H PRN, Nick Speedy, DO  •  oxyCODONE (ROXICODONE) oral solution 2 5 mg, 2 5 mg, Oral, Q4H PRN, Nick Rodriguez, , 2 5 mg at 02/06/23 1606    Objective  BP (!) 132/102   Pulse 78   Temp 97 7 °F (36 5 °C) (Axillary)   Resp 16   Wt 52 3 kg (115 lb 4 8 oz)   SpO2 96%   BMI 22 52 kg/m²   Physical Exam:   Constitutional: Appears chronically ill  Comfortable and in no acute distress  Head: Normocephalic and atraumatic  Eyes: EOM are normal  No ocular discharge  No scleral icterus  Neck: no visible adenopathy or masses  Cardiac: Normal rate   Respiratory: Effort normal  No stridor  No respiratory distress  No cough  Gastrointestinal: No abdominal distension  Musculoskeletal: No edema  Neurological: Opens eyes to voice  Skin: Dry, no diaphoresis  Psychiatric: Nonsensical speech  Unable to assess further    Lab Results: I have personally reviewed pertinent labs  Imaging Studies: I have personally reviewed pertinent reports  EKG, Pathology, and Other Studies: I have personally reviewed pertinent reports  Counseling / Coordination of Care  Total floor / unit time spent today 40 minutes  Greater than 50% of total time was spent with the patient and / or family counseling and / or coordinating of care  A description of the counseling / coordination of care: Chart reviewed, provided medical updates, determined goals of care, discussed palliative care and symptom management, discussed code status, discussed comfort care and hospice, determined competency and POA/HCA, determined social/family support, provided psychosocial support  Reviewed with SADIE EVANS and hospice liaison Renato Mcmahan PA-C  Palliative & Supportive Care    Portions of this document may have been created using dictation software and as such some "sound alike" terms may have been generated by the system  Do not hesitate to contact me with any questions or clarifications

## 2023-02-07 NOTE — ASSESSMENT & PLAN NOTE
-Cr 2 03 vs baseline 0 6, BUN 52  -BUN:Cr ration suggests pre-renal in etiology      -IV fluids stopped  ESME is resolved

## 2023-02-07 NOTE — CASE MANAGEMENT
Case Management Assessment & Discharge Planning Note    Patient name Denae Cummings  Location W /W -06 MRN [id number]  : 10/24/1927 Date 2023       Current Admission Date: 2023  Current Admission Diagnosis:Abdominal pain   Patient Active Problem List    Diagnosis Date Noted   • Abdominal pain 2023   • Aspiration pneumonia ([facility] ) 2023   • ESME (acute kidney injury) ([facility] ) 2023   • SBO (small bowel obstruction) ([facility] ) 2023   • Soft tissue lesion of pelvic region 2022   • Urinary retention 2022   • Dementia ([facility] ) 2022   • Anxiety 10/27/2021   • Elevated LFTs 02/10/2021   • Vitamin B12 deficiency 2021   • Macular degeneration 2020   • Cervical spondylosis 2020   • Hiatal hernia 2020   • Vertigo 2020   • Closed lumbar vertebral fracture ([facility] ) 2020   • Osteoarthritis 2020   • Hearing loss 2020   • Urinary frequency 2020   • Other hyperlipidemia 2020   • History of vertebral fracture 2019   • Chronic constipation 2019   • Arthritis 2019   • Closed compression fracture of L3 lumbar vertebra, initial encounter [facility] ) 2019   • Labile hypertension 2019   • Hypertensive urgency 2018   • Paroxysmal atrial fibrillation ([facility] ) 2018   • Hyponatremia 2018   • Ambulatory dysfunction 2018   • Mitral valve regurgitation 2018      LOS (days): 2  Geometric Mean LOS (GMLOS) (days): 4 60  Days to GMLOS:2 8     OBJECTIVE:    Risk of Unplanned Readmission Score: 11 13         Current admission status: Inpatient       Preferred Pharmacy:   Adrián  [address] [facility], [facility] [address] [place] [id number]  Phone: [phone] Fax: [phone]    Primary Care Provider: Emi Munoz MD    Primary Insurance: MEDICARE  Secondary Insurance: Woody Soria 20:  Pattie [employer]     Isaías Christie - Steven   Primary Phone: [phone] [facility])  Home Phone: [phone]                                                                DISCHARGE DETAILS:    Discharge planning discussed with[de-identified] Steven - Pantera Sanchez of Choice: Yes  Comments - Freedom of Choice: Latasha Nieto wishes to use [facility] for her aunt's home hospice  CM contacted family/caregiver?: Yes  Were Treatment Team discharge recommendations reviewed with patient/caregiver?: Yes  Did patient/caregiver verbalize understanding of patient care needs?: Yes  Were patient/caregiver advised of the risks associated with not following Treatment Team discharge recommendations?: Yes    Contacts  Patient Contacts: Latasha Nieto (steven)  Relationship to Patient[de-identified] Family  Contact Method: Phone  Phone Number: [phone] Yudith Lim (IRMA)  Reason/Outcome: Discharge Planning, Continuity of Care, Referral              Other Referral/Resources/Interventions Provided:  Interventions: Hospice  Referral Comments: CM spoke with Latasha Nieto, who confirmed pt's address for transportation to her home tomorrow 2/8  CM sent TT to hospice liaison with transportation requested time  Treatment Team Recommendation: Hospice  Discharge Destination Plan[de-identified] Hospice  Transport at Discharge : [facility]  Dispatcher Contacted: Yes  Number/Name of Dispatcher: Yudith Romero and Unit #):  SLETS  ETA of Transport (Date): 02/07/23  ETA of Transport (Time): 1000

## 2023-02-08 NOTE — ASSESSMENT & PLAN NOTE
-Pt has advanced dementia, per her niece she is occasionally oriented to self but not time or place   -She frequently experiences hallucinations, including seeing people in her room       Patient planning to go home with hospice today 2/8

## 2023-02-08 NOTE — DISCHARGE SUMMARY
[facility]  Discharge- Kelly Go 10/24/1927, 80 y o  female MRN: [id number]  Unit/Bed#: W -01 Encounter: [id number]  Primary Care Provider: Andrew Garcia MD   Date and time admitted to hospital: 2/5/2023  2:44 PM    SBO (small bowel obstruction) [facility])  Assessment & Plan  -2d abdominal pain, N/V  -CT showed "small bowel obstruction with small bowel caliber is up to 4 cm in diameter  Abrupt transition seen within the midline abdomen at the level of the umbilicus  Appearance suggesting adhesion  "    -management as above    ESME (acute kidney injury) ([facility] )  Assessment & Plan  -Cr 2 03 vs baseline 0 6, BUN 52  -BUN:Cr ration suggests pre-renal in etiology      -IV fluids stopped  ESME is resolved  Aspiration pneumonia ([facility] )  Assessment & Plan  -CT showed patchy infiltrates L lower lobe, possibly representing aspiration PNA  -On arrival WBC 22 4, procal 11 4, lactic acid 3 2 down to 2 2  -Afebrile  -Given 1g ceftriaxone in ED        Dementia [facility])  Assessment & Plan  -Pt has advanced dementia, per her niece she is occasionally oriented to self but not time or place   -She frequently experiences hallucinations, including seeing people in her room  Patient planning to go home with hospice today 2/8    Urinary retention  Assessment & Plan  -Pt's niece notes that she frequently retains urine at home      Chronic constipation  Assessment & Plan  -Pt takes miralax at home  -Hold until resolution of SBO    Ambulatory dysfunction  Assessment & Plan  -Ambulatory dysfunction at baseline, per niece she can walk 6-8 steps at a time with her walker if she is assisted        Hyponatremia  Assessment & Plan  -Pt has chronic hyponatremia, takes salt tabs at home      * Abdominal pain  Assessment & Plan  -Pt has experienced 2 days of abdominal pain accompanied by nausea and bilious vomiting   -Last BM was morning 2/5  -CT shows "small bowel obstruction with small bowel caliber is up to 4 cm in diameter  Abrupt transition seen within the midline abdomen at the level of the umbilicus  Appearance suggesting adhesion  "  -Given pt's advanced dementia, multiple comorbidities, Niece who is her POA has stated desire for conservative treatment  Had lengthy discussion with POA today  Patient is planning to go home with home hospice tomorrow 2/8  IV fell out over the course of last night and POA does not want any IVs placed today  She understands this means she cannot get treatment for her pneumonia and it may be difficult to give pain medication and nausea medication due to the patient's dysphagia  She will have further with hospice team today regarding IV placement if necessary to help control pain, nausea, other symptoms  Patient was transitioned to [facility] for comfort care only  Plan to return home with home hospice today    -Zofran ordered for nausea control  -Pain regimen ordered        Medical Problems     Resolved Problems  Date Reviewed: 2/7/2023   None       Discharging Resident: Deja Carter DO  Discharging Attending: Jc Topete MD  PCP: Lydia Ladd MD  Admission Date:   Admission Orders (From admission, onward)     Ordered        02/05/23 1947  INPATIENT ADMISSION  Once                      Discharge Date: 02/08/23    Consultations During Hospital Stay:  · Surgery, palliative care, hospice    Procedures Performed:   · CT abdomen: Small bowel obstruction with small bowel caliber up to 4 cm in diameter was appearance suggestive of adhesions  Patchy infiltrates in left lower lobe with possible aspiration  Enlarging unilocular appearing right pelvic cyst of undetermined etiology    Significant Findings / Test Results:   · As above    Incidental Findings:   · None    Test Results Pending at Discharge (will require follow up):   · None     Outpatient Tests Requested:  · none    Complications: None    Reason for Admission: Small bowel obstruction    Hospital Course:   Tiara Jiménez is a 80 y o  female patient who originally presented to the hospital on 2/5/2023 due to decreased appetite, abdominal pain, nausea and vomiting  She was found to have a small bowel obstruction on CT scan, likely secondary to adhesions from previous surgeries  She was also seen to have patchy infiltrates in the left lower lobe, possibly secondary to aspiration  Patient has progressive dementia, often not even oriented to person  Her POA, niece Rubi Carter, was present throughout her entire hospital stay  She was initially started on antibiotics and kept n p o  as the POA did not want surgical intervention or an NG tube  Palliative care was,  After much discussion with the POA, palliative care, who is decided that the patient should be made level 4 comfort care only  She was discharged home with home hospice  Please see above list of diagnoses and related plan for additional information  Condition at Discharge: terminal    Discharge Day Visit / Exam:   Subjective: Patient resting in bed  She appears to be in no acute distress  Vitals: Blood Pressure: (!) 132/102 (02/07/23 0820)  Pulse: 98 (02/07/23 2227)  Temperature: 97 7 °F (36 5 °C) (02/06/23 1546)  Temp Source: Axillary (02/06/23 1546)  Respirations: 18 (02/07/23 2227)  Weight - Scale: 52 3 kg (115 lb 4 8 oz) (02/05/23 2122)  SpO2: 98 % (02/07/23 2227)  Exam:   Physical Exam  Constitutional:       General: She is not in acute distress  Appearance: She is normal weight  She is ill-appearing  Comments: sleeping   Cardiovascular:      Rate and Rhythm: Normal rate  Pulmonary:      Effort: Pulmonary effort is normal  No respiratory distress  Skin:     General: Skin is warm and dry  Discussion with Family: Updated  (niece) at bedside  Discharge instructions/Information to patient and family:   See after visit summary for information provided to patient and family        Provisions for Follow-Up Care:  See after visit summary for information related to follow-up care and any pertinent home health orders  Disposition:   Other: Home with home hospice    Planned Readmission: no    Discharge Medications:  See after visit summary for reconciled discharge medications provided to patient and/or family        **Please Note: This note may have been constructed using a voice recognition system**

## 2023-02-08 NOTE — DISCHARGE INSTR - AVS FIRST PAGE
Dear Carito Felt,     It was our pleasure to care for you here at [facility]  It is our hope that we were always able to exceed the expected standards for your care during your stay  You were hospitalized due to small bowel obstruction  You were cared for on the [facility] 4 floor by Conor Mckeon DO under the service of Mark Watson MD with the [facility] who covers for your primary care physician (PCP), Naz Baez MD, while you were hospitalized  If you have any questions or concerns related to this hospitalization, you may contact us at [phone]  For follow up as well as any medication refills, we recommend that you follow up with your primary care physician  A registered nurse will reach out to you by phone within a few days after your discharge to answer any additional questions that you may have after going home  However, at this time we provide for you here, the most important instructions / recommendations at discharge:     Notable Medication Adjustments -   Use medication provided by hospice team at your discretion for pain, nausea, anxiety control  May stop all medication that are not directed at your comfort  Testing Required after Discharge -   None  Important follow up information -   None  Other Instructions -   None  Please review this entire after visit summary as additional general instructions including medication list, appointments, activity, diet, any pertinent wound care, and other additional recommendations from your care team that may be provided for you        Sincerely,     Conor Mckeon DO

## 2023-02-08 NOTE — ASSESSMENT & PLAN NOTE
-Pt has experienced 2 days of abdominal pain accompanied by nausea and bilious vomiting   -Last BM was morning 2/5  -CT shows "small bowel obstruction with small bowel caliber is up to 4 cm in diameter  Abrupt transition seen within the midline abdomen at the level of the umbilicus  Appearance suggesting adhesion  "  -Given pt's advanced dementia, multiple comorbidities, Niece who is her POA has stated desire for conservative treatment  Had lengthy discussion with POA today  Patient is planning to go home with home hospice tomorrow 2/8  IV fell out over the course of last night and POA does not want any IVs placed today  She understands this means she cannot get treatment for her pneumonia and it may be difficult to give pain medication and nausea medication due to the patient's dysphagia  She will have further with hospice team today regarding IV placement if necessary to help control pain, nausea, other symptoms  Patient was transitioned to [facility] for comfort care only    Plan to return home with home hospice today    -Zofran ordered for nausea control  -Pain regimen ordered

## 2023-02-08 NOTE — ASSESSMENT & PLAN NOTE
-CT showed patchy infiltrates L lower lobe, possibly representing aspiration PNA  -On arrival WBC 22 4, procal 11 4, lactic acid 3 2 down to 2 2  -Afebrile  -Given 1g ceftriaxone in ED

## 2023-02-11 LAB
BACTERIA BLD CULT: NORMAL
BACTERIA BLD CULT: NORMAL

## 2023-02-12 ENCOUNTER — HOME CARE VISIT (OUTPATIENT)
Dept: HOME HOSPICE | Facility: HOSPICE | Age: 88
End: 2023-02-12

## 2023-02-16 ENCOUNTER — HOME CARE VISIT (OUTPATIENT)
Dept: HOME HOSPICE | Facility: HOSPICE | Age: 88
End: 2023-02-16

## 2023-02-16 NOTE — CASE COMMUNICATION
Gladis L  Genny, Bereavement Final IDG 23 (1MR) Due: 23  : 10/24/1927  SOC: 23  DOD: 2/10/23  Diagnosis: Dementia, Bowel Obstruction  Primary: Niece - Early Tk was a 80year old woman   twice and reportedly sick most of her life  Numerous surgeries  At one point she was told she would not walk again, but proved the doctors wrong  Great niece Carmencita Juares visited with her infant son  She declined bereav ement follow-up  Ernesto Crabtree was POA  Shared her aunt experienced a gradual decline and had been on palliative care with [facility] first, which "cushioned the feeling of loss " Jazlyn Crabtree stated she had some family assisting her with care  Jazlyn Crabtree is assessed at   It was mentioned that she experienced anxiety around trying to make Gladis comfortable  She tended to second-guess herself  Shared losing her spouse and having similar feelings in s eeing Gladis die  TOD: Support declined at TOD  Family is grieving appropriately and aware bereavement will follow  CC: Condolence call was made to Jazlyn Leyda  Voiced catching up on sleep and having help from her sons  Stated she is doing okay and 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

## 2024-03-11 NOTE — ASSESSMENT & PLAN NOTE
BP remains labile  Taking hydralazine tid, carvedilol bid and irbesartan qHS  Greg Aly  Otolaryngology  [address] [place] [address], [address]  [place] [address]  Phone: [phone]  Fax: [phone]  Follow Up Time:

## 2024-04-16 NOTE — PLAN OF CARE
Discharge instructions reviewed with pt and family member who verbalize understanding of follow up care.     Problem: Potential for Falls  Goal: Patient will remain free of falls  Description  INTERVENTIONS:  - Assess patient frequently for physical needs  -  Identify cognitive and physical deficits and behaviors that affect risk of falls    -  [place] fall precautions as indicated by assessment   - Educate patient/family on patient safety including physical limitations  - Instruct patient to call for assistance with activity based on assessment  - Modify environment to reduce risk of injury  - Consider OT/PT consult to assist with strengthening/mobility  Outcome: Progressing     Problem: GENITOURINARY - ADULT  Goal: Maintains or returns to baseline urinary function  Description  INTERVENTIONS:  - Assess urinary function  - Encourage oral fluids to ensure adequate hydration  - Administer IV fluids as ordered to ensure adequate hydration  - Administer ordered medications as needed  - Offer frequent toileting  - Follow urinary retention protocol if ordered  Outcome: Progressing  Goal: Absence of urinary retention  Description  INTERVENTIONS:  - Assess patients ability to void and empty bladder  - Monitor I/O  - Bladder scan as needed  - Discuss with physician/AP medications to alleviate retention as needed  - Discuss catheterization for long term situations as appropriate  Outcome: Progressing     Problem: METABOLIC, FLUID AND ELECTROLYTES - ADULT  Goal: Electrolytes maintained within normal limits  Description  INTERVENTIONS:  - Monitor labs and assess patient for signs and symptoms of electrolyte imbalances  - Administer electrolyte replacement as ordered  - Monitor response to electrolyte replacements, including repeat lab results as appropriate  - Instruct patient on fluid and nutrition as appropriate  Outcome: Progressing     Problem: Prexisting or High Potential for Compromised Skin Integrity  Goal: Skin integrity is maintained or improved  Description  INTERVENTIONS:  - Identify patients at risk for skin breakdown  - Assess and monitor skin integrity  - Assess and monitor nutrition and hydration status  - Monitor labs (i e  albumin)  - Assess for incontinence   - Turn and reposition patient  - Assist with mobility/ambulation  - Relieve pressure over bony prominences  - Avoid friction and shearing  - Provide appropriate hygiene as needed including keeping skin clean and dry  - Evaluate need for skin moisturizer/barrier cream  - Collaborate with interdisciplinary team (i e  Nutrition, Rehabilitation, etc )   - Patient/family teaching  Outcome: Progressing     Problem: PAIN - ADULT  Goal: Verbalizes/displays adequate comfort level or baseline comfort level  Description  Interventions:  - Encourage patient to monitor pain and request assistance  - Assess pain using appropriate pain scale  - Administer analgesics based on type and severity of pain and evaluate response  - Implement non-pharmacological measures as appropriate and evaluate response  - Consider cultural and social influences on pain and pain management  - Notify physician/advanced practitioner if interventions unsuccessful or patient reports new pain  Outcome: Progressing     Problem: DISCHARGE PLANNING  Goal: Discharge to home or other facility with appropriate resources  Description  INTERVENTIONS:  - Identify barriers to discharge w/patient and caregiver  - Arrange for needed discharge resources and transportation as appropriate  - Identify discharge learning needs (meds, wound care, etc )  - Arrange for interpretive services to assist at discharge as needed  - Refer to Case Management Department for coordinating discharge planning if the patient needs post-hospital services based on physician/advanced practitioner order or complex needs related to functional status, cognitive ability, or social support system  Outcome: Progressing     Problem: Knowledge Deficit  Goal: Patient/family/caregiver demonstrates understanding of disease process, treatment plan, medications, and discharge instructions  Description  Complete learning assessment and assess knowledge base    Interventions:  - Provide teaching at level of understanding  - Provide teaching via preferred learning methods  Outcome: Progressing     Problem: RESPIRATORY - ADULT  Goal: Achieves optimal ventilation and oxygenation  Description  INTERVENTIONS:  - Assess for changes in respiratory status  - Assess for changes in mentation and behavior  - Position to facilitate oxygenation and minimize respiratory effort  - Oxygen administration by appropriate delivery method based on oxygen saturation (per order) or ABGs  - Initiate smoking cessation education as indicated  - Encourage broncho-pulmonary hygiene including cough, deep breathe, Incentive Spirometry  - Assess the need for suctioning and aspirate as needed  - Assess and instruct to report SOB or any respiratory difficulty  - Respiratory Therapy support as indicated  Outcome: Progressing     Problem: MUSCULOSKELETAL - ADULT  Goal: Maintain or return mobility to safest level of function  Description  INTERVENTIONS:  - Assess patient's ability to carry out ADLs; assess patient's baseline for ADL function and identify physical deficits which impact ability to perform ADLs (bathing, care of mouth/teeth, toileting, grooming, dressing, etc )  - Assess/evaluate cause of self-care deficits   - Assess range of motion  - Assess patient's mobility; develop plan if impaired  - Assess patient's need for assistive devices and provide as appropriate  - Encourage maximum independence but intervene and supervise when necessary  - Involve family in performance of ADLs  - Assess for home care needs following discharge   - Request OT consult to assist with ADL evaluation and planning for discharge  - Provide patient education as appropriate  Outcome: Progressing

## 2024-06-03 NOTE — SOCIAL WORK
Cm met with pt and niparviz to inform them [facility] is the facility that will allow niece to stay overnight however they do not have a private room  [facility] and [facility] do not have private rooms  Niece requested one other referral be made to [facility]  Cm made referral   Cm will follow up continue to assist with planning  VTE Present on Admission, Assessment Completed on: 03-Jun-2024 19:15